# Patient Record
Sex: FEMALE | Race: WHITE | NOT HISPANIC OR LATINO | Employment: PART TIME | URBAN - METROPOLITAN AREA
[De-identification: names, ages, dates, MRNs, and addresses within clinical notes are randomized per-mention and may not be internally consistent; named-entity substitution may affect disease eponyms.]

---

## 2018-06-18 RX ORDER — LEVOTHYROXINE SODIUM 0.03 MG/1
1 TABLET ORAL DAILY
COMMUNITY
Start: 2014-11-17 | End: 2018-06-25

## 2018-06-25 ENCOUNTER — OFFICE VISIT (OUTPATIENT)
Dept: FAMILY MEDICINE CLINIC | Facility: HOSPITAL | Age: 63
End: 2018-06-25
Payer: COMMERCIAL

## 2018-06-25 VITALS
HEART RATE: 66 BPM | TEMPERATURE: 97.5 F | HEIGHT: 68 IN | DIASTOLIC BLOOD PRESSURE: 88 MMHG | BODY MASS INDEX: 22.73 KG/M2 | SYSTOLIC BLOOD PRESSURE: 136 MMHG | WEIGHT: 150 LBS

## 2018-06-25 DIAGNOSIS — Z13.820 SCREENING FOR OSTEOPOROSIS: ICD-10-CM

## 2018-06-25 DIAGNOSIS — Z00.00 ENCOUNTER FOR ANNUAL PHYSICAL EXAM: Primary | ICD-10-CM

## 2018-06-25 DIAGNOSIS — Z12.11 SCREENING FOR COLON CANCER: ICD-10-CM

## 2018-06-25 DIAGNOSIS — E78.5 DYSLIPIDEMIA: ICD-10-CM

## 2018-06-25 DIAGNOSIS — E04.2 NON-TOXIC MULTINODULAR GOITER: ICD-10-CM

## 2018-06-25 DIAGNOSIS — E06.3 HASHIMOTO'S DISEASE: ICD-10-CM

## 2018-06-25 DIAGNOSIS — Z12.31 ENCOUNTER FOR SCREENING MAMMOGRAM FOR BREAST CANCER: ICD-10-CM

## 2018-06-25 PROBLEM — H81.10 BENIGN PAROXYSMAL POSITIONAL VERTIGO: Status: RESOLVED | Noted: 2018-06-25 | Resolved: 2018-06-25

## 2018-06-25 PROBLEM — S52.90XA FRACTURE OF RADIUS: Status: RESOLVED | Noted: 2018-06-25 | Resolved: 2018-06-25

## 2018-06-25 PROCEDURE — 99386 PREV VISIT NEW AGE 40-64: CPT | Performed by: INTERNAL MEDICINE

## 2018-06-25 RX ORDER — CALCIUM GLUCONATE 45(500) MG
1000 TABLET ORAL DAILY
Status: ON HOLD | COMMUNITY
End: 2022-05-19 | Stop reason: CLARIF

## 2018-06-25 RX ORDER — MULTIVITAMIN WITH IRON
3 TABLET ORAL DAILY
COMMUNITY

## 2018-06-25 NOTE — PROGRESS NOTES
Patient is here to re-establish care and for a routine physical exam   Last physical was about 4-5 yrs ago    Health Maintenance:  BW - not done in years    Mammo - last one 2014, she notes no h/o abnormal mammograms, denies current breast symptoms    Dexa - never had one done, no known family h/o osteoporosis, did have wrist fracture after fall on ice at age 49    PAP - has not seen GYN in years, had h/o abnormal PAPs x 1 but that was years ago and has been normal since then    Colonoscopy - pt never had one, is deferring colo but will do FOBT    Diet/exercise - eats a well balanced diet with fruits and vegetables, exercises regularly at home and has a strenuous job    Dentist - every 6 mos, had a tooth pulled recently but denies any symptoms today    Eye Exam - wears glasses most of the time, last eye exam 2 yrs ago    BP up on presentation today  She states she is here with her dgtr and is very worried about her today  Pt has not been taking her Levothyroxine for years  She has switched over to holistic thyroid meds by Dr Soni Christine  He recently moved and she has not had thyroid levels checked in about a year  The med contains thytrophin, Cataplex F, Adrenal Complex, and thyroid complex    Review of Systems   Constitutional: Negative for chills, fatigue, fever and unexpected weight change  HENT: Negative for congestion and sore throat  Eyes: Negative for pain and discharge  Respiratory: Negative for shortness of breath and wheezing  Cardiovascular: Negative for chest pain, palpitations and leg swelling  Gastrointestinal: Negative for abdominal pain, blood in stool, constipation, diarrhea and nausea  Endocrine: Negative for polydipsia and polyuria  Genitourinary: Negative for difficulty urinating and dysuria  Musculoskeletal: Negative for back pain and neck pain  Skin: Negative for rash and wound  Neurological: Negative for dizziness, syncope, light-headedness and headaches  Hematological: Negative for adenopathy  Psychiatric/Behavioral: Negative for behavioral problems and confusion  Social History     Social History    Marital status: Single     Spouse name: N/A    Number of children: N/A    Years of education: N/A     Occupational History    Not on file  Social History Main Topics    Smoking status: Never Smoker    Smokeless tobacco: Never Used    Alcohol use Yes      Comment: social    Drug use: No    Sexual activity: Not on file     Other Topics Concern    Not on file     Social History Narrative    Single, lives alone, part-time employment in [employer]         family history includes Alcohol abuse in her father; Breast cancer in her maternal aunt and mother; COPD in her father; Other in her father; Stroke in her mother; Thyroid cancer in her brother; Thyroid disease in her maternal grandmother      has a past medical history of Benign paroxysmal positional vertigo and Fracture of radius  No Known Allergies      There is no immunization history on file for this patient  Vitals:    06/25/18 1014   BP: 136/88   Pulse:    Temp:      Physical Exam   Constitutional: She appears well-developed and well-nourished  No distress  HENT:   Head: Atraumatic  Left Ear: External ear normal    Mouth/Throat: Oropharynx is clear and moist    Scarring R TM   Eyes: Conjunctivae are normal  Right eye exhibits no discharge  Left eye exhibits no discharge  Neck: Neck supple  No tracheal deviation present  R lower pole of thyroid gland enlarged   Cardiovascular: Normal rate, regular rhythm and normal heart sounds  No murmur heard  Pulmonary/Chest: Effort normal and breath sounds normal  She has no wheezes  She has no rales  Abdominal: Soft  She exhibits no distension  There is no tenderness  Musculoskeletal: She exhibits no edema or deformity  Neurological: She is alert  She exhibits normal muscle tone  Skin: Skin is warm and dry  No rash noted  Psychiatric: She has a normal mood and affect  Her behavior is normal  Judgment normal    Nursing note and vitals reviewed  Edie House was seen today for establish care  Diagnoses and all orders for this visit:    Encounter for annual physical exam        -  Healthy diet, regular exercise encouraged        -  Urged to make appt for eye exam     Encounter for screening mammogram for breast cancer  -     Mammo screening bilateral w cad; Future    Screening for osteoporosis  -     DXA bone density spine hip and pelvis; Future    Screening for colon cancer  -     Occult Bloood,Fecal Immunochemical; Future (refused colonoscopy)    Dyslipidemia  -     CBC and differential  -     Comprehensive metabolic panel  -     Lipid panel    Non-toxic multinodular goiter - thyroid enlarged at R lower pole - urged to do US and labs  -     CBC and differential  -     Comprehensive metabolic panel  -     TSH, 3rd generation; Future  -     T4, free; Future  -     US thyroid; Future  -     TSH, 3rd generation  -     T4, free    Hashimoto's disease  -     CBC and differential  -     Comprehensive metabolic panel  -     TSH, 3rd generation; Future  -     T4, free;  Future  -     TSH, 3rd generation  -     T4, free    Will make appt with Edgar Alcocer or Tobias Buitrago for PAP    Return in about 1 year (around 6/25/2019) for Annual physical

## 2018-06-25 NOTE — PATIENT INSTRUCTIONS

## 2018-06-25 NOTE — PROGRESS NOTES
Assessment/Plan:    No problem-specific Assessment & Plan notes found for this encounter  There are no diagnoses linked to this encounter  Subjective:      Patient ID: Masha Phillips is a 65 y o  female      HPI Pt here to re-establish care - not seen in over 3 yrs            Review of Systems      Objective:      /88   Pulse 66   Temp 97 5 °F (36 4 °C)   Ht 5' 8" (1 727 m)   Wt 68 kg (150 lb)   BMI 22 81 kg/m²          Physical Exam

## 2018-06-29 DIAGNOSIS — R79.89 ELEVATED TSH: Primary | ICD-10-CM

## 2018-06-29 LAB
ALBUMIN SERPL-MCNC: 4.4 G/DL (ref 3.6–4.8)
ALBUMIN/GLOB SERPL: 1.6 {RATIO} (ref 1.2–2.2)
ALP SERPL-CCNC: 92 IU/L (ref 39–117)
ALT SERPL-CCNC: 15 IU/L (ref 0–32)
AST SERPL-CCNC: 37 IU/L (ref 0–40)
BASOPHILS # BLD AUTO: 0 X10E3/UL (ref 0–0.2)
BASOPHILS NFR BLD AUTO: 1 %
BILIRUB SERPL-MCNC: 0.4 MG/DL (ref 0–1.2)
BUN SERPL-MCNC: 15 MG/DL (ref 8–27)
BUN/CREAT SERPL: 17 (ref 12–28)
CALCIUM SERPL-MCNC: 9.8 MG/DL (ref 8.7–10.3)
CHLORIDE SERPL-SCNC: 98 MMOL/L (ref 96–106)
CHOLEST SERPL-MCNC: 258 MG/DL (ref 100–199)
CHOLEST/HDLC SERPL: 3 RATIO (ref 0–4.4)
CO2 SERPL-SCNC: 24 MMOL/L (ref 20–29)
CREAT SERPL-MCNC: 0.89 MG/DL (ref 0.57–1)
EOSINOPHIL # BLD AUTO: 0.1 X10E3/UL (ref 0–0.4)
EOSINOPHIL NFR BLD AUTO: 3 %
ERYTHROCYTE [DISTWIDTH] IN BLOOD BY AUTOMATED COUNT: 13.7 % (ref 12.3–15.4)
GLOBULIN SER-MCNC: 2.8 G/DL (ref 1.5–4.5)
GLUCOSE SERPL-MCNC: 94 MG/DL (ref 65–99)
HCT VFR BLD AUTO: 41.7 % (ref 34–46.6)
HDLC SERPL-MCNC: 85 MG/DL
HGB BLD-MCNC: 13.7 G/DL (ref 11.1–15.9)
IMM GRANULOCYTES # BLD: 0 X10E3/UL (ref 0–0.1)
IMM GRANULOCYTES NFR BLD: 0 %
LDLC SERPL CALC-MCNC: 155 MG/DL (ref 0–99)
LYMPHOCYTES # BLD AUTO: 2 X10E3/UL (ref 0.7–3.1)
LYMPHOCYTES NFR BLD AUTO: 47 %
MCH RBC QN AUTO: 29 PG (ref 26.6–33)
MCHC RBC AUTO-ENTMCNC: 32.9 G/DL (ref 31.5–35.7)
MCV RBC AUTO: 88 FL (ref 79–97)
MONOCYTES # BLD AUTO: 0.3 X10E3/UL (ref 0.1–0.9)
MONOCYTES NFR BLD AUTO: 7 %
NEUTROPHILS # BLD AUTO: 1.9 X10E3/UL (ref 1.4–7)
NEUTROPHILS NFR BLD AUTO: 42 %
PLATELET # BLD AUTO: 257 X10E3/UL (ref 150–379)
POTASSIUM SERPL-SCNC: 4.1 MMOL/L (ref 3.5–5.2)
PROT SERPL-MCNC: 7.2 G/DL (ref 6–8.5)
RBC # BLD AUTO: 4.73 X10E6/UL (ref 3.77–5.28)
SL AMB EGFR AFRICAN AMERICAN: 80 ML/MIN/1.73
SL AMB EGFR NON AFRICAN AMERICAN: 70 ML/MIN/1.73
SL AMB VLDL CHOLESTEROL CALC: 18 MG/DL (ref 5–40)
SODIUM SERPL-SCNC: 139 MMOL/L (ref 134–144)
T4 FREE SERPL-MCNC: 1 NG/DL (ref 0.82–1.77)
TRIGL SERPL-MCNC: 88 MG/DL (ref 0–149)
TSH SERPL DL<=0.005 MIU/L-ACNC: 4.76 UIU/ML (ref 0.45–4.5)
WBC # BLD AUTO: 4.4 X10E3/UL (ref 3.4–10.8)

## 2018-07-09 ENCOUNTER — HOSPITAL ENCOUNTER (OUTPATIENT)
Dept: ULTRASOUND IMAGING | Facility: HOSPITAL | Age: 63
Discharge: HOME | End: 2018-07-09
Payer: COMMERCIAL

## 2018-07-09 DIAGNOSIS — E04.2 NON-TOXIC MULTINODULAR GOITER: ICD-10-CM

## 2018-07-09 PROCEDURE — 76536 US EXAM OF HEAD AND NECK: CPT

## 2018-07-12 ENCOUNTER — ANNUAL EXAM (OUTPATIENT)
Dept: FAMILY MEDICINE CLINIC | Facility: HOSPITAL | Age: 63
End: 2018-07-12
Payer: COMMERCIAL

## 2018-07-12 VITALS
HEIGHT: 67 IN | DIASTOLIC BLOOD PRESSURE: 86 MMHG | SYSTOLIC BLOOD PRESSURE: 132 MMHG | HEART RATE: 68 BPM | BODY MASS INDEX: 23.48 KG/M2 | WEIGHT: 149.6 LBS | TEMPERATURE: 97.4 F

## 2018-07-12 DIAGNOSIS — Z01.419 ENCNTR FOR GYN EXAM (GENERAL) (ROUTINE) W/O ABN FINDINGS: ICD-10-CM

## 2018-07-12 DIAGNOSIS — Z12.11 SCREEN FOR COLON CANCER: Primary | ICD-10-CM

## 2018-07-12 PROCEDURE — G0124 SCREEN C/V THIN LAYER BY MD: HCPCS | Performed by: PATHOLOGY

## 2018-07-12 PROCEDURE — 99214 OFFICE O/P EST MOD 30 MIN: CPT | Performed by: NURSE PRACTITIONER

## 2018-07-12 PROCEDURE — 87624 HPV HI-RISK TYP POOLED RSLT: CPT | Performed by: NURSE PRACTITIONER

## 2018-07-12 PROCEDURE — S0612 ANNUAL GYNECOLOGICAL EXAMINA: HCPCS | Performed by: NURSE PRACTITIONER

## 2018-07-12 PROCEDURE — G0145 SCR C/V CYTO,THINLAYER,RESCR: HCPCS | Performed by: PATHOLOGY

## 2018-07-12 NOTE — PROGRESS NOTES
Pt is a59 y o  No obstetric history on file  ,menopausal, who presents for preventive care    Calcium: Yes  Vitamin D: yes  Exercise: Yes  Colonoscopy: Refuses  Has order for fecal occult blood screen  DEXA: Scheduled for tomorrow  Mammo: Scheduled for tomorrow  tobacco use: No         Past Medical History:   Diagnosis Date    Benign paroxysmal positional vertigo     Resolved: 9/15/2014    Fracture of radius        Past Surgical History:   Procedure Laterality Date    BIOPSY CORE NEEDLE      Thyroid     SECTION      COLPOSCOPY         Ob Hx:   OB History    Para Term  AB Living   2 2 2     2   SAB TAB Ectopic Multiple Live Births                  # Outcome Date GA Lbr Markos/2nd Weight Sex Delivery Anes PTL Lv   2 Term            1 Term                   Gyn HX:  abnormal pap    H/o abnormal pap with colposcopy less than 10 years ago  Colposcopy was normal  Last pap 4-5 years ago was normal    Mom with breast cancer diagnosed at age 79  Last mammo 3 years ago  Gets mammo tomorrow  Refuses colonoscopy  Has order for heme occult  Occasional intercourse  Denies issues with intercourse  Some vaginal dryness  Last period   Gets dexa tomorrow           Gynecologic Exam         Current Outpatient Prescriptions:     calcium gluconate 500 mg tablet, Take 500 mg by mouth daily, Disp: , Rfl:     Magnesium 250 MG TABS, Take 1 tablet by mouth daily, Disp: , Rfl:     Multiple Vitamin (MULTIVITAMINS PO), Take 1 capsule by mouth daily, Disp: , Rfl:     No Known Allergies    Social History     Social History    Marital status: Single     Spouse name: N/A    Number of children: N/A    Years of education: N/A     Social History Main Topics    Smoking status: Never Smoker    Smokeless tobacco: Never Used    Alcohol use Yes      Comment: social    Drug use: No    Sexual activity: Not Asked     Other Topics Concern    None     Social History Narrative    Single, lives alone, part-time employment in [employer]           Family History   Problem Relation Age of Onset    Stroke Mother         CVA    Breast cancer Mother     Alcohol abuse Father     COPD Father     Other Father         Nicotine Abuse    Thyroid cancer Brother     Thyroid disease Maternal Grandmother     Breast cancer Maternal Aunt        ROS:  Genito-Urinary ROS: no dysuria, trouble voiding, or hematuria    Review of Systems   Constitutional: Negative  Respiratory: Negative  Cardiovascular: Negative  Gastrointestinal: Negative  Genitourinary: Negative  Physical Exam   Constitutional: She is oriented to person, place, and time  She appears well-developed and well-nourished  Cardiovascular: Normal rate, regular rhythm and normal heart sounds  Pulmonary/Chest: Effort normal and breath sounds normal    Abdominal: Soft  Bowel sounds are normal  There is no tenderness  No hernia  Genitourinary: Rectum normal, vagina normal and uterus normal  No labial fusion  There is no rash, tenderness, lesion or injury on the right labia  There is no rash, tenderness, lesion or injury on the left labia  Cervix exhibits no motion tenderness, no discharge and no friability  Genitourinary Comments: Ovaries not palpated  Neurological: She is alert and oriented to person, place, and time  Skin: Skin is warm and dry  Capillary refill takes less than 2 seconds  Psychiatric: She has a normal mood and affect  A/P:  Pt is a 59 y o  No obstetric history on file  with       Katie Fer was seen today for gynecologic exam     Diagnoses and all orders for this visit:    Screen for colon cancer    Encntr for gyn exam (general) (routine) w/o abn findings  -     Liquid-based pap, screening    Other orders  -     Cancel: Ambulatory referral to Gastroenterology;  Future      F/U in 1 year for GYN exam

## 2018-07-13 ENCOUNTER — HOSPITAL ENCOUNTER (OUTPATIENT)
Dept: BONE DENSITY | Facility: IMAGING CENTER | Age: 63
Discharge: HOME | End: 2018-07-13
Payer: COMMERCIAL

## 2018-07-13 DIAGNOSIS — Z13.820 SCREENING FOR OSTEOPOROSIS: ICD-10-CM

## 2018-07-13 DIAGNOSIS — Z12.31 ENCOUNTER FOR SCREENING MAMMOGRAM FOR BREAST CANCER: ICD-10-CM

## 2018-07-13 PROCEDURE — 77080 DXA BONE DENSITY AXIAL: CPT

## 2018-07-13 PROCEDURE — 77067 SCR MAMMO BI INCL CAD: CPT

## 2018-07-16 LAB — HPV RRNA GENITAL QL NAA+PROBE: NORMAL

## 2018-07-20 LAB
LAB AP GYN PRIMARY INTERPRETATION: NORMAL
LAB AP LMP: NORMAL
Lab: NORMAL
PATH INTERP SPEC-IMP: NORMAL

## 2018-12-14 ENCOUNTER — OFFICE VISIT (OUTPATIENT)
Dept: FAMILY MEDICINE CLINIC | Facility: HOSPITAL | Age: 63
End: 2018-12-14
Payer: COMMERCIAL

## 2018-12-14 VITALS
BODY MASS INDEX: 24.71 KG/M2 | HEIGHT: 67 IN | TEMPERATURE: 97.5 F | DIASTOLIC BLOOD PRESSURE: 64 MMHG | SYSTOLIC BLOOD PRESSURE: 110 MMHG | OXYGEN SATURATION: 97 % | WEIGHT: 157.4 LBS | HEART RATE: 69 BPM

## 2018-12-14 DIAGNOSIS — J01.40 ACUTE NON-RECURRENT PANSINUSITIS: Primary | ICD-10-CM

## 2018-12-14 PROCEDURE — 3008F BODY MASS INDEX DOCD: CPT | Performed by: NURSE PRACTITIONER

## 2018-12-14 PROCEDURE — 99213 OFFICE O/P EST LOW 20 MIN: CPT | Performed by: NURSE PRACTITIONER

## 2018-12-14 PROCEDURE — 1036F TOBACCO NON-USER: CPT | Performed by: NURSE PRACTITIONER

## 2018-12-14 RX ORDER — AMOXICILLIN 500 MG/1
1000 CAPSULE ORAL EVERY 12 HOURS SCHEDULED
Qty: 40 CAPSULE | Refills: 0 | Status: SHIPPED | OUTPATIENT
Start: 2018-12-14 | End: 2018-12-24

## 2018-12-14 RX ORDER — FLUTICASONE PROPIONATE 50 MCG
SPRAY, SUSPENSION (ML) NASAL
Qty: 16 G | Refills: 0 | Status: SHIPPED | OUTPATIENT
Start: 2018-12-14 | End: 2019-02-14 | Stop reason: SDUPTHER

## 2018-12-14 NOTE — PROGRESS NOTES
Assessment/Plan:    No problem-specific Assessment & Plan notes found for this encounter  Diagnoses and all orders for this visit:    Acute non-recurrent pansinusitis  Comments:  given persistence/worsening, will issue antibiotic and nasal steroid; hold further OTC nasal spray, rest & push fluids  Orders:  -     amoxicillin (AMOXIL) 500 mg capsule; Take 2 capsules (1,000 mg total) by mouth every 12 (twelve) hours for 10 days  -     fluticasone (FLONASE) 50 mcg/act nasal spray; Use 2 sprays in each nostril at bedtime    Other orders  -     THYROID PO; Take by mouth  -     Misc Natural Products (ADRENAL PO); Take by mouth          Subjective:      Patient ID: Genoveva Edwards is a 59 y o  female here for an acute visit  Has had head/sinus pressure for past few weeks  Feels much worse today  Hurts to bend forward and do her sewing  Has tried sinex nasal spray, eye drops, and sudafed without relief  Eye MD suspected sinus infection this week  The following portions of the patient's history were reviewed and updated as appropriate: allergies, current medications, past medical history, past social history and problem list     Review of Systems   Constitutional: Positive for chills (last week)  Negative for activity change, appetite change and fever  HENT: Positive for congestion, ear pain, sinus pain and sinus pressure  Negative for sore throat and trouble swallowing  Respiratory: Negative for cough and shortness of breath  Objective:      /64 (Patient Position: Sitting, Cuff Size: Standard)   Pulse 69   Temp 97 5 °F (36 4 °C) (Tympanic)   Ht 5' 7" (1 702 m)   Wt 71 4 kg (157 lb 6 4 oz)   SpO2 97%   BMI 24 65 kg/m²          Physical Exam   Constitutional: She is oriented to person, place, and time  She appears well-developed and well-nourished  No distress  HENT:   Head: Normocephalic and atraumatic  Right Ear: A middle ear effusion is present     Left Ear: A middle ear effusion is present  Mouth/Throat: Oropharynx is clear and moist  No oropharyngeal exudate  bilat frontal > maxillary sinus pain     Eyes: Conjunctivae are normal  No scleral icterus  Cardiovascular: Normal rate and regular rhythm  Pulmonary/Chest: Effort normal and breath sounds normal  No respiratory distress  Lymphadenopathy:        Head (right side): Submandibular adenopathy present  Head (left side): Submandibular adenopathy present  Neurological: She is alert and oriented to person, place, and time  Skin: Skin is warm and dry  Psychiatric: She has a normal mood and affect  Her behavior is normal  Judgment and thought content normal    Vitals reviewed

## 2018-12-14 NOTE — PATIENT INSTRUCTIONS
Sinusitis   AMBULATORY CARE:   Sinusitis  is inflammation or infection of your sinuses  It is most often caused by a virus  Acute sinusitis may last up to 12 weeks  Chronic sinusitis lasts longer than 12 weeks  Recurrent sinusitis means you have 4 or more times in 1 year  Common symptoms include the following:   · Fever    · Pain, pressure, redness, or swelling around the forehead, cheeks, or eyes    · Thick yellow or green discharge from your nose    · Tenderness when you touch your face over your sinuses    · Dry cough that happens mostly at night or when you lie down    · Headache and face pain that is worse when you lean forward    · Tooth pain, or pain when you chew  Seek care immediately if:   · Your eye and eyelid are red, swollen, and painful  · You cannot open your eye  · You have vision changes, such as double vision  · Your eyeball bulges out or you cannot move your eye  · You are more sleepy than normal, or you notice changes in your ability to think, move, or talk  · You have a stiff neck, a fever, or a bad headache  · You have swelling of your forehead or scalp  Contact your healthcare provider if:   · Your symptoms do not improve after 3 days  · Your symptoms do not go away after 10 days  · You have nausea and are vomiting  · Your nose is bleeding  · You have questions or concerns about your condition or care  Treatment for sinusitis:  Your symptoms may go away on their own  Your healthcare provider may recommend watchful waiting for up to 10 days before starting antibiotics  You may  need any of the following:  · Acetaminophen  decreases pain and fever  It is available without a doctor's order  Ask how much to take and how often to take it  Follow directions  Read the labels of all other medicines you are using to see if they also contain acetaminophen, or ask your doctor or pharmacist  Acetaminophen can cause liver damage if not taken correctly   Do not use more than 4 grams (4,000 milligrams) total of acetaminophen in one day  · NSAIDs , such as ibuprofen, help decrease swelling, pain, and fever  This medicine is available with or without a doctor's order  NSAIDs can cause stomach bleeding or kidney problems in certain people  If you take blood thinner medicine, always ask your healthcare provider if NSAIDs are safe for you  Always read the medicine label and follow directions  · Nasal steroid sprays  may help decrease inflammation in your nose and sinuses  · Decongestants  help reduce swelling and drain mucus in the nose and sinuses  They may help you breathe easier  · Antihistamines  help dry mucus in the nose and relieve sneezing  · Antibiotics  help treat or prevent a bacterial infection  · Take your medicine as directed  Contact your healthcare provider if you think your medicine is not helping or if you have side effects  Tell him or her if you are allergic to any medicine  Keep a list of the medicines, vitamins, and herbs you take  Include the amounts, and when and why you take them  Bring the list or the pill bottles to follow-up visits  Carry your medicine list with you in case of an emergency  Self-care:   · Rinse your sinuses  Use a sinus rinse device to rinse your nasal passages with a saline (salt water) solution or distilled water  Do not use tap water  This will help thin the mucus in your nose and rinse away pollen and dirt  It will also help reduce swelling so you can breathe normally  Ask your healthcare provider how often to do this  · Breathe in steam   Heat a bowl of water until you see steam  Lean over the bowl and make a tent over your head with a large towel  Breathe deeply for about 20 minutes  Be careful not to get too close to the steam or burn yourself  Do this 3 times a day  You can also breathe deeply when you take a hot shower  · Sleep with your head elevated    Place an extra pillow under your head before you go to sleep to help your sinuses drain  · Drink liquids as directed  Ask your healthcare provider how much liquid to drink each day and which liquids are best for you  Liquids will thin the mucus in your nose and help it drain  Avoid drinks that contain alcohol or caffeine  · Do not smoke, and avoid secondhand smoke  Nicotine and other chemicals in cigarettes and cigars can make your symptoms worse  Ask your healthcare provider for information if you currently smoke and need help to quit  E-cigarettes or smokeless tobacco still contain nicotine  Talk to your healthcare provider before you use these products  Prevent the spread of germs that cause sinusitis:  Wash your hands often with soap and water  Wash your hands after you use the bathroom, change a child's diaper, or sneeze  Wash your hands before you prepare or eat food  Follow up with your healthcare provider as directed: You may be referred to an ear, nose, and throat specialist  Write down your questions so you remember to ask them during your visits  © 2017 [facility] Information is for End User's use only and may not be sold, redistributed or otherwise used for commercial purposes  All illustrations and images included in CareNotes® are the copyrighted property of [employer] , [employer]  or [employer]  The above information is an  only  It is not intended as medical advice for individual conditions or treatments  Talk to your doctor, nurse or pharmacist before following any medical regimen to see if it is safe and effective for you

## 2019-02-14 ENCOUNTER — OFFICE VISIT (OUTPATIENT)
Dept: FAMILY MEDICINE CLINIC | Facility: HOSPITAL | Age: 64
End: 2019-02-14
Payer: COMMERCIAL

## 2019-02-14 VITALS
BODY MASS INDEX: 24.8 KG/M2 | TEMPERATURE: 97.8 F | HEART RATE: 82 BPM | WEIGHT: 158 LBS | DIASTOLIC BLOOD PRESSURE: 82 MMHG | HEIGHT: 67 IN | SYSTOLIC BLOOD PRESSURE: 132 MMHG

## 2019-02-14 DIAGNOSIS — J01.91 ACUTE RECURRENT SINUSITIS, UNSPECIFIED LOCATION: Primary | ICD-10-CM

## 2019-02-14 DIAGNOSIS — J31.0 RHINITIS, UNSPECIFIED TYPE: ICD-10-CM

## 2019-02-14 DIAGNOSIS — J01.40 ACUTE NON-RECURRENT PANSINUSITIS: ICD-10-CM

## 2019-02-14 PROBLEM — M85.80 OSTEOPENIA: Status: ACTIVE | Noted: 2019-02-14

## 2019-02-14 PROCEDURE — 3008F BODY MASS INDEX DOCD: CPT | Performed by: INTERNAL MEDICINE

## 2019-02-14 PROCEDURE — 1036F TOBACCO NON-USER: CPT | Performed by: INTERNAL MEDICINE

## 2019-02-14 PROCEDURE — 99214 OFFICE O/P EST MOD 30 MIN: CPT | Performed by: INTERNAL MEDICINE

## 2019-02-14 RX ORDER — FLUTICASONE PROPIONATE 50 MCG
SPRAY, SUSPENSION (ML) NASAL
Qty: 16 G | Refills: 2 | Status: SHIPPED | OUTPATIENT
Start: 2019-02-14 | End: 2019-06-18 | Stop reason: SDUPTHER

## 2019-02-14 RX ORDER — DOXYCYCLINE HYCLATE 100 MG
100 TABLET ORAL 2 TIMES DAILY
Qty: 20 TABLET | Refills: 0 | Status: SHIPPED | OUTPATIENT
Start: 2019-02-14 | End: 2019-02-24

## 2019-02-14 NOTE — PROGRESS NOTES
Assessment/Plan:    Rhinitis  Urged to con't Flonase daily and start OTC antihistamine WITH it, urged gargles/hot tea/lozenges/rest/fluids, if not better needs to see allergist       Diagnoses and all orders for this visit:    Acute recurrent sinusitis, unspecified location  Comments:  D/w pt that her symptoms certainly may not be bacterial d/t duration of symptoms with known allergies - symptoms did improve with abx and have again worsened, will give Doxy one last time and if not better will have to see A/I, con't Flonase and restart OTC antihistamine with it,   Rest/fluids/lozenges/hot tea/garles were recommended; d/w pt that cough can last 4-6 wks but call with new/worsening symptoms     Orders:  -     doxycycline hyclate (VIBRA-TABS) 100 mg tablet; Take 1 tablet (100 mg total) by mouth 2 (two) times a day for 10 days    Acute non-recurrent pansinusitis  Comments:  given persistence/worsening, will issue antibiotic and nasal steroid; hold further OTC nasal spray, rest & push fluids  Orders:  -     fluticasone (FLONASE) 50 mcg/act nasal spray; Use 2 sprays in each nostril at bedtime    Rhinitis, unspecified type          Subjective:      Patient ID: Kashif Allen is a 59 y o  female  HPI Pt here with c/o persistent cold symptoms  Symptoms originally started in Oct and started with congestion and she thought it was allergies - has h/o allergies  She used saline nasal spray and symptoms improved  She con't to have congestion and cough and was seen by Ema Khalil in Dec   She was tx for sinusitis and was given Amoxil and Flonase  She noted symptoms again improved but never resolved  Since Dec she con't again to have runny nose and cough and post nasal drip  She notes a ST but no ear  No fevers/chills/rashes/urinary symptoms  She notes SOB and wheezing  She is still using Flonase but no cough med or decongestant    She did try an OTC antihistamine in the past (NOT with Flonase at the same time) and did not feel it really helped  Review of Systems   Constitutional: Positive for fatigue  Negative for chills and fever  HENT: Positive for congestion, postnasal drip, rhinorrhea, sinus pressure and sore throat  Negative for ear pain  Eyes: Positive for redness  Negative for discharge and itching  Respiratory: Positive for cough, shortness of breath and wheezing  Cardiovascular: Negative for chest pain and palpitations  Gastrointestinal: Negative for diarrhea, nausea and vomiting  Genitourinary: Negative for difficulty urinating and dysuria  Musculoskeletal: Negative for arthralgias and myalgias  Skin: Negative for rash  Neurological: Positive for headaches  Negative for dizziness  Psychiatric/Behavioral: Negative for behavioral problems and confusion  Objective:    /82 (BP Location: Right arm, Patient Position: Sitting, Cuff Size: Standard)   Pulse 82   Temp 97 8 °F (36 6 °C)   Ht 5' 7" (1 702 m)   Wt 71 7 kg (158 lb)   BMI 24 75 kg/m²      Physical Exam   Constitutional: She appears well-developed and well-nourished  No distress  HENT:   Head: Normocephalic and atraumatic  Right Ear: External ear normal    Left Ear: External ear normal    Mouth/Throat: Oropharynx is clear and moist  No oropharyngeal exudate  Eyes: Conjunctivae are normal  Right eye exhibits no discharge  Left eye exhibits no discharge  Neck: Neck supple  No tracheal deviation present  Cardiovascular: Normal rate, regular rhythm and normal heart sounds  Exam reveals no friction rub  No murmur heard  Pulmonary/Chest: Effort normal and breath sounds normal  No respiratory distress  She has no wheezes  She has no rales  Musculoskeletal: She exhibits no edema  Lymphadenopathy:     She has cervical adenopathy  Neurological: She is alert  She exhibits normal muscle tone  Skin: Skin is warm and dry  No rash noted  Psychiatric: She has a normal mood and affect   Her behavior is normal    Nursing note and vitals reviewed

## 2019-02-14 NOTE — ASSESSMENT & PLAN NOTE
Urged to con't Flonase daily and start OTC antihistamine WITH it, urged gargles/hot tea/lozenges/rest/fluids, if not better needs to see allergist

## 2019-06-18 DIAGNOSIS — J01.40 ACUTE NON-RECURRENT PANSINUSITIS: ICD-10-CM

## 2019-06-18 RX ORDER — FLUTICASONE PROPIONATE 50 MCG
SPRAY, SUSPENSION (ML) NASAL
Qty: 1 BOTTLE | Refills: 0 | Status: SHIPPED | OUTPATIENT
Start: 2019-06-18 | End: 2019-08-29 | Stop reason: SDUPTHER

## 2019-07-16 DIAGNOSIS — Z12.39 SCREENING FOR BREAST CANCER: Primary | ICD-10-CM

## 2019-07-22 ENCOUNTER — ANNUAL EXAM (OUTPATIENT)
Dept: FAMILY MEDICINE CLINIC | Facility: HOSPITAL | Age: 64
End: 2019-07-22
Payer: COMMERCIAL

## 2019-07-22 VITALS
HEART RATE: 68 BPM | TEMPERATURE: 97.4 F | BODY MASS INDEX: 24.71 KG/M2 | WEIGHT: 157.4 LBS | SYSTOLIC BLOOD PRESSURE: 126 MMHG | DIASTOLIC BLOOD PRESSURE: 72 MMHG | HEIGHT: 67 IN

## 2019-07-22 DIAGNOSIS — R79.89 ELEVATED TSH: ICD-10-CM

## 2019-07-22 DIAGNOSIS — E78.5 DYSLIPIDEMIA: ICD-10-CM

## 2019-07-22 DIAGNOSIS — E06.3 HASHIMOTO'S DISEASE: ICD-10-CM

## 2019-07-22 DIAGNOSIS — Z11.59 ENCOUNTER FOR HEPATITIS C SCREENING TEST FOR LOW RISK PATIENT: ICD-10-CM

## 2019-07-22 DIAGNOSIS — E04.2 NON-TOXIC MULTINODULAR GOITER: ICD-10-CM

## 2019-07-22 DIAGNOSIS — Z01.419 ENCNTR FOR GYN EXAM (GENERAL) (ROUTINE) W/O ABN FINDINGS: Primary | ICD-10-CM

## 2019-07-22 DIAGNOSIS — Z12.4 SCREENING FOR CERVICAL CANCER: ICD-10-CM

## 2019-07-22 DIAGNOSIS — R87.610 ASCUS OF CERVIX WITH NEGATIVE HIGH RISK HPV: ICD-10-CM

## 2019-07-22 DIAGNOSIS — Z12.11 SCREEN FOR COLON CANCER: ICD-10-CM

## 2019-07-22 PROCEDURE — S0612 ANNUAL GYNECOLOGICAL EXAMINA: HCPCS | Performed by: NURSE PRACTITIONER

## 2019-07-22 PROCEDURE — 99214 OFFICE O/P EST MOD 30 MIN: CPT | Performed by: NURSE PRACTITIONER

## 2019-07-22 NOTE — PROGRESS NOTES
Pt is a58 y o  [id number] ,menopausal, who presents for preventive care  Partner same           Calcium:Yes  Vitamin D:Yes  Exercise:Yes  Colonoscopy:Refuses  Agreeable to cologuard  DEXA:2018- osteopenia  Mammo: 2018  tobacco use No     Past Medical History:   Diagnosis Date    Benign paroxysmal positional vertigo     Resolved: 9/15/2014    Fracture of radius        Past Surgical History:   Procedure Laterality Date    BIOPSY CORE NEEDLE      Thyroid     SECTION      COLPOSCOPY         Ob Hx:   OB History    Para Term  AB Living   2 2 2     2   SAB TAB Ectopic Multiple Live Births                  # Outcome Date GA Lbr Markos/2nd Weight Sex Delivery Anes PTL Lv   2 Term            1 Term                Gyn HX:  abnormal pap   Had abnormal pap last year  ASCUS with HPV negative  Denies any vaginal bleeding  Post menopausal  No pelvic pain or pressure  Vaginal dryness  Advance is painful at times  Uses some sort of holistic type lubricant with little effectiveness  H/o enlarged thyroid glands and nodules  Last US was 2018  FNA was not recommended although she has had in the past  No significant change so f/u US not recommended unless clinically indicated  Pt feels nodule is getting smaller but can be painful at times  No difficulty swallowing  H/o hashimoto's thyroiditis  Last TSH was elevated  Pt has not had rechecked           Current Outpatient Medications:     calcium gluconate 500 mg tablet, Take 500 mg by mouth daily, Disp: , Rfl:     fluticasone (FLONASE) 50 mcg/act nasal spray, USE 2 SPRAYS IN EACH NOSTRIL AT BEDTIME, Disp: 1 Bottle, Rfl: 0    Magnesium 250 MG TABS, Take 1 tablet by mouth daily, Disp: , Rfl:     Misc Natural Products (ADRENAL PO), Take by mouth, Disp: , Rfl:     Multiple Vitamin (MULTIVITAMINS PO), Take 1 capsule by mouth daily, Disp: , Rfl:     THYROID PO, Take by mouth, Disp: , Rfl:     No Known Allergies    Social History     Socioeconomic History    Marital status: Single     Spouse name: None    Number of children: None    Years of education: None    Highest education level: None   Occupational History    None   Social Needs    Financial resource strain: None    Food insecurity:     Worry: None     Inability: None    Transportation needs:     Medical: None     Non-medical: None   Tobacco Use    Smoking status: Never Smoker    Smokeless tobacco: Never Used   Substance and Sexual Activity    Alcohol use: Yes     Comment: social    Drug use: No    Sexual activity: None   Lifestyle    Physical activity:     Days per week: None     Minutes per session: None    Stress: None   Relationships    Social connections:     Talks on phone: None     Gets together: None     Attends Orthodox service: None     Active member of club or organization: None     Attends meetings of clubs or organizations: None     Relationship status: None    Intimate partner violence:     Fear of current or ex partner: None     Emotionally abused: None     Physically abused: None     Forced sexual activity: None   Other Topics Concern    None   Social History Narrative    Single, lives alone, part-time employment in [employer]       Family History   Problem Relation Age of Onset    Stroke Mother         CVA    Breast cancer Mother     Alcohol abuse Father     COPD Father     Other Father         Nicotine Abuse    Thyroid cancer Brother     Thyroid disease Maternal Grandmother     Breast cancer Maternal Aunt        Review of Systems   Constitutional: Negative for chills, fatigue and fever  Respiratory: Negative for shortness of breath  Cardiovascular: Negative for chest pain, palpitations and leg swelling  Gastrointestinal: Negative for abdominal pain, constipation, diarrhea, nausea and vomiting  Endocrine: Negative  Genitourinary: Negative          Blood pressure 126/72, pulse 68, temperature (!) 97 4 °F (36 3 °C), temperature source Tympanic, height 5' 7" (1 702 m), weight 71 4 kg (157 lb 6 4 oz)  and Body mass index is 24 65 kg/m²  Physical Exam   Constitutional: She is oriented to person, place, and time  She appears well-developed and well-nourished  Genitourinary: Rectum normal, vagina normal and uterus normal  There is no rash, tenderness, lesion, injury or Bartholin's cyst on the right labia  There is no rash, tenderness, lesion, injury or Bartholin's cyst on the left labia  No vaginal discharge found  Right adnexum does not display mass, does not display tenderness, does not display fullness and palpable  Left adnexum does not display mass, does not display tenderness, does not display fullness and palpable  Cervix exhibits pinkness  Cervix does not exhibit motion tenderness, discharge, friability or polyp  Rectal exam shows guaiac negative stool  Neck: Thyroid mass and thyromegaly present  Cardiovascular: Normal rate, regular rhythm, normal heart sounds and intact distal pulses  Pulmonary/Chest: Effort normal and breath sounds normal  Right breast exhibits no inverted nipple, no mass, no nipple discharge, no skin change and no tenderness  Left breast exhibits no inverted nipple, no mass, no nipple discharge, no skin change and no tenderness  No breast swelling  Abdominal: Soft  Bowel sounds are normal    Neurological: She is alert and oriented to person, place, and time  Skin: Skin is warm and dry  Capillary refill takes less than 2 seconds  Psychiatric: She has a normal mood and affect  Her behavior is normal  Judgment and thought content normal        A/P:  Pt is a 62 y o  Authur Ducking with       Eliezer Goodell was seen today for gynecologic exam     Diagnoses and all orders for this visit:    Encntr for gyn exam (general) (routine) w/o abn findings    Screening for cervical cancer  -     Pap IG, rfx HPV ASCU,16/18    ASCUS of cervix with negative high risk HPV  -     Pap IG, rfx HPV ASCU,16/18    Elevated TSH  -     T4, free;  Future  -     T3, free; Future  -     TSH, 3rd generation; Future  -     T4, free  -     T3, free  -     TSH, 3rd generation    Hashimoto's disease  -     T4, free; Future  -     T3, free; Future  -     TSH, 3rd generation; Future  -     T4, free  -     T3, free  -     TSH, 3rd generation    Non-toxic multinodular goiter  -     T4, free; Future  -     T3, free; Future  -     TSH, 3rd generation; Future  -     T4, free  -     T3, free  -     TSH, 3rd generation    Dyslipidemia  -     CBC and differential; Future  -     Comprehensive metabolic panel; Future  -     Lipid panel; Future  -     CBC and differential  -     Comprehensive metabolic panel  -     Lipid panel    Screen for colon cancer  -     Cologuard; Future    Encounter for hepatitis C screening test for low risk patient  -     Hepatitis C antibody;  Future  -     Hepatitis C antibody

## 2019-07-30 LAB
CYTOLOGIST CVX/VAG CYTO: ABNORMAL
DX ICD CODE: ABNORMAL
DX ICD CODE: ABNORMAL
HPV I/H RISK 1 DNA CVX QL PROBE+SIG AMP: POSITIVE
HPV16 DNA CVX QL PROBE+SIG AMP: NEGATIVE
HPV18 DNA CVX QL PROBE+SIG AMP: NEGATIVE
OTHER STN SPEC: ABNORMAL
OTHER STN SPEC: ABNORMAL
PATH REPORT.FINAL DX SPEC: ABNORMAL
PATHOLOGIST CVX/VAG CYTO: ABNORMAL
SL AMB NOTE:: ABNORMAL
SL AMB SPECIMEN ADEQUACY: ABNORMAL
SL AMB TEST METHODOLOGY: ABNORMAL

## 2019-08-09 LAB
ALBUMIN SERPL-MCNC: 4.4 G/DL (ref 3.6–4.8)
ALBUMIN/GLOB SERPL: 1.6 {RATIO} (ref 1.2–2.2)
ALP SERPL-CCNC: 86 IU/L (ref 39–117)
ALT SERPL-CCNC: 10 IU/L (ref 0–32)
AST SERPL-CCNC: 36 IU/L (ref 0–40)
BASOPHILS # BLD AUTO: 0 X10E3/UL (ref 0–0.2)
BASOPHILS NFR BLD AUTO: 1 %
BILIRUB SERPL-MCNC: 0.4 MG/DL (ref 0–1.2)
BUN SERPL-MCNC: 15 MG/DL (ref 8–27)
BUN/CREAT SERPL: 17 (ref 12–28)
CALCIUM SERPL-MCNC: 9.5 MG/DL (ref 8.7–10.3)
CHLORIDE SERPL-SCNC: 101 MMOL/L (ref 96–106)
CHOLEST SERPL-MCNC: 254 MG/DL (ref 100–199)
CHOLEST/HDLC SERPL: 3.1 RATIO (ref 0–4.4)
CO2 SERPL-SCNC: 23 MMOL/L (ref 20–29)
CREAT SERPL-MCNC: 0.87 MG/DL (ref 0.57–1)
EOSINOPHIL # BLD AUTO: 0.1 X10E3/UL (ref 0–0.4)
EOSINOPHIL NFR BLD AUTO: 2 %
ERYTHROCYTE [DISTWIDTH] IN BLOOD BY AUTOMATED COUNT: 14.9 % (ref 12.3–15.4)
GLOBULIN SER-MCNC: 2.7 G/DL (ref 1.5–4.5)
GLUCOSE SERPL-MCNC: 94 MG/DL (ref 65–99)
HCT VFR BLD AUTO: 38.7 % (ref 34–46.6)
HDLC SERPL-MCNC: 83 MG/DL
HGB BLD-MCNC: 13.1 G/DL (ref 11.1–15.9)
IMM GRANULOCYTES # BLD: 0 X10E3/UL (ref 0–0.1)
IMM GRANULOCYTES NFR BLD: 0 %
LDLC SERPL CALC-MCNC: 154 MG/DL (ref 0–99)
LYMPHOCYTES # BLD AUTO: 2.3 X10E3/UL (ref 0.7–3.1)
LYMPHOCYTES NFR BLD AUTO: 48 %
MCH RBC QN AUTO: 29.2 PG (ref 26.6–33)
MCHC RBC AUTO-ENTMCNC: 33.9 G/DL (ref 31.5–35.7)
MCV RBC AUTO: 86 FL (ref 79–97)
MONOCYTES # BLD AUTO: 0.4 X10E3/UL (ref 0.1–0.9)
MONOCYTES NFR BLD AUTO: 9 %
NEUTROPHILS # BLD AUTO: 1.9 X10E3/UL (ref 1.4–7)
NEUTROPHILS NFR BLD AUTO: 40 %
PLATELET # BLD AUTO: 253 X10E3/UL (ref 150–450)
POTASSIUM SERPL-SCNC: 4.1 MMOL/L (ref 3.5–5.2)
PROT SERPL-MCNC: 7.1 G/DL (ref 6–8.5)
RBC # BLD AUTO: 4.48 X10E6/UL (ref 3.77–5.28)
SL AMB EGFR AFRICAN AMERICAN: 82 ML/MIN/1.73
SL AMB EGFR NON AFRICAN AMERICAN: 71 ML/MIN/1.73
SL AMB VLDL CHOLESTEROL CALC: 17 MG/DL (ref 5–40)
SODIUM SERPL-SCNC: 137 MMOL/L (ref 134–144)
T3FREE SERPL-MCNC: 3.2 PG/ML (ref 2–4.4)
T4 FREE SERPL-MCNC: 1.04 NG/DL (ref 0.82–1.77)
TRIGL SERPL-MCNC: 85 MG/DL (ref 0–149)
TSH SERPL DL<=0.005 MIU/L-ACNC: 5.17 UIU/ML (ref 0.45–4.5)
WBC # BLD AUTO: 4.7 X10E3/UL (ref 3.4–10.8)

## 2019-08-12 DIAGNOSIS — E06.3 HASHIMOTO'S DISEASE: Primary | ICD-10-CM

## 2019-08-26 ENCOUNTER — HOSPITAL ENCOUNTER (OUTPATIENT)
Dept: BONE DENSITY | Facility: IMAGING CENTER | Age: 64
Discharge: HOME | End: 2019-08-26
Payer: COMMERCIAL

## 2019-08-26 VITALS — WEIGHT: 157 LBS | BODY MASS INDEX: 24.64 KG/M2 | HEIGHT: 67 IN

## 2019-08-26 DIAGNOSIS — Z12.39 SCREENING FOR BREAST CANCER: ICD-10-CM

## 2019-08-26 PROCEDURE — 77067 SCR MAMMO BI INCL CAD: CPT

## 2019-08-26 PROCEDURE — 77063 BREAST TOMOSYNTHESIS BI: CPT

## 2019-08-29 ENCOUNTER — OFFICE VISIT (OUTPATIENT)
Dept: FAMILY MEDICINE CLINIC | Facility: HOSPITAL | Age: 64
End: 2019-08-29
Payer: COMMERCIAL

## 2019-08-29 VITALS
DIASTOLIC BLOOD PRESSURE: 70 MMHG | HEIGHT: 67 IN | HEART RATE: 78 BPM | BODY MASS INDEX: 24.64 KG/M2 | WEIGHT: 157 LBS | TEMPERATURE: 98 F | SYSTOLIC BLOOD PRESSURE: 128 MMHG

## 2019-08-29 DIAGNOSIS — J01.40 ACUTE NON-RECURRENT PANSINUSITIS: ICD-10-CM

## 2019-08-29 DIAGNOSIS — Z00.00 ANNUAL PHYSICAL EXAM: Primary | ICD-10-CM

## 2019-08-29 PROCEDURE — 99396 PREV VISIT EST AGE 40-64: CPT | Performed by: INTERNAL MEDICINE

## 2019-08-29 RX ORDER — FLUTICASONE PROPIONATE 50 MCG
2 SPRAY, SUSPENSION (ML) NASAL
Qty: 1 BOTTLE | Refills: 5 | Status: SHIPPED | OUTPATIENT
Start: 2019-08-29 | End: 2021-09-02

## 2019-08-29 NOTE — PROGRESS NOTES
ADULT ANNUAL PHYSICAL  [facility]'s Physician Dayanna Huerta MD    NAME: Alina Olvera  AGE: 62 y o  SEX: female  : 1955     DATE: 2019     Assessment and Plan:     Problem List Items Addressed This Visit     None      Visit Diagnoses     Annual physical exam    -  Primary    Acute non-recurrent pansinusitis        given persistence/worsening, will issue antibiotic and nasal steroid; hold further OTC nasal spray, rest & push fluids    Relevant Medications    fluticasone (FLONASE) 50 mcg/act nasal spray          Immunizations and preventive care screenings were discussed with patient today  Appropriate education was printed on patient's after visit summary  Counseling:  Alcohol/drug use: discussed moderation in alcohol intake and avoidance of illicit drug use  Dental Health: discussed importance of regular tooth brushing, flossing, and dental visits  · Injury prevention: discussed safety/seat belts, safety helmets, smoke detectors, carbon dioxide detectors, and smoking near bedding or upholstery  · Colon cancer screening: Cologuard neg  - repeat 3 yrs  · Breast cancer screening: neg mammo   · Cervical cancer screening: PAP     Return in 1 year (on 2020)  Chief Complaint:     Chief Complaint   Patient presents with    Annual Exam      History of Present Illness:     Adult Annual Physical   Patient here for a comprehensive physical exam  The patient reports no problems  Diet and Physical Activity  · Diet/Nutrition: well balanced diet, limited junk food and consuming 3-5 servings of fruits/vegetables daily  · Exercise: walking and walks 30 min 2-3 times a week and does light weights the other days  Depression Screening  PHQ-9 Depression Screening    PHQ-9:    Frequency of the following problems over the past two weeks:            General Health  · Sleep: sleeps well and gets 7-8 hours of sleep on average     · Hearing: normal - bilateral   · Vision: vision problems: has a cataract in the R eye and wears glasses  · Dental: regular dental visits, brushes teeth twice daily and flosses regularly  /GYN Health  · Patient is: postmenopausal  · Last menstrual period: age 47  · Contraceptive method: postmenopausal    Review of Systems:     Review of Systems   Constitutional: Negative for chills, fatigue, fever and unexpected weight change  HENT: Negative for congestion, hearing loss and sore throat  Eyes: Negative for pain and visual disturbance  Respiratory: Negative for cough, shortness of breath and wheezing  Cardiovascular: Negative for chest pain, palpitations and leg swelling  Gastrointestinal: Negative for abdominal pain, blood in stool, constipation, diarrhea and nausea  Endocrine: Negative for polydipsia and polyuria  Genitourinary: Negative for difficulty urinating, dysuria, hematuria, vaginal bleeding and vaginal pain  Musculoskeletal: Positive for arthralgias  Negative for back pain and neck pain  Skin: Negative for rash and wound  Neurological: Negative for dizziness, syncope, light-headedness and headaches  Hematological: Negative for adenopathy  Psychiatric/Behavioral: Negative for behavioral problems, confusion and dysphoric mood        Past Medical History:     Past Medical History:   Diagnosis Date    Benign paroxysmal positional vertigo     Resolved: 9/15/2014    Fracture of radius       Past Surgical History:     Past Surgical History:   Procedure Laterality Date    BIOPSY CORE NEEDLE      Thyroid     SECTION      COLPOSCOPY        Social History:     Social History     Socioeconomic History    Marital status: Single     Spouse name: None    Number of children: None    Years of education: None    Highest education level: None   Occupational History    None   Social Needs    Financial resource strain: None    Food insecurity:     Worry: None     Inability: None    Transportation needs:     Medical: None Non-medical: None   Tobacco Use    Smoking status: Never Smoker    Smokeless tobacco: Never Used   Substance and Sexual Activity    Alcohol use: Yes     Comment: social    Drug use: No    Sexual activity: None   Lifestyle    Physical activity:     Days per week: None     Minutes per session: None    Stress: None   Relationships    Social connections:     Talks on phone: None     Gets together: None     Attends Cheondoism service: None     Active member of club or organization: None     Attends meetings of clubs or organizations: None     Relationship status: None    Intimate partner violence:     Fear of current or ex partner: None     Emotionally abused: None     Physically abused: None     Forced sexual activity: None   Other Topics Concern    None   Social History Narrative    Single, lives alone, part-time employment in [employer]      Family History:     Family History   Problem Relation Age of Onset    Stroke Mother         CVA    Breast cancer Mother 77    Alcohol abuse Father     COPD Father     Other Father         Nicotine Abuse    Thyroid cancer Brother     Thyroid disease Maternal Grandmother     Breast cancer Maternal Aunt 36    Breast cancer additional onset Maternal Aunt 48    No Known Problems Sister     No Known Problems Daughter     No Known Problems Maternal Grandfather     No Known Problems Paternal Grandmother     No Known Problems Paternal Grandfather     No Known Problems Daughter     No Known Problems Maternal Aunt     No Known Problems Maternal Aunt     No Known Problems Maternal Aunt       Current Medications:     Current Outpatient Medications   Medication Sig Dispense Refill    calcium gluconate 500 mg tablet Take 500 mg by mouth daily      fluticasone (FLONASE) 50 mcg/act nasal spray 2 sprays into each nostril daily at bedtime 1 Bottle 5    Magnesium 250 MG TABS Take 1 tablet by mouth daily      Misc Natural Products (ADRENAL PO) Take by mouth      Multiple Vitamin (MULTIVITAMINS PO) Take 1 capsule by mouth daily      THYROID PO Take by mouth       No current facility-administered medications for this visit  Allergies:     No Known Allergies   Physical Exam:     /70 (BP Location: Right arm, Patient Position: Sitting, Cuff Size: Standard)   Pulse 78   Temp 98 °F (36 7 °C)   Ht 5' 7" (1 702 m)   Wt 71 2 kg (157 lb)   BMI 24 59 kg/m²     Physical Exam   Constitutional: She appears well-developed and well-nourished  No distress  HENT:   Head: Normocephalic and atraumatic  Right Ear: External ear normal    Left Ear: External ear normal    Mouth/Throat: Oropharynx is clear and moist    Eyes: Conjunctivae are normal  Right eye exhibits no discharge  Left eye exhibits no discharge  Neck: Neck supple  No tracheal deviation present  Cardiovascular: Normal rate, regular rhythm and normal heart sounds  Exam reveals no friction rub  No murmur heard  Pulmonary/Chest: Effort normal and breath sounds normal  No respiratory distress  She has no wheezes  She has no rales  Abdominal: Soft  She exhibits no distension  There is no tenderness  There is no guarding  Musculoskeletal: She exhibits no edema  Lymphadenopathy:     She has no cervical adenopathy  Neurological: She is alert  She exhibits normal muscle tone  Skin: Skin is warm and dry  No rash noted  Psychiatric: She has a normal mood and affect  Her behavior is normal    Nursing note and vitals reviewed        Bay Barnett MD

## 2019-08-29 NOTE — PATIENT INSTRUCTIONS
Wellness Visit for Adults   AMBULATORY CARE:   A wellness visit  is when you see your healthcare provider to get screened for health problems  You can also get advice on how to stay healthy  Write down your questions so you remember to ask them  Ask your healthcare provider how often you should have a wellness visit  What happens at a wellness visit:  Your healthcare provider will ask about your health, and your family history of health problems  This includes high blood pressure, heart disease, and cancer  He or she will ask if you have symptoms that concern you, if you smoke, and about your mood  You may also be asked about your intake of medicines, supplements, food, and alcohol  Any of the following may be done:  · Your weight  will be checked  Your height may also be checked so your body mass index (BMI) can be calculated  Your BMI shows if you are at a healthy weight  · Your blood pressure  and heart rate will be checked  Your temperature may also be checked  · Blood and urine tests  may be done  Blood tests may be done to check your cholesterol levels  Abnormal cholesterol levels increase your risk for heart disease and stroke  You may also need a blood or urine test to check for diabetes if you are at increased risk  Urine tests may be done to look for signs of an infection or kidney disease  · A physical exam  includes checking your heartbeat and lungs with a stethoscope  Your healthcare provider may also check your skin to look for sun damage  · Screening tests  may be recommended  A screening test is done to check for diseases that may not cause symptoms  The screening tests you may need depend on your age, gender, family history, and lifestyle habits  For example, colorectal screening may be recommended if you are [address] old or older  Screening tests you need if you are a woman:   · A Pap smear  is used to screen for cervical cancer   Pap smears are usually done every 3 to 5 years depending on your age  You may need them more often if you have had abnormal Pap smear test results in the past  Ask your healthcare provider how often you should have a Pap smear  · A mammogram  is an x-ray of your breasts to screen for breast cancer  Experts recommend mammograms every 2 years starting at age 48 years  You may need a mammogram at age 52 years or younger if you have an increased risk for breast cancer  Talk to your healthcare provider about when you should start having mammograms and how often you need them  Vaccines you may need:   · Get an influenza vaccine  every year  The influenza vaccine protects you from the flu  Several types of viruses cause the flu  The viruses change over time, so new vaccines are made each year  · Get a tetanus-diphtheria (Td) booster vaccine  every 10 years  This vaccine protects you against tetanus and diphtheria  Tetanus is a severe infection that may cause painful muscle spasms and lockjaw  Diphtheria is a severe bacterial infection that causes a thick covering in the back of your mouth and throat  · Get a human papillomavirus (HPV) vaccine  if you are female and aged 23 to 32 or male 23 to 24 and never received it  This vaccine protects you from HPV infection  HPV is the most common infection spread by sexual contact  HPV may also cause vaginal, penile, and anal cancers  · Get a pneumococcal vaccine  if you are aged 72 years or older  The pneumococcal vaccine is an injection given to protect you from pneumococcal disease  Pneumococcal disease is an infection caused by pneumococcal bacteria  The infection may cause pneumonia, meningitis, or an ear infection  · Get a shingles vaccine  if you are aged 61 or older, even if you have had shingles before  The shingles vaccine is an injection to protect you from the varicella-zoster virus  This is the same virus that causes chickenpox   Shingles is a painful rash that develops in people who had chickenpox or have been exposed to the virus  How to eat healthy:  My Plate is a model for planning healthy meals  It shows the types and amounts of foods that should go on your plate  Fruits and vegetables make up about half of your plate, and grains and protein make up the other half  A serving of dairy is included on the side of your plate  The amount of calories and serving sizes you need depends on your age, gender, weight, and height  Examples of healthy foods are listed below:  · Eat a variety of vegetables  such as dark green, red, and orange vegetables  You can also include canned vegetables low in sodium (salt) and frozen vegetables without added butter or sauces  · Eat a variety of fresh fruits , canned fruit in 100% juice, frozen fruit, and dried fruit  · Include whole grains  At least half of the grains you eat should be whole grains  Examples include whole-wheat bread, wheat pasta, brown rice, and whole-grain cereals such as oatmeal     · Eat a variety of protein foods such as seafood (fish and shellfish), lean meat, and poultry without skin (turkey and chicken)  Examples of lean meats include pork leg, shoulder, or tenderloin, and beef round, sirloin, tenderloin, and extra lean ground beef  Other protein foods include eggs and egg substitutes, beans, peas, soy products, nuts, and seeds  · Choose low-fat dairy products such as skim or 1% milk or low-fat yogurt, cheese, and cottage cheese  · Limit unhealthy fats  such as butter, hard margarine, and shortening  Exercise:  Exercise at least 30 minutes per day on most days of the week  Some examples of exercise include walking, biking, dancing, and swimming  You can also fit in more physical activity by taking the stairs instead of the elevator or parking farther away from stores  Include muscle strengthening activities 2 days each week  Regular exercise provides many health benefits   It helps you manage your weight, and decreases your risk for type 2 diabetes, heart disease, stroke, and high blood pressure  Exercise can also help improve your mood  Ask your healthcare provider about the best exercise plan for you  General health and safety guidelines:   · Do not smoke  Nicotine and other chemicals in cigarettes and cigars can cause lung damage  Ask your healthcare provider for information if you currently smoke and need help to quit  E-cigarettes or smokeless tobacco still contain nicotine  Talk to your healthcare provider before you use these products  · Limit alcohol  A drink of alcohol is 12 ounces of beer, 5 ounces of wine, or 1½ ounces of liquor  · Lose weight, if needed  Being overweight increases your risk of certain health conditions  These include heart disease, high blood pressure, type 2 diabetes, and certain types of cancer  · Protect your skin  Do not sunbathe or use tanning beds  Use sunscreen with a SPF 15 or higher  Apply sunscreen at least 15 minutes before you go outside  Reapply sunscreen every 2 hours  Wear protective clothing, hats, and sunglasses when you are outside  · Drive safely  Always wear your seatbelt  Make sure everyone in your car wears a seatbelt  A seatbelt can save your life if you are in an accident  Do not use your cell phone when you are driving  This could distract you and cause an accident  Pull over if you need to make a call or send a text message  · Practice safe sex  Use latex condoms if are sexually active and have more than one partner  Your healthcare provider may recommend screening tests for sexually transmitted infections (STIs)  · Wear helmets, lifejackets, and protective gear  Always wear a helmet when you ride a bike or motorcycle, go skiing, or play sports that could cause a head injury  Wear protective equipment when you play sports  Wear a lifejacket when you are on a boat or doing water sports    © 2017 [employer] Information is for End User's use only and may not be sold, redistributed or otherwise used for commercial purposes  All illustrations and images included in CareNotes® are the copyrighted property of [employer]  or [employer]  The above information is an  only  It is not intended as medical advice for individual conditions or treatments  Talk to your doctor, nurse or pharmacist before following any medical regimen to see if it is safe and effective for you  Weight Management   AMBULATORY CARE:   Why it is important to manage your weight:  Being overweight increases your risk of health conditions such as heart disease, high blood pressure, type 2 diabetes, and certain types of cancer  It can also increase your risk for osteoarthritis, sleep apnea, and other respiratory problems  Aim for a slow, steady weight loss  Even a small amount of weight loss can lower your risk of health problems  How to lose weight safely:  A safe and healthy way to lose weight is to eat fewer calories and get regular exercise  You can lose up about 1 pound a week by decreasing the number of calories you eat by 500 calories each day  You can decrease calories by eating smaller portion sizes or by cutting out high-calorie foods  Read labels to find out how many calories are in the foods you eat  You can also burn calories with exercise such as walking, swimming, or biking  You will be more likely to keep weight off if you make these changes part of your lifestyle  Healthy meal plan for weight management:  A healthy meal plan includes a variety of foods, contains fewer calories, and helps you stay healthy  A healthy meal plan includes the following:  · Eat whole-grain foods more often  A healthy meal plan should contain fiber  Fiber is the part of grains, fruits, and vegetables that is not broken down by your body  Whole-grain foods are healthy and provide extra fiber in your diet   Some examples of whole-grain foods are whole-wheat breads and pastas, oatmeal, brown rice, and bulgur  · Eat a variety of vegetables every day  Include dark, leafy greens such as spinach, kale, veronica greens, and mustard greens  Eat yellow and orange vegetables such as carrots, sweet potatoes, and winter squash  · Eat a variety of fruits every day  Choose fresh or canned fruit (canned in its own juice or light syrup) instead of juice  Fruit juice has very little or no fiber  · Eat low-fat dairy foods  Drink fat-free (skim) milk or 1% milk  Eat fat-free yogurt and low-fat cottage cheese  Try low-fat cheeses such as mozzarella and other reduced-fat cheeses  · Choose meat and other protein foods that are low in fat  Choose beans or other legumes such as split peas or lentils  Choose fish, skinless poultry (chicken or turkey), or lean cuts of red meat (beef or pork)  Before you cook meat or poultry, cut off any visible fat  · Use less fat and oil  Try baking foods instead of frying them  Add less fat, such as margarine, sour cream, regular salad dressing and mayonnaise to foods  Eat fewer high-fat foods  Some examples of high-fat foods include french fries, doughnuts, ice cream, and cakes  · Eat fewer sweets  Limit foods and drinks that are high in sugar  This includes candy, cookies, regular soda, and sweetened drinks  Ways to decrease calories:   · Eat smaller portions  ¨ Use a small plate with smaller servings  ¨ Do not eat second helpings  ¨ When you eat at a restaurant, ask for a box and place half of your meal in the box before you eat  ¨ Share an entrée with someone else  · Replace high-calorie snacks with healthy, low-calorie snacks  ¨ Choose fresh fruit, vegetables, fat-free rice cakes, or air-popped popcorn instead of potato chips, nuts, or chocolate  ¨ Choose water or calorie-free drinks instead of soda or sweetened drinks  · Eat regular meals  Skipping meals can lead to overeating later in the day   Eat a healthy snack in place of a meal if you do not have time to eat a regular meal      · Do not shop for groceries when you are hungry  You may be more likely to make unhealthy food choices  Take a grocery list of healthy foods and shop after you have eaten  Exercise:  Exercise at least 30 minutes per day on most days of the week  Some examples of exercise include walking, biking, dancing, and swimming  You can also fit in more physical activity by taking the stairs instead of the elevator or parking farther away from stores  Ask your healthcare provider about the best exercise plan for you  Other things to consider as you try to lose weight:   · Be aware of situations that may give you the urge to overeat, such as eating while watching television  Find ways to avoid these situations  For example, read a book, go for a walk, or do crafts  · Meet with a weight loss support group or friends who are also trying to lose weight  This may help you stay motivated to continue working on your weight loss goals  © 2017 [employer] Information is for End User's use only and may not be sold, redistributed or otherwise used for commercial purposes  All illustrations and images included in CareNotes® are the copyrighted property of [employer]  or [employer]  The above information is an  only  It is not intended as medical advice for individual conditions or treatments  Talk to your doctor, nurse or pharmacist before following any medical regimen to see if it is safe and effective for you

## 2019-11-22 ENCOUNTER — OFFICE VISIT (OUTPATIENT)
Dept: URGENT CARE | Facility: CLINIC | Age: 64
End: 2019-11-22
Payer: COMMERCIAL

## 2019-11-22 VITALS
RESPIRATION RATE: 18 BRPM | HEIGHT: 67 IN | DIASTOLIC BLOOD PRESSURE: 84 MMHG | BODY MASS INDEX: 25.27 KG/M2 | TEMPERATURE: 97.5 F | SYSTOLIC BLOOD PRESSURE: 156 MMHG | OXYGEN SATURATION: 97 % | HEART RATE: 82 BPM | WEIGHT: 161 LBS

## 2019-11-22 DIAGNOSIS — K11.20 SIALADENITIS: Primary | ICD-10-CM

## 2019-11-22 PROCEDURE — 99213 OFFICE O/P EST LOW 20 MIN: CPT | Performed by: FAMILY MEDICINE

## 2019-11-22 RX ORDER — AMOXICILLIN AND CLAVULANATE POTASSIUM 875; 125 MG/1; MG/1
1 TABLET, FILM COATED ORAL EVERY 12 HOURS SCHEDULED
Qty: 14 TABLET | Refills: 0 | Status: SHIPPED | OUTPATIENT
Start: 2019-11-22 | End: 2019-11-29

## 2019-11-22 NOTE — PATIENT INSTRUCTIONS
F/u here as needed  Viral vs bacterial  Will start augmentin  Warm compresses  Hard sour candies  Massage  Go to ER if worse symptoms

## 2019-11-24 ENCOUNTER — HOSPITAL ENCOUNTER (EMERGENCY)
Facility: HOSPITAL | Age: 64
Discharge: HOME | End: 2019-11-24
Attending: EMERGENCY MEDICINE
Payer: COMMERCIAL

## 2019-11-24 ENCOUNTER — APPOINTMENT (EMERGENCY)
Dept: CT IMAGING | Facility: HOSPITAL | Age: 64
End: 2019-11-24
Payer: COMMERCIAL

## 2019-11-24 VITALS
WEIGHT: 158.73 LBS | RESPIRATION RATE: 16 BRPM | DIASTOLIC BLOOD PRESSURE: 78 MMHG | HEART RATE: 68 BPM | HEIGHT: 67 IN | OXYGEN SATURATION: 96 % | SYSTOLIC BLOOD PRESSURE: 150 MMHG | TEMPERATURE: 97.8 F | BODY MASS INDEX: 24.91 KG/M2

## 2019-11-24 DIAGNOSIS — K04.7 DENTAL ABSCESS: Primary | ICD-10-CM

## 2019-11-24 LAB
ALBUMIN SERPL BCP-MCNC: 3.6 G/DL (ref 3.5–5)
ALP SERPL-CCNC: 96 U/L (ref 46–116)
ALT SERPL W P-5'-P-CCNC: 21 U/L (ref 12–78)
ANION GAP SERPL CALCULATED.3IONS-SCNC: 9 MMOL/L (ref 4–13)
AST SERPL W P-5'-P-CCNC: 38 U/L (ref 5–45)
BASOPHILS # BLD AUTO: 0.03 THOUSANDS/ΜL (ref 0–0.1)
BASOPHILS NFR BLD AUTO: 1 % (ref 0–1)
BILIRUB SERPL-MCNC: 0.5 MG/DL (ref 0.2–1)
BUN SERPL-MCNC: 13 MG/DL (ref 5–25)
CALCIUM SERPL-MCNC: 9 MG/DL (ref 8.3–10.1)
CHLORIDE SERPL-SCNC: 101 MMOL/L (ref 100–108)
CO2 SERPL-SCNC: 25 MMOL/L (ref 21–32)
CREAT SERPL-MCNC: 0.89 MG/DL (ref 0.6–1.3)
EOSINOPHIL # BLD AUTO: 0.09 THOUSAND/ΜL (ref 0–0.61)
EOSINOPHIL NFR BLD AUTO: 1 % (ref 0–6)
ERYTHROCYTE [DISTWIDTH] IN BLOOD BY AUTOMATED COUNT: 13.9 % (ref 11.6–15.1)
GFR SERPL CREATININE-BSD FRML MDRD: 69 ML/MIN/1.73SQ M
GLUCOSE SERPL-MCNC: 94 MG/DL (ref 65–140)
HCT VFR BLD AUTO: 39.9 % (ref 34.8–46.1)
HGB BLD-MCNC: 12.8 G/DL (ref 11.5–15.4)
IMM GRANULOCYTES # BLD AUTO: 0 THOUSAND/UL (ref 0–0.2)
IMM GRANULOCYTES NFR BLD AUTO: 0 % (ref 0–2)
LYMPHOCYTES # BLD AUTO: 2.16 THOUSANDS/ΜL (ref 0.6–4.47)
LYMPHOCYTES NFR BLD AUTO: 33 % (ref 14–44)
MCH RBC QN AUTO: 29 PG (ref 26.8–34.3)
MCHC RBC AUTO-ENTMCNC: 32.1 G/DL (ref 31.4–37.4)
MCV RBC AUTO: 90 FL (ref 82–98)
MONOCYTES # BLD AUTO: 0.63 THOUSAND/ΜL (ref 0.17–1.22)
MONOCYTES NFR BLD AUTO: 10 % (ref 4–12)
NEUTROPHILS # BLD AUTO: 3.64 THOUSANDS/ΜL (ref 1.85–7.62)
NEUTS SEG NFR BLD AUTO: 55 % (ref 43–75)
PLATELET # BLD AUTO: 261 THOUSANDS/UL (ref 149–390)
PMV BLD AUTO: 8.9 FL (ref 8.9–12.7)
POTASSIUM SERPL-SCNC: 3.7 MMOL/L (ref 3.5–5.3)
PROT SERPL-MCNC: 7.9 G/DL (ref 6.4–8.2)
RBC # BLD AUTO: 4.42 MILLION/UL (ref 3.81–5.12)
SODIUM SERPL-SCNC: 135 MMOL/L (ref 136–145)
WBC # BLD AUTO: 6.55 THOUSAND/UL (ref 4.31–10.16)

## 2019-11-24 PROCEDURE — 96361 HYDRATE IV INFUSION ADD-ON: CPT

## 2019-11-24 PROCEDURE — 99284 EMERGENCY DEPT VISIT MOD MDM: CPT

## 2019-11-24 PROCEDURE — 36415 COLL VENOUS BLD VENIPUNCTURE: CPT | Performed by: EMERGENCY MEDICINE

## 2019-11-24 PROCEDURE — 70491 CT SOFT TISSUE NECK W/DYE: CPT

## 2019-11-24 PROCEDURE — 99284 EMERGENCY DEPT VISIT MOD MDM: CPT | Performed by: EMERGENCY MEDICINE

## 2019-11-24 PROCEDURE — 80053 COMPREHEN METABOLIC PANEL: CPT | Performed by: EMERGENCY MEDICINE

## 2019-11-24 PROCEDURE — 96365 THER/PROPH/DIAG IV INF INIT: CPT

## 2019-11-24 PROCEDURE — 85025 COMPLETE CBC W/AUTO DIFF WBC: CPT | Performed by: EMERGENCY MEDICINE

## 2019-11-24 PROCEDURE — 96375 TX/PRO/DX INJ NEW DRUG ADDON: CPT

## 2019-11-24 RX ORDER — KETOROLAC TROMETHAMINE 30 MG/ML
30 INJECTION, SOLUTION INTRAMUSCULAR; INTRAVENOUS ONCE
Status: COMPLETED | OUTPATIENT
Start: 2019-11-24 | End: 2019-11-24

## 2019-11-24 RX ORDER — CLINDAMYCIN PHOSPHATE 600 MG/50ML
600 INJECTION INTRAVENOUS ONCE
Status: COMPLETED | OUTPATIENT
Start: 2019-11-24 | End: 2019-11-24

## 2019-11-24 RX ADMIN — IOHEXOL 90 ML: 350 INJECTION, SOLUTION INTRAVENOUS at 12:50

## 2019-11-24 RX ADMIN — KETOROLAC TROMETHAMINE 30 MG: 30 INJECTION, SOLUTION INTRAMUSCULAR; INTRAVENOUS at 11:44

## 2019-11-24 RX ADMIN — CLINDAMYCIN IN 5 PERCENT DEXTROSE 600 MG: 12 INJECTION, SOLUTION INTRAVENOUS at 14:27

## 2019-11-24 RX ADMIN — SODIUM CHLORIDE 1000 ML: 0.9 INJECTION, SOLUTION INTRAVENOUS at 11:55

## 2019-11-24 NOTE — ED PROVIDER NOTES
History  Chief Complaint   Patient presents with    Facial Swelling     Pt woke up 1 month ago with right jaw pain and swelling, went away and then on Friday she woke up with increased swelling and was seen at urgent care and told she had blocked glands with possibly stones  Shes been sucking on sour candy and pain is worse and swelling is increased  Taking amoxicillin and aleve  This is a 81-year-old female who presents with right jaw and sub mandibular pain over the past 3 days, she was seen at urgent care yesterday and started on Aleve and Augmentin denies any fevers or chills she did have a similar episode that resolved on its own approximately 1 month ago  Denies any dental pain  History provided by:  Patient  Medical Problem   Location:  Right jaw  Quality:  Pain  Severity:  Moderate  Onset quality:  Gradual  Duration:  3 days  Timing:  Constant  Progression:  Waxing and waning  Chronicity:  Recurrent  Context:  Right jaw pain  Relieved by:  Nothing  Worsened by:  Palpation and jaw movement  Ineffective treatments:  Just started Augmentin yesterday  Associated symptoms: no fever        Prior to Admission Medications   Prescriptions Last Dose Informant Patient Reported? Taking?    Magnesium 250 MG TABS Past Week at Unknown time Self Yes Yes   Sig: Take 1 tablet by mouth daily   Misc Natural Products (ADRENAL PO) Past Week at Unknown time  Yes Yes   Sig: Take by mouth   Multiple Vitamin (MULTIVITAMINS PO) Past Week at Unknown time  Yes Yes   Sig: Take 1 capsule by mouth daily   THYROID PO Past Week at Unknown time  Yes Yes   Sig: Take by mouth   amoxicillin-clavulanate (AUGMENTIN) 875-125 mg per tablet 2019 at Unknown time  No Yes   Sig: Take 1 tablet by mouth every 12 (twelve) hours for 7 days   calcium gluconate 500 mg tablet Past Week at Unknown time Self Yes Yes   Sig: Take 500 mg by mouth daily   fluticasone (FLONASE) 50 mcg/act nasal spray Past Week at Unknown time  No Yes   Si sprays into each nostril daily at bedtime      Facility-Administered Medications: None       Past Medical History:   Diagnosis Date    Benign paroxysmal positional vertigo     Resolved: 9/15/2014    Fracture of radius        Past Surgical History:   Procedure Laterality Date    BIOPSY CORE NEEDLE      Thyroid     SECTION      COLPOSCOPY         Family History   Problem Relation Age of Onset   Jaymie Splinter Stroke Mother         CVA   Jaymie Splinter Breast cancer Mother 75    Alcohol abuse Father     COPD Father     Other Father         Nicotine Abuse    Thyroid cancer Brother     Thyroid disease Maternal Grandmother     Breast cancer Maternal Aunt 36    Breast cancer additional onset Maternal Aunt 48    No Known Problems Sister     No Known Problems Daughter     No Known Problems Maternal Grandfather     No Known Problems Paternal Grandmother     No Known Problems Paternal Grandfather     No Known Problems Daughter     No Known Problems Maternal Aunt     No Known Problems Maternal Aunt     No Known Problems Maternal Aunt      I have reviewed and agree with the history as documented  Social History     Tobacco Use    Smoking status: Never Smoker    Smokeless tobacco: Never Used   Substance Use Topics    Alcohol use: Yes     Comment: social    Drug use: No        Review of Systems   Constitutional: Negative for fever  HENT:        Jaw pain   All other systems reviewed and are negative  Physical Exam  Physical Exam   Constitutional: She is oriented to person, place, and time  She appears well-developed and well-nourished  No distress  HENT:   Head: Normocephalic and atraumatic  Right Ear: External ear normal    Left Ear: External ear normal    Nose: Nose normal    Mild swelling with tenderness over the right submandibular salivary gland no exudative drainage from Stensen's duct or Mozier's duct  No dental tenderness   Eyes: Pupils are equal, round, and reactive to light   EOM are normal  Right eye exhibits no discharge  Left eye exhibits no discharge  Neck: Neck supple  No JVD present  No tracheal deviation present  Cardiovascular: Regular rhythm and intact distal pulses  Exam reveals no gallop and no friction rub  No murmur heard  Pulmonary/Chest: Effort normal and breath sounds normal  No stridor  No respiratory distress  She has no wheezes  She has no rales  Abdominal: Soft  Bowel sounds are normal  She exhibits no distension  There is no tenderness  There is no rebound and no guarding  Musculoskeletal: Normal range of motion  She exhibits no edema, tenderness or deformity  Neurological: She is alert and oriented to person, place, and time  No cranial nerve deficit or sensory deficit  She exhibits normal muscle tone  Skin: Skin is warm and dry  No rash noted  She is not diaphoretic  Psychiatric: She has a normal mood and affect  Her behavior is normal  Thought content normal    Nursing note and vitals reviewed        Vital Signs  ED Triage Vitals [11/24/19 1103]   Temperature Pulse Respirations Blood Pressure SpO2   97 8 °F (36 6 °C) (!) 110 20 130/92 96 %      Temp Source Heart Rate Source Patient Position - Orthostatic VS BP Location FiO2 (%)   Temporal Monitor Sitting Left arm --      Pain Score       Worst Possible Pain           Vitals:    11/24/19 1315 11/24/19 1330 11/24/19 1345 11/24/19 1400   BP: 151/70 139/67 142/68 137/68   Pulse: 78 68 69 68   Patient Position - Orthostatic VS:             Visual Acuity  Visual Acuity      Most Recent Value   L Pupil Size (mm)  3   R Pupil Size (mm)  3          ED Medications  Medications   clindamycin (CLEOCIN) IVPB (premix) 600 mg (has no administration in time range)   ketorolac (TORADOL) injection 30 mg (30 mg Intravenous Given 11/24/19 1144)   sodium chloride 0 9 % bolus 1,000 mL (0 mL Intravenous Stopped 11/24/19 1303)   iohexol (OMNIPAQUE) 350 MG/ML injection (MULTI-DOSE) 90 mL (90 mL Intravenous Given 11/24/19 1250)       Diagnostic Studies  Results Reviewed     Procedure Component Value Units Date/Time    Comprehensive metabolic panel [[id number]]  (Abnormal) Collected:  11/24/19 1149    Lab Status:  Final result Specimen:  Blood from Arm, Right Updated:  11/24/19 1223     Sodium 135 mmol/L      Potassium 3 7 mmol/L      Chloride 101 mmol/L      CO2 25 mmol/L      ANION GAP 9 mmol/L      BUN 13 mg/dL      Creatinine 0 89 mg/dL      Glucose 94 mg/dL      Calcium 9 0 mg/dL      AST 38 U/L      ALT 21 U/L      Alkaline Phosphatase 96 U/L      Total Protein 7 9 g/dL      Albumin 3 6 g/dL      Total Bilirubin 0 50 mg/dL      eGFR 69 ml/min/1 73sq m     Narrative:       [place] guidelines for Chronic Kidney Disease (CKD):     Stage 1 with normal or high GFR (GFR > 90 mL/min/1 73 square meters)    Stage 2 Mild CKD (GFR = 60-89 mL/min/1 73 square meters)    Stage 3A Moderate CKD (GFR = 45-59 mL/min/1 73 square meters)    Stage 3B Moderate CKD (GFR = 30-44 mL/min/1 73 square meters)    Stage 4 Severe CKD (GFR = 15-29 mL/min/1 73 square meters)    Stage 5 End Stage CKD (GFR <15 mL/min/1 73 square meters)  Note: GFR calculation is accurate only with a steady state creatinine    CBC and differential [[id number]]  (Normal) Collected:  11/24/19 1149    Lab Status:  Final result Specimen:  Blood from Arm, Right Updated:  11/24/19 1208     WBC 6 55 Thousand/uL      RBC 4 42 Million/uL      Hemoglobin 12 8 g/dL      Hematocrit 39 9 %      MCV 90 fL      MCH 29 0 pg      MCHC 32 1 g/dL      RDW 13 9 %      MPV 8 9 fL      Platelets 113 Thousands/uL      Neutrophils Relative 55 %      Immat GRANS % 0 %      Lymphocytes Relative 33 %      Monocytes Relative 10 %      Eosinophils Relative 1 %      Basophils Relative 1 %      Neutrophils Absolute 3 64 Thousands/µL      Immature Grans Absolute 0 00 Thousand/uL      Lymphocytes Absolute 2 16 Thousands/µL      Monocytes Absolute 0 63 Thousand/µL      Eosinophils Absolute 0 09 Thousand/µL Basophils Absolute 0 03 Thousands/µL                  CT soft tissue neck with contrast   Final Result by Toro Motta MD (11/24 1314)   Right posterior mandibular molar periapical abscess with overlying phlegmonous reaction involving facial soft tissues      Thyromegaly  Thyroid nodules  Consistent with 7/9/2018 ultrasound               Workstation performed: [id number]                    Procedures  Procedures       ED Course                               MDM  Number of Diagnoses or Management Options  Diagnosis management comments: Submandibular swelling will check CT scan to rule out stone or abscess       Amount and/or Complexity of Data Reviewed  Clinical lab tests: ordered  Tests in the radiology section of CPT®: ordered        Disposition  Final diagnoses:   Dental abscess     Time reflects when diagnosis was documented in both MDM as applicable and the Disposition within this note     Time User Action Codes Description Comment    11/24/2019  2:11 PM Graeme Lucero Add [K04 7] Dental abscess       ED Disposition     ED Disposition Condition Date/Time Comment    Discharge Stable Sun Nov 24, 2019  2:11 PM Rosa M Yang discharge to home/self care  Follow-up Information     Follow up With Specialties Details Why [address] [facility] and [facility]  In 1 week Call tomorrow for dental follow-up [address] [facility] [place] [address]          Patient's Medications   Discharge Prescriptions    No medications on file     No discharge procedures on file      ED Provider  Electronically Signed by           Justo Purcell DO  11/24/19 5456

## 2019-11-26 ENCOUNTER — VBI (OUTPATIENT)
Dept: FAMILY MEDICINE CLINIC | Facility: HOSPITAL | Age: 64
End: 2019-11-26

## 2020-09-03 ENCOUNTER — OFFICE VISIT (OUTPATIENT)
Dept: FAMILY MEDICINE CLINIC | Facility: HOSPITAL | Age: 65
End: 2020-09-03
Payer: COMMERCIAL

## 2020-09-03 VITALS
SYSTOLIC BLOOD PRESSURE: 120 MMHG | WEIGHT: 145.6 LBS | BODY MASS INDEX: 22.85 KG/M2 | HEART RATE: 69 BPM | HEIGHT: 67 IN | TEMPERATURE: 97.9 F | DIASTOLIC BLOOD PRESSURE: 62 MMHG

## 2020-09-03 DIAGNOSIS — M25.562 ACUTE PAIN OF LEFT KNEE: Primary | ICD-10-CM

## 2020-09-03 PROCEDURE — 3725F SCREEN DEPRESSION PERFORMED: CPT | Performed by: FAMILY MEDICINE

## 2020-09-03 PROCEDURE — 99213 OFFICE O/P EST LOW 20 MIN: CPT | Performed by: FAMILY MEDICINE

## 2020-09-03 NOTE — PROGRESS NOTES
Assessment/Plan:      Problem List Items Addressed This Visit     None      Visit Diagnoses     Acute pain of left knee    -  Primary         There is pain on palpation over the left MCL  There is corresponding numbness over that same area  Suspect inflammation causing irritation over a peripheral/superficial nerve of the knee  May continue with aleve as needed  Monitor for further sytmpms  Consider further evaluation and workup especially if worsening numbness or other associated sytmpoms  To call if worsening or no improvement  Subjective:   Chief Complaint   Patient presents with    Knee Pain     Left - inner part  States it is numb         Patient ID: Mojgan Zavala is a 60 y o  female  Patient with ongoing knee pain and pain over the left side  Has been going to chiropractic treatments  Over the pst few days with numbness over the inner side of her knee  No pain on walking  No numbness in other areas of the body  No weakness  No lateralizing weakness  No significant pain  No dizziness, no blurry of vision  No balance problems  The following portions of the patient's history were reviewed and updated as appropriate: allergies, current medications, past family history, past medical history, past social history, past surgical history and problem list     Review of Systems   Constitutional: Negative  Negative for activity change, appetite change, chills, diaphoresis, fatigue and fever  HENT: Negative for congestion, facial swelling and sore throat  Respiratory: Negative  Negative for apnea, cough, chest tightness and shortness of breath  Cardiovascular: Negative  Negative for chest pain and palpitations  Gastrointestinal: Negative  Negative for abdominal distention, abdominal pain, blood in stool, constipation, diarrhea and nausea  Genitourinary: Negative  Negative for difficulty urinating, dysuria, flank pain and frequency  Objective:  Vitals:    09/03/20 1426   BP: 120/62   Pulse: 69   Temp: 97 9 °F (36 6 °C)   Weight: 66 kg (145 lb 9 6 oz)   Height: 5' 7" (1 702 m)     BP Readings from Last 6 Encounters:   09/03/20 120/62   11/24/19 150/78   11/22/19 156/84   08/29/19 128/70   07/22/19 126/72   02/14/19 132/82      Wt Readings from Last 6 Encounters:   09/03/20 66 kg (145 lb 9 6 oz)   11/24/19 72 kg (158 lb 11 7 oz)   11/22/19 73 kg (161 lb)   08/29/19 71 2 kg (157 lb)   08/26/19 71 2 kg (157 lb)   07/22/19 71 4 kg (157 lb 6 4 oz)             Physical Exam  Vitals signs reviewed  Constitutional:       Appearance: Normal appearance  Musculoskeletal:      Left knee: She exhibits normal range of motion, no swelling, no effusion, no ecchymosis, no deformity, no laceration, no erythema, normal alignment, no LCL laxity, normal patellar mobility, no bony tenderness, normal meniscus and no MCL laxity  Tenderness found  Medial joint line and MCL tenderness noted  No lateral joint line, no LCL and no patellar tendon tenderness noted  Legs:    Neurological:      Mental Status: She is alert  No visits with results within 6 Month(s) from this visit     Latest known visit with results is:   Admission on 11/24/2019, Discharged on 11/24/2019   Component Date Value Ref Range Status    WBC 11/24/2019 6 55  4 31 - 10 16 Thousand/uL Final    RBC 11/24/2019 4 42  3 81 - 5 12 Million/uL Final    Hemoglobin 11/24/2019 12 8  11 5 - 15 4 g/dL Final    Hematocrit 11/24/2019 39 9  34 8 - 46 1 % Final    MCV 11/24/2019 90  82 - 98 fL Final    MCH 11/24/2019 29 0  26 8 - 34 3 pg Final    MCHC 11/24/2019 32 1  31 4 - 37 4 g/dL Final    RDW 11/24/2019 13 9  11 6 - 15 1 % Final    MPV 11/24/2019 8 9  8 9 - 12 7 fL Final    Platelets 94/75/9768 261  149 - 390 Thousands/uL Final    Neutrophils Relative 11/24/2019 55  43 - 75 % Final    Immat GRANS % 11/24/2019 0  0 - 2 % Final    Lymphocytes Relative 11/24/2019 33  14 - 44 % Final  Monocytes Relative 11/24/2019 10  4 - 12 % Final    Eosinophils Relative 11/24/2019 1  0 - 6 % Final    Basophils Relative 11/24/2019 1  0 - 1 % Final    Neutrophils Absolute 11/24/2019 3 64  1 85 - 7 62 Thousands/µL Final    Immature Grans Absolute 11/24/2019 0 00  0 00 - 0 20 Thousand/uL Final    Lymphocytes Absolute 11/24/2019 2 16  0 60 - 4 47 Thousands/µL Final    Monocytes Absolute 11/24/2019 0 63  0 17 - 1 22 Thousand/µL Final    Eosinophils Absolute 11/24/2019 0 09  0 00 - 0 61 Thousand/µL Final    Basophils Absolute 11/24/2019 0 03  0 00 - 0 10 Thousands/µL Final    Sodium 11/24/2019 135* 136 - 145 mmol/L Final    Potassium 11/24/2019 3 7  3 5 - 5 3 mmol/L Final    Chloride 11/24/2019 101  100 - 108 mmol/L Final    CO2 11/24/2019 25  21 - 32 mmol/L Final    ANION GAP 11/24/2019 9  4 - 13 mmol/L Final    BUN 11/24/2019 13  5 - 25 mg/dL Final    Creatinine 11/24/2019 0 89  0 60 - 1 30 mg/dL Final    Standardized to IDMS reference method    Glucose 11/24/2019 94  65 - 140 mg/dL Final      If the patient is fasting, the ADA then defines impaired fasting glucose as > 100 mg/dL and diabetes as > or equal to 123 mg/dL  Specimen collection should occur prior to Sulfasalazine administration due to the potential for falsely depressed results  Specimen collection should occur prior to Sulfapyridine administration due to the potential for falsely elevated results   Calcium 11/24/2019 9 0  8 3 - 10 1 mg/dL Final    AST 11/24/2019 38  5 - 45 U/L Final      Specimen collection should occur prior to Sulfasalazine administration due to the potential for falsely depressed results   ALT 11/24/2019 21  12 - 78 U/L Final      Specimen collection should occur prior to Sulfasalazine administration due to the potential for falsely depressed results       Alkaline Phosphatase 11/24/2019 96  46 - 116 U/L Final    Total Protein 11/24/2019 7 9  6 4 - 8 2 g/dL Final    Albumin 11/24/2019 3 6  3 5 - 5 0 g/dL Final    Total Bilirubin 11/24/2019 0 50  0 20 - 1 00 mg/dL Final    eGFR 11/24/2019 69  ml/min/1 73sq m Final

## 2020-09-15 ENCOUNTER — OFFICE VISIT (OUTPATIENT)
Dept: FAMILY MEDICINE CLINIC | Facility: HOSPITAL | Age: 65
End: 2020-09-15
Payer: COMMERCIAL

## 2020-09-15 VITALS
HEART RATE: 80 BPM | SYSTOLIC BLOOD PRESSURE: 118 MMHG | DIASTOLIC BLOOD PRESSURE: 74 MMHG | WEIGHT: 146.4 LBS | TEMPERATURE: 97 F | HEIGHT: 67 IN | BODY MASS INDEX: 22.98 KG/M2

## 2020-09-15 DIAGNOSIS — Z12.39 SCREENING FOR MALIGNANT NEOPLASM OF BREAST: ICD-10-CM

## 2020-09-15 DIAGNOSIS — Z00.00 ANNUAL PHYSICAL EXAM: Primary | ICD-10-CM

## 2020-09-15 DIAGNOSIS — Z11.4 SCREENING FOR HIV (HUMAN IMMUNODEFICIENCY VIRUS): ICD-10-CM

## 2020-09-15 DIAGNOSIS — E28.39 MENOPAUSE OVARIAN FAILURE: ICD-10-CM

## 2020-09-15 DIAGNOSIS — R79.89 ELEVATED TSH: ICD-10-CM

## 2020-09-15 DIAGNOSIS — E78.5 DYSLIPIDEMIA: ICD-10-CM

## 2020-09-15 DIAGNOSIS — Z11.59 ENCOUNTER FOR HEPATITIS C SCREENING TEST FOR LOW RISK PATIENT: ICD-10-CM

## 2020-09-15 DIAGNOSIS — E06.3 HASHIMOTO'S DISEASE: ICD-10-CM

## 2020-09-15 PROCEDURE — 99396 PREV VISIT EST AGE 40-64: CPT | Performed by: INTERNAL MEDICINE

## 2020-09-15 PROCEDURE — 1036F TOBACCO NON-USER: CPT | Performed by: INTERNAL MEDICINE

## 2020-09-15 NOTE — PATIENT INSTRUCTIONS

## 2020-09-15 NOTE — PROGRESS NOTES
Jossy Holder MD    NAME: Michaela Ken  AGE: 60 y o   SEX: female  : 1955     DATE: 9/15/2020     Assessment and Plan:     Problem List Items Addressed This Visit        Endocrine    Hashimoto's disease     Check TFT's and thyroid US - orders given         Relevant Medications    Chlorophyll 100 MG TABS    Other Relevant Orders    CBC and differential    Comprehensive metabolic panel    Lipid panel    TSH, 3rd generation    T4, free       Other    Dyslipidemia     Order for FLP given, healthy diet/exercise encouraged         Relevant Medications    Chlorophyll 100 MG TABS    Other Relevant Orders    CBC and differential    Comprehensive metabolic panel    Lipid panel    TSH, 3rd generation    T4, free    Elevated TSH     Overdue for TFT's - order given, would be agreeable to start thyroid meds if needed         Relevant Medications    Chlorophyll 100 MG TABS    Other Relevant Orders    CBC and differential    Comprehensive metabolic panel    Lipid panel    TSH, 3rd generation    T4, free      Other Visit Diagnoses     Annual physical exam    -  Primary    Relevant Medications    Chlorophyll 100 MG TABS    Other Relevant Orders    CBC and differential    Comprehensive metabolic panel    Lipid panel    TSH, 3rd generation    T4, free    Hepatitis C antibody    HIV 1/2 w/ Reflex (Multispot)    Screening for malignant neoplasm of breast        Relevant Medications    Chlorophyll 100 MG TABS    Other Relevant Orders    Mammo screening bilateral w 3d & cad    CBC and differential    Comprehensive metabolic panel    Lipid panel    TSH, 3rd generation    T4, free    Menopause ovarian failure        Relevant Medications    Chlorophyll 100 MG TABS    Other Relevant Orders    DXA bone density spine hip and pelvis    CBC and differential    Comprehensive metabolic panel    Lipid panel    TSH, 3rd generation    T4, free    Encounter for hepatitis C screening test for low risk patient        Relevant Orders    Hepatitis C antibody    Screening for HIV (human immunodeficiency virus)        Relevant Orders    HIV 1/2 w/ Reflex (Multispot)          Immunizations and preventive care screenings were discussed with patient today  Appropriate education was printed on patient's after visit summary  Counseling:  Alcohol/drug use: discussed moderation in alcohol intake, the recommendations for healthy alcohol use, and avoidance of illicit drug use  Dental Health: discussed importance of regular tooth brushing, flossing, and dental visits  Injury prevention: discussed safety/seat belts, safety helmets, smoke detectors, carbon dioxide detectors, and smoking near bedding or upholstery  · Exercise: the importance of regular exercise/physical activity was discussed  Recommend exercise 3-5 times per week for at least 30 minutes  · Cervical cancer screening: PAP 7/19 - 3 yrs  · Breast cancer screening: mammo 8/19 - order given  · Colon cancer screening: Cologuard 8/19 - 3 yrs         Return in about 1 year (around 9/15/2021) for Annual physical      Chief Complaint:     Chief Complaint   Patient presents with    Physical Exam      History of Present Illness:     Adult Annual Physical   Patient here for a comprehensive physical exam  The patient reports problems - L knee pain has improved with Aleve  Diet and Physical Activity  · Diet/Nutrition: well balanced diet, limited junk food and consuming 3-5 servings of fruits/vegetables daily  · Exercise: stretching regularly and walks approx 2 miles daily, does do some push ups and sit ups as well  · BMI reviewed, wgt down 11 lbs from last year     Depression Screening  PHQ-9 Depression Screening    PHQ-9:    Frequency of the following problems over the past two weeks:            General Health  · Sleep: sleeps well and gets 7-8 hours of sleep on average     · Hearing: significantly decreased - bilateral   · Vision: goes for regular eye exams, most recent eye exam >1 year ago and wears glasses  · Dental: regular dental visits, brushes teeth twice daily and flosses  /GYN Health  · Patient is: postmenopausal  · Last menstrual period: approx 48 yo  · Contraceptive method: postmenopausal      Review of Systems:     Review of Systems   Constitutional: Negative for chills, fatigue, fever and unexpected weight change  HENT: Positive for hearing loss  Negative for congestion and sore throat  Eyes: Negative for pain and visual disturbance  Respiratory: Negative for cough, shortness of breath and wheezing  Cardiovascular: Negative for chest pain, palpitations and leg swelling  Gastrointestinal: Negative for abdominal pain, blood in stool, constipation, diarrhea, nausea and vomiting  Endocrine: Negative for polydipsia and polyuria  Genitourinary: Negative for difficulty urinating, dysuria, hematuria, vaginal bleeding and vaginal pain  Musculoskeletal: Positive for arthralgias  Negative for back pain and neck pain  Skin: Negative for rash and wound  Neurological: Negative for dizziness, light-headedness and headaches  Hematological: Negative for adenopathy  Psychiatric/Behavioral: Negative for behavioral problems, confusion, dysphoric mood and sleep disturbance        Past Medical History:     Past Medical History:   Diagnosis Date    Benign paroxysmal positional vertigo     Resolved: 9/15/2014    Fracture of radius       Past Surgical History:     Past Surgical History:   Procedure Laterality Date    BIOPSY CORE NEEDLE      Thyroid     SECTION      COLPOSCOPY        Social History:     E-Cigarette/Vaping    E-Cigarette Use Never User      E-Cigarette/Vaping Substances    Nicotine No     Flavoring No      Social History     Socioeconomic History    Marital status: Single     Spouse name: None    Number of children: None    Years of education: None    Highest education level: None   Occupational History    None   Social Needs    Financial resource strain: None    Food insecurity     Worry: None     Inability: None    Transportation needs     Medical: None     Non-medical: None   Tobacco Use    Smoking status: Never Smoker    Smokeless tobacco: Never Used   Substance and Sexual Activity    Alcohol use: Yes     Comment: social    Drug use: No    Sexual activity: None   Lifestyle    Physical activity     Days per week: None     Minutes per session: None    Stress: None   Relationships    Social connections     Talks on phone: None     Gets together: None     Attends Confucianism service: None     Active member of club or organization: None     Attends meetings of clubs or organizations: None     Relationship status: None    Intimate partner violence     Fear of current or ex partner: None     Emotionally abused: None     Physically abused: None     Forced sexual activity: None   Other Topics Concern    None   Social History Narrative    Single, lives alone, part-time employment in [employer]      Family History:     Family History   Problem Relation Age of Onset    Stroke Mother         CVA    Breast cancer Mother 77    Alcohol abuse Father     COPD Father     Other Father         Nicotine Abuse    Thyroid cancer Brother     Coronary artery disease Brother     Thyroid disease Maternal Grandmother     Breast cancer Maternal Aunt 36    Breast cancer additional onset Maternal Aunt 48    No Known Problems Sister     No Known Problems Daughter     No Known Problems Maternal Grandfather     No Known Problems Paternal Grandmother     No Known Problems Paternal Grandfather     No Known Problems Daughter     No Known Problems Maternal Aunt     No Known Problems Maternal Aunt     No Known Problems Maternal Aunt       Current Medications:     Current Outpatient Medications   Medication Sig Dispense Refill    calcium gluconate 500 mg tablet Take 500 mg by mouth daily      Chlorophyll 100 MG TABS Take 1 tablet (100 mg total) by mouth daily  0    fluticasone (FLONASE) 50 mcg/act nasal spray 2 sprays into each nostril daily at bedtime 1 Bottle 5    Magnesium 250 MG TABS Take 1 tablet by mouth daily      Misc Natural Products (ADRENAL PO) Take by mouth      Multiple Vitamin (MULTIVITAMINS PO) Take 1 capsule by mouth daily      THYROID PO Take by mouth       No current facility-administered medications for this visit  Allergies:     No Known Allergies   Physical Exam:     /74   Pulse 80   Temp (!) 97 °F (36 1 °C)   Ht 5' 7" (1 702 m)   Wt 66 4 kg (146 lb 6 4 oz)   BMI 22 93 kg/m²     Physical Exam  Vitals signs and nursing note reviewed  Constitutional:       General: She is not in acute distress  Appearance: She is well-developed  HENT:      Head: Atraumatic  Right Ear: Tympanic membrane and external ear normal  There is no impacted cerumen  Left Ear: Tympanic membrane and external ear normal  There is no impacted cerumen  Eyes:      General:         Right eye: No discharge  Left eye: No discharge  Conjunctiva/sclera: Conjunctivae normal    Neck:      Musculoskeletal: Neck supple  Thyroid: No thyromegaly  Trachea: No tracheal deviation  Comments: No thyroid masses noted on exam  Cardiovascular:      Rate and Rhythm: Normal rate and regular rhythm  Heart sounds: Normal heart sounds  No murmur  Pulmonary:      Effort: Pulmonary effort is normal  No respiratory distress  Breath sounds: Normal breath sounds  No wheezing, rhonchi or rales  Abdominal:      Palpations: Abdomen is soft  Tenderness: There is no abdominal tenderness  There is no guarding or rebound  Musculoskeletal:         General: No deformity  Right lower leg: No edema  Left lower leg: No edema  Lymphadenopathy:      Cervical: No cervical adenopathy  Skin:     General: Skin is warm and dry  Coloration: Skin is not pale        Findings: No rash  Neurological:      General: No focal deficit present  Mental Status: She is alert  Motor: No abnormal muscle tone  Gait: Gait normal    Psychiatric:         Mood and Affect: Mood normal          Behavior: Behavior normal          Thought Content:  Thought content normal          Judgment: Judgment normal           Nighat Alvarez MD

## 2020-09-18 DIAGNOSIS — E06.3 HASHIMOTO'S DISEASE: ICD-10-CM

## 2020-09-18 DIAGNOSIS — R79.89 ELEVATED TSH: Primary | ICD-10-CM

## 2020-09-18 LAB
ALBUMIN SERPL-MCNC: 4.6 G/DL (ref 3.8–4.8)
ALBUMIN/GLOB SERPL: 1.8 {RATIO} (ref 1.2–2.2)
ALP SERPL-CCNC: 79 IU/L (ref 39–117)
ALT SERPL-CCNC: 12 IU/L (ref 0–32)
AST SERPL-CCNC: 43 IU/L (ref 0–40)
BASOPHILS # BLD AUTO: 0 X10E3/UL (ref 0–0.2)
BASOPHILS NFR BLD AUTO: 1 %
BILIRUB SERPL-MCNC: 0.4 MG/DL (ref 0–1.2)
BUN SERPL-MCNC: 13 MG/DL (ref 8–27)
BUN/CREAT SERPL: 16 (ref 12–28)
CALCIUM SERPL-MCNC: 9.6 MG/DL (ref 8.7–10.3)
CHLORIDE SERPL-SCNC: 102 MMOL/L (ref 96–106)
CHOLEST SERPL-MCNC: 276 MG/DL (ref 100–199)
CHOLEST/HDLC SERPL: 3.3 RATIO (ref 0–4.4)
CO2 SERPL-SCNC: 26 MMOL/L (ref 20–29)
CREAT SERPL-MCNC: 0.82 MG/DL (ref 0.57–1)
EOSINOPHIL # BLD AUTO: 0.1 X10E3/UL (ref 0–0.4)
EOSINOPHIL NFR BLD AUTO: 1 %
ERYTHROCYTE [DISTWIDTH] IN BLOOD BY AUTOMATED COUNT: 13.3 % (ref 11.7–15.4)
GLOBULIN SER-MCNC: 2.5 G/DL (ref 1.5–4.5)
GLUCOSE SERPL-MCNC: 96 MG/DL (ref 65–99)
HCT VFR BLD AUTO: 39.3 % (ref 34–46.6)
HDLC SERPL-MCNC: 83 MG/DL
HGB BLD-MCNC: 13.3 G/DL (ref 11.1–15.9)
IMM GRANULOCYTES # BLD: 0 X10E3/UL (ref 0–0.1)
IMM GRANULOCYTES NFR BLD: 0 %
LDLC SERPL CALC-MCNC: 177 MG/DL (ref 0–99)
LYMPHOCYTES # BLD AUTO: 2.2 X10E3/UL (ref 0.7–3.1)
LYMPHOCYTES NFR BLD AUTO: 46 %
MCH RBC QN AUTO: 30.2 PG (ref 26.6–33)
MCHC RBC AUTO-ENTMCNC: 33.8 G/DL (ref 31.5–35.7)
MCV RBC AUTO: 89 FL (ref 79–97)
MONOCYTES # BLD AUTO: 0.4 X10E3/UL (ref 0.1–0.9)
MONOCYTES NFR BLD AUTO: 9 %
NEUTROPHILS # BLD AUTO: 2 X10E3/UL (ref 1.4–7)
NEUTROPHILS NFR BLD AUTO: 43 %
PLATELET # BLD AUTO: 273 X10E3/UL (ref 150–450)
POTASSIUM SERPL-SCNC: 4 MMOL/L (ref 3.5–5.2)
PROT SERPL-MCNC: 7.1 G/DL (ref 6–8.5)
RBC # BLD AUTO: 4.4 X10E6/UL (ref 3.77–5.28)
SL AMB EGFR AFRICAN AMERICAN: 87 ML/MIN/1.73
SL AMB EGFR NON AFRICAN AMERICAN: 76 ML/MIN/1.73
SL AMB VLDL CHOLESTEROL CALC: 16 MG/DL (ref 5–40)
SODIUM SERPL-SCNC: 139 MMOL/L (ref 134–144)
T4 FREE SERPL-MCNC: 0.95 NG/DL (ref 0.82–1.77)
TRIGL SERPL-MCNC: 97 MG/DL (ref 0–149)
TSH SERPL DL<=0.005 MIU/L-ACNC: 4.6 UIU/ML (ref 0.45–4.5)
WBC # BLD AUTO: 4.8 X10E3/UL (ref 3.4–10.8)

## 2020-09-21 LAB
HCV AB S/CO SERPL IA: <0.1 S/CO RATIO (ref 0–0.9)
HGB FRACT BLD-IMP: NEGATIVE
HIV1 AB SERPL QL IA: NEGATIVE
SL AMB FINAL INTERPRETATION: NORMAL
SL AMB HIV 1 RNA QUALITATIVE: NEGATIVE
SL AMB HIV 2 AB: NEGATIVE

## 2020-10-13 ENCOUNTER — TELEPHONE (OUTPATIENT)
Dept: FAMILY MEDICINE CLINIC | Facility: HOSPITAL | Age: 65
End: 2020-10-13

## 2020-11-17 ENCOUNTER — HOSPITAL ENCOUNTER (OUTPATIENT)
Dept: BONE DENSITY | Facility: IMAGING CENTER | Age: 65
Discharge: HOME | End: 2020-11-17
Payer: COMMERCIAL

## 2020-11-17 VITALS — HEIGHT: 67 IN | BODY MASS INDEX: 22.6 KG/M2 | WEIGHT: 144 LBS

## 2020-11-17 DIAGNOSIS — E28.39 MENOPAUSE OVARIAN FAILURE: ICD-10-CM

## 2020-11-17 DIAGNOSIS — Z12.31 VISIT FOR SCREENING MAMMOGRAM: ICD-10-CM

## 2020-11-17 PROCEDURE — 77080 DXA BONE DENSITY AXIAL: CPT

## 2020-11-17 PROCEDURE — 77067 SCR MAMMO BI INCL CAD: CPT

## 2020-11-17 PROCEDURE — 77063 BREAST TOMOSYNTHESIS BI: CPT

## 2021-01-08 ENCOUNTER — TELEPHONE (OUTPATIENT)
Dept: FAMILY MEDICINE CLINIC | Facility: HOSPITAL | Age: 66
End: 2021-01-08

## 2021-01-08 DIAGNOSIS — M25.562 ACUTE PAIN OF LEFT KNEE: Primary | ICD-10-CM

## 2021-01-08 NOTE — TELEPHONE ENCOUNTER
Pain in knee and leg is getting worse  She has seen Dr Eulalia Koenig for this before  She is asking how she should proceed from here   PCB

## 2021-01-12 ENCOUNTER — APPOINTMENT (OUTPATIENT)
Dept: RADIOLOGY | Facility: CLINIC | Age: 66
End: 2021-01-12
Payer: COMMERCIAL

## 2021-01-12 ENCOUNTER — OFFICE VISIT (OUTPATIENT)
Dept: OBGYN CLINIC | Facility: CLINIC | Age: 66
End: 2021-01-12
Payer: COMMERCIAL

## 2021-01-12 VITALS
BODY MASS INDEX: 22.91 KG/M2 | SYSTOLIC BLOOD PRESSURE: 122 MMHG | WEIGHT: 146 LBS | DIASTOLIC BLOOD PRESSURE: 80 MMHG | HEIGHT: 67 IN | TEMPERATURE: 97.9 F

## 2021-01-12 DIAGNOSIS — M54.50 LOW BACK PAIN, UNSPECIFIED BACK PAIN LATERALITY, UNSPECIFIED CHRONICITY, UNSPECIFIED WHETHER SCIATICA PRESENT: ICD-10-CM

## 2021-01-12 DIAGNOSIS — M51.36 DDD (DEGENERATIVE DISC DISEASE), LUMBAR: Primary | ICD-10-CM

## 2021-01-12 DIAGNOSIS — R20.0 LEFT LEG NUMBNESS: ICD-10-CM

## 2021-01-12 DIAGNOSIS — M25.562 POSTERIOR LEFT KNEE PAIN: ICD-10-CM

## 2021-01-12 DIAGNOSIS — R29.898 WEAKNESS OF BOTH LOWER EXTREMITIES: ICD-10-CM

## 2021-01-12 DIAGNOSIS — M16.12 PRIMARY OSTEOARTHRITIS OF LEFT HIP: ICD-10-CM

## 2021-01-12 DIAGNOSIS — M25.552 PAIN IN LEFT HIP: ICD-10-CM

## 2021-01-12 DIAGNOSIS — M25.562 LEFT KNEE PAIN, UNSPECIFIED CHRONICITY: ICD-10-CM

## 2021-01-12 PROBLEM — M51.369 DDD (DEGENERATIVE DISC DISEASE), LUMBAR: Status: ACTIVE | Noted: 2021-01-12

## 2021-01-12 PROBLEM — M17.12 PRIMARY OSTEOARTHRITIS OF LEFT KNEE: Status: ACTIVE | Noted: 2021-01-12

## 2021-01-12 PROCEDURE — 73502 X-RAY EXAM HIP UNI 2-3 VIEWS: CPT

## 2021-01-12 PROCEDURE — 3008F BODY MASS INDEX DOCD: CPT | Performed by: ORTHOPAEDIC SURGERY

## 2021-01-12 PROCEDURE — 72110 X-RAY EXAM L-2 SPINE 4/>VWS: CPT

## 2021-01-12 PROCEDURE — 99244 OFF/OP CNSLTJ NEW/EST MOD 40: CPT | Performed by: ORTHOPAEDIC SURGERY

## 2021-01-12 PROCEDURE — 1036F TOBACCO NON-USER: CPT | Performed by: ORTHOPAEDIC SURGERY

## 2021-01-12 PROCEDURE — 73564 X-RAY EXAM KNEE 4 OR MORE: CPT

## 2021-01-12 RX ORDER — COVID-19 ANTIGEN TEST
KIT MISCELLANEOUS
COMMUNITY

## 2021-01-12 NOTE — PROGRESS NOTES
Assessment:     1  DDD (degenerative disc disease), lumbar    2  Left leg numbness    3  Primary osteoarthritis of left hip    4  Posterior left knee pain    5  Weakness of both lower extremities        Plan:     Problem List Items Addressed This Visit        Musculoskeletal and Integument    Primary osteoarthritis of left hip    DDD (degenerative disc disease), lumbar - Primary    Relevant Orders    XR hip/pelv 2-3 vws left if performed (Completed)    EMG 2 limb lower extremity (Completed)       Other    Left leg numbness    Relevant Orders    EMG 2 limb lower extremity (Completed)      Other Visit Diagnoses     Posterior left knee pain        Relevant Orders    XR knee 4+ vw left injury (Completed)    Weakness of both lower extremities        Relevant Orders    XR spine lumbar minimum 4 views non injury (Completed)          Findings consistent left left hip severe ostearthritis andlumbar DDD multilevel worse at L1/2, L5/S1 possible stenosis  I reviewed patient's left hip, knee, and lumbar spine x-ray with her  I discussed possible cause of her leg symptoms  Imaging reviewed showing advanced left hip arthritis which is causing her gait issues, limited motion of hip  Currently having no groin pain  Localized area of diminished sensation of medial aspect of left knee and leg weakness could be due to irritated nerve in lumbar spine  Will get EMG to further evaluate  See back to review and discuss further treatment options  All patient's questions were answered to her satisfaction  This note is created using dictation transcription  It may contain typographical errors, grammatical errors, improperly dictated words, background noise and other errors  Subjective:     Patient ID: Adilson Foreman is a 60 y o  female  Chief Complaint:  60 yr old female referred by her family physician for evaluation of left leg pain and numbness   Onset in March, she was power walking 2 miles a day then started to have left leg, back pain  Saw chiropractor and he suggested she rest  She did which helped, then in July started to get more active and started to notice numbness in medial aspect of her knee and medial posterior thigh region which has continued  She denies locking or giving way sensation in the left knee  She feels when she gets up from seated position she has to adjust her left hip, knee, ankle so she can walk  She doesn't have same motion in left hip as right  Denies any back pain  No groin pain  No locking or catching in her knee  She denies bowel bladder problems  Information on patient's intake form was reviewed      Allergy:  No Known Allergies  Medications:  all current active meds have been reviewed  Past Medical History:  Past Medical History:   Diagnosis Date    Benign paroxysmal positional vertigo     Resolved: 9/15/2014    Fracture of radius      Past Surgical History:  Past Surgical History:   Procedure Laterality Date    BIOPSY CORE NEEDLE      Thyroid     SECTION      COLPOSCOPY       Family History:  Family History   Problem Relation Age of Onset    Stroke Mother         CVA    Breast cancer Mother 77    Alcohol abuse Father     COPD Father     Other Father         Nicotine Abuse    Thyroid cancer Brother     Coronary artery disease Brother     Thyroid disease Maternal Grandmother     Breast cancer Maternal Aunt 36    Breast cancer additional onset Maternal Aunt 48    No Known Problems Sister     No Known Problems Daughter     No Known Problems Maternal Grandfather     No Known Problems Paternal Grandmother     No Known Problems Paternal Grandfather     No Known Problems Daughter     No Known Problems Maternal Aunt     No Known Problems Maternal Aunt     No Known Problems Maternal Aunt      Social History:  Social History     Substance and Sexual Activity   Alcohol Use Yes    Comment: social     Social History     Substance and Sexual Activity   Drug Use No     Social History Tobacco Use   Smoking Status Never Smoker   Smokeless Tobacco Never Used     Review of Systems   Constitutional: Negative for chills and fever  HENT: Negative for ear pain and sore throat  Eyes: Negative for pain and visual disturbance  Respiratory: Negative for cough and shortness of breath  Cardiovascular: Negative for chest pain and palpitations  Gastrointestinal: Negative for abdominal pain and vomiting  Genitourinary: Negative for dysuria and hematuria  Musculoskeletal: Negative for arthralgias, back pain, gait problem and joint swelling  Skin: Negative for color change and rash  Neurological: Positive for weakness (Bilateral leg) and numbness (Left knee and posterior thigh)  Negative for seizures and syncope  All other systems reviewed and are negative  Objective:  BP Readings from Last 1 Encounters:   02/10/21 120/80      Wt Readings from Last 1 Encounters:   02/10/21 66 2 kg (146 lb)      BMI:   Estimated body mass index is 22 87 kg/m² as calculated from the following:    Height as of this encounter: 5' 7" (1 702 m)  Weight as of this encounter: 66 2 kg (146 lb)  BSA:   Estimated body surface area is 1 77 meters squared as calculated from the following:    Height as of this encounter: 5' 7" (1 702 m)  Weight as of this encounter: 66 2 kg (146 lb)  Physical Exam  Vitals signs and nursing note reviewed  Constitutional:       Appearance: Normal appearance  She is well-developed  HENT:      Head: Normocephalic and atraumatic  Right Ear: External ear normal       Left Ear: External ear normal    Eyes:      Extraocular Movements: Extraocular movements intact  Conjunctiva/sclera: Conjunctivae normal    Neck:      Musculoskeletal: Neck supple  Pulmonary:      Effort: Pulmonary effort is normal    Skin:     General: Skin is warm and dry  Neurological:      Mental Status: She is alert and oriented to person, place, and time        Deep Tendon Reflexes: Reflexes are normal and symmetric  Psychiatric:         Mood and Affect: Mood normal          Behavior: Behavior normal        Left Knee Exam     Muscle Strength   The patient has normal left knee strength  Tenderness   The patient is experiencing no tenderness  Range of Motion   Extension: normal   Flexion: normal     Tests   Sadie:  Medial - negative Lateral - negative  Varus: negative Valgus: negative  Lachman:  Anterior - negative      Drawer:  Anterior - negative       Patellar apprehension: negative    Other   Erythema: absent  Scars: absent  Sensation: normal  Pulse: present  Swelling: none      Left Hip Exam     Tenderness   The patient is experiencing tenderness in the greater trochanter  Range of Motion   Flexion: 120   External rotation: 30 (pain at end range)   Internal rotation: 10 (pain at end range)     Muscle Strength   The patient has normal left hip strength  Tests   FABIAN: positive  Tasia: negative    Other   Erythema: absent  Scars: absent  Sensation: normal  Pulse: present    Comments:  Lumbar spine  Skin intact  No specific tenderness to palpation lumbar musculature   L2-S1 5/5 strength  L2-S1 sensation intact and symmetric except localized area medial aspect of left knee and posterior thigh decreased sensation  Negative SLR            I have personally reviewed pertinent films in PACS and my interpretation is xr left knee demonstrates narrowing medial compartment, no significant degenerative changes  XR left hip demonstrates significant degenerative changes, subchondral sclerosis, osteophytes  XR lumbar spine multilevel DDD worse at L1/2, L5/S1 , scoliosis, loss of lordosis       Scribe Attestation    I,:  Jorje Williamson am acting as a scribe while in the presence of the attending physician :       I,:  Landry Yanez MD personally performed the services described in this documentation    as scribed in my presence :

## 2021-01-20 ENCOUNTER — HOSPITAL ENCOUNTER (OUTPATIENT)
Dept: NEUROLOGY | Facility: CLINIC | Age: 66
Discharge: HOME | End: 2021-01-20
Payer: COMMERCIAL

## 2021-01-20 DIAGNOSIS — R20.0 LEFT LEG NUMBNESS: ICD-10-CM

## 2021-01-20 DIAGNOSIS — M51.36 DDD (DEGENERATIVE DISC DISEASE), LUMBAR: ICD-10-CM

## 2021-01-20 PROCEDURE — 95886 MUSC TEST DONE W/N TEST COMP: CPT | Performed by: PSYCHIATRY & NEUROLOGY

## 2021-01-20 PROCEDURE — 95910 NRV CNDJ TEST 7-8 STUDIES: CPT | Performed by: PSYCHIATRY & NEUROLOGY

## 2021-02-10 ENCOUNTER — OFFICE VISIT (OUTPATIENT)
Dept: OBGYN CLINIC | Facility: CLINIC | Age: 66
End: 2021-02-10
Payer: COMMERCIAL

## 2021-02-10 VITALS
HEIGHT: 67 IN | DIASTOLIC BLOOD PRESSURE: 80 MMHG | WEIGHT: 146 LBS | BODY MASS INDEX: 22.91 KG/M2 | TEMPERATURE: 97.8 F | SYSTOLIC BLOOD PRESSURE: 120 MMHG

## 2021-02-10 DIAGNOSIS — M16.12 PRIMARY OSTEOARTHRITIS OF LEFT HIP: Primary | ICD-10-CM

## 2021-02-10 DIAGNOSIS — M51.36 DDD (DEGENERATIVE DISC DISEASE), LUMBAR: ICD-10-CM

## 2021-02-10 DIAGNOSIS — R20.0 LEFT LEG NUMBNESS: ICD-10-CM

## 2021-02-10 PROCEDURE — 99213 OFFICE O/P EST LOW 20 MIN: CPT | Performed by: ORTHOPAEDIC SURGERY

## 2021-02-10 NOTE — PROGRESS NOTES
Assessment:     1  Primary osteoarthritis of left hip    2  Left leg numbness    3  DDD (degenerative disc disease), lumbar        Plan:     Problem List Items Addressed This Visit        Musculoskeletal and Integument    Osteoarthritis of hip - Primary    Relevant Orders    Ambulatory referral to Orthopedic Surgery    DDD (degenerative disc disease), lumbar       Other    Left leg numbness    Relevant Orders    Ambulatory referral to Orthopedic Surgery          EMG results were reviewed with the patient today which showed normal findings  She is still experiencing numbness the medial aspect of her left knee  She denies any knee pain or groin pain  With her EMG finding being normal I cannot really explain why she is experiencing numbness around the inner aspect of her knee  With arthritis in her hip which could certainly cause some numbing sensation in the leg but unlikely to actual decrease physical sensation  Some of her complain such as initial weight-bearing with instability sensation may stem from the hip arthritis  At this time I will be placing a referral for her to see Dr Cookie Bryan our joint replacement specialist for second opinion  All patient's questions were answered to her satisfaction  This note is created using dictation transcription  It may contain typographical errors, grammatical errors, improperly dictated words, background noise and other errors  Subjective:      Patient ID: Julio Soto is a 60 y o  female  Chief Complaint:  Left Leg numbness and weakness    Jackson Junior is a 60year old female presenting here today for a follow up evaluation and to review her EMG results  She notes continued pain with her activities of daily living especially bending, lifting and walking  She likes to take walks and is unable to walk for long periods of time now  She notes that she has numbness located on her medial aspect of her left knee and denies any pain in the groin   She notes that when she gets up from a seated position, she cannot feel her leg and comes close to falling  She denies bowel or bladder problem  She has no lower back pain  His no pain radiate from the back into her legs  Allergy:  No Known Allergies  Medications:  all current active meds have been reviewed  Past Medical History:  Past Medical History:   Diagnosis Date    Benign paroxysmal positional vertigo     Resolved: 9/15/2014    Fracture of radius      Past Surgical History:  Past Surgical History:   Procedure Laterality Date    BIOPSY CORE NEEDLE      Thyroid     SECTION      COLPOSCOPY       Family History:  Family History   Problem Relation Age of Onset    Stroke Mother         CVA    Breast cancer Mother 77    Alcohol abuse Father     COPD Father     Other Father         Nicotine Abuse    Thyroid cancer Brother     Coronary artery disease Brother     Thyroid disease Maternal Grandmother     Breast cancer Maternal Aunt 36    Breast cancer additional onset Maternal Aunt 48    No Known Problems Sister     No Known Problems Daughter     No Known Problems Maternal Grandfather     No Known Problems Paternal Grandmother     No Known Problems Paternal Grandfather     No Known Problems Daughter     No Known Problems Maternal Aunt     No Known Problems Maternal Aunt     No Known Problems Maternal Aunt      Social History:  Social History     Substance and Sexual Activity   Alcohol Use Yes    Comment: social     Social History     Substance and Sexual Activity   Drug Use No     Social History     Tobacco Use   Smoking Status Never Smoker   Smokeless Tobacco Never Used     Review of Systems   Constitutional: Negative  HENT: Negative  Eyes: Negative  Respiratory: Negative  Cardiovascular: Negative  Gastrointestinal: Negative  Endocrine: Negative  Genitourinary: Negative  Musculoskeletal: Negative  Skin: Negative  Allergic/Immunologic: Negative      Neurological: Positive for weakness (Left leg) and numbness (Left leg)  Hematological: Negative  Psychiatric/Behavioral: Negative  Objective:  BP Readings from Last 1 Encounters:   02/10/21 120/80      Wt Readings from Last 1 Encounters:   02/10/21 66 2 kg (146 lb)      BMI:   Estimated body mass index is 22 87 kg/m² as calculated from the following:    Height as of this encounter: 5' 7" (1 702 m)  Weight as of this encounter: 66 2 kg (146 lb)  BSA:   Estimated body surface area is 1 77 meters squared as calculated from the following:    Height as of this encounter: 5' 7" (1 702 m)  Weight as of this encounter: 66 2 kg (146 lb)  Physical Exam  Vitals signs and nursing note reviewed  Constitutional:       Appearance: Normal appearance  She is well-developed  HENT:      Head: Normocephalic and atraumatic  Right Ear: External ear normal       Left Ear: External ear normal    Eyes:      Extraocular Movements: Extraocular movements intact  Conjunctiva/sclera: Conjunctivae normal    Neck:      Musculoskeletal: Neck supple  Pulmonary:      Effort: Pulmonary effort is normal    Musculoskeletal:      Right knee: She exhibits no effusion  Skin:     General: Skin is warm and dry  Neurological:      Mental Status: She is alert and oriented to person, place, and time  Deep Tendon Reflexes: Reflexes are normal and symmetric  Psychiatric:         Mood and Affect: Mood normal          Behavior: Behavior normal        Right Knee Exam     Other   Effusion: no effusion present      Left Knee Exam     Muscle Strength   The patient has normal left knee strength  Tenderness   The patient is experiencing no tenderness       Range of Motion   Extension: normal   Flexion: normal     Tests   Sadie:  Medial - negative Lateral - negative  Varus: negative Valgus: negative  Lachman:  Anterior - negative      Drawer:  Anterior - negative       Patellar apprehension: negative    Other   Erythema: absent  Scars: absent  Sensation: normal  Pulse: present  Swelling: none      Left Hip Exam     Tenderness   The patient is experiencing tenderness in the greater trochanter  Range of Motion   Flexion: normal   External rotation: 30 (pain at end range)   Internal rotation: 10 (pain at end range)     Muscle Strength   The patient has normal left hip strength  Tests   FABIAN: positive  Tasia: negative    Other   Erythema: absent  Scars: absent  Sensation: normal  Pulse: present    Comments:  Lumbar spine  Skin intact  No specific tenderness to palpation lumbar musculature   L2-S1 5/5 strength  L2-S1 sensation intact and symmetric except localized area medial aspect of left knee and posterior thigh decreased sensation  Negative SLR            EMG study was performed on 1/20/2021 has normal findings

## 2021-02-11 ENCOUNTER — OFFICE VISIT (OUTPATIENT)
Dept: OBGYN CLINIC | Facility: CLINIC | Age: 66
End: 2021-02-11
Payer: COMMERCIAL

## 2021-02-11 VITALS
TEMPERATURE: 97.7 F | SYSTOLIC BLOOD PRESSURE: 118 MMHG | BODY MASS INDEX: 22.91 KG/M2 | HEIGHT: 67 IN | WEIGHT: 146 LBS | DIASTOLIC BLOOD PRESSURE: 80 MMHG

## 2021-02-11 DIAGNOSIS — M51.36 DDD (DEGENERATIVE DISC DISEASE), LUMBAR: Primary | ICD-10-CM

## 2021-02-11 DIAGNOSIS — M16.12 PRIMARY OSTEOARTHRITIS OF LEFT HIP: ICD-10-CM

## 2021-02-11 DIAGNOSIS — R20.0 LEFT LEG NUMBNESS: ICD-10-CM

## 2021-02-11 PROCEDURE — 99214 OFFICE O/P EST MOD 30 MIN: CPT | Performed by: ORTHOPAEDIC SURGERY

## 2021-02-11 NOTE — PROGRESS NOTES
Orthopaedic Surgery Note    CC: Left Knee Pain      HPI:  Migel Guardian is a 60 y  o female with left knee numbness  She was referred by Dr General Smiley  She states in 2020 she was power walking for 2 miles a day and started to have numbness over medial aspect of the left knee  She denies any left hip or groin  pain  No knee pain  She did have EMG Left LE which was normal  She feels her left knee is unstable  Pain is worse with prolong walking and increase activities and better with rest  She is taking OTC Aleve or Advil for pain with relief  She has no history of having treatment on her left knee  Her pain level is a 0/10  ALLERGIES:  No Known Allergies    CURRENT MEDICATIONS:  Current Outpatient Medications   Medication Sig Dispense Refill    calcium gluconate 500 mg tablet Take 500 mg by mouth daily      Chlorophyll 100 MG TABS Take 1 tablet (100 mg total) by mouth daily  0    fluticasone (FLONASE) 50 mcg/act nasal spray 2 sprays into each nostril daily at bedtime 1 Bottle 5    Magnesium 250 MG TABS Take 1 tablet by mouth daily      Misc Natural Products (ADRENAL PO) Take by mouth      Multiple Vitamin (MULTIVITAMINS PO) Take 1 capsule by mouth daily      Naproxen Sodium (Aleve) 220 MG CAPS Take by mouth      THYROID PO Take by mouth       No current facility-administered medications for this visit          PAST MEDICAL HISTORY  Past Medical History:   Diagnosis Date    Benign paroxysmal positional vertigo     Resolved: 9/15/2014    Fracture of radius        SURGICAL HISTORY  Past Surgical History:   Procedure Laterality Date    BIOPSY CORE NEEDLE      Thyroid     SECTION      COLPOSCOPY         FAMILY HISTORY  Family History   Problem Relation Age of Onset    Stroke Mother         CVA    Breast cancer Mother 75    Alcohol abuse Father     COPD Father     Other Father         Nicotine Abuse    Thyroid cancer Brother     Coronary artery disease Brother     Thyroid disease Maternal Grandmother     Breast cancer Maternal Aunt 36    Breast cancer additional onset Maternal Aunt 48    No Known Problems Sister     No Known Problems Daughter     No Known Problems Maternal Grandfather     No Known Problems Paternal Grandmother     No Known Problems Paternal Grandfather     No Known Problems Daughter     No Known Problems Maternal Aunt     No Known Problems Maternal Aunt     No Known Problems Maternal Aunt        SOCIAL HISTORY  Social History     Socioeconomic History    Marital status: Single     Spouse name: Not on file    Number of children: Not on file    Years of education: Not on file    Highest education level: Not on file   Occupational History    Not on file   Social Needs    Financial resource strain: Not on file    Food insecurity     Worry: Not on file     Inability: Not on file    Transportation needs     Medical: Not on file     Non-medical: Not on file   Tobacco Use    Smoking status: Never Smoker    Smokeless tobacco: Never Used   Substance and Sexual Activity    Alcohol use: Yes     Comment: social    Drug use: No    Sexual activity: Not on file   Lifestyle    Physical activity     Days per week: Not on file     Minutes per session: Not on file    Stress: Not on file   Relationships    Social connections     Talks on phone: Not on file     Gets together: Not on file     Attends [employer] service: Not on file     Active member of club or organization: Not on file     Attends meetings of clubs or organizations: Not on file     Relationship status: Not on file    Intimate partner violence     Fear of current or ex partner: Not on file     Emotionally abused: Not on file     Physically abused: Not on file     Forced sexual activity: Not on file   Other Topics Concern    Not on file   Social History Narrative    Single, lives alone, part-time employment in [employer]         Review of Systems   Metal Allergy: no  History of MRSA: no  History of DVT or PE: no  Active dental issues: no  Anesthesia complications: no    Patient indicated positive for thyroid problems  Otherwise negative except per above and HPI  Physical Exam    Vitals  Vitals:    02/11/21 1339   BP: 118/80   Temp: 97 7 °F (36 5 °C)       BMI  Body mass index is 22 87 kg/m²  GENERAL: No acute distress  X3 Alert and oriented  Well nourished and well hydrated  Appears stated age  HEENT : Normocephalic, atraumatic  Extraocular movements intact  Mask in place  NECK: Supple, trachea midline  LUNGS: Adequate and symmetric respiratory effort  No intercostal retractions or accessory muscle use  HEART: Extremities warm and perfused  ABDOMEN: Nondistended  SKIN: Warm and dry, no rash  Left Hip:  Flexion 120 (5-/5)  Extension 0  IR/ER: 15/20      Left  Knee   Inspection/Appearance:       Swelling: No      Patella is midline  Alignment:  Knee is in neutral  Palpation - Soft Tissue: normal without effusion  ROM:       Extension - 0          Flexion - 120      extensor lag: no    Stability:  demonstrates no varus, valgus, anterior drawer or posterior drawer  Patella: stable, tracks normally  Sensation Intact to Light Touch in Sural, Saphenous, Tibial, Superficial Peroneal, and Deep Peroneal Nerve Distribution  Motor function 5 out of 5 strength in Tibialis Anterior, Gastrocnemius, Soleus, Extensor Hallucis Longus, and Flexor Hallucis Longus Muscles  Extremity Warm and Well Perfused  Imaging  A) Imaging modality available  Radiographs: yes  MRI scan: no  CT scan: no    B) Imaging findings  Subchondral cysts: no  Subchondral sclerosis: no  Periarticular osteophytes: no  Joint subluxation: no  Joint space narrowing: yes  Bone-on-bone articulations: yes  Avascular necrosis: no    Left knee mild OA, mostly medial compartment  Left hip severe OA with bone on bone, most severe in central compartment        Assessment and Plan  Left  Knee mild  Arthritis  Left hip severe OA    - discussed with patietn that her symptoms of medial thigh/knee numbness are not typical for either hip or knee OA  Hip OA, especially central compartment disease can commonly effect the obturator nerve and result in referred pain to the medial kneel however, this would be unlikely to cause numbnes  Prior EMG show LLE peripheral nerves appear normal, this study did not apepar to evaluate nerves coming from lumbar spine  She does have lumbar spine degen disc disease  Recommend pain managemtn for evaluation:    Differential in my mind is abnormal presentation of hip OA (hip CSI may help diagnositically) vs lumbar spine degen disc disease  If IA hip CSI provides significant relief, this would be evidence to help support SAMEER as tx option  Merly Rubio MD  Adult Reconstruction Surgery  Department of [address] [place] [address]  1:49 PM      Scribe Attestation    I,:  Andrew Elaine am acting as a scribe while in the presence of the attending physician :       I,:  Lance Hutchison MD personally performed the services described in this documentation    as scribed in my presence :

## 2021-02-17 ENCOUNTER — CONSULT (OUTPATIENT)
Dept: PAIN MEDICINE | Facility: CLINIC | Age: 66
End: 2021-02-17
Payer: COMMERCIAL

## 2021-02-17 VITALS
DIASTOLIC BLOOD PRESSURE: 80 MMHG | WEIGHT: 150 LBS | HEART RATE: 78 BPM | TEMPERATURE: 97.9 F | HEIGHT: 67 IN | SYSTOLIC BLOOD PRESSURE: 160 MMHG | BODY MASS INDEX: 23.54 KG/M2

## 2021-02-17 DIAGNOSIS — R20.0 LEFT LEG NUMBNESS: ICD-10-CM

## 2021-02-17 DIAGNOSIS — M51.36 DDD (DEGENERATIVE DISC DISEASE), LUMBAR: ICD-10-CM

## 2021-02-17 DIAGNOSIS — M16.12 PRIMARY OSTEOARTHRITIS OF LEFT HIP: ICD-10-CM

## 2021-02-17 PROCEDURE — 99244 OFF/OP CNSLTJ NEW/EST MOD 40: CPT | Performed by: ANESTHESIOLOGY

## 2021-02-17 NOTE — LETTER
February 17, 2021     Vivek Ruffin MD  [address] [place] [address]  [address] [facility]  [place] [id number]    Patient: Veryl Knee   YOB: 1955   Date of Visit: 2/17/2021       Dear Dr Jhoana Boudreaux: Thank you for referring Jessica Veliz to me for evaluation  Below are my notes for this consultation  If you have questions, please do not hesitate to call me  I look forward to following your patient along with you  Sincerely,        Lucila Figueroa DO        CC: No Recipients  Lucila Figueroa DO  2/17/2021  9:39 AM  Signed  Assessment  1  Primary osteoarthritis of left hip    2  DDD (degenerative disc disease), lumbar    3  Left leg numbness        Plan       Leobardo Champagne has an unusual presentation  Her radiographs of the knee are normal EMG is essentially within normal limits yet she has significant hip arthritis  Her physical exam is unremarkable  At this point in time I think with  her hip radiographs revealing  significant osteoarthritis,  I believe is reasonable to pursue a [ left  hip joint injection that would serve both diagnostic and hopefully therapeutic purposes  Depending on  her response, we will re-evaluate the plan  if she obtained positive response with the relief of her other symptoms,  Did not obtain standing relief than 1 May consider surgical re-evaluation  In the office today did review the nature of the patient's pathology in depth using diagrams and models  I discussed the approach I would use for the hip joint injection provided literature for home review  I did review the risks associated with the procedure not limited to but including bleeding, tissue injury, decreased immunity secondary to steroid injection, allergic reaction, infection, exacerbation of symptoms and patient provided verbal consent  My impressions and treatment recommendations were discussed in detail with the patient who verbalized understanding and had no further questions  Discharge instructions were provided  I personally saw and examined the patient and I agree with the above discussed plan of care  This note is created using dictation transcription  It may contain typographical errors, grammatical errors, improperly dictated words, background noise and other errors  Orders Placed This Encounter   Procedures    FL spine and pain procedure     Standing Status:   Future     Standing Expiration Date:   2/17/2025     Order Specific Question:   Reason for Exam:     Answer:   Left hip injection with 0 75% and pain diary     Order Specific Question:   Anticoagulant hold needed? Answer:   no       Referred By: Alia Purcell MD  History of Present Illness    Rosa M Yang is a 60 y o  female   Was evaluated by Orthopedics  She has left medial knee numbness and tingling worsening when walking  She is unaware of any clear precipitating event denies any trauma or injury  This been going on for eight months  She has had radiographs of both the knee and the hip performed as well as EMG  Knee and EMG are essentially within normal limits her symptoms are moderate no typical pattern with a numbing sensation  She denies weakness of her lower limbs  She reports that sitting decreases symptoms will standing bending walking and sometimes lying down aggravates her pain  I have personally reviewed and/or updated the patient's past medical history, past surgical history, family history, social history, current medications, allergies, and vital signs today  Review of Systems   Constitutional: Negative for fever and unexpected weight change  HENT: Negative for trouble swallowing  Eyes: Negative for visual disturbance  Respiratory: Negative for shortness of breath and wheezing  Cardiovascular: Negative for chest pain and palpitations  Gastrointestinal: Negative for constipation, diarrhea, nausea and vomiting  Endocrine: Negative for cold intolerance, heat intolerance and polydipsia     Genitourinary: Negative for difficulty urinating and frequency  Musculoskeletal: Negative for arthralgias, gait problem, joint swelling and myalgias  Skin: Negative for rash  Neurological: Negative for dizziness, seizures, syncope, weakness and headaches  Hematological: Does not bruise/bleed easily  Psychiatric/Behavioral: Negative for dysphoric mood  All other systems reviewed and are negative        Patient Active Problem List   Diagnosis    Breast cyst    Cervical spondylosis    Chronic allergic conjunctivitis    Dyslipidemia    Hashimoto's disease    Herniated cervical disc    Non-toxic multinodular goiter    Primary osteoarthritis of left hip    Rhinitis    Varicose veins    Elevated TSH    Osteopenia    DDD (degenerative disc disease), lumbar    Left leg numbness       Past Medical History:   Diagnosis Date    Benign paroxysmal positional vertigo     Resolved: 9/15/2014    Fracture of radius     Thyroid disease        Past Surgical History:   Procedure Laterality Date    BIOPSY CORE NEEDLE      Thyroid     SECTION      COLPOSCOPY         Family History   Problem Relation Age of Onset    Stroke Mother         CVA    Breast cancer Mother 77    Alcohol abuse Father    Fili Flower COPD Father     Other Father         Nicotine Abuse    Thyroid cancer Brother     Coronary artery disease Brother     Thyroid disease Maternal Grandmother     Breast cancer Maternal Aunt 36    Breast cancer additional onset Maternal Aunt 48    No Known Problems Sister     No Known Problems Daughter     No Known Problems Maternal Grandfather     No Known Problems Paternal Grandmother     No Known Problems Paternal Grandfather     No Known Problems Daughter     No Known Problems Maternal Aunt     No Known Problems Maternal Aunt     No Known Problems Maternal Aunt        Social History     Occupational History    Not on file   Tobacco Use    Smoking status: Never Smoker    Smokeless tobacco: Never Used   Substance and Sexual Activity    Alcohol use: Yes     Comment: social    Drug use: No    Sexual activity: Not Currently       Current Outpatient Medications on File Prior to Visit   Medication Sig    calcium gluconate 500 mg tablet Take 500 mg by mouth daily    Chlorophyll 100 MG TABS Take 1 tablet (100 mg total) by mouth daily    fluticasone (FLONASE) 50 mcg/act nasal spray 2 sprays into each nostril daily at bedtime    Magnesium 250 MG TABS Take 1 tablet by mouth daily    Misc Natural Products (ADRENAL PO) Take by mouth    Multiple Vitamin (MULTIVITAMINS PO) Take 1 capsule by mouth daily    Naproxen Sodium (Aleve) 220 MG CAPS Take by mouth    THYROID PO Take by mouth     No current facility-administered medications on file prior to visit  No Known Allergies    Physical Exam    /80 (BP Location: Left arm, Patient Position: Sitting, Cuff Size: Standard)   Pulse 78   Temp 97 9 °F (36 6 °C)   Ht 5' 7" (1 702 m)   Wt 68 kg (150 lb)   BMI 23 49 kg/m²     Constitutional: normal, well developed, well nourished, alert, in no distress and non-toxic and no overt pain behavior  Eyes: anicteric  HEENT: grossly intact  Neck: supple, symmetric, trachea midline and no masses   Pulmonary:even and unlabored  Cardiovascular:No edema or pitting edema present  Skin:Normal without rashes or lesions and well hydrated  Psychiatric:Mood and affect appropriate  Neurologic:Cranial Nerves II-XII grossly intact  Musculoskeletal:normal ,   Inspection:  Normal station and gait  Normal lumbar lordotic curve with no significant scoliosis or spinal step-off  Skin intact without erythema  No gross mass or muscle atrophy  Palpation:  There is no tenderness to palpation overlying the sacroiliac joint as well as the ischial bursa bilateral   No significant tenderness over the greater trochanteric bursa bilaterally  Motor/Strength:  5/5 strength in the bilateral lower limbs  The patient is able to heel and/toe walk  Tandem gait is intact  Reflexes: Deep tendon reflex are 2+ and symmetrical bilateral patella and Achilles  Sensation:   Sensation intact to light touch and pinprick in the bilateral lower limbs  Proprioception is intact at bilateral hallux  Maneuvers: Negative bilateral straight leg raising  Negative Benoit's maneuver  Imaging  LUMBAR SPINE @  1-12-21     INDICATION:   M54 5: Low back pain      COMPARISON:  None     VIEWS:  XR SPINE LUMBAR MINIMUM 4 VIEWS NON INJURY        FINDINGS:     There are 5 non rib bearing lumbar vertebral bodies       There is no evidence of acute fracture or destructive osseous lesion      There is mild convexity of the lumbar spine to the right in the lower lumbar spine to the left  There is grade 1 anterolisthesis of L4 on L5  There is grade 1 retrolisthesis of L1 on L2       There is mild intervertebral disc space narrowing at L1/L2 and L5/S1  There is bilateral facet arthropathy at L3/L4, L4/L5 and L5/S1      The pedicles appear intact      Soft tissues are unremarkable      IMPRESSION:     No acute lumbar spine fracture or subluxation      Mild to moderate multilevel spondylotic degenerative changes of the lumbar spine  LEFT HIP @  1-12-21     INDICATION:   M25 552: Pain in left hip      COMPARISON:  Left hip x-ray November 30, 2007      VIEWS:  XR HIP/PELV 2-3 VWS LEFT  W PELVIS IF PERFORMED         FINDINGS:     There is no acute fracture or dislocation      There is severe left hip joint space narrowing with subchondral sclerosis and subchondral cyst formation  There are small acetabular marginal osteophytes      No lytic or blastic osseous lesion      Soft tissues are unremarkable      The visualized lumbar spine is unremarkable      IMPRESSION:     No acute osseous abnormality      Severe osteoarthritic degenerative changes of the left hip joint      LEFT KNEE @  1-12-21     INDICATION:   M25 562: Pain in left knee      COMPARISON:  None     VIEWS:  XR KNEE 4+ VW LEFT INJURY         FINDINGS:     There is no acute fracture or dislocation      There is no joint effusion      No significant degenerative changes      No lytic or blastic osseous lesion      Soft tissues are unremarkable      IMPRESSION:     No acute osseous abnormality  I have personally reviewed pertinent films in PACS

## 2021-02-17 NOTE — PROGRESS NOTES
Assessment  1  Primary osteoarthritis of left hip    2  DDD (degenerative disc disease), lumbar    3  Left leg numbness        Plan       Kelly Frederick has an unusual presentation  Her radiographs of the knee are normal EMG is essentially within normal limits yet she has significant hip arthritis  Her physical exam is unremarkable  At this point in time I think with  her hip radiographs revealing  significant osteoarthritis,  I believe is reasonable to pursue a [ left  hip joint injection that would serve both diagnostic and hopefully therapeutic purposes  Depending on  her response, we will re-evaluate the plan  if she obtained positive response with the relief of her other symptoms,  Did not obtain standing relief than 1 May consider surgical re-evaluation  In the office today did review the nature of the patient's pathology in depth using diagrams and models  I discussed the approach I would use for the hip joint injection provided literature for home review  I did review the risks associated with the procedure not limited to but including bleeding, tissue injury, decreased immunity secondary to steroid injection, allergic reaction, infection, exacerbation of symptoms and patient provided verbal consent  My impressions and treatment recommendations were discussed in detail with the patient who verbalized understanding and had no further questions  Discharge instructions were provided  I personally saw and examined the patient and I agree with the above discussed plan of care  This note is created using dictation transcription  It may contain typographical errors, grammatical errors, improperly dictated words, background noise and other errors      Orders Placed This Encounter   Procedures    FL spine and pain procedure     Standing Status:   Future     Standing Expiration Date:   2/17/2025     Order Specific Question:   Reason for Exam:     Answer:   Left hip injection with 0 75% and pain diary Order Specific Question:   Anticoagulant hold needed? Answer:   no       Referred By: Tapan Arias MD  History of Present Illness    Monica Neves is a 60 y o  female   Was evaluated by Orthopedics  She has left medial knee numbness and tingling worsening when walking  She is unaware of any clear precipitating event denies any trauma or injury  This been going on for eight months  She has had radiographs of both the knee and the hip performed as well as EMG  Knee and EMG are essentially within normal limits her symptoms are moderate no typical pattern with a numbing sensation  She denies weakness of her lower limbs  She reports that sitting decreases symptoms will standing bending walking and sometimes lying down aggravates her pain  I have personally reviewed and/or updated the patient's past medical history, past surgical history, family history, social history, current medications, allergies, and vital signs today  Review of Systems   Constitutional: Negative for fever and unexpected weight change  HENT: Negative for trouble swallowing  Eyes: Negative for visual disturbance  Respiratory: Negative for shortness of breath and wheezing  Cardiovascular: Negative for chest pain and palpitations  Gastrointestinal: Negative for constipation, diarrhea, nausea and vomiting  Endocrine: Negative for cold intolerance, heat intolerance and polydipsia  Genitourinary: Negative for difficulty urinating and frequency  Musculoskeletal: Negative for arthralgias, gait problem, joint swelling and myalgias  Skin: Negative for rash  Neurological: Negative for dizziness, seizures, syncope, weakness and headaches  Hematological: Does not bruise/bleed easily  Psychiatric/Behavioral: Negative for dysphoric mood  All other systems reviewed and are negative        Patient Active Problem List   Diagnosis    Breast cyst    Cervical spondylosis    Chronic allergic conjunctivitis    Dyslipidemia  Hashimoto's disease    Herniated cervical disc    Non-toxic multinodular goiter    Primary osteoarthritis of left hip    Rhinitis    Varicose veins    Elevated TSH    Osteopenia    DDD (degenerative disc disease), lumbar    Left leg numbness       Past Medical History:   Diagnosis Date    Benign paroxysmal positional vertigo     Resolved: 9/15/2014    Fracture of radius     Thyroid disease        Past Surgical History:   Procedure Laterality Date    BIOPSY CORE NEEDLE      Thyroid     SECTION      COLPOSCOPY         Family History   Problem Relation Age of Onset    Stroke Mother         CVA    Breast cancer Mother 77    Alcohol abuse Father     COPD Father     Other Father         Nicotine Abuse    Thyroid cancer Brother     Coronary artery disease Brother     Thyroid disease Maternal Grandmother     Breast cancer Maternal Aunt 36    Breast cancer additional onset Maternal Aunt 48    No Known Problems Sister     No Known Problems Daughter     No Known Problems Maternal Grandfather     No Known Problems Paternal Grandmother     No Known Problems Paternal Grandfather     No Known Problems Daughter     No Known Problems Maternal Aunt     No Known Problems Maternal Aunt     No Known Problems Maternal Aunt        Social History     Occupational History    Not on file   Tobacco Use    Smoking status: Never Smoker    Smokeless tobacco: Never Used   Substance and Sexual Activity    Alcohol use: Yes     Comment: social    Drug use: No    Sexual activity: Not Currently       Current Outpatient Medications on File Prior to Visit   Medication Sig    calcium gluconate 500 mg tablet Take 500 mg by mouth daily    Chlorophyll 100 MG TABS Take 1 tablet (100 mg total) by mouth daily    fluticasone (FLONASE) 50 mcg/act nasal spray 2 sprays into each nostril daily at bedtime    Magnesium 250 MG TABS Take 1 tablet by mouth daily    Misc Natural Products (ADRENAL PO) Take by mouth  Multiple Vitamin (MULTIVITAMINS PO) Take 1 capsule by mouth daily    Naproxen Sodium (Aleve) 220 MG CAPS Take by mouth    THYROID PO Take by mouth     No current facility-administered medications on file prior to visit  No Known Allergies    Physical Exam    /80 (BP Location: Left arm, Patient Position: Sitting, Cuff Size: Standard)   Pulse 78   Temp 97 9 °F (36 6 °C)   Ht 5' 7" (1 702 m)   Wt 68 kg (150 lb)   BMI 23 49 kg/m²     Constitutional: normal, well developed, well nourished, alert, in no distress and non-toxic and no overt pain behavior  Eyes: anicteric  HEENT: grossly intact  Neck: supple, symmetric, trachea midline and no masses   Pulmonary:even and unlabored  Cardiovascular:No edema or pitting edema present  Skin:Normal without rashes or lesions and well hydrated  Psychiatric:Mood and affect appropriate  Neurologic:Cranial Nerves II-XII grossly intact  Musculoskeletal:normal ,   Inspection:  Normal station and gait  Normal lumbar lordotic curve with no significant scoliosis or spinal step-off  Skin intact without erythema  No gross mass or muscle atrophy  Palpation:  There is no tenderness to palpation overlying the sacroiliac joint as well as the ischial bursa bilateral   No significant tenderness over the greater trochanteric bursa bilaterally  Motor/Strength:  5/5 strength in the bilateral lower limbs  The patient is able to heel and/toe walk  Tandem gait is intact  Reflexes: Deep tendon reflex are 2+ and symmetrical bilateral patella and Achilles  Sensation:   Sensation intact to light touch and pinprick in the bilateral lower limbs  Proprioception is intact at bilateral hallux  Maneuvers: Negative bilateral straight leg raising  Negative Benoit's maneuver            Imaging  LUMBAR SPINE @  1-12-21     INDICATION:   M54 5: Low back pain      COMPARISON:  None     VIEWS:  XR SPINE LUMBAR MINIMUM 4 VIEWS NON INJURY        FINDINGS:     There are 5 non rib bearing lumbar vertebral bodies       There is no evidence of acute fracture or destructive osseous lesion      There is mild convexity of the lumbar spine to the right in the lower lumbar spine to the left  There is grade 1 anterolisthesis of L4 on L5  There is grade 1 retrolisthesis of L1 on L2       There is mild intervertebral disc space narrowing at L1/L2 and L5/S1  There is bilateral facet arthropathy at L3/L4, L4/L5 and L5/S1      The pedicles appear intact      Soft tissues are unremarkable      IMPRESSION:     No acute lumbar spine fracture or subluxation      Mild to moderate multilevel spondylotic degenerative changes of the lumbar spine  LEFT HIP @  1-12-21     INDICATION:   M25 552: Pain in left hip      COMPARISON:  Left hip x-ray November 30, 2007      VIEWS:  XR HIP/PELV 2-3 VWS LEFT  W PELVIS IF PERFORMED         FINDINGS:     There is no acute fracture or dislocation      There is severe left hip joint space narrowing with subchondral sclerosis and subchondral cyst formation  There are small acetabular marginal osteophytes      No lytic or blastic osseous lesion      Soft tissues are unremarkable      The visualized lumbar spine is unremarkable      IMPRESSION:     No acute osseous abnormality      Severe osteoarthritic degenerative changes of the left hip joint  LEFT KNEE @  1-12-21     INDICATION:   M25 562: Pain in left knee      COMPARISON:  None     VIEWS:  XR KNEE 4+ VW LEFT INJURY         FINDINGS:     There is no acute fracture or dislocation      There is no joint effusion      No significant degenerative changes      No lytic or blastic osseous lesion      Soft tissues are unremarkable      IMPRESSION:     No acute osseous abnormality  I have personally reviewed pertinent films in PACS

## 2021-02-17 NOTE — PATIENT INSTRUCTIONS
Hip Pain   WHAT YOU NEED TO KNOW:   What causes hip pain? Hip pain can be caused by a number of conditions, such as bursitis, arthritis, or muscle or tendon strain  You may have swelling in the fluid-filled sacs that protect your muscles and tendons  Hip pain can also be caused by a lower back problem  Hip pain may be caused by trauma, playing sports, or running  Pain may start in your hip and go to your thigh, buttock, or groin  How can I manage hip pain? You may need x-rays to make sure there are no broken bones that need to be treated  · Rest  your injured hip so that it can heal  You may need to avoid putting any weight on your hip for at least 48 hours  Return to normal activities as directed  · Ice  the injury for 20 minutes every 4 hours, or as directed  Use an ice pack, or put crushed ice in a plastic bag  Cover it with a towel to protect your skin  Ice helps prevent tissue damage and decreases swelling and pain  · Elevate  your injured hip above the level of your heart as often as you can  This will help decrease swelling and pain  If possible, prop your hip and leg on pillows or blankets to keep the area elevated comfortably  · NSAIDs , such as ibuprofen, help decrease swelling, pain, and fever  This medicine is available with or without a doctor's order  NSAIDs can cause stomach bleeding or kidney problems in certain people  If you take blood thinner medicine, always ask your healthcare provider if NSAIDs are safe for you  Always read the medicine label and follow directions  · Maintain a healthy weight  Extra body weight can cause pressure and pain in your hip, knee, and ankle joints  Ask your healthcare provider how much you should weigh  Ask him or her to help you create a weight loss plan if you are overweight  · Use assistive devices as directed  You may need to use a cane or crutches  Assistive devices help decrease pain and pressure on your hip when you walk   Ask your healthcare provider for more information about assistive devices and how to use them correctly  When should I seek immediate care? · Your pain gets worse  · You have numbness in your leg or toes  · You cannot put any weight on or move your hip  When should I contact my healthcare provider? · You have a fever  · Your pain does not decrease, even after treatment  · You have questions or concerns about your condition or care  CARE AGREEMENT:   You have the right to help plan your care  Learn about your health condition and how it may be treated  Discuss treatment options with your healthcare providers to decide what care you want to receive  You always have the right to refuse treatment  The above information is an  only  It is not intended as medical advice for individual conditions or treatments  Talk to your doctor, nurse or pharmacist before following any medical regimen to see if it is safe and effective for you  © Copyright [address] Information is for End User's use only and may not be sold, redistributed or otherwise used for commercial purposes   All illustrations and images included in CareNotes® are the copyrighted property of [employer]  or [address] [place] [address]

## 2021-02-24 ENCOUNTER — TELEPHONE (OUTPATIENT)
Dept: PAIN MEDICINE | Facility: CLINIC | Age: 66
End: 2021-02-24

## 2021-02-25 ENCOUNTER — HOSPITAL ENCOUNTER (OUTPATIENT)
Dept: RADIOLOGY | Facility: CLINIC | Age: 66
Discharge: HOME | End: 2021-02-25
Attending: ANESTHESIOLOGY | Admitting: ANESTHESIOLOGY
Payer: COMMERCIAL

## 2021-02-25 VITALS
RESPIRATION RATE: 20 BRPM | HEART RATE: 83 BPM | DIASTOLIC BLOOD PRESSURE: 81 MMHG | TEMPERATURE: 97.3 F | OXYGEN SATURATION: 98 % | SYSTOLIC BLOOD PRESSURE: 123 MMHG

## 2021-02-25 DIAGNOSIS — M16.12 PRIMARY OSTEOARTHRITIS OF LEFT HIP: ICD-10-CM

## 2021-02-25 PROCEDURE — 77002 NEEDLE LOCALIZATION BY XRAY: CPT | Performed by: ANESTHESIOLOGY

## 2021-02-25 PROCEDURE — 20610 DRAIN/INJ JOINT/BURSA W/O US: CPT | Performed by: ANESTHESIOLOGY

## 2021-02-25 PROCEDURE — 77002 NEEDLE LOCALIZATION BY XRAY: CPT

## 2021-02-25 RX ORDER — LIDOCAINE HYDROCHLORIDE 10 MG/ML
5 INJECTION, SOLUTION EPIDURAL; INFILTRATION; INTRACAUDAL; PERINEURAL ONCE
Status: COMPLETED | OUTPATIENT
Start: 2021-02-25 | End: 2021-02-25

## 2021-02-25 RX ORDER — BUPIVACAINE HCL/PF 2.5 MG/ML
10 VIAL (ML) INJECTION ONCE
Status: COMPLETED | OUTPATIENT
Start: 2021-02-25 | End: 2021-02-25

## 2021-02-25 RX ORDER — METHYLPREDNISOLONE ACETATE 80 MG/ML
80 INJECTION, SUSPENSION INTRA-ARTICULAR; INTRALESIONAL; INTRAMUSCULAR; PARENTERAL; SOFT TISSUE ONCE
Status: COMPLETED | OUTPATIENT
Start: 2021-02-25 | End: 2021-02-25

## 2021-02-25 RX ADMIN — LIDOCAINE HYDROCHLORIDE 3 ML: 10 INJECTION, SOLUTION EPIDURAL; INFILTRATION; INTRACAUDAL; PERINEURAL at 09:10

## 2021-02-25 RX ADMIN — BUPIVACAINE HYDROCHLORIDE 2 ML: 2.5 INJECTION, SOLUTION EPIDURAL; INFILTRATION; INTRACAUDAL at 09:10

## 2021-02-25 RX ADMIN — IOHEXOL 1 ML: 300 INJECTION, SOLUTION INTRAVENOUS at 09:10

## 2021-02-25 RX ADMIN — METHYLPREDNISOLONE ACETATE 80 MG: 80 INJECTION, SUSPENSION INTRA-ARTICULAR; INTRALESIONAL; INTRAMUSCULAR; SOFT TISSUE at 09:11

## 2021-02-25 NOTE — DISCHARGE INSTRUCTIONS
Steroid Joint Injection   WHAT YOU NEED TO KNOW:   A steroid joint injection is a procedure to inject steroid medicine into a joint  Steroid medicine decreases pain and inflammation  The injection may also contain an anesthetic (numbing medicine) to decrease pain  It may be done to treat conditions such as arthritis, gout, or carpal tunnel syndrome  The injections may be given in your knee, ankle, shoulder, elbow, wrist, ankle or sacroiliac joint  1  Do not apply heat to any area that is numb  If you have discomfort or soreness at the injection site, you may apply ice today, 20 minutes on and 20 minutes off  Tomorrow you may use ice or warm, moist heat  Do not apply ice or heat directly to the skin  2  You may have an increase or change in the discomfort for 36-48 hours after your treatment  Apply ice and continue with any pain medicine you have been prescribed  3  Do not do anything strenuous today  You may shower, but no tub baths or hot tubs today  You may resume your normal activities tomorrow, but do not overdo it  Resume normal activities slowly when you are feeling better  4  If you experience redness, drainage or swelling at the injection site, or if you develop a fever above 100 degrees, please call [facility] at [phone] or go to the Emergency Room  5  Continue to take all routine medicines prescribed by your primary care physician unless otherwise instructed by our staff  Most blood thinners should be started again according to your regularly scheduled dosing  If you have any questions, please give our office a call  If you have a problem specifically related to your procedure, please call our office at [phone]  Problems not related to your procedure should be directed to your primary care physician

## 2021-02-25 NOTE — H&P
History of Present Illness: The patient is a 60 y o  female who presents with complaints of  Leg pain      Patient Active Problem List   Diagnosis    Breast cyst    Cervical spondylosis    Chronic allergic conjunctivitis    Dyslipidemia    Hashimoto's disease    Herniated cervical disc    Non-toxic multinodular goiter    Primary osteoarthritis of left hip    Rhinitis    Varicose veins    Elevated TSH    Osteopenia    DDD (degenerative disc disease), lumbar    Left leg numbness       Past Medical History:   Diagnosis Date    Benign paroxysmal positional vertigo     Resolved: 9/15/2014    Fracture of radius     Thyroid disease        Past Surgical History:   Procedure Laterality Date    BIOPSY CORE NEEDLE      Thyroid     SECTION      COLPOSCOPY           Current Outpatient Medications:     calcium gluconate 500 mg tablet, Take 500 mg by mouth daily, Disp: , Rfl:     Chlorophyll 100 MG TABS, Take 1 tablet (100 mg total) by mouth daily, Disp: , Rfl: 0    fluticasone (FLONASE) 50 mcg/act nasal spray, 2 sprays into each nostril daily at bedtime, Disp: 1 Bottle, Rfl: 5    Magnesium 250 MG TABS, Take 1 tablet by mouth daily, Disp: , Rfl:     Misc Natural Products (ADRENAL PO), Take by mouth, Disp: , Rfl:     Multiple Vitamin (MULTIVITAMINS PO), Take 1 capsule by mouth daily, Disp: , Rfl:     Naproxen Sodium (Aleve) 220 MG CAPS, Take by mouth, Disp: , Rfl:     THYROID PO, Take by mouth, Disp: , Rfl:     Current Facility-Administered Medications:     bupivacaine (PF) (MARCAINE) 0 25 % injection 10 mL, 10 mL, Intra-articular, Once, Quinton Bryan DO    iohexol (OMNIPAQUE) 300 mg/mL injection 50 mL, 50 mL, Intra-articular, Once, Quinton Bryan DO    lidocaine (PF) (XYLOCAINE-MPF) 1 % injection 5 mL, 5 mL, Other, Once, Quinton Bryan DO    methylPREDNISolone acetate (DEPO-MEDROL) injection 80 mg, 80 mg, Intra-articular, Once, Quinton Bryan DO    No Known Allergies    Physical Exam:   General: Awake, Alert, Oriented x 3, Mood and affect appropriate  Respiratory: Respirations even and unlabored  Cardiovascular: Peripheral pulses intact; no edema  Musculoskeletal Exam: Decreased range of motion left hip  ASA Score: II         Assessment:   1   Primary osteoarthritis of left hip        Plan:  Left hip injection with pain diary

## 2021-03-04 ENCOUNTER — TELEPHONE (OUTPATIENT)
Dept: PAIN MEDICINE | Facility: CLINIC | Age: 66
End: 2021-03-04

## 2021-03-04 NOTE — TELEPHONE ENCOUNTER
Pt reports 80% improvement post inj (hip)   She said 20% improvement in her knee  Pain level 2/10      S/p LT HIP INJ 2/25 F/u 3/12

## 2021-03-12 ENCOUNTER — OFFICE VISIT (OUTPATIENT)
Dept: PAIN MEDICINE | Facility: CLINIC | Age: 66
End: 2021-03-12
Payer: COMMERCIAL

## 2021-03-12 VITALS
HEIGHT: 67 IN | DIASTOLIC BLOOD PRESSURE: 80 MMHG | WEIGHT: 148 LBS | TEMPERATURE: 98.2 F | SYSTOLIC BLOOD PRESSURE: 128 MMHG | BODY MASS INDEX: 23.23 KG/M2 | HEART RATE: 78 BPM

## 2021-03-12 DIAGNOSIS — M16.12 PRIMARY OSTEOARTHRITIS OF LEFT HIP: ICD-10-CM

## 2021-03-12 DIAGNOSIS — G89.29 CHRONIC LEFT HIP PAIN: ICD-10-CM

## 2021-03-12 DIAGNOSIS — G89.4 CHRONIC PAIN SYNDROME: Primary | ICD-10-CM

## 2021-03-12 DIAGNOSIS — G89.29 CHRONIC PAIN OF LEFT KNEE: ICD-10-CM

## 2021-03-12 DIAGNOSIS — M25.562 CHRONIC PAIN OF LEFT KNEE: ICD-10-CM

## 2021-03-12 DIAGNOSIS — M25.552 CHRONIC LEFT HIP PAIN: ICD-10-CM

## 2021-03-12 PROCEDURE — 1036F TOBACCO NON-USER: CPT | Performed by: NURSE PRACTITIONER

## 2021-03-12 PROCEDURE — 3008F BODY MASS INDEX DOCD: CPT | Performed by: NURSE PRACTITIONER

## 2021-03-12 PROCEDURE — 99213 OFFICE O/P EST LOW 20 MIN: CPT | Performed by: NURSE PRACTITIONER

## 2021-03-12 RX ORDER — LORATADINE 10 MG/1
10 TABLET ORAL DAILY
COMMUNITY
End: 2021-09-02

## 2021-03-12 NOTE — PROGRESS NOTES
Assessment:  1  Chronic pain syndrome    2  Chronic left hip pain    3  Chronic pain of left knee    4  Primary osteoarthritis of left hip        Plan:    While the patient was in the office today, I did have a thorough conversation with the patient regarding their chronic pain syndrome, symptoms, medication regimen, and treatment plan  I discussed with the patient that at this point time since she is noting significant and stable relief from the left hip pain, as a result of the injection, for now, we will hold off any repeat injections for at least the next 2-3 months, if not longer  If her pain should start to return before that time period, we would then have to seriously consider have her follow-up with orthopedics to discuss hip replacement surgery  The patient was agreeable and verbalized an understanding  With regards to her left knee pain, I did explain to the patient that it could be related to her compensation for her walking antalgicly because of her left hip pain  I did discuss as I few feel there is definitely significant underlying neuropathic, myofascial, and even osteoarthritic component to her pain symptoms, that she may benefit from a neuropathic medications such as Cymbalta  However, at this point the patient is not overly interested in medications and wants to see how she does for the next several weeks to months and proceed from there as appropriate  I discussed with the patient that at this point time she can followup with our office on an as-needed basis  I did review the patient that if her pain symptoms should change, worsen, and/or if she would experience any new symptoms as she would like to be evaluated for, she should give our office a call  The patient was agreeable and verbalized an understanding  History of Present Illness:     The patient is a 60 y o  female last seen on 2/25/2021 who presents for a follow up office visit in regards to Chronic pain syndrome secondary to  Primary osteoarthritis of the left hip with continued left knee pain  The patient currently reports that since her last office visit she has noted significant improvement with regards to her left hip pain which she is status post a left intra-articular hip joint injection on 2021 with Dr Terrell Calvo and feels there is at least 80% improvement in the hip pain itself  She does continue with the left anterior and medial knee pain and numbness which is more of an annoying instant significant pain and does not feel there is any improvement in her knee pain since her hip injection  The patient presents today to discuss her treatment plan options as again she feels her pain is improved but she is just concerned that no one can tell her why she is having the left knee pain as her x-rays and EMG were normal   She presents today to discuss her treatment plan options  I have personally reviewed and/or updated the patient's past medical history, past surgical history, family history, social history, current medications, allergies, and vital signs today  Review of Systems:    Review of Systems   Respiratory: Negative for shortness of breath  Cardiovascular: Negative for chest pain  Gastrointestinal: Negative for constipation, diarrhea, nausea and vomiting  Musculoskeletal: Positive for gait problem  Negative for arthralgias, joint swelling (joint stiffness) and myalgias  Skin: Negative for rash  Neurological: Negative for dizziness, seizures and weakness  All other systems reviewed and are negative          Past Medical History:   Diagnosis Date    Benign paroxysmal positional vertigo     Resolved: 9/15/2014    Fracture of radius     Thyroid disease        Past Surgical History:   Procedure Laterality Date    BIOPSY CORE NEEDLE      Thyroid     SECTION      COLPOSCOPY         Family History   Problem Relation Age of Onset    Stroke Mother         CVA    Breast cancer Mother 77    Alcohol abuse Father     COPD Father     Other Father         Nicotine Abuse    Thyroid cancer Brother     Coronary artery disease Brother     Thyroid disease Maternal Grandmother     Breast cancer Maternal Aunt 36    Breast cancer additional onset Maternal Aunt 48    No Known Problems Sister     No Known Problems Daughter     No Known Problems Maternal Grandfather     No Known Problems Paternal Grandmother     No Known Problems Paternal Grandfather     No Known Problems Daughter     No Known Problems Maternal Aunt     No Known Problems Maternal Aunt     No Known Problems Maternal Aunt        Social History     Occupational History    Not on file   Tobacco Use    Smoking status: Never Smoker    Smokeless tobacco: Never Used   Substance and Sexual Activity    Alcohol use: Yes     Comment: social    Drug use: No    Sexual activity: Not Currently         Current Outpatient Medications:     calcium gluconate 500 mg tablet, Take 500 mg by mouth daily, Disp: , Rfl:     Chlorophyll 100 MG TABS, Take 1 tablet (100 mg total) by mouth daily, Disp: , Rfl: 0    fluticasone (FLONASE) 50 mcg/act nasal spray, 2 sprays into each nostril daily at bedtime, Disp: 1 Bottle, Rfl: 5    loratadine (CLARITIN) 10 mg tablet, Take 10 mg by mouth daily, Disp: , Rfl:     Magnesium 250 MG TABS, Take 1 tablet by mouth daily, Disp: , Rfl:     Misc Natural Products (ADRENAL PO), Take by mouth, Disp: , Rfl:     Multiple Vitamin (MULTIVITAMINS PO), Take 1 capsule by mouth daily, Disp: , Rfl:     Naproxen Sodium (Aleve) 220 MG CAPS, Take by mouth, Disp: , Rfl:     THYROID PO, Take by mouth, Disp: , Rfl:     No Known Allergies    Physical Exam:    /80 (BP Location: Left arm, Patient Position: Sitting, Cuff Size: Standard)   Pulse 78   Temp 98 2 °F (36 8 °C)   Ht 5' 7" (1 702 m)   Wt 67 1 kg (148 lb)   BMI 23 18 kg/m²     Constitutional:normal, well developed, well nourished, alert, in no distress and non-toxic and no overt pain behavior  Eyes:anicteric  HEENT:grossly intact  Neck:supple, symmetric, trachea midline and no masses   Pulmonary:even and unlabored  Cardiovascular:No edema or pitting edema present  Skin:Normal without rashes or lesions and well hydrated  Psychiatric:Mood and affect appropriate  Neurologic:Cranial Nerves II-XII grossly intact  Musculoskeletal:The patient's gait is slightly antalgic, but steady without the use of any assistive devices  Imaging  No orders to display         No orders of the defined types were placed in this encounter

## 2021-04-13 DIAGNOSIS — Z23 ENCOUNTER FOR IMMUNIZATION: ICD-10-CM

## 2021-04-25 ENCOUNTER — IMMUNIZATIONS (OUTPATIENT)
Dept: FAMILY MEDICINE CLINIC | Facility: HOSPITAL | Age: 66
End: 2021-04-25

## 2021-04-25 DIAGNOSIS — Z23 ENCOUNTER FOR IMMUNIZATION: Primary | ICD-10-CM

## 2021-04-25 PROCEDURE — 91300 SARS-COV-2 / COVID-19 MRNA VACCINE (PFIZER-BIONTECH) 30 MCG: CPT

## 2021-04-25 PROCEDURE — 0001A SARS-COV-2 / COVID-19 MRNA VACCINE (PFIZER-BIONTECH) 30 MCG: CPT

## 2021-05-19 ENCOUNTER — IMMUNIZATIONS (OUTPATIENT)
Dept: FAMILY MEDICINE CLINIC | Facility: HOSPITAL | Age: 66
End: 2021-05-19

## 2021-05-19 DIAGNOSIS — Z23 ENCOUNTER FOR IMMUNIZATION: Primary | ICD-10-CM

## 2021-05-19 PROCEDURE — 91300 SARS-COV-2 / COVID-19 MRNA VACCINE (PFIZER-BIONTECH) 30 MCG: CPT

## 2021-05-19 PROCEDURE — 0002A SARS-COV-2 / COVID-19 MRNA VACCINE (PFIZER-BIONTECH) 30 MCG: CPT

## 2021-07-19 ENCOUNTER — HOSPITAL ENCOUNTER (OUTPATIENT)
Dept: MRI IMAGING | Facility: HOSPITAL | Age: 66
Discharge: HOME | End: 2021-07-19
Payer: MEDICARE

## 2021-07-19 DIAGNOSIS — M54.16 RADICULOPATHY, LUMBAR REGION: ICD-10-CM

## 2021-07-19 DIAGNOSIS — M79.662 PAIN IN LEFT LOWER LEG: ICD-10-CM

## 2021-07-19 DIAGNOSIS — M89.8X6 OTHER SPECIFIED DISORDERS OF BONE, LOWER LEG: ICD-10-CM

## 2021-07-19 PROCEDURE — 73718 MRI LOWER EXTREMITY W/O DYE: CPT

## 2021-08-09 ENCOUNTER — HOSPITAL ENCOUNTER (OUTPATIENT)
Dept: MRI IMAGING | Facility: HOSPITAL | Age: 66
Discharge: HOME | End: 2021-08-09
Payer: MEDICARE

## 2021-08-09 DIAGNOSIS — M47.817 SPONDYLOSIS WITHOUT MYELOPATHY OR RADICULOPATHY, LUMBOSACRAL REGION: ICD-10-CM

## 2021-08-09 DIAGNOSIS — M54.17 RADICULOPATHY, LUMBOSACRAL REGION: ICD-10-CM

## 2021-08-09 DIAGNOSIS — M54.16 RADICULOPATHY, LUMBAR REGION: ICD-10-CM

## 2021-08-09 PROCEDURE — G1004 CDSM NDSC: HCPCS

## 2021-08-09 PROCEDURE — 72148 MRI LUMBAR SPINE W/O DYE: CPT

## 2021-08-24 ENCOUNTER — EVALUATION (OUTPATIENT)
Dept: PHYSICAL THERAPY | Facility: CLINIC | Age: 66
End: 2021-08-24
Payer: MEDICARE

## 2021-08-24 DIAGNOSIS — M54.17 LUMBOSACRAL RADICULOPATHY: ICD-10-CM

## 2021-08-24 DIAGNOSIS — M51.37 DEGENERATION OF LUMBAR OR LUMBOSACRAL INTERVERTEBRAL DISC: Primary | ICD-10-CM

## 2021-08-24 DIAGNOSIS — M53.3 SI (SACROILIAC) JOINT DYSFUNCTION: ICD-10-CM

## 2021-08-24 PROCEDURE — 97110 THERAPEUTIC EXERCISES: CPT | Performed by: PHYSICAL THERAPIST

## 2021-08-24 PROCEDURE — 97161 PT EVAL LOW COMPLEX 20 MIN: CPT | Performed by: PHYSICAL THERAPIST

## 2021-08-24 NOTE — LETTER
2021    Mohsen Granado MD  [address] [address]    Patient: Eamon Prakash   YOB: 1955   Date of Visit: 2021     Encounter Diagnosis     ICD-10-CM    1  Degeneration of lumbar or lumbosacral intervertebral disc  M51 37    2  Lumbosacral radiculopathy  M54 17    3  SI (sacroiliac) joint dysfunction  M53 3        Dear Dr Berto Quintanilla: Thank you for your recent referral of Eamon Prakash  Please review the attached evaluation summary from Sandra's recent visit  Please verify that you agree with the plan of care by signing the attached order  If you have any questions or concerns, please do not hesitate to call  I sincerely appreciate the opportunity to share in the care of one of your patients and hope to have another opportunity to work with you in the near future  Sincerely,    KATELIN Monet, PT      Referring Provider:      I certify that I have read the below Plan of Care and certify the need for these services furnished under this plan of treatment while under my care  Mohsen Granado MD  [address] [address]  Via Fax: [phone]          PT Evaluation     Today's date: 2021  Patient name: Eamon Prakash  : 1955  MRN: [id number]  Referring provider: eDvora Atkins MD  Dx:   Encounter Diagnosis     ICD-10-CM    1  Degeneration of lumbar or lumbosacral intervertebral disc  M51 37    2  Lumbosacral radiculopathy  M54 17    3  SI (sacroiliac) joint dysfunction  M53 3                   Assessment  Assessment details: Patient is a 73 y o  female with PT prescription for lumbar radiculopathy with signs and symptoms consistent with accompanying SI joint dysfunction  Patient presents with decreased lumbar spine AROM, mild neural tension in slump position, moderate hip ROM restrictions, and left anterior innominate rotation   These impairments limit patient with stair climbing, sleeping, prolonged walking in community and for recreational exercise, and completing work duties as a seamstress  To address impairments and improve function, patient would benefit from skilled PT consisting of manual therapy to improve ROM and joint mobility, therapeutic exercises to improve core strength and flexibility, neuromuscular reeducation to improve core stability, and patient education on home program and posture/proper body mechanics  Impairments: abnormal gait, abnormal or restricted ROM, abnormal movement, impaired balance, impaired physical strength, lacks appropriate home exercise program, pain with function, poor posture  and poor body mechanics    Symptom irritability: moderateUnderstanding of Dx/Px/POC: good   Prognosis: good    Goals  Short term goals:  Patient is independent in home program to support plan of care and improve function  - 2 weeks  Patient demonstrates only mild restriction with lumbar extension AROM so she can walk with less pain  -2 weeks  Patient demonstrates proper body mechanics with bending and lifting so she can perform ADLs with less pain  - 2 weeks    Long term goals:  Patient demonstrates improvement in community participation with increase in FOTO score by 20%  - 6 weeks  Patient has no LE numbness so she has less sleep disturbances for improved quality of life  - 4 weeks  Patient demonstrates negative SI joint dysfunction tests so she can perform work duties and ambulate in community without fear of leg giving out on her   - 6 weeks      Plan  Patient would benefit from: skilled physical therapy  Planned therapy interventions: abdominal trunk stabilization, activity modification, joint mobilization, manual therapy, massage, neuromuscular re-education, patient education, postural training, strengthening, stretching, body mechanics training, therapeutic exercise, flexibility, functional ROM exercises and home exercise program  Frequency: 2x week  Duration in weeks: 6  Treatment plan discussed with: patient        Subjective Evaluation    History of Present Illness  Date of onset: 2020  Mechanism of injury: Patient has prescription for lumbar radiculopathy  Patient reports onset of symptoms approximately 1 year ago  She had starting powering walking the previous March, and was walking 2 miles everyday  She initially got anterior tibia/shin pain, but now her symptoms are numbness distal medial thigh (L side) and feeling of left lower extremity weakness  She went to PCP and was referred to orthopedist  She eventually went to [facility] and had MRI to lumbar spine and hip  EMG for was negative  She had cortisone injection in hip with no change in symptoms  She recently started prednisone dose pack and was given script for PT  Symptoms include: constant numbness in distal medial thigh (sometimes woken at night), difficulty putting weight on LE, due to fear of giving out on her  She has difficulty laying flat due to back arching/stiffness  She reports feeling weak in left thigh and stiffness in spine when standing up  She has difficulty stair climbing, some difficulty measuring customers at work, getting up from floor  She denies saddle symptoms, denies bowel/bladder symptoms       Works as seamstress with some work limitation  Pain  Current pain ratin  At best pain ratin  At worst pain ratin (8/10 rated numbness, 2/10 pain)    Patient Goals  Patient goals for therapy: independence with ADLs/IADLs, increased strength, return to sport/leisure activities and decreased pain  Patient goal: get back to walking, feel more confident on leg        Objective     Postural Observations    Additional Postural Observation Details  Antalgic gait pattern, decreased stance time on left LE, decreased knee/hip extension during terminal stance on L with right circumduction for compensation  -improved following posterior mobilization to left ASIS    Active Range of Motion     Lumbar   Flexion:  WFL  Extension: Restriction level: moderate  Left lateral flexion:  WFL  Right lateral flexion:  with pain Restriction level: moderate  Left rotation:  WFL  Right rotation:  [facility]  Mechanical Assessment    Cervical      Thoracic      Lumbar    Standing flexion: repeated movements   Pain location:no change  Lying flexion: repeated movements  Pain location: no change  Standing extension: repeated movements  Pain location: no change    Tests     Lumbar     Left   Positive slump test    Negative passive SLR  Right   Negative passive SLR and slump test      Left Hip   Positive FABIAN and long sit  Right Hip   Positive FABIAN  Additional Tests Details  14 sec  TUG  11 sec   5x Sit to stand  SLS 2 seconds on L, 30 on R  Tandem stance 30 b/l    When assessed in supine, anterior position of left innominate relative to right, noted significant relief in numbness, lumbar tightness, and ambulation following MET for left hamstrings and posterior rotation mobilization     General Comments:      Hip Comments   Moderate hip ER PROM restriction b/l  Mild hamstrings tightness             Precautions: Osteopenia    Manuals 8/24            p-a mobs lumbar spine 3-4             L hip long axis distraction gr 1-3 2'            STM              A-p rotation mob L ASIS 3'            Neuro Re-Ed                          TA bracing             Bent knee FO             Brace with march                          Ther Ex             Press ups                          Pt edu on plan of care, pathology, home program 5'                         Hamstring stretch             Piriformis stretch             Figure of 4 stretch 3x30"            Left hamstrings MET 10x5"            Ther Activity                                       Gait Training                                       Modalities             Moist hot pack

## 2021-08-24 NOTE — PROGRESS NOTES
PT Evaluation     Today's date: 2021  Patient name: Larissa Miranda  : 1955  MRN: [id number]  Referring provider: Gunner King MD  Dx:   Encounter Diagnosis     ICD-10-CM    1  Degeneration of lumbar or lumbosacral intervertebral disc  M51 37    2  Lumbosacral radiculopathy  M54 17    3  SI (sacroiliac) joint dysfunction  M53 3                   Assessment  Assessment details: Patient is a 73 y o  female with PT prescription for lumbar radiculopathy with signs and symptoms consistent with accompanying SI joint dysfunction  Patient presents with decreased lumbar spine AROM, mild neural tension in slump position, moderate hip ROM restrictions, and left anterior innominate rotation  These impairments limit patient with stair climbing, sleeping, prolonged walking in community and for recreational exercise, and completing work duties as a seamstress  To address impairments and improve function, patient would benefit from skilled PT consisting of manual therapy to improve ROM and joint mobility, therapeutic exercises to improve core strength and flexibility, neuromuscular reeducation to improve core stability, and patient education on home program and posture/proper body mechanics  Impairments: abnormal gait, abnormal or restricted ROM, abnormal movement, impaired balance, impaired physical strength, lacks appropriate home exercise program, pain with function, poor posture  and poor body mechanics    Symptom irritability: moderateUnderstanding of Dx/Px/POC: good   Prognosis: good    Goals  Short term goals:  Patient is independent in home program to support plan of care and improve function  - 2 weeks  Patient demonstrates only mild restriction with lumbar extension AROM so she can walk with less pain  -2 weeks  Patient demonstrates proper body mechanics with bending and lifting so she can perform ADLs with less pain   - 2 weeks    Long term goals:  Patient demonstrates improvement in community participation with increase in FOTO score by 20%  - 6 weeks  Patient has no LE numbness so she has less sleep disturbances for improved quality of life  - 4 weeks  Patient demonstrates negative SI joint dysfunction tests so she can perform work duties and ambulate in community without fear of leg giving out on her  - 6 weeks      Plan  Patient would benefit from: skilled physical therapy  Planned therapy interventions: abdominal trunk stabilization, activity modification, joint mobilization, manual therapy, massage, neuromuscular re-education, patient education, postural training, strengthening, stretching, body mechanics training, therapeutic exercise, flexibility, functional ROM exercises and home exercise program  Frequency: 2x week  Duration in weeks: 6  Treatment plan discussed with: patient        Subjective Evaluation    History of Present Illness  Date of onset: 7/24/2020  Mechanism of injury: Patient has prescription for lumbar radiculopathy  Patient reports onset of symptoms approximately 1 year ago  She had starting powering walking the previous March, and was walking 2 miles everyday  She initially got anterior tibia/shin pain, but now her symptoms are numbness distal medial thigh (L side) and feeling of left lower extremity weakness  She went to PCP and was referred to orthopedist  She eventually went to [facility] and had MRI to lumbar spine and hip  EMG for was negative  She had cortisone injection in hip with no change in symptoms  She recently started prednisone dose pack and was given script for PT  Symptoms include: constant numbness in distal medial thigh (sometimes woken at night), difficulty putting weight on LE, due to fear of giving out on her  She has difficulty laying flat due to back arching/stiffness  She reports feeling weak in left thigh and stiffness in spine when standing up  She has difficulty stair climbing, some difficulty measuring customers at work, getting up from floor   She denies saddle symptoms, denies bowel/bladder symptoms  Works as seamstress with some work limitation  Pain  Current pain ratin  At best pain ratin  At worst pain ratin (8/10 rated numbness, 2/10 pain)    Patient Goals  Patient goals for therapy: independence with ADLs/IADLs, increased strength, return to sport/leisure activities and decreased pain  Patient goal: get back to walking, feel more confident on leg        Objective     Postural Observations    Additional Postural Observation Details  Antalgic gait pattern, decreased stance time on left LE, decreased knee/hip extension during terminal stance on L with right circumduction for compensation  -improved following posterior mobilization to left ASIS    Active Range of Motion     Lumbar   Flexion:  WFL  Extension:  Restriction level: moderate  Left lateral flexion:  WFL  Right lateral flexion:  with pain Restriction level: moderate  Left rotation:  WFL  Right rotation:  [facility]  Mechanical Assessment    Cervical      Thoracic      Lumbar    Standing flexion: repeated movements   Pain location:no change  Lying flexion: repeated movements  Pain location: no change  Standing extension: repeated movements  Pain location: no change    Tests     Lumbar     Left   Positive slump test    Negative passive SLR  Right   Negative passive SLR and slump test      Left Hip   Positive FABIAN and long sit  Right Hip   Positive FABIAN  Additional Tests Details  14 sec  TUG  11 sec   5x Sit to stand  SLS 2 seconds on L, 30 on R  Tandem stance 30 b/l    When assessed in supine, anterior position of left innominate relative to right, noted significant relief in numbness, lumbar tightness, and ambulation following MET for left hamstrings and posterior rotation mobilization     General Comments:      Hip Comments   Moderate hip ER PROM restriction b/l  Mild hamstrings tightness             Precautions: Osteopenia    Manuals             p-a mobs lumbar spine 3-4             L hip long axis distraction gr 1-3 2'            STM              A-p rotation mob L ASIS 3'            Neuro Re-Ed                          TA bracing             Bent knee FO             Brace with march                          Ther Ex             Press ups                          Pt edu on plan of care, pathology, home program 5'                         Hamstring stretch             Piriformis stretch             Figure of 4 stretch 3x30"            Left hamstrings MET 10x5"            Ther Activity                                       Gait Training                                       Modalities             Moist hot pack

## 2021-08-26 ENCOUNTER — OFFICE VISIT (OUTPATIENT)
Dept: PHYSICAL THERAPY | Facility: CLINIC | Age: 66
End: 2021-08-26
Payer: MEDICARE

## 2021-08-26 DIAGNOSIS — M53.3 SI (SACROILIAC) JOINT DYSFUNCTION: ICD-10-CM

## 2021-08-26 DIAGNOSIS — M54.17 LUMBOSACRAL RADICULOPATHY: ICD-10-CM

## 2021-08-26 DIAGNOSIS — M51.37 DEGENERATION OF LUMBAR OR LUMBOSACRAL INTERVERTEBRAL DISC: Primary | ICD-10-CM

## 2021-08-26 PROCEDURE — 97112 NEUROMUSCULAR REEDUCATION: CPT | Performed by: PHYSICAL THERAPIST

## 2021-08-26 PROCEDURE — 97110 THERAPEUTIC EXERCISES: CPT | Performed by: PHYSICAL THERAPIST

## 2021-08-26 PROCEDURE — 97140 MANUAL THERAPY 1/> REGIONS: CPT | Performed by: PHYSICAL THERAPIST

## 2021-08-26 NOTE — PROGRESS NOTES
Daily Note     Today's date: 2021  Patient name: Martha Oliva  : 1955  MRN: [id number]  Referring provider: Mina Nageotte, MD  Dx:   Encounter Diagnosis     ICD-10-CM    1  Degeneration of lumbar or lumbosacral intervertebral disc  M51 37    2  Lumbosacral radiculopathy  M54 17    3  SI (sacroiliac) joint dysfunction  M53 3                   Subjective: Patient reports that she felt good following evaluation and yesterday, but the pain/discomfort came back today  She also had a migraine yesterday but it has since subsided  Objective: See treatment diary below  Moderate hamstrings tightness on R, mild L  Mild quad tightness on L  Moderate hip flexor tightness on L, mild on R  Moderate hip extension PROM restriction on L      Assessment: Upon assessment, patient demonstrated moderate lower extremity muscle tightness and imbalance, most notable with hip flexor tightness on L  Patient tolerated manual therapy to hip and pelvis well with less discomfort and difficulty ambulating following  Progressed home program with addition of supine hip flexor stretch  Patient would benefit from continued skilled PT per plan of care  Plan: Continue per plan of care        Precautions: Osteopenia    Manuals            p-a mobs lumbar spine 3-4             L hip long axis distraction gr 1-3 2' Prone 2'                                     L hip p-a mob, and IR mob gr 3-4  6'           A-p rotation mob L ASIS 3' 2'           Neuro Re-Ed             Pelvic tilt  15x           TA bracing with bridge  15x           Bent knee FO             Brace with march                          Ther Ex             Press ups  10x           Supine hip flexor stretch  3x30"                        Hamstring stretch  3x30"           Piriformis stretch             Figure of 4 stretch 3x30" 3x30"           Left hamstrings MET 10x5" 10x5"           Ther Activity                                       Gait Training Modalities             Moist hot pack

## 2021-08-31 ENCOUNTER — OFFICE VISIT (OUTPATIENT)
Dept: PHYSICAL THERAPY | Facility: CLINIC | Age: 66
End: 2021-08-31
Payer: MEDICARE

## 2021-08-31 DIAGNOSIS — M51.37 DEGENERATION OF LUMBAR OR LUMBOSACRAL INTERVERTEBRAL DISC: Primary | ICD-10-CM

## 2021-08-31 DIAGNOSIS — M54.17 LUMBOSACRAL RADICULOPATHY: ICD-10-CM

## 2021-08-31 PROCEDURE — 97110 THERAPEUTIC EXERCISES: CPT

## 2021-08-31 PROCEDURE — 97112 NEUROMUSCULAR REEDUCATION: CPT

## 2021-08-31 NOTE — PROGRESS NOTES
Daily Note     Today's date: 2021  Patient name: Larissa Rodriguez  : 1955  MRN: [id number]  Referring provider: Blaine Carmona MD  Dx:   Encounter Diagnosis     ICD-10-CM    1  Degeneration of lumbar or lumbosacral intervertebral disc  M51 37    2  Lumbosacral radiculopathy  M54 17                   Subjective: Patient supine hip flexor stretch on R helps significantly but when performed on the L it really bothered her and increased her symptoms  Objective: See treatment diary below  Moderate hamstrings tightness on R, mild L  Mild quad tightness on L  Moderate hip flexor tightness on L, mild on R  Moderate hip extension PROM restriction on L      Assessment: Noted pain on L side- Anterior SI rotation on L- unchanged with SI MET  With Supine hip flexor stretch on R side she has relief and improved ambulation but no change in LLD  Tolerated all other TE's well with no adverse effects  Post session she was feeling better and had improved ambulation  She was fatigued post session and would continue to benefit from skilled therapy    Plan: Continue per plan of care        Precautions: Osteopenia    Manuals           p-a mobs lumbar spine 3-4             L hip long axis distraction gr 1-3 2' Prone 2'           SI MET for Ant Rot on L   WE                       L hip p-a mob, and IR mob gr 3-4  6'           A-p rotation mob L ASIS 3' 2'           Neuro Re-Ed             Pelvic tilt  15x x15          TA bracing with bridge  15x x15          Bent knee FO   x10 ea          Brace with march   x10          Brace with ADD ball squeeze   5"x10                       Ther Ex             Press ups  10x           Supine hip flexor stretch  3x30" held                       Hamstring stretch  3x30" 30"x3 ea          Piriformis stretch             Figure of 4 stretch 3x30" 3x30" 30"x3          Left hamstrings MET 10x5" 10x5" 5"x10          Ther Activity                                       Gait Training Modalities             Moist hot pack

## 2021-09-02 ENCOUNTER — OFFICE VISIT (OUTPATIENT)
Dept: PHYSICAL THERAPY | Facility: CLINIC | Age: 66
End: 2021-09-02
Payer: MEDICARE

## 2021-09-02 ENCOUNTER — OFFICE VISIT (OUTPATIENT)
Dept: FAMILY MEDICINE CLINIC | Facility: HOSPITAL | Age: 66
End: 2021-09-02
Payer: MEDICARE

## 2021-09-02 VITALS
TEMPERATURE: 95.7 F | SYSTOLIC BLOOD PRESSURE: 116 MMHG | WEIGHT: 150.6 LBS | HEIGHT: 66 IN | DIASTOLIC BLOOD PRESSURE: 82 MMHG | BODY MASS INDEX: 24.2 KG/M2 | HEART RATE: 74 BPM

## 2021-09-02 DIAGNOSIS — E06.3 HASHIMOTO'S DISEASE: ICD-10-CM

## 2021-09-02 DIAGNOSIS — M53.3 SI (SACROILIAC) JOINT DYSFUNCTION: ICD-10-CM

## 2021-09-02 DIAGNOSIS — M51.37 DEGENERATION OF LUMBAR OR LUMBOSACRAL INTERVERTEBRAL DISC: Primary | ICD-10-CM

## 2021-09-02 DIAGNOSIS — Z00.00 WELCOME TO MEDICARE PREVENTIVE VISIT: Primary | ICD-10-CM

## 2021-09-02 DIAGNOSIS — Z12.31 ENCOUNTER FOR SCREENING MAMMOGRAM FOR MALIGNANT NEOPLASM OF BREAST: ICD-10-CM

## 2021-09-02 DIAGNOSIS — M54.17 LUMBOSACRAL RADICULOPATHY: ICD-10-CM

## 2021-09-02 DIAGNOSIS — R20.0 LEFT LEG NUMBNESS: ICD-10-CM

## 2021-09-02 DIAGNOSIS — E78.5 DYSLIPIDEMIA: ICD-10-CM

## 2021-09-02 DIAGNOSIS — Z12.83 SKIN CANCER SCREENING: ICD-10-CM

## 2021-09-02 DIAGNOSIS — R79.89 ELEVATED TSH: ICD-10-CM

## 2021-09-02 PROCEDURE — 1123F ACP DISCUSS/DSCN MKR DOCD: CPT | Performed by: INTERNAL MEDICINE

## 2021-09-02 PROCEDURE — 97112 NEUROMUSCULAR REEDUCATION: CPT

## 2021-09-02 PROCEDURE — 97110 THERAPEUTIC EXERCISES: CPT

## 2021-09-02 PROCEDURE — G0402 INITIAL PREVENTIVE EXAM: HCPCS | Performed by: INTERNAL MEDICINE

## 2021-09-02 NOTE — PROGRESS NOTES
Assessment and Plan:     Problem List Items Addressed This Visit        Endocrine    Hashimoto's disease     Due for TFT's - order given, will follow, currently only on natural thyroid supplements         Relevant Orders    CBC and differential    Comprehensive metabolic panel    TSH, 3rd generation with Free T4 reflex       Other    Dyslipidemia     Due for FLP - order given, healthy diet/exercise encouraged, will follow         Relevant Orders    CBC and differential    Comprehensive metabolic panel    Lipid panel    Elevated TSH     Never did f/u BW - order given again for complete labs         Relevant Orders    CBC and differential    Comprehensive metabolic panel    TSH, 3rd generation with Free T4 reflex    Left leg numbness     Improved with PT and gait much better, con't as per Dr Ryan Rodriguez and PT         Relevant Orders    CBC and differential    Comprehensive metabolic panel    Lipid panel    TSH, 3rd generation with Free T4 reflex      Other Visit Diagnoses     Welcome to Medicare preventive visit    -  Primary    Relevant Orders    CBC and differential    Comprehensive metabolic panel    Lipid panel    TSH, 3rd generation with Free T4 reflex    Encounter for screening mammogram for malignant neoplasm of breast        Relevant Orders    Mammo screening bilateral w 3d & cad    Skin cancer screening        Relevant Orders    Ambulatory referral to Dermatology           Preventive health issues were discussed with patient, and age appropriate screening tests were ordered as noted in patient's After Visit Summary  Personalized health advice and appropriate referrals for health education or preventive services given if needed, as noted in patient's After Visit Summary  Deferring pneumonia vaccine - advised to call if she changes her mind    Will check with pharmacy about Shingrix         History of Present Illness:     Patient presents for Welcome to Medicare visit       Patient Care Team:  Ileana Castro DO as PCP - General (Internal Medicine)     Review of Systems:     Review of Systems   Constitutional: Positive for fatigue  Negative for chills, fever and unexpected weight change  HENT: Negative for congestion and sore throat  Eyes: Positive for visual disturbance  Negative for pain  Respiratory: Negative for cough, shortness of breath and wheezing  Cardiovascular: Negative for chest pain, palpitations and leg swelling  Gastrointestinal: Negative for abdominal pain, blood in stool, constipation, diarrhea, nausea and vomiting  Endocrine: Negative for polydipsia and polyuria  Genitourinary: Negative for difficulty urinating, dysuria, hematuria, vaginal bleeding and vaginal pain  Musculoskeletal: Positive for back pain and gait problem  Skin: Negative for rash and wound  Neurological: Positive for numbness  Negative for dizziness, light-headedness and headaches  Hematological: Negative for adenopathy  Does not bruise/bleed easily  Psychiatric/Behavioral: Negative for behavioral problems, confusion and dysphoric mood        Problem List:     Patient Active Problem List   Diagnosis    Breast cyst    Cervical spondylosis    Chronic allergic conjunctivitis    Dyslipidemia    Hashimoto's disease    Herniated cervical disc    Non-toxic multinodular goiter    Primary osteoarthritis of left hip    Rhinitis    Varicose veins    Elevated TSH    Osteopenia    DDD (degenerative disc disease), lumbar    Left leg numbness    Chronic pain syndrome    Chronic pain of left knee    Chronic left hip pain      Past Medical and Surgical History:     Past Medical History:   Diagnosis Date    Benign paroxysmal positional vertigo     Resolved: 9/15/2014    Fracture of radius      Past Surgical History:   Procedure Laterality Date    BIOPSY CORE NEEDLE      Thyroid     SECTION      COLPOSCOPY        Family History:     Family History   Problem Relation Age of Onset    Stroke Mother CVA    Breast cancer Mother 77    Alcohol abuse Father     COPD Father     Other Father         Nicotine Abuse    Thyroid cancer Brother     Coronary artery disease Brother     Thyroid disease Maternal Grandmother     Breast cancer Maternal Aunt 36    Breast cancer additional onset Maternal Aunt 48    No Known Problems Sister     No Known Problems Daughter     No Known Problems Maternal Grandfather     No Known Problems Paternal Grandmother     No Known Problems Paternal Grandfather     No Known Problems Daughter     No Known Problems Maternal Aunt     No Known Problems Maternal Aunt     No Known Problems Maternal Aunt       Social History:     Social History     Socioeconomic History    Marital status: Single     Spouse name: None    Number of children: None    Years of education: None    Highest education level: None   Occupational History    None   Tobacco Use    Smoking status: Never Smoker    Smokeless tobacco: Never Used   Vaping Use    Vaping Use: Never used   Substance and Sexual Activity    Alcohol use: Yes     Comment: social    Drug use: No    Sexual activity: Not Currently   Other Topics Concern    None   Social History Narrative    Single, lives alone, part-time employment in [employer]     Social Determinants of Health     Financial Resource Strain:     Difficulty of Paying Living Expenses:    Food Insecurity:     Worried About [email or web address] in the Last Year:     [address] in the Last Year:    Transportation Needs:     Lack of Transportation (Medical):      Lack of Transportation (Non-Medical):    Physical Activity:     Days of Exercise per Week:     Minutes of Exercise per Session:    Stress:     Feeling of Stress :    Social Connections:     Frequency of Communication with Friends and Family:     Frequency of Social Gatherings with Friends and Family:     Attends [employer] Services:     Active Member of Clubs or Organizations:     Attends [employer] or Organization Meetings:     Marital Status:    Intimate Partner Violence:     Fear of Current or Ex-Partner:     Emotionally Abused:     Physically Abused:     Sexually Abused:       Medications and Allergies:     Current Outpatient Medications   Medication Sig Dispense Refill    Multiple Vitamins-Minerals (ZINC PO) Take 1 tablet by mouth daily      calcium gluconate 500 mg tablet Take 1,000 mg by mouth daily      Chlorophyll 100 MG TABS Take 1 tablet (100 mg total) by mouth daily  0    Magnesium 250 MG TABS Take 1 tablet by mouth daily      Misc Natural Products (ADRENAL PO) Take by mouth      Multiple Vitamin (MULTIVITAMINS PO) Take 1 capsule by mouth daily      Naproxen Sodium (Aleve) 220 MG CAPS Take by mouth      THYROID PO Take by mouth       No current facility-administered medications for this visit  No Known Allergies   Immunizations:     Immunization History   Administered Date(s) Administered    SARS-CoV-2 / COVID-19 mRNA IM (Pfizer-BioNTech) 04/25/2021, 05/19/2021      Health Maintenance:         Topic Date Due    Breast Cancer Screening: Mammogram  11/17/2021    Cervical Cancer Screening  07/22/2022    Colorectal Cancer Screening  08/12/2022    DXA SCAN  11/17/2022    HIV Screening  Completed    Hepatitis C Screening  Completed         Topic Date Due    DTaP,Tdap,and Td Vaccines (1 - Tdap) Never done    Pneumococcal Vaccine: 65+ Years (1 of 1 - PPSV23) Never done    Influenza Vaccine (1) 09/01/2021      Medicare Screening Tests and Risk Assessments:     Eliezer Goodell is here for her Welcome to Medicare visit  Health Risk Assessment:   Patient rates overall health as very good  Patient feels that their physical health rating is slightly better  Patient is very satisfied with their life  Eyesight was rated as slightly worse  Hearing was rated as same  Patient feels that their emotional and mental health rating is same  Patients states they are never, rarely angry   Patient states they are never, rarely unusually tired/fatigued  Pain experienced in the last 7 days has been none  Patient states that she has experienced no weight loss or gain in last 6 months  Physical healthy better with PT for her LLE pain, feels more "balanced" and is not as afraid of falling  Eyesight slightly worse - has a known cataract in her R eye, has glasses but did not have for appt today    Depression Screening:   PHQ-2 Score: 0      Fall Risk Screening: In the past year, patient has experienced: no history of falling in past year      Urinary Incontinence Screening:   Patient has not leaked urine accidently in the last six months  Home Safety:  Patient has trouble with stairs inside or outside of their home  Patient has working smoke alarms and has no working carbon monoxide detector  Home safety hazards include: none  Nutrition:   Current diet is Regular  Eats more veggies then fruits but feels diet is healthy  She does no formal exercise since her LLE pain but is more active now with PT    Medications:   Patient is currently taking over-the-counter supplements  OTC medications include: see medication list  Patient is able to manage medications  Activities of Daily Living (ADLs)/Instrumental Activities of Daily Living (IADLs):   Walk and transfer into and out of bed and chair?: Yes  Dress and groom yourself?: Yes    Bathe or shower yourself?: Yes    Feed yourself? Yes  Do your laundry/housekeeping?: Yes  Manage your money, pay your bills and track your expenses?: Yes  Make your own meals?: Yes    Do your own shopping?: Yes    Previous Hospitalizations:   Any hospitalizations or ED visits within the last 12 months?: No      Advance Care Planning:   Living will: Yes    Durable POA for healthcare:  Yes    Advanced directive: Yes      Cognitive Screening:   Provider or family/friend/caregiver concerned regarding cognition?: No    PREVENTIVE SCREENINGS      Cardiovascular Screening:    General: Screening Current and Risks and Benefits Discussed      Diabetes Screening:     General: Screening Current and Risks and Benefits Discussed      Colorectal Cancer Screening:     General: Screening Current      Breast Cancer Screening:     General: Screening Current and Risks and Benefits Discussed      Cervical Cancer Screening:    General: Risks and Benefits Discussed and Screening Current      Osteoporosis Screening:    General: Screening Current and Risks and Benefits Discussed      Abdominal Aortic Aneurysm (AAA) Screening:        General: Risks and Benefits Discussed and Screening Not Indicated      Lung Cancer Screening:     General: Screening Not Indicated and Risks and Benefits Discussed      Hepatitis C Screening:    General: Screening Current and Risks and Benefits Discussed    Screening, Brief Intervention, and Referral to Treatment (SBIRT)    Screening  Typical number of drinks in a day: 0  Typical number of drinks in a week: 0  Interpretation: Low risk drinking behavior  Single Item Drug Screening:  How often have you used an illegal drug (including marijuana) or a prescription medication for non-medical reasons in the past year? never    Single Item Drug Screen Score: 0  Interpretation: Negative screen for possible drug use disorder    Other Counseling Topics:   Car/seat belt/driving safety, sunscreen and regular weightbearing exercise  Visual Acuity Screening    Right eye Left eye Both eyes   Without correction: 20/200 20/40 20/40   With correction:           Physical Exam:     /82   Pulse 74   Temp (!) 95 7 °F (35 4 °C) (Tympanic)   Ht 5' 6" (1 676 m)   Wt 68 3 kg (150 lb 9 6 oz)   BMI 24 31 kg/m²     Physical Exam  Vitals and nursing note reviewed  Constitutional:       General: She is not in acute distress  Appearance: She is well-developed  She is not ill-appearing  HENT:      Head: Normocephalic and atraumatic        Right Ear: Tympanic membrane normal  There is no impacted cerumen  Left Ear: Tympanic membrane normal  There is no impacted cerumen  Eyes:      General:         Right eye: No discharge  Left eye: No discharge  Conjunctiva/sclera: Conjunctivae normal    Neck:      Vascular: No carotid bruit  Cardiovascular:      Rate and Rhythm: Normal rate and regular rhythm  Heart sounds: No murmur heard  Pulmonary:      Effort: Pulmonary effort is normal  No respiratory distress  Breath sounds: Normal breath sounds  No wheezing, rhonchi or rales  Abdominal:      General: There is no distension  Palpations: Abdomen is soft  Tenderness: There is no abdominal tenderness  There is no guarding or rebound  Musculoskeletal:         General: No deformity or signs of injury  Cervical back: Neck supple  Lymphadenopathy:      Cervical: No cervical adenopathy  Skin:     General: Skin is warm and dry  Coloration: Skin is not pale  Neurological:      General: No focal deficit present  Mental Status: She is alert  Mental status is at baseline  Gait: Gait normal    Psychiatric:         Mood and Affect: Mood normal          Thought Content:  Thought content normal          Judgment: Judgment normal           Roxanne Ely DO

## 2021-09-02 NOTE — PROGRESS NOTES
Daily Note     Today's date: 2021  Patient name: Nalini Main  : 1955  MRN: [id number]  Referring provider: Krishan Miller MD  Dx:   Encounter Diagnosis     ICD-10-CM    1  Degeneration of lumbar or lumbosacral intervertebral disc  M51 37    2  Lumbosacral radiculopathy  M54 17    3  SI (sacroiliac) joint dysfunction  M53 3          Subjective:  Patient noted that she had L knee pain pre treatment and some numbness medial side of knee  Objective: See treatment diary below      Assessment: Tolerated treatment fair  Patient was able to perform listed exercises with correct form  Patient noted that she felt better after performing exercises  Patient noted decreased numbness medial side L knee post grade 3 anterior glide fibular head  Patient would benefit from continued PT      Plan: Continue per plan of care        Precautions: Osteopenia    Manuals          p-a mobs lumbar spine 3-4             L hip long axis distraction gr 1-3 2' Prone 2'           SI MET for Ant Rot on L   WE WE         Grade 3 anterior glide fibular head     JA         L hip p-a mob, and IR mob gr 3-4  6'           A-p rotation mob L ASIS 3' 2'           Neuro Re-Ed             Pelvic tilt  15x x15 x15         TA bracing with bridge  15x x15 x15         Bent knee FO   x10 ea x10ea         Brace with march   x10 x10         Brace with ADD ball squeeze   5"x10 5"x10                      Ther Ex             Press ups  10x           Supine hip flexor stretch  3x30" held                       Hamstring stretch  3x30" 30"x3 ea 30"x3         Piriformis stretch             Figure of 4 stretch 3x30" 3x30" 30"x3 30"x3         Left hamstrings MET 10x5" 10x5" 5"x10          Ther Activity                                       Gait Training                                       Modalities             Moist hot pack

## 2021-09-02 NOTE — PATIENT INSTRUCTIONS
Medicare Preventive Visit Patient Instructions  Thank you for completing your Welcome to Medicare Visit or Medicare Annual Wellness Visit today  Your next wellness visit will be due in one year (9/3/2022)  The screening/preventive services that you may require over the next 5-10 years are detailed below  Some tests may not apply to you based off risk factors and/or age  Screening tests ordered at today's visit but not completed yet may show as past due  Also, please note that scanned in results may not display below  Preventive Screenings:  Service Recommendations Previous Testing/Comments   Colorectal Cancer Screening  * Colonoscopy    * Fecal Occult Blood Test (FOBT)/Fecal Immunochemical Test (FIT)  * Fecal DNA/Cologuard Test  * Flexible Sigmoidoscopy Age: 54-65 years old   Colonoscopy: every 10 years (may be performed more frequently if at higher risk)  OR  FOBT/FIT: every 1 year  OR  Cologuard: every 3 years  OR  Sigmoidoscopy: every 5 years  Screening may be recommended earlier than age 48 if at higher risk for colorectal cancer  Also, an individualized decision between you and your healthcare provider will decide whether screening between the ages of 74-80 would be appropriate  Colonoscopy: Not on file  FOBT/FIT: Not on file  Cologuard: 08/12/2019  Sigmoidoscopy: Not on file    Screening Current     Breast Cancer Screening Age: 36 years old  Frequency: every 1-2 years  Not required if history of left and right mastectomy Mammogram: 11/17/2020    Screening Current   Cervical Cancer Screening Between the ages of 21-29, pap smear recommended once every 3 years  Between the ages of 33-67, can perform pap smear with HPV co-testing every 5 years     Recommendations may differ for women with a history of total hysterectomy, cervical cancer, or abnormal pap smears in past  Pap Smear: 07/22/2019    Screening Not Indicated   Hepatitis C Screening Once for adults born between 1945 and 1965  More frequently in patients at high risk for Hepatitis C Hep C Antibody: 09/17/2020    Screening Current   Diabetes Screening 1-2 times per year if you're at risk for diabetes or have pre-diabetes Fasting glucose: No results in last 5 years   A1C: No results in last 5 years    Screening Current   Cholesterol Screening Once every 5 years if you don't have a lipid disorder  May order more often based on risk factors  Lipid panel: 09/17/2020    Screening Current     Other Preventive Screenings Covered by Medicare:  1  Abdominal Aortic Aneurysm (AAA) Screening: covered once if your at risk  You're considered to be at risk if you have a family history of AAA  2  Lung Cancer Screening: covers low dose CT scan once per year if you meet all of the following conditions: (1) Age 50-69; (2) No signs or symptoms of lung cancer; (3) Current smoker or have quit smoking within the last 15 years; (4) You have a tobacco smoking history of at least 30 pack years (packs per day multiplied by number of years you smoked); (5) You get a written order from a healthcare provider  3  Glaucoma Screening: covered annually if you're considered high risk: (1) You have diabetes OR (2) Family history of glaucoma OR (3)  aged 48 and older OR (3)  American aged 72 and older  3  Osteoporosis Screening: covered every 2 years if you meet one of the following conditions: (1) You're estrogen deficient and at risk for osteoporosis based off medical history and other findings; (2) Have a vertebral abnormality; (3) On glucocorticoid therapy for more than 3 months; (4) Have primary hyperparathyroidism; (5) On osteoporosis medications and need to assess response to drug therapy  · Last bone density test (DXA Scan): 11/17/2020   5  HIV Screening: covered annually if you're between the age of 15-65  Also covered annually if you are younger than 13 and older than 72 with risk factors for HIV infection   For pregnant patients, it is covered up to 3 times per pregnancy  Immunizations:  Immunization Recommendations   Influenza Vaccine Annual influenza vaccination during flu season is recommended for all persons aged >= 6 months who do not have contraindications   Pneumococcal Vaccine (Prevnar and Pneumovax)  * Prevnar = PCV13  * Pneumovax = PPSV23   Adults 25-60 years old: 1-3 doses may be recommended based on certain risk factors  Adults 72 years old: Prevnar (PCV13) vaccine recommended followed by Pneumovax (PPSV23) vaccine  If already received PPSV23 since turning 65, then PCV13 recommended at least one year after PPSV23 dose  Hepatitis B Vaccine 3 dose series if at intermediate or high risk (ex: diabetes, end stage renal disease, liver disease)   Tetanus (Td) Vaccine - COST NOT COVERED BY MEDICARE PART B Following completion of primary series, a booster dose should be given every 10 years to maintain immunity against tetanus  Td may also be given as tetanus wound prophylaxis  Tdap Vaccine - COST NOT COVERED BY MEDICARE PART B Recommended at least once for all adults  For pregnant patients, recommended with each pregnancy  Shingles Vaccine (Shingrix) - COST NOT COVERED BY MEDICARE PART B  2 shot series recommended in those aged 48 and above     Health Maintenance Due:      Topic Date Due    Breast Cancer Screening: Mammogram  11/17/2021    Cervical Cancer Screening  07/22/2022    Colorectal Cancer Screening  08/12/2022    DXA SCAN  11/17/2022    HIV Screening  Completed    Hepatitis C Screening  Completed     Immunizations Due:      Topic Date Due    DTaP,Tdap,and Td Vaccines (1 - Tdap) Never done    Pneumococcal Vaccine: 65+ Years (1 of 1 - PPSV23) Never done    Influenza Vaccine (1) 09/01/2021     Advance Directives   What are advance directives? Advance directives are legal documents that state your wishes and plans for medical care  These plans are made ahead of time in case you lose your ability to make decisions for yourself   Advance directives can apply to any medical decision, such as the treatments you want, and if you want to donate organs  What are the types of advance directives? There are many types of advance directives, and each state has rules about how to use them  You may choose a combination of any of the following:  · Living will: This is a written record of the treatment you want  You can also choose which treatments you do not want, which to limit, and which to stop at a certain time  This includes surgery, medicine, IV fluid, and tube feedings  · Durable power of  for healthcare [facility]): This is a written record that states who you want to make healthcare choices for you when you are unable to make them for yourself  This person, called a proxy, is usually a family member or a friend  You may choose more than 1 proxy  · Do not resuscitate (DNR) order:  A DNR order is used in case your heart stops beating or you stop breathing  It is a request not to have certain forms of treatment, such as CPR  A DNR order may be included in other types of advance directives  · Medical directive: This covers the care that you want if you are in a coma, near death, or unable to make decisions for yourself  You can list the treatments you want for each condition  Treatment may include pain medicine, surgery, blood transfusions, dialysis, IV or tube feedings, and a ventilator (breathing machine)  · Values history: This document has questions about your views, beliefs, and how you feel and think about life  This information can help others choose the care that you would choose  Why are advance directives important? An advance directive helps you control your care  Although spoken wishes may be used, it is better to have your wishes written down  Spoken wishes can be misunderstood, or not followed  Treatments may be given even if you do not want them   An advance directive may make it easier for your family to make difficult choices about your care  © Copyright [employer] 2018 Information is for End User's use only and may not be sold, redistributed or otherwise used for commercial purposes  All illustrations and images included in CareNotes® are the copyrighted property of [employer]  or [facility] Preventive Visit Patient Instructions  Thank you for completing your Welcome to Medicare Visit or Medicare Annual Wellness Visit today  Your next wellness visit will be due in one year (9/3/2022)  The screening/preventive services that you may require over the next 5-10 years are detailed below  Some tests may not apply to you based off risk factors and/or age  Screening tests ordered at today's visit but not completed yet may show as past due  Also, please note that scanned in results may not display below  Preventive Screenings:  Service Recommendations Previous Testing/Comments   Colorectal Cancer Screening  * Colonoscopy    * Fecal Occult Blood Test (FOBT)/Fecal Immunochemical Test (FIT)  * Fecal DNA/Cologuard Test  * Flexible Sigmoidoscopy Age: 54-65 years old   Colonoscopy: every 10 years (may be performed more frequently if at higher risk)  OR  FOBT/FIT: every 1 year  OR  Cologuard: every 3 years  OR  Sigmoidoscopy: every 5 years  Screening may be recommended earlier than age 48 if at higher risk for colorectal cancer  Also, an individualized decision between you and your healthcare provider will decide whether screening between the ages of 74-80 would be appropriate  Colonoscopy: Not on file  FOBT/FIT: Not on file  Cologuard: 08/12/2019  Sigmoidoscopy: Not on file    Screening Current     Breast Cancer Screening Age: 36 years old  Frequency: every 1-2 years  Not required if history of left and right mastectomy Mammogram: 11/17/2020    Screening Current   Cervical Cancer Screening Between the ages of 21-29, pap smear recommended once every 3 years     Between the ages of 33-67, can perform pap smear with HPV co-testing every 5 years  Recommendations may differ for women with a history of total hysterectomy, cervical cancer, or abnormal pap smears in past  Pap Smear: 07/22/2019    Screening Not Indicated   Hepatitis C Screening Once for adults born between 1945 and 1965  More frequently in patients at high risk for Hepatitis C Hep C Antibody: 09/17/2020    Screening Current   Diabetes Screening 1-2 times per year if you're at risk for diabetes or have pre-diabetes Fasting glucose: No results in last 5 years   A1C: No results in last 5 years    Screening Current   Cholesterol Screening Once every 5 years if you don't have a lipid disorder  May order more often based on risk factors  Lipid panel: 09/17/2020    Screening Current     Other Preventive Screenings Covered by Medicare:  6  Abdominal Aortic Aneurysm (AAA) Screening: covered once if your at risk  You're considered to be at risk if you have a family history of AAA  7  Lung Cancer Screening: covers low dose CT scan once per year if you meet all of the following conditions: (1) Age 50-69; (2) No signs or symptoms of lung cancer; (3) Current smoker or have quit smoking within the last 15 years; (4) You have a tobacco smoking history of at least 30 pack years (packs per day multiplied by number of years you smoked); (5) You get a written order from a healthcare provider  8  Glaucoma Screening: covered annually if you're considered high risk: (1) You have diabetes OR (2) Family history of glaucoma OR (3)  aged 48 and older OR (3)  American aged 72 and older  5   Osteoporosis Screening: covered every 2 years if you meet one of the following conditions: (1) You're estrogen deficient and at risk for osteoporosis based off medical history and other findings; (2) Have a vertebral abnormality; (3) On glucocorticoid therapy for more than 3 months; (4) Have primary hyperparathyroidism; (5) On osteoporosis medications and need to assess response to drug therapy  · Last bone density test (DXA Scan): 11/17/2020  10  HIV Screening: covered annually if you're between the age of 12-76  Also covered annually if you are younger than 13 and older than 72 with risk factors for HIV infection  For pregnant patients, it is covered up to 3 times per pregnancy  Immunizations:  Immunization Recommendations   Influenza Vaccine Annual influenza vaccination during flu season is recommended for all persons aged >= 6 months who do not have contraindications   Pneumococcal Vaccine (Prevnar and Pneumovax)  * Prevnar = PCV13  * Pneumovax = PPSV23   Adults 25-60 years old: 1-3 doses may be recommended based on certain risk factors  Adults 72 years old: Prevnar (PCV13) vaccine recommended followed by Pneumovax (PPSV23) vaccine  If already received PPSV23 since turning 65, then PCV13 recommended at least one year after PPSV23 dose  Hepatitis B Vaccine 3 dose series if at intermediate or high risk (ex: diabetes, end stage renal disease, liver disease)   Tetanus (Td) Vaccine - COST NOT COVERED BY MEDICARE PART B Following completion of primary series, a booster dose should be given every 10 years to maintain immunity against tetanus  Td may also be given as tetanus wound prophylaxis  Tdap Vaccine - COST NOT COVERED BY MEDICARE PART B Recommended at least once for all adults  For pregnant patients, recommended with each pregnancy     Shingles Vaccine (Shingrix) - COST NOT COVERED BY MEDICARE PART B  2 shot series recommended in those aged 48 and above     Health Maintenance Due:      Topic Date Due    Breast Cancer Screening: Mammogram  11/17/2021    Cervical Cancer Screening  07/22/2022    Colorectal Cancer Screening  08/12/2022    DXA SCAN  11/17/2022    HIV Screening  Completed    Hepatitis C Screening  Completed     Immunizations Due:      Topic Date Due    DTaP,Tdap,and Td Vaccines (1 - Tdap) Never done    Pneumococcal Vaccine: 65+ Years (1 of 1 - PPSV23) Never done    Influenza Vaccine (1) 09/01/2021     Advance Directives   What are advance directives? Advance directives are legal documents that state your wishes and plans for medical care  These plans are made ahead of time in case you lose your ability to make decisions for yourself  Advance directives can apply to any medical decision, such as the treatments you want, and if you want to donate organs  What are the types of advance directives? There are many types of advance directives, and each state has rules about how to use them  You may choose a combination of any of the following:  · Living will: This is a written record of the treatment you want  You can also choose which treatments you do not want, which to limit, and which to stop at a certain time  This includes surgery, medicine, IV fluid, and tube feedings  · Durable power of  for healthcare [place]): This is a written record that states who you want to make healthcare choices for you when you are unable to make them for yourself  This person, called a proxy, is usually a family member or a friend  You may choose more than 1 proxy  · Do not resuscitate (DNR) order:  A DNR order is used in case your heart stops beating or you stop breathing  It is a request not to have certain forms of treatment, such as CPR  A DNR order may be included in other types of advance directives  · Medical directive: This covers the care that you want if you are in a coma, near death, or unable to make decisions for yourself  You can list the treatments you want for each condition  Treatment may include pain medicine, surgery, blood transfusions, dialysis, IV or tube feedings, and a ventilator (breathing machine)  · Values history: This document has questions about your views, beliefs, and how you feel and think about life  This information can help others choose the care that you would choose  Why are advance directives important?   An advance directive helps you control your care  Although spoken wishes may be used, it is better to have your wishes written down  Spoken wishes can be misunderstood, or not followed  Treatments may be given even if you do not want them  An advance directive may make it easier for your family to make difficult choices about your care  © Copyright [employer] 2018 Information is for End User's use only and may not be sold, redistributed or otherwise used for commercial purposes   All illustrations and images included in CareNotes® are the copyrighted property of [employer]  or [address] [place] [address]

## 2021-09-03 ENCOUNTER — LAB (OUTPATIENT)
Dept: LAB | Facility: HOSPITAL | Age: 66
End: 2021-09-03
Payer: MEDICARE

## 2021-09-03 DIAGNOSIS — E06.3 CHRONIC LYMPHOCYTIC THYROIDITIS: ICD-10-CM

## 2021-09-03 DIAGNOSIS — R20.0 TACTILE ANESTHESIA: ICD-10-CM

## 2021-09-03 DIAGNOSIS — Z00.00 ROUTINE GENERAL MEDICAL EXAMINATION AT A HEALTH CARE FACILITY: ICD-10-CM

## 2021-09-03 LAB
ALBUMIN SERPL BCP-MCNC: 3.5 G/DL (ref 3.5–5)
ALP SERPL-CCNC: 80 U/L (ref 46–116)
ALT SERPL W P-5'-P-CCNC: 21 U/L (ref 12–78)
ANION GAP SERPL CALCULATED.3IONS-SCNC: 2 MMOL/L (ref 4–13)
AST SERPL W P-5'-P-CCNC: 43 U/L (ref 5–45)
BASOPHILS # BLD AUTO: 0.02 THOUSANDS/ΜL (ref 0–0.1)
BASOPHILS NFR BLD AUTO: 1 % (ref 0–1)
BILIRUB SERPL-MCNC: 0.56 MG/DL (ref 0.2–1)
BUN SERPL-MCNC: 13 MG/DL (ref 5–25)
CALCIUM SERPL-MCNC: 9.3 MG/DL (ref 8.3–10.1)
CHLORIDE SERPL-SCNC: 108 MMOL/L (ref 100–108)
CHOLEST SERPL-MCNC: 273 MG/DL (ref 50–200)
CO2 SERPL-SCNC: 29 MMOL/L (ref 21–32)
CREAT SERPL-MCNC: 0.86 MG/DL (ref 0.6–1.3)
EOSINOPHIL # BLD AUTO: 0.11 THOUSAND/ΜL (ref 0–0.61)
EOSINOPHIL NFR BLD AUTO: 3 % (ref 0–6)
ERYTHROCYTE [DISTWIDTH] IN BLOOD BY AUTOMATED COUNT: 14.6 % (ref 11.6–15.1)
GFR SERPL CREATININE-BSD FRML MDRD: 71 ML/MIN/1.73SQ M
GLUCOSE P FAST SERPL-MCNC: 92 MG/DL (ref 65–99)
HCT VFR BLD AUTO: 43.2 % (ref 34.8–46.1)
HDLC SERPL-MCNC: 104 MG/DL
HGB BLD-MCNC: 13.7 G/DL (ref 11.5–15.4)
IMM GRANULOCYTES # BLD AUTO: 0.01 THOUSAND/UL (ref 0–0.2)
IMM GRANULOCYTES NFR BLD AUTO: 0 % (ref 0–2)
LDLC SERPL CALC-MCNC: 156 MG/DL (ref 0–100)
LYMPHOCYTES # BLD AUTO: 1.95 THOUSANDS/ΜL (ref 0.6–4.47)
LYMPHOCYTES NFR BLD AUTO: 45 % (ref 14–44)
MCH RBC QN AUTO: 29.5 PG (ref 26.8–34.3)
MCHC RBC AUTO-ENTMCNC: 31.7 G/DL (ref 31.4–37.4)
MCV RBC AUTO: 93 FL (ref 82–98)
MONOCYTES # BLD AUTO: 0.37 THOUSAND/ΜL (ref 0.17–1.22)
MONOCYTES NFR BLD AUTO: 9 % (ref 4–12)
NEUTROPHILS # BLD AUTO: 1.75 THOUSANDS/ΜL (ref 1.85–7.62)
NEUTS SEG NFR BLD AUTO: 42 % (ref 43–75)
NONHDLC SERPL-MCNC: 169 MG/DL
NRBC BLD AUTO-RTO: 0 /100 WBCS
PLATELET # BLD AUTO: 250 THOUSANDS/UL (ref 149–390)
PMV BLD AUTO: 9 FL (ref 8.9–12.7)
POTASSIUM SERPL-SCNC: 4.6 MMOL/L (ref 3.5–5.3)
PROT SERPL-MCNC: 7.1 G/DL (ref 6.4–8.2)
RBC # BLD AUTO: 4.65 MILLION/UL (ref 3.81–5.12)
SODIUM SERPL-SCNC: 139 MMOL/L (ref 136–145)
T4 FREE SERPL-MCNC: 0.91 NG/DL (ref 0.76–1.46)
TRIGL SERPL-MCNC: 63 MG/DL
TSH SERPL DL<=0.05 MIU/L-ACNC: 3.83 UIU/ML (ref 0.36–3.74)
WBC # BLD AUTO: 4.21 THOUSAND/UL (ref 4.31–10.16)

## 2021-09-03 PROCEDURE — 84439 ASSAY OF FREE THYROXINE: CPT

## 2021-09-03 PROCEDURE — 80053 COMPREHEN METABOLIC PANEL: CPT

## 2021-09-03 PROCEDURE — 80061 LIPID PANEL: CPT

## 2021-09-03 PROCEDURE — 36415 COLL VENOUS BLD VENIPUNCTURE: CPT

## 2021-09-03 PROCEDURE — 85025 COMPLETE CBC W/AUTO DIFF WBC: CPT

## 2021-09-03 PROCEDURE — 84443 ASSAY THYROID STIM HORMONE: CPT

## 2021-09-07 ENCOUNTER — OFFICE VISIT (OUTPATIENT)
Dept: PHYSICAL THERAPY | Facility: CLINIC | Age: 66
End: 2021-09-07
Payer: MEDICARE

## 2021-09-07 DIAGNOSIS — M54.17 LUMBOSACRAL RADICULOPATHY: ICD-10-CM

## 2021-09-07 DIAGNOSIS — M51.37 DEGENERATION OF LUMBAR OR LUMBOSACRAL INTERVERTEBRAL DISC: Primary | ICD-10-CM

## 2021-09-07 DIAGNOSIS — M53.3 SI (SACROILIAC) JOINT DYSFUNCTION: ICD-10-CM

## 2021-09-07 PROCEDURE — 97110 THERAPEUTIC EXERCISES: CPT | Performed by: PHYSICAL THERAPIST

## 2021-09-07 PROCEDURE — 97140 MANUAL THERAPY 1/> REGIONS: CPT | Performed by: PHYSICAL THERAPIST

## 2021-09-07 NOTE — PROGRESS NOTES
Daily Note     Today's date: 2021  Patient name: Julissa Fernandez  : 1955  MRN: [id number]  Referring provider: Adeline Patel MD  Dx:   Encounter Diagnosis     ICD-10-CM    1  Degeneration of lumbar or lumbosacral intervertebral disc  M51 37    2  Lumbosacral radiculopathy  M54 17    3  SI (sacroiliac) joint dysfunction  M53 3          Subjective:  Patient reported that she felt a lot better following therap on Thursday, especially with knee mobilizations  She then tried the core exercises at home and had more pain following  She also reported knee pain with hip flexor stretch  She hasn't been having as much difficulty walking or feeling of leg buckling anymore, or numbness, but has had increased low back pain lately  Objective: See treatment diary below      Assessment: Patient continues to present with significant anterior innominate rotation on left side with supine assessment  Patient noted reduction in low back pain following muscle energy technique and manual posterior rotation mobilization to left innominate  She was unable to tolerate hip flexor stretching without left patella pain so IASTM was performed to hip flexors  However she noted increase in low back pain following, likely due to lying supine with hip extended during IASTM treatment  Patient would benefit from continued PT per plan of care  Plan: Continue per plan of care        Precautions: Osteopenia    Manuals         p-a mobs lumbar spine 3-4             L hip long axis distraction gr 1-3 2' Prone 2'   Prone 2'        SI MET for Ant Rot on L   WE WE 3'        Grade 3 anterior glide fibular head     JA 3'        L hip p-a mob, and IR mob gr 3-4  6'   3'        A-p rotation mob L ASIS 3' 2'   4'        Neuro Re-Ed             Pelvic tilt  15x x15 x15         TA bracing with bridge  15x x15 x15         Bent knee FO   x10 ea x10ea         Brace with march   x10 x10         Brace with ADD ball squeeze   5"x10 5"x10                      Ther Ex             Press ups  10x   Prone on elbows        Supine hip flexor stretch  3x30" held  3x30"        Pt edu on home program     5'        Hamstring stretch  3x30" 30"x3 ea 30"x3 3x30"        Piriformis stretch             Figure of 4 stretch 3x30" 3x30" 30"x3 30"x3         Left hamstrings MET 10x5" 10x5" 5"x10  3x30"        Ther Activity                                       Gait Training                                       Modalities             Moist hot pack

## 2021-09-09 ENCOUNTER — OFFICE VISIT (OUTPATIENT)
Dept: PHYSICAL THERAPY | Facility: CLINIC | Age: 66
End: 2021-09-09
Payer: MEDICARE

## 2021-09-09 DIAGNOSIS — M51.37 DEGENERATION OF LUMBAR OR LUMBOSACRAL INTERVERTEBRAL DISC: Primary | ICD-10-CM

## 2021-09-09 DIAGNOSIS — M54.17 LUMBOSACRAL RADICULOPATHY: ICD-10-CM

## 2021-09-09 DIAGNOSIS — M53.3 SI (SACROILIAC) JOINT DYSFUNCTION: ICD-10-CM

## 2021-09-09 PROCEDURE — 97112 NEUROMUSCULAR REEDUCATION: CPT | Performed by: PHYSICAL THERAPIST

## 2021-09-09 PROCEDURE — 97140 MANUAL THERAPY 1/> REGIONS: CPT | Performed by: PHYSICAL THERAPIST

## 2021-09-09 PROCEDURE — 97110 THERAPEUTIC EXERCISES: CPT | Performed by: PHYSICAL THERAPIST

## 2021-09-09 NOTE — PROGRESS NOTES
Daily Note     Today's date: 2021  Patient name: Martha Oliva  : 1955  MRN: [id number]  Referring provider: Mina Nageotte, MD  Dx:   Encounter Diagnosis     ICD-10-CM    1  Degeneration of lumbar or lumbosacral intervertebral disc  M51 37    2  Lumbosacral radiculopathy  M54 17    3  SI (sacroiliac) joint dysfunction  M53 3          Subjective:  Patient reports that she feels better compared to earlier this week  She reports 6/10 low back pain which is an improvement  She has been following home program per recommendations  Objective: See treatment diary below      Assessment: Upon assessment, patient displayed improved pelvic symmetry compared to previous session, with only slight leg length descrepancy  Performed manual hamstring MET on left side  Patient displayed significant improvement in symmetry, near even alignment and noted reduction in low back pain to 3/10 pain  Patient also noted reduction in low back pain following core stability exercises and feeling more supportive with trial of SI belt  Patient would benefit from continued PT per plan of care  Plan: Continue per plan of care        Precautions: Osteopenia    Manuals        p-a mobs lumbar spine 3-4             L hip long axis distraction gr 1-3 2' Prone 2'   Prone 2'        SI MET for Ant Rot on L   WE WE 3' 4'       Grade 3 anterior glide fibular head     JA 3'        Manual L quad and hip flexor stretch      4'       L hip p-a mob, and IR mob gr 3-4  6'   3' 4'       A-p rotation mob L ASIS 3' 2'   4' 4'       Neuro Re-Ed             Pelvic tilt  15x x15 x15         TA bracing with bridge  15x x15 x15  10x       Bent knee FO   x10 ea x10ea  10x       Brace with march   x10 x10         Brace with ADD ball squeeze   5"x10 5"x10  10x       ta bracing      10x       Ther Ex             Press ups  10x   Prone on elbows        Supine hip flexor stretch  3x30" held  3x30"        Pt edu on home program     5' Hamstring stretch  3x30" 30"x3 ea 30"x3 3x30" 3x30"       Piriformis stretch             Figure of 4 stretch 3x30" 3x30" 30"x3 30"x3  3x30"       Left hamstrings MET 10x5" 10x5" 5"x10  3x30" 5x10"       Ther Activity                                       Gait Training                                       Modalities             Moist hot pack

## 2021-09-14 ENCOUNTER — OFFICE VISIT (OUTPATIENT)
Dept: PHYSICAL THERAPY | Facility: CLINIC | Age: 66
End: 2021-09-14
Payer: MEDICARE

## 2021-09-14 DIAGNOSIS — M51.37 DEGENERATION OF LUMBAR OR LUMBOSACRAL INTERVERTEBRAL DISC: ICD-10-CM

## 2021-09-14 DIAGNOSIS — M54.17 LUMBOSACRAL RADICULOPATHY: Primary | ICD-10-CM

## 2021-09-14 DIAGNOSIS — M53.3 SI (SACROILIAC) JOINT DYSFUNCTION: ICD-10-CM

## 2021-09-14 PROCEDURE — 97112 NEUROMUSCULAR REEDUCATION: CPT | Performed by: PHYSICAL THERAPIST

## 2021-09-14 PROCEDURE — 97110 THERAPEUTIC EXERCISES: CPT | Performed by: PHYSICAL THERAPIST

## 2021-09-14 PROCEDURE — 97140 MANUAL THERAPY 1/> REGIONS: CPT | Performed by: PHYSICAL THERAPIST

## 2021-09-14 NOTE — PROGRESS NOTES
Daily Note     Today's date: 2021  Patient name: Jose Joshi  : 1955  MRN: [id number]  Referring provider: Kitty Brandon MD  Dx:   Encounter Diagnosis     ICD-10-CM    1  Lumbosacral radiculopathy  M54 17    2  Degeneration of lumbar or lumbosacral intervertebral disc  M51 37    3  SI (sacroiliac) joint dysfunction  M53 3          Subjective:  Patient reports that she had a lot of pain over the weekend  She reports yesterday the pain started in her left groin, unsure why  She got SI belt but isn't sure if it helps, thinks maybe she has less pain when not wearing it  Objective: See treatment diary below      Assessment: Upon assessment, patient anterior innominate rotation on left side and leg length descrepancy in supine  This was corrected again with hamstring MET and she noted reduction in left groin pain from 6/10 to 2/10  Performed lateral hip mobilizations with mobilization belt into IR/ER  She had marked improvement in IR PROM following and 0/10 pain following  Patient continues to be severely limited with hip flexor muscle tightness and hip extension PROM  Patient would benefit from continued PT per plan of care  Plan: Continue per plan of care        Precautions: Osteopenia    Manuals       SI MET for Ant Rot on L   WE WE 3' 4' 4'      Grade 3 anterior glide fibular head     JA 3'        Manual L quad and hip flexor stretch      4' 3'      L hip lateral mob with belt for IR/ER gr 3-4       6'      L hip p-a mob, and IR mob gr 3-4  6'   3' 4' 4'      A-p rotation mob L ASIS 3' 2'   4' 4'       Neuro Re-Ed             Pelvic tilt  15x x15 x15         TA bracing with bridge  15x x15 x15  10x 15x      Bent knee FO   x10 ea x10ea  10x 10ea      Brace with ADD ball squeeze   5"x10 5"x10  10x 15x      ta bracing      10x 15x      Ther Ex             Press ups  10x   Prone on elbows        Supine hip flexor stretch  3x30" held  3x30"  standing 30"      Pt edu on home program     5'  2'      Hamstring stretch  3x30" 30"x3 ea 30"x3 3x30" 3x30"       Figure of 4 stretch 3x30" 3x30" 30"x3 30"x3  3x30"       Prone quad stretch       3x30"      Left hamstrings MET 10x5" 10x5" 5"x10  3x30" 5x10" 5x10"      Ther Activity                                       Gait Training                                       Modalities             Moist hot pack

## 2021-09-16 ENCOUNTER — OFFICE VISIT (OUTPATIENT)
Dept: PHYSICAL THERAPY | Facility: CLINIC | Age: 66
End: 2021-09-16
Payer: MEDICARE

## 2021-09-16 DIAGNOSIS — M54.17 LUMBOSACRAL RADICULOPATHY: Primary | ICD-10-CM

## 2021-09-16 DIAGNOSIS — M51.37 DEGENERATION OF LUMBAR OR LUMBOSACRAL INTERVERTEBRAL DISC: ICD-10-CM

## 2021-09-16 DIAGNOSIS — M53.3 SI (SACROILIAC) JOINT DYSFUNCTION: ICD-10-CM

## 2021-09-16 PROCEDURE — 97140 MANUAL THERAPY 1/> REGIONS: CPT | Performed by: PHYSICAL THERAPIST

## 2021-09-16 PROCEDURE — 97110 THERAPEUTIC EXERCISES: CPT | Performed by: PHYSICAL THERAPIST

## 2021-09-16 NOTE — PROGRESS NOTES
Daily Note     Today's date: 2021  Patient name: Amy Griggs  : 1955  MRN: [id number]  Referring provider: Stan Gasca MD  Dx:   Encounter Diagnosis     ICD-10-CM    1  Lumbosacral radiculopathy  M54 17    2  Degeneration of lumbar or lumbosacral intervertebral disc  M51 37    3  SI (sacroiliac) joint dysfunction  M53 3          Subjective:  Patient reports that she felt great yesterday and had no pain anywhere  She noticed a little difficulty walking still but felt good, the best she has since onset  She didn't have to take any pain medication  Today she woke up with 4/10 groin pain  Objective: See treatment diary below      Assessment: Patient continues to respond well from hamstring MET and hip mobilizations, noting less pain and displaying improved motion following  She tolerated manual hip IR with improvement in knee pain, but noted low back pain with resisted ER  Trialed repeated lateral shift in standing due to left lateral shift with ambulation, but her gait worsened following  Instructed patient in stretching hip ER and performing MET in sitting in case she gets pain over the weekend when walking through mall  Patient would benefit from continued PT per plan of care  Plan: Continue per plan of care        Precautions: Osteopenia    Manuals      SI MET for Ant Rot on L   WE WE 3' 4' 4' 4'     Grade 3 anterior glide fibular head     JA 3'        Manual L quad and hip flexor stretch      4' 3'      L hip lateral mob with belt for IR/ER gr 3-4       6' 6'     L hip p-a mob, and IR mob gr 3-4  6'   3' 4' 4' 5'     Hip IR/ER manual resistance        3'     A-p rotation mob L ASIS 3' 2'   4' 4'       Neuro Re-Ed             Pelvic tilt  15x x15 x15         TA bracing with bridge  15x x15 x15  10x 15x      Bent knee FO   x10 ea x10ea  10x 10ea      Brace with ADD ball squeeze   5"x10 5"x10  10x 15x                   Prone brace with glut set        10x ta bracing      10x 15x      Ther Ex             Press ups  10x   Prone on elbows        Supine hip flexor stretch  3x30" held  3x30"  standing 30"      Pt edu on home program     5'  2'      Hamstring stretch  3x30" 30"x3 ea 30"x3 3x30" 3x30"       Figure of 4 stretch 3x30" 3x30" 30"x3 30"x3  3x30"  3x30" and sitting     Prone quad stretch       3x30" 3x30"     Right lateral glide        10x     Left hamstrings MET 10x5" 10x5" 5"x10  3x30" 5x10" 5x10" 5x10" and sitting     Ther Activity                                       Gait Training                                       Modalities             Moist hot pack

## 2021-09-21 ENCOUNTER — OFFICE VISIT (OUTPATIENT)
Dept: PHYSICAL THERAPY | Facility: CLINIC | Age: 66
End: 2021-09-21
Payer: MEDICARE

## 2021-09-21 DIAGNOSIS — M51.37 DEGENERATION OF LUMBAR OR LUMBOSACRAL INTERVERTEBRAL DISC: ICD-10-CM

## 2021-09-21 DIAGNOSIS — M54.17 LUMBOSACRAL RADICULOPATHY: Primary | ICD-10-CM

## 2021-09-21 PROCEDURE — 97140 MANUAL THERAPY 1/> REGIONS: CPT | Performed by: PHYSICAL THERAPIST

## 2021-09-21 PROCEDURE — 97112 NEUROMUSCULAR REEDUCATION: CPT | Performed by: PHYSICAL THERAPIST

## 2021-09-21 PROCEDURE — 97110 THERAPEUTIC EXERCISES: CPT | Performed by: PHYSICAL THERAPIST

## 2021-09-21 NOTE — PROGRESS NOTES
Daily Note     Today's date: 2021  Patient name: Lady Amaya  : 1955  MRN: [id number]  Referring provider: Emani Lara MD  Dx:   Encounter Diagnosis     ICD-10-CM    1  Lumbosacral radiculopathy  M54 17    2  Degeneration of lumbar or lumbosacral intervertebral disc  M51 37          Subjective:  Patient reports that 6/10 pain today and her whole leg feels numb  Patient reports that she drove 1 5 hours to see daughter and had increased pain following, she was unable to walk much  She wore SI belt when driving back home and this helped  She was able to improve her symptoms by over 50% with home program     Objective: See treatment diary below      Assessment: Performed neutral gapping mobilization to lumbar spine on left side  She had multiple cavitations and noted significant improvement in symptoms following, but slight aggravation when performed on right side  Patient noted further reduction in pain and improvement in walking following core stability exercises in quadruped and prone  Patient was able to tolerate supine hip flexor stretch with less pain, but had aggravation of symptoms following  Patient continues to respond well to manual therapy and core stability exercises, but has overall high irritability of symptoms  Patient would benefit from continued PT per plan of care  Plan: Continue per plan of care        Precautions: Osteopenia    Manuals     SI MET for Ant Rot on L   WE WE 3' 4' 4' 4' 3'    Manual L quad and hip flexor stretch      4' 3'      L hip lateral mob with belt for IR/ER gr 3-4       6' 6' 5'    L hip p-a mob, and IR mob gr 3-4  6'   3' 4' 4' 5' 4'    Hip IR/ER manual resistance        3' 3'    L spine neutral gap gr 3-4         6'                 A-p rotation mob L ASIS 3' 2'   4' 4'       Neuro Re-Ed             Pelvic tilt  15x x15 x15     10x    TA bracing with bridge  15x x15 x15  10x 15x  10x    Bent knee FO   x10 ea x10ea 10x 10ea  10x    Brace with ADD ball squeeze   5"x10 5"x10  10x 15x  10x                 Prone brace with glut set        10x Just brace    ta bracing      10x 15x  10x    Ther Ex             Press ups  10x   Prone on elbows        Supine hip flexor stretch  3x30" held  3x30"  standing 30"  2x30"    Pt edu on home program     5'  2'      Hamstring stretch  3x30" 30"x3 ea 30"x3 3x30" 3x30"       Figure of 4 stretch 3x30" 3x30" 30"x3 30"x3  3x30"  3x30" and sitting 3x30"    Prone quad stretch       3x30" 3x30" 3x30"    Right lateral glide        10x     Left hamstrings MET 10x5" 10x5" 5"x10  3x30" 5x10" 5x10" 5x10" and sitting 5x10"    Ther Activity                                       Gait Training                                       Modalities             Moist hot pack

## 2021-09-23 ENCOUNTER — OFFICE VISIT (OUTPATIENT)
Dept: PHYSICAL THERAPY | Facility: CLINIC | Age: 66
End: 2021-09-23
Payer: MEDICARE

## 2021-09-23 DIAGNOSIS — M54.17 LUMBOSACRAL RADICULOPATHY: Primary | ICD-10-CM

## 2021-09-23 DIAGNOSIS — M51.37 DEGENERATION OF LUMBAR OR LUMBOSACRAL INTERVERTEBRAL DISC: ICD-10-CM

## 2021-09-23 DIAGNOSIS — M53.3 SI (SACROILIAC) JOINT DYSFUNCTION: ICD-10-CM

## 2021-09-23 PROCEDURE — 97112 NEUROMUSCULAR REEDUCATION: CPT | Performed by: PHYSICAL THERAPIST

## 2021-09-23 PROCEDURE — 97140 MANUAL THERAPY 1/> REGIONS: CPT | Performed by: PHYSICAL THERAPIST

## 2021-09-23 PROCEDURE — 97110 THERAPEUTIC EXERCISES: CPT | Performed by: PHYSICAL THERAPIST

## 2021-09-23 NOTE — PROGRESS NOTES
Daily Note     Today's date: 2021  Patient name: Yair Sultana  : 1955  MRN: [id number]  Referring provider: Alla Martinez MD  Dx:   Encounter Diagnosis     ICD-10-CM    1  Lumbosacral radiculopathy  M54 17    2  Degeneration of lumbar or lumbosacral intervertebral disc  M51 37    3  SI (sacroiliac) joint dysfunction  M53 3          Subjective:  Patient reports that no pain other than in lower leg/shin  She thinks it could be due to arthritis  Patient reports feeling pretty good the past two days  She didn't have significant increase in pain during work on Tuesday  Objective: See treatment diary below      Assessment: Patient noted less tightness following neutral gapping mobilization to left side of lumbar spine  She continues to have altered gait pattern due to decreased hip extension on left  Trialed different hip extension stretches to improve hip flexor muscle length  Progressed core strengthening and proximal hip strengthening today which caused fatigue but no increase in pain  Patient would benefit from continued PT per plan of care  Plan: Continue per plan of care    Improve hip extension/hip flexor tightness as able     Precautions: Osteopenia    Manuals    SI MET for Ant Rot on L   WE WE 3' 4' 4' 4' 3' 3'   STM L Quad          3'   L hip lateral mob with belt for IR/ER gr 3-4       6' 6' 5'    L hip p-a mob, and IR mob gr 3-4  6'   3' 4' 4' 5' 4' 4'   Hip IR/ER manual resistance        3' 3' 2'   L spine neutral gap gr 3-4         6' 3'                A-p rotation mob L ASIS 3' 2'   4' 4'    4'   Neuro Re-Ed             Pelvic tilt  15x x15 x15     10x 15x   TA bracing with bridge  15x x15 x15  10x 15x  10x 10x   Bent knee FO   x10 ea x10ea  10x 10ea  10x 10x   Quadruped core and kegel          10ea   Prone brace with glut set        10x Just brace    ta bracing      10x 15x  10x    Ther Ex             Press ups  10x   Prone on elbows Supine hip flexor stretch  3x30" held  3x30"  standing 30"  2x30" Prone lying   Hamstring stretch  3x30" 30"x3 ea 30"x3 3x30" 3x30"       Figure of 4 stretch 3x30" 3x30" 30"x3 30"x3  3x30"  3x30" and sitting 3x30" 3x30"   clamshells          2x10   Prone quad stretch       3x30" 3x30" 3x30"    Left hamstrings MET 10x5" 10x5" 5"x10  3x30" 5x10" 5x10" 5x10" and sitting 5x10"    Ther Activity                                       Gait Training                                       Modalities             Moist hot pack

## 2021-09-28 ENCOUNTER — OFFICE VISIT (OUTPATIENT)
Dept: PHYSICAL THERAPY | Facility: CLINIC | Age: 66
End: 2021-09-28
Payer: MEDICARE

## 2021-09-28 DIAGNOSIS — M53.3 SI (SACROILIAC) JOINT DYSFUNCTION: ICD-10-CM

## 2021-09-28 DIAGNOSIS — M54.17 LUMBOSACRAL RADICULOPATHY: Primary | ICD-10-CM

## 2021-09-28 DIAGNOSIS — M51.37 DEGENERATION OF LUMBAR OR LUMBOSACRAL INTERVERTEBRAL DISC: ICD-10-CM

## 2021-09-28 PROCEDURE — 97110 THERAPEUTIC EXERCISES: CPT | Performed by: PHYSICAL THERAPIST

## 2021-09-28 PROCEDURE — 97140 MANUAL THERAPY 1/> REGIONS: CPT | Performed by: PHYSICAL THERAPIST

## 2021-09-28 NOTE — PROGRESS NOTES
Progress Report     Today's date: 2021  Patient name: Reggie Cox  : 1955  MRN: [id number]  Referring provider: Kya West MD  Dx:   Encounter Diagnosis     ICD-10-CM    1  Lumbosacral radiculopathy  M54 17    2  Degeneration of lumbar or lumbosacral intervertebral disc  M51 37    3  SI (sacroiliac) joint dysfunction  M53 3          Subjective:  Patient reports that she feels "back to square one " She reports that since trying to stretch hip flexor in prone lying last session, she has had more numbness in knee and unsteadiness of LE, like her symptoms use to be  Patient reports that she has had periods of no pain or numbness in LE in response to therapy treatments, but she continues to have limping  She also reports having to do steps one at a time over the past few days  Objective: See treatment diary below  Gait pattern: left lateral lean with circumduction on right    SLS 8 seconds on L, 23 on R     LE strength MMT knee and ankle 5/5, hip flexion 4/5 on L 4+ on R, hip abduction 3 on L 4 on R, hip extension 3+ on L 4+ on R, hip IR 5 b/l but pain on L    Lumbar AROM: minimal restriction with flexion, moderate restriction with extension (repeated lumbar extension reduce pain during but worse ambulation following)    R Hip PROM mild restriction with flexion and ER, moderate hamstrings tightness, moderate quad tightness  L Hip PROM moderate restriction with ER and IR, severe hip extension restriction, mild hamstrings and quad tightness      Assessment: Patient has been treated for over the past month for lumbosacral radiculopathy and SI joint dysfunction  Treatment has consisted of manual therapy, flexibility exercises, core and proximal hip strengthening exercises, use of SI belt, and patient education on postural awareness   At times, patient has noted complete relief of symptoms following exercises and manual therapy, with gradual progress, but she is experiencing flare up and regression today  She continues to present with hip and lumbar spine ROM restrictions, hip muscle weakness and poor pelvic stability, and moderate irritability of symptoms  At this time, patient would benefit from continued skilled PT per plan of care to further progress upon deficits and meet goals  Plan: Continue per plan of care  Improve hip extension/hip flexor tightness as able        Precautions: Osteopenia    Manuals 9/2 9/7 9/9 9/14 9/16 9/21 9/23 9/28     SI MET for Ant Rot on L WE 3' 4' 4' 4' 3' 3'      STM L Quad       3' Psoas 5'     L hip lateral mob with belt for IR/ER gr 3-4    6' 6' 5'       L hip p-a mob, and IR mob gr 3-4  3' 4' 4' 5' 4' 4' 4'     Hip IR/ER manual resistance     3' 3' 2'      L spine neutral gap gr 3-4      6' 3'                   A-p rotation mob L ASIS  4' 4'    4' 4'     Neuro Re-Ed             Pelvic tilt x15     10x 15x      TA bracing with bridge x15  10x 15x  10x 10x      Bent knee FO x10ea  10x 10ea  10x 10x      Quadruped core and kegel       10ea      ta bracing   10x 15x  10x       Ther Ex             Press ups  Prone on elbows           Supine hip flexor stretch  3x30"  standing 30"  2x30" Prone lying      Hamstring stretch 30"x3 3x30" 3x30"          Figure of 4 stretch 30"x3  3x30"  3x30" and sitting 3x30" 3x30"      clamshells       2x10 2x10     Prone quad stretch    3x30" 3x30" 3x30"       Left hamstrings MET  3x30" 5x10" 5x10" 5x10" and sitting 5x10"                    Assessment associated with progress report        21'     Ther Activity                                       Gait Training                                       Modalities             Moist hot pack

## 2021-09-30 ENCOUNTER — OFFICE VISIT (OUTPATIENT)
Dept: PHYSICAL THERAPY | Facility: CLINIC | Age: 66
End: 2021-09-30
Payer: MEDICARE

## 2021-09-30 DIAGNOSIS — M54.17 LUMBOSACRAL RADICULOPATHY: Primary | ICD-10-CM

## 2021-09-30 DIAGNOSIS — M53.3 SI (SACROILIAC) JOINT DYSFUNCTION: ICD-10-CM

## 2021-09-30 DIAGNOSIS — M51.37 DEGENERATION OF LUMBAR OR LUMBOSACRAL INTERVERTEBRAL DISC: ICD-10-CM

## 2021-09-30 PROCEDURE — 97110 THERAPEUTIC EXERCISES: CPT | Performed by: PHYSICAL THERAPIST

## 2021-09-30 PROCEDURE — 97140 MANUAL THERAPY 1/> REGIONS: CPT | Performed by: PHYSICAL THERAPIST

## 2021-09-30 NOTE — PROGRESS NOTES
Daily Note     Today's date: 2021  Patient name: Driss Flores  : 1955  MRN: [id number]  Referring provider: Chana Dumont MD  Dx:   Encounter Diagnosis     ICD-10-CM    1  Lumbosacral radiculopathy  M54 17    2  SI (sacroiliac) joint dysfunction  M53 3    3  Degeneration of lumbar or lumbosacral intervertebral disc  M51 37          Subjective:  Patient reports that she feels a little better than earlier this week but she still has pain, 8/10 anterior thigh pain  She reports sitting in the car causes pain  She reports frustration because she wants to be able to walk again  Objective: See treatment diary below  Assessment: Patient presented with significant tenderness and soft tissue restrictions along iliopsoas muscles  Performed soft tissue mobilization and trigger point release  She noted marked relief in knee pain and tightness during treatment, with significant relaxation of reported tightness  She reported reduction in pain to 3/10 with ambulation following and less difficulty extending hip and knee on left side  Instructed patient in pelvic tilt and kneeling hip flexor stretch to improve psoas flexibility without aggravating lumbar symptoms  She was able to feel appropriate stretch without pain  Patient would benefit from continued skilled PT per plan of care  Plan: Continue per plan of care  Improve hip extension/hip flexor tightness as able        Precautions: Osteopenia    Manuals     SI MET for Ant Rot on L WE 3' 4' 4' 4' 3' 3'      Iliopsoas STM supine and sidelying         30'    L hip lateral mob with belt for IR/ER gr 3-4    6' 6' 5'       L hip p-a mob, and IR mob gr 3-4  3' 4' 4' 5' 4' 4' 4'     Hip IR/ER manual resistance     3' 3' 2'      L spine neutral gap gr 3-4      6' 3'                   A-p rotation mob L ASIS  4' 4'    4' 4' 3'    Neuro Re-Ed             TA bracing with bridge x15  10x 15x  10x 10x      Bent knee FO x10ea 10x 10ea  10x 10x      Quadruped core and kegel       10ea      ta bracing   10x 15x  10x       Ther Ex             Press ups  Prone on elbows           Supine hip flexor stretch  3x30"  standing 30"  2x30" Prone lying      Hamstring stretch 30"x3 3x30" 3x30"          Figure of 4 stretch 30"x3  3x30"  3x30" and sitting 3x30" 3x30"      clamshells       2x10 2x10     Prone quad stretch    3x30" 3x30" 3x30"       Pt edu         3'    Left hamstrings MET  3x30" 5x10" 5x10" 5x10" and sitting 5x10"       Pelvic tilt, supine and standing         5ea    Lunge psoas stretch         3x30"    Ther Activity                                       Gait Training                                       Modalities             Moist hot pack

## 2021-10-05 ENCOUNTER — OFFICE VISIT (OUTPATIENT)
Dept: PHYSICAL THERAPY | Facility: CLINIC | Age: 66
End: 2021-10-05
Payer: MEDICARE

## 2021-10-05 DIAGNOSIS — M51.37 DEGENERATION OF LUMBAR OR LUMBOSACRAL INTERVERTEBRAL DISC: ICD-10-CM

## 2021-10-05 DIAGNOSIS — M54.17 LUMBOSACRAL RADICULOPATHY: Primary | ICD-10-CM

## 2021-10-05 DIAGNOSIS — M53.3 SI (SACROILIAC) JOINT DYSFUNCTION: ICD-10-CM

## 2021-10-05 PROCEDURE — 97110 THERAPEUTIC EXERCISES: CPT | Performed by: PHYSICAL THERAPIST

## 2021-10-05 PROCEDURE — 97140 MANUAL THERAPY 1/> REGIONS: CPT | Performed by: PHYSICAL THERAPIST

## 2021-10-07 ENCOUNTER — OFFICE VISIT (OUTPATIENT)
Dept: PHYSICAL THERAPY | Facility: CLINIC | Age: 66
End: 2021-10-07
Payer: MEDICARE

## 2021-10-07 DIAGNOSIS — M54.17 LUMBOSACRAL RADICULOPATHY: Primary | ICD-10-CM

## 2021-10-07 DIAGNOSIS — M53.3 SI (SACROILIAC) JOINT DYSFUNCTION: ICD-10-CM

## 2021-10-07 DIAGNOSIS — M51.37 DEGENERATION OF LUMBAR OR LUMBOSACRAL INTERVERTEBRAL DISC: ICD-10-CM

## 2021-10-07 PROCEDURE — 97140 MANUAL THERAPY 1/> REGIONS: CPT | Performed by: PHYSICAL THERAPIST

## 2021-10-07 PROCEDURE — 97110 THERAPEUTIC EXERCISES: CPT | Performed by: PHYSICAL THERAPIST

## 2021-10-12 ENCOUNTER — APPOINTMENT (OUTPATIENT)
Dept: PHYSICAL THERAPY | Facility: CLINIC | Age: 66
End: 2021-10-12
Payer: MEDICARE

## 2021-10-14 ENCOUNTER — OFFICE VISIT (OUTPATIENT)
Dept: PHYSICAL THERAPY | Facility: CLINIC | Age: 66
End: 2021-10-14
Payer: MEDICARE

## 2021-10-14 DIAGNOSIS — M54.17 LUMBOSACRAL RADICULOPATHY: Primary | ICD-10-CM

## 2021-10-14 DIAGNOSIS — M51.37 DEGENERATION OF LUMBAR OR LUMBOSACRAL INTERVERTEBRAL DISC: ICD-10-CM

## 2021-10-14 DIAGNOSIS — M53.3 SI (SACROILIAC) JOINT DYSFUNCTION: ICD-10-CM

## 2021-10-14 PROCEDURE — 97140 MANUAL THERAPY 1/> REGIONS: CPT | Performed by: PHYSICAL THERAPIST

## 2021-10-14 PROCEDURE — 97110 THERAPEUTIC EXERCISES: CPT | Performed by: PHYSICAL THERAPIST

## 2021-10-19 ENCOUNTER — OFFICE VISIT (OUTPATIENT)
Dept: PHYSICAL THERAPY | Facility: CLINIC | Age: 66
End: 2021-10-19
Payer: MEDICARE

## 2021-10-19 DIAGNOSIS — M53.3 SI (SACROILIAC) JOINT DYSFUNCTION: ICD-10-CM

## 2021-10-19 DIAGNOSIS — M51.37 DEGENERATION OF LUMBAR OR LUMBOSACRAL INTERVERTEBRAL DISC: ICD-10-CM

## 2021-10-19 DIAGNOSIS — M54.17 LUMBOSACRAL RADICULOPATHY: Primary | ICD-10-CM

## 2021-10-19 PROCEDURE — 97112 NEUROMUSCULAR REEDUCATION: CPT | Performed by: PHYSICAL THERAPIST

## 2021-10-19 PROCEDURE — 97140 MANUAL THERAPY 1/> REGIONS: CPT | Performed by: PHYSICAL THERAPIST

## 2021-10-19 PROCEDURE — 97110 THERAPEUTIC EXERCISES: CPT | Performed by: PHYSICAL THERAPIST

## 2021-10-21 ENCOUNTER — OFFICE VISIT (OUTPATIENT)
Dept: PHYSICAL THERAPY | Facility: CLINIC | Age: 66
End: 2021-10-21
Payer: MEDICARE

## 2021-10-21 DIAGNOSIS — M51.37 DEGENERATION OF LUMBAR OR LUMBOSACRAL INTERVERTEBRAL DISC: ICD-10-CM

## 2021-10-21 DIAGNOSIS — M54.17 LUMBOSACRAL RADICULOPATHY: Primary | ICD-10-CM

## 2021-10-21 DIAGNOSIS — M53.3 SI (SACROILIAC) JOINT DYSFUNCTION: ICD-10-CM

## 2021-10-21 PROCEDURE — 97140 MANUAL THERAPY 1/> REGIONS: CPT | Performed by: PHYSICAL THERAPIST

## 2021-10-21 PROCEDURE — 97110 THERAPEUTIC EXERCISES: CPT | Performed by: PHYSICAL THERAPIST

## 2021-10-21 PROCEDURE — 97112 NEUROMUSCULAR REEDUCATION: CPT | Performed by: PHYSICAL THERAPIST

## 2021-10-25 ENCOUNTER — OFFICE VISIT (OUTPATIENT)
Dept: PHYSICAL THERAPY | Facility: CLINIC | Age: 66
End: 2021-10-25
Payer: MEDICARE

## 2021-10-25 DIAGNOSIS — M54.17 LUMBOSACRAL RADICULOPATHY: Primary | ICD-10-CM

## 2021-10-25 DIAGNOSIS — M51.37 DEGENERATION OF LUMBAR OR LUMBOSACRAL INTERVERTEBRAL DISC: ICD-10-CM

## 2021-10-25 DIAGNOSIS — M53.3 SI (SACROILIAC) JOINT DYSFUNCTION: ICD-10-CM

## 2021-10-25 PROCEDURE — 97110 THERAPEUTIC EXERCISES: CPT | Performed by: PHYSICAL THERAPIST

## 2021-10-25 PROCEDURE — 97140 MANUAL THERAPY 1/> REGIONS: CPT | Performed by: PHYSICAL THERAPIST

## 2021-10-27 ENCOUNTER — OFFICE VISIT (OUTPATIENT)
Dept: PHYSICAL THERAPY | Facility: CLINIC | Age: 66
End: 2021-10-27
Payer: MEDICARE

## 2021-10-27 DIAGNOSIS — M51.37 DEGENERATION OF LUMBAR OR LUMBOSACRAL INTERVERTEBRAL DISC: ICD-10-CM

## 2021-10-27 DIAGNOSIS — M54.17 LUMBOSACRAL RADICULOPATHY: Primary | ICD-10-CM

## 2021-10-27 DIAGNOSIS — M53.3 SI (SACROILIAC) JOINT DYSFUNCTION: ICD-10-CM

## 2021-10-27 PROCEDURE — 97140 MANUAL THERAPY 1/> REGIONS: CPT | Performed by: PHYSICAL THERAPIST

## 2021-10-27 PROCEDURE — 97110 THERAPEUTIC EXERCISES: CPT | Performed by: PHYSICAL THERAPIST

## 2021-11-02 ENCOUNTER — OFFICE VISIT (OUTPATIENT)
Dept: PHYSICAL THERAPY | Facility: CLINIC | Age: 66
End: 2021-11-02
Payer: MEDICARE

## 2021-11-02 DIAGNOSIS — M51.37 DEGENERATION OF LUMBAR OR LUMBOSACRAL INTERVERTEBRAL DISC: ICD-10-CM

## 2021-11-02 DIAGNOSIS — M54.17 LUMBOSACRAL RADICULOPATHY: Primary | ICD-10-CM

## 2021-11-02 DIAGNOSIS — M53.3 SI (SACROILIAC) JOINT DYSFUNCTION: ICD-10-CM

## 2021-11-02 PROCEDURE — 97110 THERAPEUTIC EXERCISES: CPT | Performed by: PHYSICAL THERAPIST

## 2021-11-02 PROCEDURE — 97140 MANUAL THERAPY 1/> REGIONS: CPT | Performed by: PHYSICAL THERAPIST

## 2021-11-02 PROCEDURE — 97112 NEUROMUSCULAR REEDUCATION: CPT | Performed by: PHYSICAL THERAPIST

## 2021-11-04 ENCOUNTER — OFFICE VISIT (OUTPATIENT)
Dept: PHYSICAL THERAPY | Facility: CLINIC | Age: 66
End: 2021-11-04
Payer: MEDICARE

## 2021-11-04 DIAGNOSIS — M54.17 LUMBOSACRAL RADICULOPATHY: Primary | ICD-10-CM

## 2021-11-04 DIAGNOSIS — M51.37 DEGENERATION OF LUMBAR OR LUMBOSACRAL INTERVERTEBRAL DISC: ICD-10-CM

## 2021-11-04 DIAGNOSIS — M53.3 SI (SACROILIAC) JOINT DYSFUNCTION: ICD-10-CM

## 2021-11-04 PROCEDURE — 97110 THERAPEUTIC EXERCISES: CPT | Performed by: PHYSICAL THERAPIST

## 2021-11-04 PROCEDURE — 97112 NEUROMUSCULAR REEDUCATION: CPT | Performed by: PHYSICAL THERAPIST

## 2021-11-04 PROCEDURE — 97140 MANUAL THERAPY 1/> REGIONS: CPT | Performed by: PHYSICAL THERAPIST

## 2021-11-09 ENCOUNTER — OFFICE VISIT (OUTPATIENT)
Dept: PHYSICAL THERAPY | Facility: CLINIC | Age: 66
End: 2021-11-09
Payer: MEDICARE

## 2021-11-09 DIAGNOSIS — M54.17 LUMBOSACRAL RADICULOPATHY: Primary | ICD-10-CM

## 2021-11-09 DIAGNOSIS — M51.37 DEGENERATION OF LUMBAR OR LUMBOSACRAL INTERVERTEBRAL DISC: ICD-10-CM

## 2021-11-09 DIAGNOSIS — M53.3 SI (SACROILIAC) JOINT DYSFUNCTION: ICD-10-CM

## 2021-11-09 PROCEDURE — 97110 THERAPEUTIC EXERCISES: CPT | Performed by: PHYSICAL THERAPIST

## 2021-11-09 PROCEDURE — 97140 MANUAL THERAPY 1/> REGIONS: CPT | Performed by: PHYSICAL THERAPIST

## 2021-11-09 PROCEDURE — 97112 NEUROMUSCULAR REEDUCATION: CPT | Performed by: PHYSICAL THERAPIST

## 2021-11-11 ENCOUNTER — OFFICE VISIT (OUTPATIENT)
Dept: PHYSICAL THERAPY | Facility: CLINIC | Age: 66
End: 2021-11-11
Payer: MEDICARE

## 2021-11-11 DIAGNOSIS — M53.3 SI (SACROILIAC) JOINT DYSFUNCTION: ICD-10-CM

## 2021-11-11 DIAGNOSIS — M54.17 LUMBOSACRAL RADICULOPATHY: ICD-10-CM

## 2021-11-11 DIAGNOSIS — M51.37 DEGENERATION OF LUMBAR OR LUMBOSACRAL INTERVERTEBRAL DISC: Primary | ICD-10-CM

## 2021-11-11 PROCEDURE — 97140 MANUAL THERAPY 1/> REGIONS: CPT | Performed by: PHYSICAL THERAPIST

## 2021-11-11 PROCEDURE — 97112 NEUROMUSCULAR REEDUCATION: CPT | Performed by: PHYSICAL THERAPIST

## 2021-11-11 PROCEDURE — 97110 THERAPEUTIC EXERCISES: CPT | Performed by: PHYSICAL THERAPIST

## 2021-11-16 ENCOUNTER — OFFICE VISIT (OUTPATIENT)
Dept: PHYSICAL THERAPY | Facility: CLINIC | Age: 66
End: 2021-11-16
Payer: MEDICARE

## 2021-11-16 DIAGNOSIS — M54.17 LUMBOSACRAL RADICULOPATHY: ICD-10-CM

## 2021-11-16 DIAGNOSIS — M51.37 DEGENERATION OF LUMBAR OR LUMBOSACRAL INTERVERTEBRAL DISC: Primary | ICD-10-CM

## 2021-11-16 DIAGNOSIS — M53.3 SI (SACROILIAC) JOINT DYSFUNCTION: ICD-10-CM

## 2021-11-16 PROCEDURE — 97140 MANUAL THERAPY 1/> REGIONS: CPT | Performed by: PHYSICAL THERAPIST

## 2021-11-16 PROCEDURE — 97110 THERAPEUTIC EXERCISES: CPT | Performed by: PHYSICAL THERAPIST

## 2021-11-18 ENCOUNTER — OFFICE VISIT (OUTPATIENT)
Dept: PHYSICAL THERAPY | Facility: CLINIC | Age: 66
End: 2021-11-18
Payer: MEDICARE

## 2021-11-18 DIAGNOSIS — M53.3 SI (SACROILIAC) JOINT DYSFUNCTION: ICD-10-CM

## 2021-11-18 DIAGNOSIS — M54.17 LUMBOSACRAL RADICULOPATHY: ICD-10-CM

## 2021-11-18 DIAGNOSIS — M51.37 DEGENERATION OF LUMBAR OR LUMBOSACRAL INTERVERTEBRAL DISC: Primary | ICD-10-CM

## 2021-11-18 PROCEDURE — 97112 NEUROMUSCULAR REEDUCATION: CPT | Performed by: PHYSICAL THERAPIST

## 2021-11-18 PROCEDURE — 97110 THERAPEUTIC EXERCISES: CPT | Performed by: PHYSICAL THERAPIST

## 2021-11-18 PROCEDURE — 97140 MANUAL THERAPY 1/> REGIONS: CPT | Performed by: PHYSICAL THERAPIST

## 2021-12-19 ENCOUNTER — HOSPITAL ENCOUNTER (OUTPATIENT)
Dept: MAMMOGRAPHY | Facility: IMAGING CENTER | Age: 66
Discharge: HOME | End: 2021-12-19
Payer: MEDICARE

## 2021-12-19 VITALS — WEIGHT: 138.89 LBS | BODY MASS INDEX: 22.32 KG/M2 | HEIGHT: 66 IN

## 2021-12-19 DIAGNOSIS — Z12.31 ENCOUNTER FOR SCREENING MAMMOGRAM FOR MALIGNANT NEOPLASM OF BREAST: ICD-10-CM

## 2021-12-19 DIAGNOSIS — Z12.31 VISIT FOR SCREENING MAMMOGRAM: ICD-10-CM

## 2021-12-19 PROCEDURE — 77067 SCR MAMMO BI INCL CAD: CPT

## 2021-12-19 PROCEDURE — 77063 BREAST TOMOSYNTHESIS BI: CPT

## 2022-05-18 ENCOUNTER — APPOINTMENT (EMERGENCY)
Dept: CT IMAGING | Facility: HOSPITAL | Age: 67
DRG: 065 | End: 2022-05-18
Payer: MEDICARE

## 2022-05-18 ENCOUNTER — HOSPITAL ENCOUNTER (INPATIENT)
Facility: HOSPITAL | Age: 67
LOS: 2 days | Discharge: HOME | DRG: 065 | End: 2022-05-20
Attending: EMERGENCY MEDICINE | Admitting: INTERNAL MEDICINE
Payer: MEDICARE

## 2022-05-18 DIAGNOSIS — R20.0 LEFT ARM NUMBNESS: Primary | ICD-10-CM

## 2022-05-18 DIAGNOSIS — R29.90 STROKE-LIKE SYMPTOMS: ICD-10-CM

## 2022-05-18 DIAGNOSIS — I63.9 CEREBROVASCULAR ACCIDENT (CVA), UNSPECIFIED MECHANISM (HCC): ICD-10-CM

## 2022-05-18 LAB
2HR DELTA HS TROPONIN: 0 NG/L
ALBUMIN SERPL BCP-MCNC: 4.2 G/DL (ref 3.5–5)
ALP SERPL-CCNC: 104 U/L (ref 46–116)
ALT SERPL W P-5'-P-CCNC: 14 U/L (ref 12–78)
ANION GAP SERPL CALCULATED.3IONS-SCNC: 7 MMOL/L (ref 4–13)
APTT PPP: 30 SECONDS (ref 23–37)
AST SERPL W P-5'-P-CCNC: 40 U/L (ref 5–45)
BASOPHILS # BLD AUTO: 0.05 THOUSANDS/ΜL (ref 0–0.1)
BASOPHILS NFR BLD AUTO: 1 % (ref 0–1)
BILIRUB SERPL-MCNC: 0.4 MG/DL (ref 0.2–1)
BUN SERPL-MCNC: 14 MG/DL (ref 5–25)
CALCIUM SERPL-MCNC: 9.4 MG/DL (ref 8.3–10.1)
CARDIAC TROPONIN I PNL SERPL HS: 2 NG/L
CARDIAC TROPONIN I PNL SERPL HS: 2 NG/L
CHLORIDE SERPL-SCNC: 104 MMOL/L (ref 100–108)
CO2 SERPL-SCNC: 26 MMOL/L (ref 21–32)
CREAT SERPL-MCNC: 0.79 MG/DL (ref 0.6–1.3)
EOSINOPHIL # BLD AUTO: 0.05 THOUSAND/ΜL (ref 0–0.61)
EOSINOPHIL NFR BLD AUTO: 1 % (ref 0–6)
ERYTHROCYTE [DISTWIDTH] IN BLOOD BY AUTOMATED COUNT: 13.3 % (ref 11.6–15.1)
FLUAV RNA RESP QL NAA+PROBE: NEGATIVE
FLUBV RNA RESP QL NAA+PROBE: NEGATIVE
GFR SERPL CREATININE-BSD FRML MDRD: 78 ML/MIN/1.73SQ M
GLUCOSE SERPL-MCNC: 109 MG/DL (ref 65–140)
GLUCOSE SERPL-MCNC: 120 MG/DL (ref 65–140)
HCT VFR BLD AUTO: 39.9 % (ref 34.8–46.1)
HGB BLD-MCNC: 12.5 G/DL (ref 11.5–15.4)
IMM GRANULOCYTES # BLD AUTO: 0.02 THOUSAND/UL (ref 0–0.2)
IMM GRANULOCYTES NFR BLD AUTO: 0 % (ref 0–2)
INR PPP: 1.1 (ref 0.84–1.19)
LYMPHOCYTES # BLD AUTO: 2.28 THOUSANDS/ΜL (ref 0.6–4.47)
LYMPHOCYTES NFR BLD AUTO: 32 % (ref 14–44)
MCH RBC QN AUTO: 28.5 PG (ref 26.8–34.3)
MCHC RBC AUTO-ENTMCNC: 31.3 G/DL (ref 31.4–37.4)
MCV RBC AUTO: 91 FL (ref 82–98)
MONOCYTES # BLD AUTO: 0.55 THOUSAND/ΜL (ref 0.17–1.22)
MONOCYTES NFR BLD AUTO: 8 % (ref 4–12)
NEUTROPHILS # BLD AUTO: 4.27 THOUSANDS/ΜL (ref 1.85–7.62)
NEUTS SEG NFR BLD AUTO: 58 % (ref 43–75)
NRBC BLD AUTO-RTO: 0 /100 WBCS
PLATELET # BLD AUTO: 281 THOUSANDS/UL (ref 149–390)
PMV BLD AUTO: 8.6 FL (ref 8.9–12.7)
POTASSIUM SERPL-SCNC: 3.6 MMOL/L (ref 3.5–5.3)
PROT SERPL-MCNC: 7.8 G/DL (ref 6.4–8.2)
PROTHROMBIN TIME: 14.1 SECONDS (ref 11.6–14.5)
RBC # BLD AUTO: 4.38 MILLION/UL (ref 3.81–5.12)
RSV RNA RESP QL NAA+PROBE: NEGATIVE
SARS-COV-2 RNA RESP QL NAA+PROBE: NEGATIVE
SODIUM SERPL-SCNC: 137 MMOL/L (ref 136–145)
TSH SERPL DL<=0.05 MIU/L-ACNC: 3.45 UIU/ML (ref 0.45–4.5)
WBC # BLD AUTO: 7.22 THOUSAND/UL (ref 4.31–10.16)

## 2022-05-18 PROCEDURE — 84443 ASSAY THYROID STIM HORMONE: CPT | Performed by: PHYSICIAN ASSISTANT

## 2022-05-18 PROCEDURE — 70496 CT ANGIOGRAPHY HEAD: CPT

## 2022-05-18 PROCEDURE — 0241U HB NFCT DS VIR RESP RNA 4 TRGT: CPT | Performed by: PHYSICIAN ASSISTANT

## 2022-05-18 PROCEDURE — 82948 REAGENT STRIP/BLOOD GLUCOSE: CPT

## 2022-05-18 PROCEDURE — 83036 HEMOGLOBIN GLYCOSYLATED A1C: CPT | Performed by: INTERNAL MEDICINE

## 2022-05-18 PROCEDURE — 99285 EMERGENCY DEPT VISIT HI MDM: CPT

## 2022-05-18 PROCEDURE — 85730 THROMBOPLASTIN TIME PARTIAL: CPT | Performed by: PHYSICIAN ASSISTANT

## 2022-05-18 PROCEDURE — 36415 COLL VENOUS BLD VENIPUNCTURE: CPT

## 2022-05-18 PROCEDURE — 80053 COMPREHEN METABOLIC PANEL: CPT | Performed by: EMERGENCY MEDICINE

## 2022-05-18 PROCEDURE — 70498 CT ANGIOGRAPHY NECK: CPT

## 2022-05-18 PROCEDURE — 99285 EMERGENCY DEPT VISIT HI MDM: CPT | Performed by: PHYSICIAN ASSISTANT

## 2022-05-18 PROCEDURE — 85610 PROTHROMBIN TIME: CPT | Performed by: PHYSICIAN ASSISTANT

## 2022-05-18 PROCEDURE — 99223 1ST HOSP IP/OBS HIGH 75: CPT | Performed by: PHYSICIAN ASSISTANT

## 2022-05-18 PROCEDURE — 85025 COMPLETE CBC W/AUTO DIFF WBC: CPT | Performed by: EMERGENCY MEDICINE

## 2022-05-18 PROCEDURE — 84484 ASSAY OF TROPONIN QUANT: CPT | Performed by: EMERGENCY MEDICINE

## 2022-05-18 PROCEDURE — 93005 ELECTROCARDIOGRAM TRACING: CPT

## 2022-05-18 RX ORDER — HYDRALAZINE HYDROCHLORIDE 20 MG/ML
5 INJECTION INTRAMUSCULAR; INTRAVENOUS EVERY 6 HOURS PRN
Status: DISCONTINUED | OUTPATIENT
Start: 2022-05-18 | End: 2022-05-20 | Stop reason: HOSPADM

## 2022-05-18 RX ORDER — ASPIRIN 325 MG
325 TABLET ORAL ONCE
Status: COMPLETED | OUTPATIENT
Start: 2022-05-18 | End: 2022-05-18

## 2022-05-18 RX ORDER — ASPIRIN 81 MG/1
81 TABLET, CHEWABLE ORAL DAILY
Status: DISCONTINUED | OUTPATIENT
Start: 2022-05-19 | End: 2022-05-20 | Stop reason: HOSPADM

## 2022-05-18 RX ORDER — ENOXAPARIN SODIUM 100 MG/ML
40 INJECTION SUBCUTANEOUS DAILY
Status: DISCONTINUED | OUTPATIENT
Start: 2022-05-19 | End: 2022-05-20 | Stop reason: HOSPADM

## 2022-05-18 RX ORDER — ATORVASTATIN CALCIUM 40 MG/1
40 TABLET, FILM COATED ORAL EVERY EVENING
Status: DISCONTINUED | OUTPATIENT
Start: 2022-05-18 | End: 2022-05-20 | Stop reason: HOSPADM

## 2022-05-18 RX ORDER — CLOPIDOGREL BISULFATE 75 MG/1
75 TABLET ORAL DAILY
Status: DISCONTINUED | OUTPATIENT
Start: 2022-05-19 | End: 2022-05-20 | Stop reason: HOSPADM

## 2022-05-18 RX ORDER — CLOPIDOGREL BISULFATE 75 MG/1
600 TABLET ORAL ONCE
Status: COMPLETED | OUTPATIENT
Start: 2022-05-18 | End: 2022-05-18

## 2022-05-18 RX ADMIN — IOHEXOL 65 ML: 350 INJECTION, SOLUTION INTRAVENOUS at 18:06

## 2022-05-18 RX ADMIN — CLOPIDOGREL BISULFATE 600 MG: 75 TABLET ORAL at 18:30

## 2022-05-18 RX ADMIN — ATORVASTATIN CALCIUM 40 MG: 40 TABLET, FILM COATED ORAL at 21:09

## 2022-05-18 RX ADMIN — ASPIRIN 325 MG ORAL TABLET 325 MG: 325 PILL ORAL at 18:30

## 2022-05-18 NOTE — ASSESSMENT & PLAN NOTE
· Left arm weakness and numbness that onset 5/18 at 1000 - persisted throughout the day, so presented to the ED  · CTH: "No acute intracranial hemorrhage, mass effect or edema "  · CTA head/neck: " No hemodynamically significant stenosis major arteries of the neck  No major intracranial arterial occlusion    No evidence of intracranial aneurysm "  · Neurology recommend admission to stroke pathway with DAPT  · Tele  · VS and neuro checks   · ASA and Plavix load given in the ED - continue on ASA 81mg daily and plavix 75mg daily   · MRI brain and echo ordered  · Risk stratify with lipid panel and HgbA1c   · Permissive HTN x24 hours from onset of symptoms - hydralazine PRN   · Neuro consult  · Not tPA candidate as she is outside the window and symptoms are not disabling  · NIHSS on admission 1 for slightly decreased sensation in left pinky and thumb - no weakness appreciated on exam

## 2022-05-18 NOTE — H&P
aHnk Jaramillo  H&P- Mell Montgomery 1955, 73 y o  female MRN: [id number]  Unit/Bed#: -01 Encounter: [id number]  Primary Care Provider: Saundra Aguilar DO   Date and time admitted to hospital: 5/18/2022  5:19 PM    * Stroke-like symptoms  Assessment & Plan  · Left arm weakness and numbness that onset 5/18 at 1000 - persisted throughout the day, so presented to the ED  · CTH: "No acute intracranial hemorrhage, mass effect or edema "  · CTA head/neck: " No hemodynamically significant stenosis major arteries of the neck  No major intracranial arterial occlusion  No evidence of intracranial aneurysm "  · Neurology recommend admission to stroke pathway with DAPT  · Tele  · VS and neuro checks   · ASA and Plavix load given in the ED - continue on ASA 81mg daily and plavix 75mg daily   · MRI brain and echo ordered  · Risk stratify with lipid panel and HgbA1c   · Permissive HTN x24 hours from onset of symptoms - hydralazine PRN   · Neuro consult  · Not tPA candidate as she is outside the window and symptoms are not disabling  · NIHSS on admission 1 for slightly decreased sensation in left pinky and thumb - no weakness appreciated on exam     Dyslipidemia  Assessment & Plan  · Lipid panel from 09/2021: total 273, triglycerides 63, , and LDl 156   · Recheck lipid panel in AM  · Start atorvastatin this evening     Hashimoto's disease  Assessment & Plan  · Reports she takes thyroid PO supplement as she did not tolerate synthroid   · Last TSH 09/2021 was 3 830 but free T4 was 0 91  · Recheck TSH with reflex to T4    VTE Pharmacologic Prophylaxis: VTE Score: 7 High Risk (Score >/= 5) - Pharmacological DVT Prophylaxis Ordered: enoxaparin (Lovenox)  Sequential Compression Devices Ordered  Code Status: Level 1 - Full Code   Discussion with family: Patient declined call to     Pt reports she updated her daughter herself in the ED    Anticipated Length of Stay: Patient will be admitted on an inpatient basis with an anticipated length of stay of greater than 2 midnights secondary to stroke like symptoms   Total Time for Visit, including Counseling / Coordination of Care: 60 minutes Greater than 50% of this total time spent on direct patient counseling and coordination of care  Chief Complaint: "My fingers wouldn't work and my hand and arm felt numb"    History of Present Illness:  Miguel Gonzalez is a 73 y o  female with a PMH of Hashimoto's on thyroid supplemental and HLD not on statin who presents with left hand numbness and weakness that onset at 1000 this morning (5/18)  Patient reports that she woke 6:00 a m  and went about her morning routine without any difficulty  Upon going upstairs to get ready for work she noticed she was unable to perform fine motor skills with her left fingers  She also noted numbness in her fingers that then extended proximally to her hand and up to her mid forearm  She was able to move her left shoulder, left wrist, and left elbow without any difficulty  She was unable to perform any fine motor skills with her left fingers though  The symptoms persisted throughout the day while she was at work, so she decided to present to the ED  She never noticed any right upper extremity numbness or weakness, bilateral lower extremity numbness or weakness, speech difficulty, facial droop, headache, or vision changes during this time  Patient reports that the weakness completely resolved proximally 20 minutes after taking the aspirin and Plavix in the ER  She also states this sensation has almost returned to baseline with some residual decreased sensation in her left pinky and left thumb  She denies any prior similar symptoms  She does note she was experiencing allergy like symptoms of rhinorrhea and sinus pressure for 2 weeks  She had negative COVID test x2 during this time    No recent fevers, chills, chest pain, dyspnea, abdominal pain, nausea, vomiting, diarrhea, or urinary symptoms  Family history of stroke in her mother at the age of 40  Patient reports that her mother was diagnosed with some abnormality in the intracranial blood vessels, however she is not sure the exact diagnosis  Of note, patient reports baseline left lower extremity weakness for which she has been evaluated and is currently seeing physical therapy for  She walks with a slight limp at baseline  Review of Systems:  Review of Systems   Constitutional: Negative for chills and fever  HENT: Positive for rhinorrhea and sinus pressure  Eyes: Negative for visual disturbance  Respiratory: Negative for cough and shortness of breath  Cardiovascular: Negative for chest pain, palpitations and leg swelling  Gastrointestinal: Negative for abdominal pain, constipation, diarrhea, nausea and vomiting  Genitourinary: Negative for difficulty urinating, dysuria and hematuria  Musculoskeletal: Positive for gait problem  Neurological: Positive for weakness and numbness  Negative for facial asymmetry, speech difficulty and headaches  All other systems reviewed and are negative  Past Medical and Surgical History:   Past Medical History:   Diagnosis Date    Benign paroxysmal positional vertigo     Resolved: 9/15/2014    Fracture of radius        Past Surgical History:   Procedure Laterality Date    BIOPSY CORE NEEDLE      Thyroid     SECTION      COLPOSCOPY         Meds/Allergies:  Prior to Admission medications    Medication Sig Start Date End Date Taking?  Authorizing Provider   calcium gluconate 500 mg tablet Take 1,000 mg by mouth daily   Yes Historical Provider, MD   Chlorophyll 100 MG TABS Take 1 tablet (100 mg total) by mouth daily 9/15/20  Yes Shannen Hernandez DO   Magnesium 250 MG TABS Take 1 tablet by mouth daily   Yes Historical Provider, MD   Misc Natural Products (ADRENAL PO) Take by mouth   Yes Historical Provider, MD   Multiple Vitamin (MULTIVITAMINS PO) Take 1 capsule by mouth daily 9/15/14  Yes Historical Provider, MD   Multiple Vitamins-Minerals (ZINC PO) Take 1 tablet by mouth daily   Yes Historical Provider, MD   Naproxen Sodium 220 MG CAPS Take by mouth   Yes Historical Provider, MD   THYROID PO Take by mouth   Yes Historical Provider, MD     I have reviewed home medications with patient personally      Allergies: No Known Allergies    Social History:  Marital Status: Single   Occupation: works as a seamstress   Patient Pre-hospital Living Situation: Home, Alone  Patient Pre-hospital Level of Mobility: walks  Patient Pre-hospital Diet Restrictions: none   Substance Use History:   Social History     Substance and Sexual Activity   Alcohol Use Yes    Comment: social     Social History     Tobacco Use   Smoking Status Never Smoker   Smokeless Tobacco Never Used     Social History     Substance and Sexual Activity   Drug Use No       Family History:  Family History   Problem Relation Age of Onset    Stroke Mother         CVA   Jeral Hose Breast cancer Mother 77    Alcohol abuse Father     COPD Father     Other Father         Nicotine Abuse    Thyroid cancer Brother     Coronary artery disease Brother     Thyroid disease Maternal Grandmother     Breast cancer Maternal Aunt 36    Breast cancer additional onset Maternal Aunt 48    No Known Problems Sister     No Known Problems Daughter     No Known Problems Maternal Grandfather     No Known Problems Paternal Grandmother     No Known Problems Paternal Grandfather     No Known Problems Daughter     No Known Problems Maternal Aunt     No Known Problems Maternal Aunt     No Known Problems Maternal Aunt        Physical Exam:     Vitals:   Blood Pressure: 159/80 (05/18/22 2021)  Pulse: 65 (05/18/22 2021)  Temperature: 98 °F (36 7 °C) (05/18/22 2021)  Temp Source: Oral (05/18/22 2021)  Respirations: 19 (05/18/22 2021)  Height: 5' 6" (167 6 cm) (05/18/22 1616)  Weight - Scale: 62 6 kg (138 lb) (05/18/22 1616)  SpO2: 96 % (05/18/22 1845)    Physical Exam  Vitals and nursing note reviewed  Constitutional:       Appearance: Normal appearance  HENT:      Head: Normocephalic  Nose: Nose normal       Mouth/Throat:      Mouth: Mucous membranes are moist    Eyes:      Extraocular Movements: Extraocular movements intact  Conjunctiva/sclera: Conjunctivae normal       Pupils: Pupils are equal, round, and reactive to light  Cardiovascular:      Rate and Rhythm: Normal rate and regular rhythm  Pulses: Normal pulses  Heart sounds: No murmur heard  Pulmonary:      Effort: Pulmonary effort is normal       Breath sounds: Normal breath sounds  Abdominal:      General: Abdomen is flat  Palpations: Abdomen is soft  Musculoskeletal:         General: Normal range of motion  Cervical back: Normal range of motion  Right lower leg: No edema  Left lower leg: No edema  Skin:     General: Skin is warm and dry  Coloration: Skin is not pale  Neurological:      Mental Status: She is alert  Cranial Nerves: No cranial nerve deficit  Comments: NIHSS of 1 (slight decreased sensation in left thumb and left pinky)  Motor:  5/5 strength in bilateral upper and lower extremities  No drift in bilateral upper and lower extremities  Sensory:  Sensation is equal and intact in bilateral lower extremities  There is slight decreased sensation in the left pinky and left thumb  Normal sensation in the remainder of the left upper extremity and throughout the right upper extremity  Coordination:  Normal finger-to-nose and heel-to-shin bilaterally  No dysarthria  No aphasia  EOMI  PERRL  Visual fields intact  Psychiatric:         Mood and Affect: Mood normal          Thought Content:  Thought content normal           Additional Data:     Lab Results:  Results from last 7 days   Lab Units 05/18/22  1624   WBC Thousand/uL 7 22   HEMOGLOBIN g/dL 12 5   HEMATOCRIT % 39 9   PLATELETS Thousands/uL 281   NEUTROS PCT % 58   LYMPHS PCT % 32   MONOS PCT % 8   EOS PCT % 1     Results from last 7 days   Lab Units 05/18/22  1624   SODIUM mmol/L 137   POTASSIUM mmol/L 3 6   CHLORIDE mmol/L 104   CO2 mmol/L 26   BUN mg/dL 14   CREATININE mg/dL 0 79   ANION GAP mmol/L 7   CALCIUM mg/dL 9 4   ALBUMIN g/dL 4 2   TOTAL BILIRUBIN mg/dL 0 40   ALK PHOS U/L 104   ALT U/L 14   AST U/L 40   GLUCOSE RANDOM mg/dL 109     Results from last 7 days   Lab Units 05/18/22  1758   INR  1 10     Results from last 7 days   Lab Units 05/18/22  1751   POC GLUCOSE mg/dl 120               Imaging: Reviewed radiology reports from this admission including: CT head  CTA stroke alert (head/neck)   Final Result by Camryn Ayala MD (05/18 1820)         1  No hemodynamically significant stenosis major arteries of the neck  2   No major intracranial arterial occlusion  No evidence of intracranial aneurysm  I personally communicated the results of this study with [facility] and Dr Marleny Tipton on 5/18/2022 at 6:14 PM                             Workstation performed: [id number]         CT stroke alert brain   Final Result by Camryn Ayala MD (05/18 1810)      No acute intracranial hemorrhage, mass effect or edema  Workstation performed: [id number]         MRI Inpatient Order    (Results Pending)       EKG and Other Studies Reviewed on Admission:   · EKG: Initial shows NSR at 72 bpm  Repeat 2 hours later showed sinus bradycardia at 53 bpm  Neither EKG with acute ischemia       ** Please Note: This note has been constructed using a voice recognition system   **

## 2022-05-18 NOTE — QUICK NOTE
Stroke Alert    Called 5:49pm    72 y/o F without significant PMHx that presents with onset of subjective L arm weakness and numbness  LKN 10am this morning when symptoms began and persisted throughout the day, then came to the ED for evaluation  NIHSS 1 on exam for LUE numbness however no objective weakness noted or other deficit  HCT and CTA reviewed - no evidence of acute ischemia/hemorrhage and no significant vascular abnormality  Not a tPA candidate as she is outside of the window and mild, non-disabling deficit  Load with 325mg ASA and 600mg Plavix then admit to the stroke pathway on DAPT

## 2022-05-18 NOTE — ED PROVIDER NOTES
History  Chief Complaint   Patient presents with    Numbness     At 10am pt started having left hand and arm numbness  Pt denies any weakness in extremities or difficulty forming words  Pt denies dizzniness, headache, or visual disturbances     Patient is a 74 y/o F that presents to the ED with left arm numbness and hand weakness that started this morning at 10AM   She states she noticed it when she was getting ready for work and trying to fasten her bra  She denies speech difficulty  No trouble walking  No dizziness  Patient states her mother had a history of 2 strokes  History provided by:  Patient  CVA/TIA-like Symptoms  Presenting symptoms: sensory loss and weakness    Presenting symptoms: no confusion and no headaches    Date/time of last known well:  5/18/2022 10:00 AM  Onset quality:  Sudden  Duration:  7 hours  Timing:  Constant  Progression:  Unchanged  Similar to previous episodes: no    Associated symptoms: no chest pain, no trouble swallowing, no dizziness, no facial pain, no fall, no fever, no nausea, no neck pain, no seizures, no vertigo and no vomiting        Prior to Admission Medications   Prescriptions Last Dose Informant Patient Reported? Taking?    Chlorophyll 100 MG TABS 5/18/2022 at Unknown time Self No Yes   Sig: Take 1 tablet (100 mg total) by mouth daily   Magnesium 250 MG TABS 5/18/2022 at Unknown time Self Yes Yes   Sig: Take 1 tablet by mouth daily   Misc Natural Products (ADRENAL PO) 5/18/2022 at Unknown time Self Yes Yes   Sig: Take by mouth   Multiple Vitamin (MULTIVITAMINS PO) 5/18/2022 at Unknown time Self Yes Yes   Sig: Take 1 capsule by mouth daily   Multiple Vitamins-Minerals (ZINC PO) 5/18/2022 at Unknown time Self Yes Yes   Sig: Take 1 tablet by mouth daily   Naproxen Sodium 220 MG CAPS 5/18/2022 at Unknown time Self Yes Yes   Sig: Take by mouth   THYROID PO 5/18/2022 at Unknown time Self Yes Yes   Sig: Take by mouth   calcium gluconate 500 mg tablet 5/18/2022 at Unknown time Self Yes Yes   Sig: Take 1,000 mg by mouth daily      Facility-Administered Medications: None       Past Medical History:   Diagnosis Date    Benign paroxysmal positional vertigo     Resolved: 9/15/2014    Fracture of radius        Past Surgical History:   Procedure Laterality Date    BIOPSY CORE NEEDLE      Thyroid     SECTION      COLPOSCOPY         Family History   Problem Relation Age of Onset   [place] Stroke Mother         CVA    Breast cancer Mother 77    Alcohol abuse Father    [place] COPD Father     Other Father         Nicotine Abuse    Thyroid cancer Brother     Coronary artery disease Brother     Thyroid disease Maternal Grandmother     Breast cancer Maternal Aunt 36    Breast cancer additional onset Maternal Aunt 48    No Known Problems Sister     No Known Problems Daughter     No Known Problems Maternal Grandfather     No Known Problems Paternal Grandmother     No Known Problems Paternal Grandfather     No Known Problems Daughter     No Known Problems Maternal Aunt     No Known Problems Maternal Aunt     No Known Problems Maternal Aunt      I have reviewed and agree with the history as documented  E-Cigarette/Vaping    E-Cigarette Use Never User      E-Cigarette/Vaping Substances    Nicotine No     Flavoring No      Social History     Tobacco Use    Smoking status: Never Smoker    Smokeless tobacco: Never Used   Vaping Use    Vaping Use: Never used   Substance Use Topics    Alcohol use: Yes     Comment: social    Drug use: No       Review of Systems   Constitutional: Negative for chills and fever  HENT: Negative for congestion and trouble swallowing  Eyes: Negative for visual disturbance  Respiratory: Negative for cough and shortness of breath  Cardiovascular: Negative for chest pain, palpitations and leg swelling  Gastrointestinal: Negative for abdominal pain, nausea and vomiting  Musculoskeletal: Negative for back pain and neck pain     Skin: Negative for color change, rash and wound  Neurological: Positive for weakness and numbness  Negative for dizziness, vertigo, tremors, seizures, syncope, facial asymmetry, speech difficulty, light-headedness and headaches  Psychiatric/Behavioral: Negative for confusion  All other systems reviewed and are negative  Physical Exam  Physical Exam  Vitals and nursing note reviewed  Constitutional:       General: She is not in acute distress  Appearance: Normal appearance  She is well-developed, well-groomed and normal weight  She is not ill-appearing or diaphoretic  HENT:      Head: Normocephalic and atraumatic  Right Ear: External ear normal       Left Ear: External ear normal       Nose: Nose normal       Mouth/Throat:      Mouth: Mucous membranes are moist       Pharynx: Oropharynx is clear  Eyes:      General: Lids are normal  No visual field deficit  Extraocular Movements: Extraocular movements intact  Conjunctiva/sclera: Conjunctivae normal       Pupils: Pupils are equal, round, and reactive to light  Cardiovascular:      Rate and Rhythm: Normal rate and regular rhythm  Heart sounds: Normal heart sounds  Pulmonary:      Effort: Pulmonary effort is normal       Breath sounds: Normal breath sounds  No wheezing, rhonchi or rales  Abdominal:      General: Abdomen is flat  Bowel sounds are normal       Palpations: Abdomen is soft  Tenderness: There is no abdominal tenderness  Musculoskeletal:         General: No swelling or tenderness  Normal range of motion  Cervical back: Normal range of motion and neck supple  Right lower leg: No edema  Left lower leg: No edema  Skin:     General: Skin is warm and dry  Coloration: Skin is not jaundiced or pale  Findings: No rash  Neurological:      General: No focal deficit present  Mental Status: She is alert and oriented to person, place, and time  GCS: GCS eye subscore is 4   GCS verbal subscore is 5  GCS motor subscore is 6  Cranial Nerves: Cranial nerves are intact  No cranial nerve deficit, dysarthria or facial asymmetry  Sensory: Sensory deficit (decreased sensation entire left arm  ) present  Motor: No weakness, tremor, abnormal muscle tone or pronator drift  Coordination: Coordination is intact  Finger-Nose-Finger Test and Heel to [facility] Test normal       Gait: Gait is intact  Psychiatric:         Mood and Affect: Mood normal          Speech: Speech normal          Behavior: Behavior is cooperative  Cognition and Memory: Cognition normal          Vital Signs  ED Triage Vitals   Temperature Pulse Respirations Blood Pressure SpO2   05/18/22 1616 05/18/22 1616 05/18/22 1616 05/18/22 1616 05/18/22 1616   (!) 97 3 °F (36 3 °C) 78 18 165/83 97 %      Temp src Heart Rate Source Patient Position - Orthostatic VS BP Location FiO2 (%)   -- 05/18/22 1742 05/18/22 1830 -- --    Monitor Lying        Pain Score       --                  Vitals:    05/18/22 1800 05/18/22 1815 05/18/22 1830 05/18/22 1845   BP: 163/86 (!) 172/80 (!) 182/80    Pulse: 71 69 70 70   Patient Position - Orthostatic VS:   Lying Lying         Visual Acuity  Visual Acuity    Flowsheet Row Most Recent Value   L Pupil Size (mm) 3   R Pupil Size (mm) 3          ED Medications  Medications   iohexol (OMNIPAQUE) 350 MG/ML injection (SINGLE-DOSE) 100 mL (65 mL Intravenous Given 5/18/22 1806)   aspirin tablet 325 mg (325 mg Oral Given 5/18/22 1830)   clopidogrel (PLAVIX) tablet 600 mg (600 mg Oral Given 5/18/22 1830)       Diagnostic Studies  Results Reviewed     Procedure Component Value Units Date/Time    HS Troponin I 2hr [[id number]] Collected: 05/18/22 1843    Lab Status:  In process Specimen: Blood from Arm, Right Updated: 05/18/22 1846    HS Troponin I 4hr [[id number]] Collected: 05/18/22 1840    Lab Status: No result Specimen: Blood from Arm, Right     Protime-INR [[id number]]  (Normal) Collected: 05/18/22 1758    Lab Status: Final result Specimen: Blood from Arm, Right Updated: 05/18/22 1820     Protime 14 1 seconds      INR 1 10    APTT [[id number]]  (Normal) Collected: 05/18/22 1758    Lab Status: Final result Specimen: Blood from Arm, Right Updated: 05/18/22 1820     PTT 30 seconds     COVID/FLU/RSV - 2 hour TAT [[id number]] Collected: 05/18/22 1758    Lab Status:  In process Specimen: Nares from Nose Updated: 05/18/22 1804    Fingerstick Glucose (POCT) [[id number]]  (Normal) Collected: 05/18/22 1751    Lab Status: Final result Updated: 05/18/22 1751     POC Glucose 120 mg/dl     HS Troponin 0hr (reflex protocol) [[id number]]  (Normal) Collected: 05/18/22 1624    Lab Status: Final result Specimen: Blood from Arm, Left Updated: 05/18/22 1700     hs TnI 0hr 2 ng/L     Comprehensive metabolic panel [[id number]] Collected: 05/18/22 1624    Lab Status: Final result Specimen: Blood from Arm, Left Updated: 05/18/22 1657     Sodium 137 mmol/L      Potassium 3 6 mmol/L      Chloride 104 mmol/L      CO2 26 mmol/L      ANION GAP 7 mmol/L      BUN 14 mg/dL      Creatinine 0 79 mg/dL      Glucose 109 mg/dL      Calcium 9 4 mg/dL      AST 40 U/L      ALT 14 U/L      Alkaline Phosphatase 104 U/L      Total Protein 7 8 g/dL      Albumin 4 2 g/dL      Total Bilirubin 0 40 mg/dL      eGFR 78 ml/min/1 73sq m     Narrative:      [place] guidelines for Chronic Kidney Disease (CKD):     Stage 1 with normal or high GFR (GFR > 90 mL/min/1 73 square meters)    Stage 2 Mild CKD (GFR = 60-89 mL/min/1 73 square meters)    Stage 3A Moderate CKD (GFR = 45-59 mL/min/1 73 square meters)    Stage 3B Moderate CKD (GFR = 30-44 mL/min/1 73 square meters)    Stage 4 Severe CKD (GFR = 15-29 mL/min/1 73 square meters)    Stage 5 End Stage CKD (GFR <15 mL/min/1 73 square meters)  Note: GFR calculation is accurate only with a steady state creatinine    CBC and differential [[id number]]  (Abnormal) Collected: 05/18/22 1624    Lab Status: Final result Specimen: Blood from Arm, Left Updated: 05/18/22 1634     WBC 7 22 Thousand/uL      RBC 4 38 Million/uL      Hemoglobin 12 5 g/dL      Hematocrit 39 9 %      MCV 91 fL      MCH 28 5 pg      MCHC 31 3 g/dL      RDW 13 3 %      MPV 8 6 fL      Platelets 209 Thousands/uL      nRBC 0 /100 WBCs      Neutrophils Relative 58 %      Immat GRANS % 0 %      Lymphocytes Relative 32 %      Monocytes Relative 8 %      Eosinophils Relative 1 %      Basophils Relative 1 %      Neutrophils Absolute 4 27 Thousands/µL      Immature Grans Absolute 0 02 Thousand/uL      Lymphocytes Absolute 2 28 Thousands/µL      Monocytes Absolute 0 55 Thousand/µL      Eosinophils Absolute 0 05 Thousand/µL      Basophils Absolute 0 05 Thousands/µL                  CTA stroke alert (head/neck)   Final Result by Dimas Licona MD (05/18 1820)         1  No hemodynamically significant stenosis major arteries of the neck  2   No major intracranial arterial occlusion  No evidence of intracranial aneurysm  I personally communicated the results of this study with [facility] and Dr Gena Murphy on 5/18/2022 at 6:14 PM                             Workstation performed: [id number]         CT stroke alert brain   Final Result by Dimas Licona MD (05/18 1810)      No acute intracranial hemorrhage, mass effect or edema           Workstation performed: [id number]         MRI ED order    (Results Pending)              Procedures  ECG 12 Lead Documentation Only    Date/Time: 5/18/2022 6:48 PM  Performed by: Campos Davey PA-C  Authorized by: Campos Davey PA-C     Indications / Diagnosis:  Left arm numbness, weakness  Patient location:  ED  Previous ECG:     Previous ECG:  Compared to current    Similarity:  No change  Rate:     ECG rate:  53  Rhythm:     Rhythm: sinus bradycardia    Conduction:     Conduction: normal    ST segments:     ST segments:  Normal  T waves:     T waves: normal    Comments:      Low voltage             ED Course  ED Course as of 05/18/22 1849   Wed May 18, 2022   1742 Stroke alert called   1752 SPoke with Dr Karen Sams, he states if CT normal, give 325mg aspirin, 600mg plavix and admit under stroke pathway  Susanne 7 paged for admission  Stroke Assessment     Row Name 05/18/22 1757             NIH Stroke Scale    Interval Baseline      Level of Consciousness (1a ) 0      LOC Questions (1b ) 0      LOC Commands (1c ) 0      Best Gaze (2 ) 0      Visual (3 ) 0      Facial Palsy (4 ) 0      Motor Arm, Left (5a ) 0      Motor Arm, Right (5b ) 0      Motor Leg, Left (6a ) 0      Motor Leg, Right (6b ) 0      Limb Ataxia (7 ) 0      Sensory (8 ) 1      Best Language (9 ) 0      Dysarthria (10 ) 0      Extinction and Inattention (11 ) (Formerly Neglect) 0      Total 1              Flowsheet Row Most Recent Value   TPA Decision Options    TPA Decision Patient not a TPA candidate  Patient is not a candidate options Unclear time of onset outside appropriate time window  MDM  Number of Diagnoses or Management Options  Left arm numbness: new and requires workup  Stroke-like symptoms: new and requires workup  Diagnosis management comments: Patient with left arm numbness, weakness, will order labs, CT scan r/o stroke  Patient admitted under stroke pathway for further monitoring and evaluation          Amount and/or Complexity of Data Reviewed  Clinical lab tests: ordered and reviewed  Tests in the radiology section of CPT®: ordered and reviewed  Discuss the patient with other providers: yes    Patient Progress  Patient progress: stable      Disposition  Final diagnoses:   Left arm numbness   Stroke-like symptoms     Time reflects when diagnosis was documented in both MDM as applicable and the Disposition within this note     Time User Action Codes Description Comment    5/18/2022  5:46 PM Lelia Guy Add [R20 0] Left arm numbness     5/18/2022  6:47 PM Steffi Shook Add [R29 90] Stroke-like symptoms       ED Disposition     ED Disposition   Admit    Condition   Stable    Date/Time   Wed May 18, 2022  6:46 PM    Comment   Case was discussed with Dr Rock Mccauley and the patient's admission status was agreed to be Admission Status: inpatient status to the service of Dr Rock Mccauley   Follow-up Information    None         Patient's Medications   Discharge Prescriptions    No medications on file       No discharge procedures on file      PDMP Review     None          ED Provider  Electronically Signed by           Yaw Rivero PA-C  05/18/22 7588

## 2022-05-19 ENCOUNTER — APPOINTMENT (INPATIENT)
Dept: MRI IMAGING | Facility: HOSPITAL | Age: 67
DRG: 065 | End: 2022-05-19
Payer: MEDICARE

## 2022-05-19 ENCOUNTER — APPOINTMENT (INPATIENT)
Dept: NON INVASIVE DIAGNOSTICS | Facility: HOSPITAL | Age: 67
DRG: 065 | End: 2022-05-19
Payer: MEDICARE

## 2022-05-19 LAB
ANION GAP SERPL CALCULATED.3IONS-SCNC: 10 MMOL/L (ref 4–13)
AORTIC ROOT: 3.8 CM
APICAL FOUR CHAMBER EJECTION FRACTION: 61 %
BUN SERPL-MCNC: 13 MG/DL (ref 5–25)
CALCIUM SERPL-MCNC: 8.8 MG/DL (ref 8.3–10.1)
CHLORIDE SERPL-SCNC: 105 MMOL/L (ref 100–108)
CHOLEST SERPL-MCNC: 227 MG/DL
CO2 SERPL-SCNC: 24 MMOL/L (ref 21–32)
CREAT SERPL-MCNC: 0.81 MG/DL (ref 0.6–1.3)
E WAVE DECELERATION TIME: 205 MS
ERYTHROCYTE [DISTWIDTH] IN BLOOD BY AUTOMATED COUNT: 13.4 % (ref 11.6–15.1)
EST. AVERAGE GLUCOSE BLD GHB EST-MCNC: 114 MG/DL
FRACTIONAL SHORTENING: 39 % (ref 28–44)
GFR SERPL CREATININE-BSD FRML MDRD: 76 ML/MIN/1.73SQ M
GLUCOSE SERPL-MCNC: 94 MG/DL (ref 65–140)
HBA1C MFR BLD: 5.6 %
HCT VFR BLD AUTO: 40 % (ref 34.8–46.1)
HDLC SERPL-MCNC: 73 MG/DL
HGB BLD-MCNC: 12.7 G/DL (ref 11.5–15.4)
INTERVENTRICULAR SEPTUM IN DIASTOLE (PARASTERNAL SHORT AXIS VIEW): 1 CM
INTERVENTRICULAR SEPTUM: 1 CM (ref 0.6–1.1)
LAAS-AP2: 17.1 CM2
LAAS-AP4: 12.2 CM2
LDLC SERPL CALC-MCNC: 140 MG/DL (ref 0–100)
LEFT ATRIUM AREA SYSTOLE SINGLE PLANE A4C: 12.9 CM2
LEFT ATRIUM SIZE: 3.3 CM
LEFT INTERNAL DIMENSION IN SYSTOLE: 2.5 CM (ref 2.1–4)
LEFT VENTRICLE DIASTOLIC VOLUME (MOD BIPLANE): 83 ML
LEFT VENTRICLE SYSTOLIC VOLUME (MOD BIPLANE): 31 ML
LEFT VENTRICULAR INTERNAL DIMENSION IN DIASTOLE: 4.1 CM (ref 3.5–6)
LEFT VENTRICULAR POSTERIOR WALL IN END DIASTOLE: 1 CM
LEFT VENTRICULAR STROKE VOLUME: 53 ML
LV EF: 63 %
LVSV (TEICH): 53 ML
MCH RBC QN AUTO: 28.8 PG (ref 26.8–34.3)
MCHC RBC AUTO-ENTMCNC: 31.8 G/DL (ref 31.4–37.4)
MCV RBC AUTO: 91 FL (ref 82–98)
MV E'TISSUE VEL-SEP: 8 CM/S
MV PEAK A VEL: 0.94 M/S
MV PEAK E VEL: 75 CM/S
MV STENOSIS PRESSURE HALF TIME: 59 MS
MV VALVE AREA P 1/2 METHOD: 3.73 CM2
PLATELET # BLD AUTO: 282 THOUSANDS/UL (ref 149–390)
PMV BLD AUTO: 8.9 FL (ref 8.9–12.7)
POTASSIUM SERPL-SCNC: 4 MMOL/L (ref 3.5–5.3)
RBC # BLD AUTO: 4.41 MILLION/UL (ref 3.81–5.12)
RIGHT ATRIUM AREA SYSTOLE A4C: 12.9 CM2
RIGHT VENTRICLE ID DIMENSION: 3.1 CM
SL CV LEFT ATRIUM LENGTH A2C: 5.5 CM
SL CV LV EF: 65
SL CV PED ECHO LEFT VENTRICLE DIASTOLIC VOLUME (MOD BIPLANE) 2D: 75 ML
SL CV PED ECHO LEFT VENTRICLE SYSTOLIC VOLUME (MOD BIPLANE) 2D: 23 ML
SODIUM SERPL-SCNC: 139 MMOL/L (ref 136–145)
TR MAX PG: 31 MMHG
TR PEAK VELOCITY: 2.8 M/S
TRICUSPID VALVE PEAK REGURGITATION VELOCITY: 2.77 M/S
TRIGL SERPL-MCNC: 69 MG/DL
WBC # BLD AUTO: 5.44 THOUSAND/UL (ref 4.31–10.16)

## 2022-05-19 PROCEDURE — 93306 TTE W/DOPPLER COMPLETE: CPT | Performed by: INTERNAL MEDICINE

## 2022-05-19 PROCEDURE — 99223 1ST HOSP IP/OBS HIGH 75: CPT | Performed by: PSYCHIATRY & NEUROLOGY

## 2022-05-19 PROCEDURE — 99232 SBSQ HOSP IP/OBS MODERATE 35: CPT | Performed by: INTERNAL MEDICINE

## 2022-05-19 PROCEDURE — 93306 TTE W/DOPPLER COMPLETE: CPT

## 2022-05-19 PROCEDURE — 97163 PT EVAL HIGH COMPLEX 45 MIN: CPT

## 2022-05-19 PROCEDURE — G1004 CDSM NDSC: HCPCS

## 2022-05-19 PROCEDURE — 70551 MRI BRAIN STEM W/O DYE: CPT

## 2022-05-19 PROCEDURE — 80048 BASIC METABOLIC PNL TOTAL CA: CPT | Performed by: INTERNAL MEDICINE

## 2022-05-19 PROCEDURE — 97166 OT EVAL MOD COMPLEX 45 MIN: CPT

## 2022-05-19 PROCEDURE — 80061 LIPID PANEL: CPT | Performed by: INTERNAL MEDICINE

## 2022-05-19 PROCEDURE — 85027 COMPLETE CBC AUTOMATED: CPT | Performed by: INTERNAL MEDICINE

## 2022-05-19 RX ORDER — MELATONIN
1000 DAILY
COMMUNITY

## 2022-05-19 RX ORDER — IBUPROFEN 200 MG
2 CAPSULE ORAL DAILY
COMMUNITY

## 2022-05-19 RX ORDER — ZINC SULFATE 50(220)MG
220 CAPSULE ORAL DAILY
COMMUNITY

## 2022-05-19 RX ADMIN — CLOPIDOGREL BISULFATE 75 MG: 75 TABLET ORAL at 10:19

## 2022-05-19 RX ADMIN — ASPIRIN 81 MG CHEWABLE TABLET 81 MG: 81 TABLET CHEWABLE at 10:19

## 2022-05-19 RX ADMIN — ENOXAPARIN SODIUM 40 MG: 100 INJECTION SUBCUTANEOUS at 10:20

## 2022-05-19 RX ADMIN — ATORVASTATIN CALCIUM 40 MG: 40 TABLET, FILM COATED ORAL at 18:31

## 2022-05-19 NOTE — ASSESSMENT & PLAN NOTE
73 y o  RHD female with no significant medical history who presented to [facility] on 5/18/22 with L hand, L arm numbness, and difficulty with fine motor skills in L hand that started at 10 AM on 5/19/22  BP on arrival 165/83  NIHSS 1  CTA H/N wwo contrast in ED revealed no significant stenosis in major arteries of the neck, no LVO, and no intracranial aneurysm  [facility] in ED revealed no acute intracranial hemorrhage, mass effect or edema  Pt not a tPA candidate due to being outside of tPA time window  MRI brain completed on 5/19/22 revealed acute ischemia in the posterior R frontal region  Stroke due to undetermined source at this time  Plan   - Stroke pathway   MRI brain completed    TTE completed 5/19/22, revealed EF 65% and normal sized L atrium  No thrombus identified   JAYDEN pending    If JAYDEN unrevealing, recommend loop recorder placement as an outpatient    Labs:    Lipid Panel: total cholesterol 227,     Hemoglobin A1c 5 6   TSH 3 4   D Dimer 0 73   Thrombosis panel pending    S/p  mg and Plavix 300 mg load on 5/18/22   Continue DAPT with Aspirin 81 mg daily and Plavix 75 mg daily on x 21 days (last day 6/8/22), then stop Plavix and continue with ASA as monotherapy   Continue Atorvastatin 40 mg   goal normotension    Euglycemic, normothermic goal   Continue telemetry   PT/OT/ST   Stroke education   Frequent neuro checks  Continue to monitor and notify neurology with any changes   STAT CT head for any acute change in neuro exam  - Medical management and supportive care per primary team  Correction of any metabolic or infectious disturbances

## 2022-05-19 NOTE — SPEECH THERAPY NOTE
Speech Language/Pathology Screen    Order received, chart reviewed  NIH 1(sensory)  Pt passed nursing dysphagia screen and has been tolerating regular texture diet, thin liquids, and PO meds  Spoke w/ RN, RN denies concerns for dysphagia, aspiration, and/or speech/language deficits  Spoke w/ pt  Pt denies dysphagia, odynophagia, globus sensation, s/s aspiration, and/or speech/language deficits at this time  Pt observed taking rapid consecutive sips of thin without difficulty, no overt s/s aspiration  No gross speech/language deficits observed during conversation  Formal ST evaluation not indicated at this time  Please re-consult if medically necessary

## 2022-05-19 NOTE — PHYSICAL THERAPY NOTE
PHYSICAL THERAPY Evaluation    Performed at least 2 patient identifiers during session:  Patient Active Problem List   Diagnosis    Breast cyst    Cervical spondylosis    Chronic allergic conjunctivitis    Dyslipidemia    Hashimoto's disease    Herniated cervical disc    Non-toxic multinodular goiter    Primary osteoarthritis of left hip    Rhinitis    Varicose veins    Elevated TSH    Osteopenia    DDD (degenerative disc disease), lumbar    Left leg numbness    Chronic pain syndrome    Chronic pain of left knee    Chronic left hip pain    Stroke-like symptoms       Past Medical History:   Diagnosis Date    Benign paroxysmal positional vertigo     Resolved: 9/15/2014    Fracture of radius        Past Surgical History:   Procedure Laterality Date    BIOPSY CORE NEEDLE      Thyroid     SECTION      COLPOSCOPY          22 1354   PT Last Visit   PT Visit Date 22   Note Type   Note type Evaluation   Pain Assessment   Pain Assessment Tool 0-10   Pain Score No Pain   Home Living   Type of Home House   Home Layout Two level;Bed/bath upstairs;1/2 bath on main level  (1 DANYELL)   Bathroom Shower/Tub Tub/shower unit   Home Equipment Cane   Prior Function   Level of [place] Independent with ADLs and functional mobility   Lives With Alone   Receives Help From Friend(s)   ADL Assistance Independent   IADLs Independent   Falls in the last 6 months 0   Vocational Full time employment   Cognition   Overall Cognitive Status WFL   Arousal/Participation Alert   Orientation Level Oriented X4   Memory Within functional limits   Following Commands Follows all commands and directions without difficulty   RUE Assessment   RUE Assessment WFL   LUE Assessment   LUE Assessment WFL   RLE Assessment   RLE Assessment WFL   LLE Assessment   LLE Assessment WFL   Coordination   Sensation WFL   Finger to Nose & Finger to Finger  Impaired  (LUE slowed finger opposition)   Heel to Shin Intact   Rapid Alternating Movements Intact   Light Touch   RLE Light Touch Grossly intact   LLE Light Touch Grossly intact   Bed Mobility   Supine to Sit 7  Independent   Additional Comments pt remains OOB at end of session   Transfers   Sit to Stand 7  Independent   Stand to Sit 7  Independent   Stand pivot 7  Independent   Ambulation/Elevation   Gait pattern Decreased L stance   Gait Assistance 6  Modified independent   Assistive Device None   Distance 50ft, no LOB or unsteadiness;  safe mc;  pt with chronic LLE deficits (pt finished OPPT in November)   Balance   Static Sitting Good   Dynamic Sitting Good   Static Standing Fair +  (NBOS, EO/EC x30 seconds each)   Dynamic Standing Fair +  (able to turn 360 degrees L and R in less than 4 seconds each; able to pick object off floor )   Ambulatory Fair +   Activity Tolerance   Activity Tolerance   (no adverse effects to PT noted)   Assessment   Prognosis Good   Problem List Decreased coordination   Assessment Pt is a 73 y o  female who presented to ED 5/18/22 with c/o LUE weakness and numbness  Dx:  CVA protocol;  MRI (+):  Small acute/early subacute infarct in the posterior right frontal lobe  Comorbidities affecting pt's physical performance at time of assessment include: chronic LLE deficits and posture deficits  Personal factors affecting pt at time of IE include: L hand deficits  PLOF and home set up listed above; Upon evaluation: Pt independent for bed mobility, independent for sit to stand, and independent for ambulation without AD, chronic LLE deficits causing gait deviations however safe and baseline  Full objective findings from PT assessment regarding body systems outlined above  Pt appears to be functioning at baseline for mobility, no need for skilled PT while in acute hospital;  Recommend OP PT/OT for hand therapy; Will DC PT orders at this time;   Re-consult if mobility status changes  Encourage ambulation TID and OOB for all meals  The patient's AM-PAC Basic Mobility Inpatient Short Form Raw Score is 24  A Raw score of greater than 17 suggests the patient may benefit from discharge to home  Please also refer to the recommendation of the Physical Therapist for safe discharge planning     Goals   Patient Goals to figure out what is going on and go home   Recommendation   PT Discharge Recommendation Home with outpatient rehabilitation  (OP hand therapy (PT or OT))   AM-PAC Basic Mobility Inpatient   Turning in Bed Without Bedrails 4   Lying on Back to Sitting on Edge of Flat Bed 4   Moving Bed to Chair 4   Standing Up From Chair 4   Walk in Room 4   Climb 3-5 Stairs 4   Basic Mobility Inpatient Raw Score 24   Basic Mobility Standardized Score 57 68   Highest Level Of Mobility   JH-HLM Goal 8: Walk 250 feet or more   JH-HLM Achieved 7: Walk 25 feet or more     Saqib He PT      Patient Name: Adilson Foreman  CLBKK'S Date: 5/19/2022

## 2022-05-19 NOTE — ASSESSMENT & PLAN NOTE
· Lipid panel from 09/2021: total 273, triglycerides 63, , and LDl 156   · Recheck lipid panel in AM  · Start atorvastatin this evening

## 2022-05-19 NOTE — ASSESSMENT & PLAN NOTE
· Reports she takes thyroid PO supplement as she did not tolerate synthroid   · Last TSH 09/2021 was 3 830 but free T4 was 0 91  · Recheck TSH with reflex to T4

## 2022-05-19 NOTE — ASSESSMENT & PLAN NOTE
Small CVA on MRI    Continue dual antiplatelet therapy, continue statin, follow-up with Neurology recommendations and monitor on telemetry overnight

## 2022-05-19 NOTE — OCCUPATIONAL THERAPY NOTE
Occupational Therapy Evaluation     Patient Name: Nancy GARCIA Date: 2022  Problem List  Principal Problem:    Stroke-like symptoms  Active Problems:    Dyslipidemia    Hashimoto's disease    Past Medical History  Past Medical History:   Diagnosis Date    Benign paroxysmal positional vertigo     Resolved: 9/15/2014    Fracture of radius      Past Surgical History  Past Surgical History:   Procedure Laterality Date    BIOPSY CORE NEEDLE      Thyroid     SECTION      COLPOSCOPY             22 1410   OT Last Visit   OT Visit Date 22   Note Type   Note type Evaluation   Pain Assessment   Pain Assessment Tool 0-10   Pain Score No Pain   Home Living   Type of [address] [place] [address] Ave Two level;1/2 bath on main level   Bathroom Shower/Tub Tub/shower unit   Bathroom Toilet [address]  (not using at baseline)   Prior Function   Level of [place] Independent with ADLs and functional mobility   Lives With Alone   Receives Help From Friend(s)   ADL Assistance Independent   IADLs Independent   Vocational Full time employment  (Seamstress)   Subjective   Subjective Pt received in supine position  Daughter at bedside   Pt agreeable to session   ADL   Eating Assistance 7  Independent   Grooming Assistance 7  Independent   UB Bathing Assistance 7  Independent   LB Bathing Assistance 7  Independent   UB Dressing Assistance 7  Independent   LB Dressing Assistance 7  Independent   Toileting Assistance  7  Independent   Bed Mobility   Supine to Sit 7  Independent   Additional Comments Pt remained seated in recliner by end of session   Transfers   Sit to Stand 7  Independent   Stand to Sit 7  Independent   Functional Mobility   Functional Mobility 7  Independent   Additional Comments no AD   Balance   Static Sitting Good   Dynamic Sitting Good   Static Standing Fair +   Dynamic Standing Fair +   Activity Tolerance   Activity Tolerance Patient tolerated treatment well   Medical Staff Made Aware PT Dagmar   RUE Assessment   RUE Assessment WFL  (5/5 strength)   LUE Assessment   LUE Assessment WFL  (5/5 strength)   Hand Function   Gross Motor Coordination Functional   Fine Motor Coordination Impaired  (Slowed/increased effort noted with thumb opposition  Some difficulty noted with in-hand translational skills)   Sensation   Light Touch No apparent deficits   Vision-Basic Assessment   Current Vision No visual deficits   Vision - Complex Assessment   Ocular Range of Motion WFL   Cognition   Overall Cognitive Status WFL   Arousal/Participation Alert   Attention Within functional limits   Orientation Level Oriented X4   Memory Within functional limits   Following Commands Follows all commands and directions without difficulty   Assessment   Limitation Decreased fine motor control   Assessment Pt is a 73 y o  female seen for OT evaluation at [facility], admitted 5/18/2022 w/ Stroke-like symptoms (left UE weakness/numbness)  OT completed moderate review of pt's medical and social history  Comorbidities affecting pt's functional performance at time of assessment include: Cervical spondylosis, herniated cervical disc, chronic left hip/knee pain, etc (see chart)  Prior to admission, pt was living alone in a house with steps to manage and was independent with ADLs and functional mobility  Pt with baseline "limp"  Upon evaluation, pt presents to OT at functional baseline except for mild LUE FM deficits  Based on findings, pt is of moderate complexity  The patient's raw score on the AM-PAC Daily Activity inpatient short form is 24, standardized score is 57 54, greater than 39 4  Patients at this level are likely to benefit from DC to home  Please refer to the recommendation of the Occupational Therapist for safe DC planning  At this time, OT recommendations at time of discharge are home with outpt OT (CHT)   No further inpt OT needs indicated at this time - Recommend pt continue to be OOB for meals, ambulation to/from BR, perform self care tasks, and mobility in hallway with nursing  D/C from OT caseload with above recommendations  Goals   Patient Goals Pt wishes for left hand function to improve     Plan   OT Frequency Eval only   Recommendation   OT Discharge Recommendation Home with outpatient rehabilitation  (oupt CHT)   AM-PAC Daily Activity Inpatient   Lower Body Dressing 4   Bathing 4   Toileting 4   Upper Body Dressing 4   Grooming 4   Eating 4   Daily Activity Raw Score 24   Daily Activity Standardized Score (Calc for Raw Score >=11) 57 54   AM-PAC Applied Cognition Inpatient   Following a Speech/Presentation 4   Understanding Ordinary Conversation 4   Taking Medications 4   Remembering Where Things Are Placed or Put Away 4   Remembering List of 4-5 Errands 4   Taking Care of Complicated Tasks 4   Applied Cognition Raw Score 24   Applied Cognition Standardized Score 62 21             Crystal Greenwood, OTR/L

## 2022-05-19 NOTE — CONSULTS
Consultation - Neurology   George Bravo 73 y o  female MRN: [id number]  Unit/Bed#: -01 Encounter: [id number]      Assessment/Plan     * Stroke-like symptoms  Assessment & Plan  73 y o  RHD female with no significant medical history who [facility] with L hand, L arm numbness, and difficulty with fine motor skills in L hand that started at 10 AM on 5/19/22  BP on arrival 165/83  NIHSS 1  CTA H/N wwo contrast in ED revealed no significant stenosis in major arteries of the neck, no LVO, and no intracranial aneurysm  [address] in ED revealed no acute intracranial hemorrhage, mass effect or edema  Pt not a tPA candidate due to being outside of tPA time window  Concern for stroke versus peripheral nerve injury versus C spine nerve impingement     Plan   - Stroke pathway   MRI brain pending    Echo pending    Labs:    Lipid Panel: total cholesterol 227,     Hemoglobin A1c 5 6   TSH 3 4   S/p  mg and Plavix 300 mg load on 5/18/22   Start DAPT with Aspirin 81 mg daily and Plavix 75 mg daily on 5/19/22 x 21 days, then stop Plavix and continue with ASA as monotherapy   Continue Atorvastatin 40 mg   Permissive HTN allow for SBP up to 220/110 x 24 hours from onset of symptoms, then slowly bring back to goal normotension    Euglycemic, normothermic goal   Continue telemetry   PT/OT/ST   Stroke education   Frequent neuro checks  Continue to monitor and notify neurology with any changes   STAT CT head for any acute change in neuro exam  - Medical management and supportive care per primary team  Correction of any metabolic or infectious disturbances  Case and plan discussed with attending neurologist  Please see attending attestation for any further recommendations and/or changes to plan  George Bravo will need follow up in in 6 weeks with neurovascular attending or advance practitioner   She will not require outpatient neurological testing  History of Present Illness     Reason for Consult / Principal Problem: Stroke alert 5/18/22  Hx and PE limited by: N/A   HPI: Ema Leija is a 73 y o  right handed female with no significant medical history who [facility]18[facility]22 with L hand, L arm numbness, and difficulty with fine motor skills in L hand that started at 10 AM on 5/19/22  BP on arrival 165/83  NIHSS 1  Pt not a tPA candidate due to being outside of tPA time window  Patient reports that she woke up around 6AM yesterday and felt at her baseline level  She was able to perform her morning "rituals" (make breakfast, clean dishes, etc ) without any difficulty  Then, pt went to take a shower and when she was trying to get dressed, she could not  her bra or hook her bra with her L hand  She then went to leave for work and had difficulty picking up her keys  She reports that she had difficulty with fine motor movements in her L hand and could not feel items with her L fingers to  items  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, buttoning clothing, etc ), so she told her boss about her symptoms, and he recommended she get further evaluation at the hospital  Pt reports the loss of sensation in her L fingers were throughout her L fingers, hand, and about midway up to her forearm  Pt denies facial droop, difficulty with speech, change in speech, headache, dizziness, changes in vision, chest pain, and/or shortness of breath  No similar previous episodes in the past      Today, pt reports the loss of sensation in her L fingers/hand/forearm is completely resolved  Pt reports having difficulty with fine motor movements in her L hand  Pt reports that she does not take any prescription medications  She reports that she takes a thyroid hormone supplement  She does not take any antiplatelet or anticoagulation medications   She reports that she has no significant medical history other than lumbar disc issues effecting her L leg and  having elevated cholesterol every year around prom season because she is so busy at work that she does not eat healthy foods  She denies history of irregular heart beat, including but not limited to Afib  She denies personal history of stroke and/or seizure  She lives in a 2 story home with 1 step to enter  She is able to handle all of her own ADLs and IADLs  Consult to Neurology  Consult performed by: Graciela Lai PA-C  Consult ordered by: Marcella Quinones PA-C          Review of Systems   Constitutional: Negative for chills and fever  HENT: Negative for congestion and trouble swallowing  Eyes: Negative for photophobia and visual disturbance  Respiratory: Negative for cough and shortness of breath  Cardiovascular: Negative for chest pain and palpitations  Gastrointestinal: Negative for nausea and vomiting  Genitourinary: Negative for difficulty urinating and dysuria  Musculoskeletal: Positive for gait problem (walks with a limp, chronic )  Negative for back pain  Neurological: Positive for weakness (L fingers/fine motor movements in L fingers/hand)  Negative for dizziness, facial asymmetry, speech difficulty, numbness and headaches  Psychiatric/Behavioral: Negative for confusion and suicidal ideas         Historical Information   Past Medical History:   Diagnosis Date    Benign paroxysmal positional vertigo     Resolved: 9/15/2014    Fracture of radius      Past Surgical History:   Procedure Laterality Date    BIOPSY CORE NEEDLE      Thyroid     SECTION      COLPOSCOPY       Social History   Social History     Substance and Sexual Activity   Alcohol Use Yes    Comment: social     Social History     Substance and Sexual Activity   Drug Use No     E-Cigarette/Vaping    E-Cigarette Use Never User      E-Cigarette/Vaping Substances    Nicotine No     Flavoring No      Social History     Tobacco Use Smoking Status Never Smoker   Smokeless Tobacco Never Used     Family History:   Family History   Problem Relation Age of Onset   Landry Harshil Stroke Mother         CVA   Landry Harshil Breast cancer Mother 75    Alcohol abuse Father    Landry Harshil COPD Father     Other Father         Nicotine Abuse    Thyroid cancer Brother     Coronary artery disease Brother     Thyroid disease Maternal Grandmother     Breast cancer Maternal Aunt 36    Breast cancer additional onset Maternal Aunt 48    No Known Problems Sister     No Known Problems Daughter     No Known Problems Maternal Grandfather     No Known Problems Paternal Grandmother     No Known Problems Paternal Grandfather     No Known Problems Daughter     No Known Problems Maternal Aunt     No Known Problems Maternal Aunt     No Known Problems Maternal Aunt        Review of previous medical records was completed  Meds/Allergies   current meds:   Current Facility-Administered Medications   Medication Dose Route Frequency    aspirin chewable tablet 81 mg  81 mg Oral Daily    atorvastatin (LIPITOR) tablet 40 mg  40 mg Oral QPM    clopidogrel (PLAVIX) tablet 75 mg  75 mg Oral Daily    enoxaparin (LOVENOX) subcutaneous injection 40 mg  40 mg Subcutaneous Daily    hydrALAZINE (APRESOLINE) injection 5 mg  5 mg Intravenous Q6H PRN    and PTA meds:   Prior to Admission Medications   Prescriptions Last Dose Informant Patient Reported? Taking?    Chlorophyll 100 MG TABS 5/18/2022 at Unknown time Self No Yes   Sig: Take 1 tablet (100 mg total) by mouth daily   Magnesium 250 MG TABS 5/18/2022 at Unknown time Self Yes Yes   Sig: Take 1 tablet by mouth daily   Misc Natural Products (ADRENAL PO) 5/18/2022 at Unknown time Self Yes Yes   Sig: Take by mouth   Multiple Vitamin (MULTIVITAMINS PO) 5/18/2022 at Unknown time Self Yes Yes   Sig: Take 1 capsule by mouth daily   Multiple Vitamins-Minerals (ZINC PO) 5/18/2022 at Unknown time Self Yes Yes   Sig: Take 1 tablet by mouth daily   Naproxen Sodium 220 MG CAPS 5/18/2022 at Unknown time Self Yes Yes   Sig: Take by mouth   THYROID PO 5/18/2022 at Unknown time Self Yes Yes   Sig: Take by mouth   calcium gluconate 500 mg tablet 5/18/2022 at Unknown time Self Yes Yes   Sig: Take 1,000 mg by mouth daily      Facility-Administered Medications: None       No Known Allergies    Objective   Vitals:Blood pressure 131/71, pulse 72, temperature 98 3 °F (36 8 °C), temperature source Oral, resp  rate 18, height 5' 6" (1 676 m), weight 62 6 kg (138 lb), SpO2 92 %  ,Body mass index is 22 27 kg/m²  No intake or output data in the 24 hours ending 05/19/22 1149    Invasive Devices: Invasive Devices  Report    Peripheral Intravenous Line  Duration           Peripheral IV 05/18/22 Right Antecubital 1 day                Physical Exam  Vitals and nursing note reviewed  Constitutional:       General: She is not in acute distress  Appearance: Normal appearance  She is normal weight  She is not diaphoretic  HENT:      Head: Normocephalic and atraumatic  Nose: Nose normal  No congestion or rhinorrhea  Mouth/Throat:      Mouth: Mucous membranes are moist       Pharynx: Oropharynx is clear  Posterior oropharyngeal erythema present  No oropharyngeal exudate  Eyes:      General: No scleral icterus  Right eye: No discharge  Left eye: No discharge  Extraocular Movements: Extraocular movements intact and EOM normal       Conjunctiva/sclera: Conjunctivae normal    Cardiovascular:      Rate and Rhythm: Normal rate  Pulmonary:      Effort: Pulmonary effort is normal    Musculoskeletal:      Right lower leg: No edema  Left lower leg: No edema  Neurological:      Mental Status: She is alert and oriented to person, place, and time        Coordination: Finger-Nose-Finger Test and Heel to [employer] normal    Psychiatric:         Speech: Speech normal       Comments: Pleasant and cooperative during exam        Neurologic Exam     Mental Status Oriented to person, place, and time  Follows 2 step commands  Attention: normal (able to spell last name forwards and backwards )  Concentration: normal    Speech: speech is normal (Clear and fluent  No dysarthria appreciated on exam )  Level of consciousness: alert  Able to perform simple calculations (6 quarters in $1 50 )  Able to name object (glove, glasses, watch )  Able to repeat ("Today is a bright and susan day in [facility] " )  Cranial Nerves     CN II   Visual acuity: (grossly intact )  Right visual field deficit: none  Left visual field deficit: none     CN III, IV, VI   Extraocular motions are normal    Right pupil: Size: 3 mm  Shape: regular  Left pupil: Size: 3 mm  Shape: regular  Nystagmus: none     CN V   Facial sensation intact  CN VII   Facial expression full, symmetric       CN VIII   CN VIII normal      CN IX, X   CN IX normal    CN X normal      CN XI   CN XI normal    Right trapezius strength: normal  Left trapezius strength: normal    CN XII   CN XII normal    Tongue: not atrophic  Fasciculations: absent  Tongue deviation: none    Motor Exam   Muscle bulk: normal  Overall muscle tone: normal  Strength to confrontation testing:  -bilateral hand  5/5  - right interossei muscle 5/5  - L interossei muscle 4+/5   - R finger flexion 5/5   - L finger flexion 4+/5   -bilateral wrist flexion and extension 5/5  -bilateral elbow flexion and extension 5/5  -bilateral shoulder abduction and adduction 5/5  -bilateral plantar flexion and dorsiflexion 5/5  Bilateral knee flexion and extension 5/5  -bilateral hip flexion 5/5    Finger tapping in R hand/fingers normal, slowed in L hand/fingers      Sensory Exam   Light touch normal    Right arm light touch: normal  Left arm light touch: normal  Right leg light touch: normal  Left leg light touch: normal    Gait, Coordination, and Reflexes     Gait  Gait: (deferred for patient safety )    Coordination   Finger to nose coordination: normal  Heel to shin coordination: normal      Lab Results:   CBC:   Results from last 7 days   Lab Units 05/19/22  0436 05/18/22  1624   WBC Thousand/uL 5 44 7 22   RBC Million/uL 4 41 4 38   HEMOGLOBIN g/dL 12 7 12 5   HEMATOCRIT % 40 0 39 9   MCV fL 91 91   PLATELETS Thousands/uL 282 281   , BMP/CMP:   Results from last 7 days   Lab Units 05/19/22  0338 05/18/22  1624   SODIUM mmol/L 139 137   POTASSIUM mmol/L 4 0 3 6   CHLORIDE mmol/L 105 104   CO2 mmol/L 24 26   BUN mg/dL 13 14   CREATININE mg/dL 0 81 0 79   CALCIUM mg/dL 8 8 9 4   AST U/L  --  40   ALT U/L  --  14   ALK PHOS U/L  --  104   EGFR ml/min/1 73sq m 76 78   , HgBA1C:   Results from last 7 days   Lab Units 05/18/22  1624   HEMOGLOBIN A1C % 5 6   , TSH:   Results from last 7 days   Lab Units 05/18/22  1624   TSH 3RD GENERATON uIU/mL 3 446   , Lipid Profile:   Results from last 7 days   Lab Units 05/19/22  0338   HDL mg/dL 73   LDL CALC mg/dL 140*   TRIGLYCERIDES mg/dL 69     Imaging Studies: I have personally reviewed pertinent reports  and I have personally reviewed pertinent films in PACS [facility] wo contrast 5/18/22, CTA H/N wwo contrast 5/18/22  EKG, Pathology, and Other Studies: I have personally reviewed pertinent reports  VTE Prophylaxis: Enoxaparin (Lovenox)    Code Status: Level 1 - Full Code  Advance Directive and Living Will:      Power of :    POLST:      Counseling / Coordination of Care  Total time spent today 60 minutes  Greater than 50% of total time was spent with the patient and / or family counseling and / or coordination of care   A description of the counseling / coordination of care: : discussion of likely causes of symptoms, recommended workup at this time, recommended treatment at this time, risks if choosing not to undergo further workup/treatment

## 2022-05-19 NOTE — CASE MANAGEMENT
Case Management Assessment & Discharge Planning Note    Patient name Luis Bello  Location /-94 MRN [id number]  : 1955 Date 2022       Current Admission Date: 2022  Current Admission Diagnosis:Stroke-like symptoms   Patient Active Problem List    Diagnosis Date Noted    Stroke-like symptoms 2022    Chronic pain syndrome 2021    Chronic pain of left knee 2021    Chronic left hip pain 2021    DDD (degenerative disc disease), lumbar 2021    Left leg numbness 2021    Osteopenia 2019    Elevated TSH 2018    Dyslipidemia 2014    Breast cyst 09/15/2014    Cervical spondylosis 09/15/2014    Chronic allergic conjunctivitis 09/15/2014    Hashimoto's disease 09/15/2014    Herniated cervical disc 09/15/2014    Non-toxic multinodular goiter 09/15/2014    Primary osteoarthritis of left hip 09/15/2014    Rhinitis 09/15/2014    Varicose veins 09/15/2014      LOS (days): 1  Geometric Mean LOS (GMLOS) (days): 2 20  Days to GMLOS:1 3     OBJECTIVE:    Risk of Unplanned Readmission Score: 5 61         Current admission status: Inpatient       Preferred Pharmacy:   [address] [facility] [place] [address]  Phone: [phone] Fax: [phone]    Primary Care Provider: Rae Steele DO    Primary Insurance: MEDICARE  Secondary Insurance: [employer]ET[employer]    ASSESSMENT:  [address], [address] Representative - Friend   Primary Phone: [phone] (Home)               Advance Directives  Does patient have a [address]?: No  Was patient offered paperwork?: Yes (declined)  Does patient currently have a Health Care decision maker?: Yes, please see Health Care Proxy section  Does patient have Advance Directives?: Yes  Advance Directives: Living will (she believes but will check with her )         Readmission Root Cause  30 Day Readmission: No    Patient Information  Admitted from[de-identified] Home  Mental Status: Alert  During Assessment patient was accompanied by: Not accompanied during assessment  Assessment information provided by[de-identified] Patient  Primary Caregiver: Self  Support Systems: DaughterFélix of Residence: [address] [place] [address] do you live in?: [address] [place] [address] entry access options   Select all that apply : Stairs  Number of steps to enter home : 1  Type of Current Residence: 2 story home  Upon entering residence, is there a bedroom on the main floor (no further steps)?: No  A bedroom is located on the following floor levels of residence (select all that apply):: 2nd Floor  Upon entering residence, is there a bathroom on the main floor (no further steps)?: No  Indicate which floors of current residence have a bathroom (select all the apply):: 2nd Floor  Number of steps to 2nd floor from main floor: One Flight  In the last 12 months, was there a time when you were not able to pay the mortgage or rent on time?: No  In the last 12 months, how many places have you lived?: 1  In the last 12 months, was there a time when you did not have a steady place to sleep or slept in a shelter (including now)?: No  Homeless/housing insecurity resource given?: N/A  Living Arrangements: Lives Alone    Activities of Daily Living Prior to Admission  Functional Status: Independent  Completes ADLs independently?: Yes  Ambulates independently?: Yes  Does patient use assisted devices?: No  Does patient currently own DME?: No  Does patient have a history of Outpatient Therapy (PT/OT)?: Yes  Does the patient have a history of Short-Term Rehab?: No  Does patient have a history of HHC?: No  Does patient currently have [facility]?: No         Patient Information Continued  Does patient have prescription coverage?: No  Within the past 12 months, you worried that your food would run out before you got the money to buy more : Never true  Within the past 12 months, the food you bought just didn't last and you didn't have money to get more : Never true  Food insecurity resource given?: N/A  Does patient receive dialysis treatments?: No  Does patient have a history of substance abuse?: No  Does patient have a history of Mental Health Diagnosis?: No         Means of Transportation  Means of Transport to Appts[de-identified] Drives Self  In the past 12 months, has lack of transportation kept you from medical appointments or from getting medications?: No  In the past 12 months, has lack of transportation kept you from meetings, work, or from getting things needed for daily living?: No  Was application for public transport provided?: N/A        DISCHARGE DETAILS:    Discharge planning discussed with[de-identified] patient  Freedom of Choice: Yes  Comments - Freedom of Choice: discussed outpt therapy; list provided  CM contacted family/caregiver?: No- see comments (declined; reports being in contact with family/friends)  Were Treatment Team discharge recommendations reviewed with patient/caregiver?: Yes  Did patient/caregiver verbalize understanding of patient care needs?: Yes  Were patient/caregiver advised of the risks associated with not following Treatment Team discharge recommendations?: Yes    [facility]         Is the patient interested in NEURONIXu  at discharge?: No    DME Referral Provided  Referral made for DME?: No    Other Referral/Resources/Interventions Provided:  Interventions: Outpatient OT, Outpatient PT  Referral Comments: list of facilities given    Treatment Team Recommendation: Home  Discharge Destination Plan[de-identified] Home  Transport at Discharge : Family     IMM Given (Date):: 05/19/22  IMM Given to[de-identified] Patient     Additional Comments: Met with pt to discuss the role of CM and to discuss any help pt may need prior to dc  Pt lives alone in a 2 story home with 1 DANYELL; bed/bathroom on the 2nd floor  No hx of HHC or rehab  No hx of mental health or D&A treatment  Pt's preferred pharmacy [facility]  Pt drives  Pt's family/friend will transport home at dc  Pt received outpt therapy in the past and is agreeable to receiving outpt therapy again at dc

## 2022-05-19 NOTE — QUICK NOTE
MRI brain personally reviewed and reviewed with attending neurologist   MRI brain revealed acute stroke in right frontal region  Per attending neurologist, stroke concerning for embolic source  Recommend ERIC  Thrombosis panel and D-dimer pending  MRI brain reviewed with patient and patient's daughter in room  All questions answered to best of this provider's ability and all concerns addressed  Patient and patient's daughter agreeable to ERIC  Discussed with the primary team and Cardiology  Eric order placed  Patient NPO at midnight

## 2022-05-19 NOTE — PROGRESS NOTES
[facility] [place]  Progress Note - Wil Tran 1955, 73 y o  female MRN: [id number]  Unit/Bed#: -[place] Encounter: [id number]  Primary Care Provider: Milad Mata DO   Date and time admitted to hospital: 2022  5:19 PM    * Stroke-like symptoms  Assessment & Plan  Small CVA on MRI    Continue dual antiplatelet therapy, continue statin, follow-up with Neurology recommendations and monitor on telemetry overnight    Dyslipidemia  Assessment & Plan  Continue Lipitor 40 mg daily    Hashimoto's disease  Assessment & Plan  · Reports she takes thyroid PO supplement as she did not tolerate synthroid   · Last TSH 2021 was 3 830 but free T4 was 0 91  · Recheck TSH with reflex to T4      Code Status: Level 1 - Full Code    Subjective:   No pain    Objective:     Vitals:   Temp (24hrs), Av 3 °F (36 8 °C), Min:97 5 °F (36 4 °C), Max:99 6 °F (37 6 °C)    Temp:  [97 5 °F (36 4 °C)-99 6 °F (37 6 °C)] 97 5 °F (36 4 °C)  HR:  [57-74] 71  Resp:  [16-19] 16  BP: (121-182)/(61-86) 126/75  SpO2:  [92 %-98 %] 93 %  Body mass index is 22 27 kg/m²  Input and Output Summary (last 24 hours):   No intake or output data in the 24 hours ending 22 1634    Physical Exam:   Physical Exam  Vitals and nursing note reviewed  HENT:      Head: Normocephalic and atraumatic  Right Ear: External ear normal       Left Ear: External ear normal    Cardiovascular:      Rate and Rhythm: Normal rate  Pulmonary:      Effort: Pulmonary effort is normal       Breath sounds: Normal breath sounds  No rales  Chest:      Chest wall: No tenderness  Musculoskeletal:      Cervical back: Normal range of motion  Skin:     General: Skin is warm and dry  Findings: No lesion or rash  Neurological:      General: No focal deficit present  Mental Status: She is alert            Additional Data:     Labs:  Results from last 7 days   Lab Units 22  0436 22  1624   WBC Thousand/uL 5 44 7 22 HEMOGLOBIN g/dL 12 7 12 5   HEMATOCRIT % 40 0 39 9   PLATELETS Thousands/uL 282 281   NEUTROS PCT %  --  58   LYMPHS PCT %  --  32   MONOS PCT %  --  8   EOS PCT %  --  1     Results from last 7 days   Lab Units 05/19/22  0338 05/18/22  1624   SODIUM mmol/L 139 137   POTASSIUM mmol/L 4 0 3 6   CHLORIDE mmol/L 105 104   CO2 mmol/L 24 26   BUN mg/dL 13 14   CREATININE mg/dL 0 81 0 79   ANION GAP mmol/L 10 7   CALCIUM mg/dL 8 8 9 4   ALBUMIN g/dL  --  4 2   TOTAL BILIRUBIN mg/dL  --  0 40   ALK PHOS U/L  --  104   ALT U/L  --  14   AST U/L  --  40   GLUCOSE RANDOM mg/dL 94 109     Results from last 7 days   Lab Units 05/18/22  1758   INR  1 10     Results from last 7 days   Lab Units 05/18/22  1751   POC GLUCOSE mg/dl 120     Results from last 7 days   Lab Units 05/18/22  1624   HEMOGLOBIN A1C % 5 6           Lines/Drains:  Invasive Devices  Report    Peripheral Intravenous Line  Duration           Peripheral IV 05/18/22 Right Antecubital 1 day                  Telemetry:  Telemetry Orders (From admission, onward)             48 Hour Telemetry Monitoring  (ED Bridging Orders Panel)  Continuous x 48 hours        References:    Telemetry Guidelines   Question:  Reason for 48 Hour Telemetry  Answer:  Acute CVA (<24 hrs old, hemispheric strokes, selected brainstem strokes, cardiac arrhythmias)                          Imaging: Reviewed radiology reports from this admission including: MRI brain    Recent Cultures (last 7 days):         Last 24 Hours Medication List:   Current Facility-Administered Medications   Medication Dose Route Frequency Provider Last Rate    aspirin  81 mg Oral Daily Terry Maxwell PA-C      atorvastatin  40 mg Oral QPM Terry Maxwell PA-C      clopidogrel  75 mg Oral Daily Terry Maxwell PA-C      enoxaparin  40 mg Subcutaneous Daily Terry Maxwell PA-C      hydrALAZINE  5 mg Intravenous Q6H PRN Terry Maxwell PA-C          Today, Patient Was Seen By: Diana Quintero, DO    **Please Note: This note may have been constructed using a voice recognition system  **

## 2022-05-20 ENCOUNTER — APPOINTMENT (INPATIENT)
Dept: NON INVASIVE DIAGNOSTICS | Facility: HOSPITAL | Age: 67
DRG: 065 | End: 2022-05-20
Payer: MEDICARE

## 2022-05-20 ENCOUNTER — EPISODE CHANGES (OUTPATIENT)
Dept: CASE MANAGEMENT | Facility: OTHER | Age: 67
End: 2022-05-20

## 2022-05-20 VITALS
BODY MASS INDEX: 22.18 KG/M2 | TEMPERATURE: 97.5 F | HEIGHT: 66 IN | HEART RATE: 63 BPM | OXYGEN SATURATION: 96 % | RESPIRATION RATE: 18 BRPM | WEIGHT: 138 LBS | DIASTOLIC BLOOD PRESSURE: 93 MMHG | SYSTOLIC BLOOD PRESSURE: 138 MMHG

## 2022-05-20 PROBLEM — I63.9 STROKE (HCC): Status: ACTIVE | Noted: 2022-05-18

## 2022-05-20 LAB
AORTIC ROOT: 3.7 CM
ASCENDING AORTA: 3.6 CM
ATRIAL RATE: 53 BPM
ATRIAL RATE: 72 BPM
D DIMER PPP FEU-MCNC: 0.73 UG/ML FEU
P AXIS: 53 DEGREES
P AXIS: 59 DEGREES
PR INTERVAL: 184 MS
PR INTERVAL: 188 MS
QRS AXIS: 14 DEGREES
QRS AXIS: 17 DEGREES
QRSD INTERVAL: 90 MS
QRSD INTERVAL: 90 MS
QT INTERVAL: 408 MS
QT INTERVAL: 424 MS
QTC INTERVAL: 397 MS
QTC INTERVAL: 446 MS
SL CV LV EF: 65
T WAVE AXIS: 49 DEGREES
T WAVE AXIS: 54 DEGREES
VENTRICULAR RATE: 53 BPM
VENTRICULAR RATE: 72 BPM

## 2022-05-20 PROCEDURE — 81241 F5 GENE: CPT | Performed by: INTERNAL MEDICINE

## 2022-05-20 PROCEDURE — 85732 THROMBOPLASTIN TIME PARTIAL: CPT | Performed by: INTERNAL MEDICINE

## 2022-05-20 PROCEDURE — 85306 CLOT INHIBIT PROT S FREE: CPT | Performed by: INTERNAL MEDICINE

## 2022-05-20 PROCEDURE — 93312 ECHO TRANSESOPHAGEAL: CPT | Performed by: INTERNAL MEDICINE

## 2022-05-20 PROCEDURE — 85379 FIBRIN DEGRADATION QUANT: CPT | Performed by: INTERNAL MEDICINE

## 2022-05-20 PROCEDURE — 85303 CLOT INHIBIT PROT C ACTIVITY: CPT | Performed by: INTERNAL MEDICINE

## 2022-05-20 PROCEDURE — 99239 HOSP IP/OBS DSCHRG MGMT >30: CPT | Performed by: INTERNAL MEDICINE

## 2022-05-20 PROCEDURE — 93010 ELECTROCARDIOGRAM REPORT: CPT | Performed by: INTERNAL MEDICINE

## 2022-05-20 PROCEDURE — 93312 ECHO TRANSESOPHAGEAL: CPT

## 2022-05-20 PROCEDURE — 93325 DOPPLER ECHO COLOR FLOW MAPG: CPT | Performed by: INTERNAL MEDICINE

## 2022-05-20 PROCEDURE — 99232 SBSQ HOSP IP/OBS MODERATE 35: CPT | Performed by: PSYCHIATRY & NEUROLOGY

## 2022-05-20 PROCEDURE — B24BZZ4 ULTRASONOGRAPHY OF HEART WITH AORTA, TRANSESOPHAGEAL: ICD-10-PCS | Performed by: INTERNAL MEDICINE

## 2022-05-20 PROCEDURE — 85613 RUSSELL VIPER VENOM DILUTED: CPT | Performed by: INTERNAL MEDICINE

## 2022-05-20 PROCEDURE — 93970 EXTREMITY STUDY: CPT | Performed by: SURGERY

## 2022-05-20 PROCEDURE — 81240 F2 GENE: CPT | Performed by: INTERNAL MEDICINE

## 2022-05-20 PROCEDURE — 86147 CARDIOLIPIN ANTIBODY EA IG: CPT | Performed by: INTERNAL MEDICINE

## 2022-05-20 PROCEDURE — 93320 DOPPLER ECHO COMPLETE: CPT | Performed by: INTERNAL MEDICINE

## 2022-05-20 PROCEDURE — 85300 ANTITHROMBIN III ACTIVITY: CPT | Performed by: INTERNAL MEDICINE

## 2022-05-20 PROCEDURE — 85305 CLOT INHIBIT PROT S TOTAL: CPT | Performed by: INTERNAL MEDICINE

## 2022-05-20 PROCEDURE — 86146 BETA-2 GLYCOPROTEIN ANTIBODY: CPT | Performed by: INTERNAL MEDICINE

## 2022-05-20 PROCEDURE — 85705 THROMBOPLASTIN INHIBITION: CPT | Performed by: INTERNAL MEDICINE

## 2022-05-20 PROCEDURE — 93970 EXTREMITY STUDY: CPT

## 2022-05-20 PROCEDURE — 85670 THROMBIN TIME PLASMA: CPT | Performed by: INTERNAL MEDICINE

## 2022-05-20 RX ORDER — ASPIRIN 81 MG/1
81 TABLET, CHEWABLE ORAL DAILY
Refills: 0
Start: 2022-05-21

## 2022-05-20 RX ORDER — CLOPIDOGREL BISULFATE 75 MG/1
75 TABLET ORAL DAILY
Qty: 20 TABLET | Refills: 0 | Status: SHIPPED | OUTPATIENT
Start: 2022-05-21 | End: 2022-07-13

## 2022-05-20 RX ORDER — PROPOFOL 10 MG/ML
INJECTION, EMULSION INTRAVENOUS AS NEEDED
Status: DISCONTINUED | OUTPATIENT
Start: 2022-05-20 | End: 2022-05-20

## 2022-05-20 RX ORDER — ATORVASTATIN CALCIUM 40 MG/1
40 TABLET, FILM COATED ORAL EVERY EVENING
Qty: 30 TABLET | Refills: 0 | Status: SHIPPED | OUTPATIENT
Start: 2022-05-20 | End: 2022-05-26 | Stop reason: SDUPTHER

## 2022-05-20 RX ORDER — SODIUM CHLORIDE 9 MG/ML
INJECTION, SOLUTION INTRAVENOUS CONTINUOUS PRN
Status: DISCONTINUED | OUTPATIENT
Start: 2022-05-20 | End: 2022-05-20

## 2022-05-20 RX ADMIN — SODIUM CHLORIDE: 0.9 INJECTION, SOLUTION INTRAVENOUS at 14:10

## 2022-05-20 RX ADMIN — PROPOFOL 20 MG: 10 INJECTION, EMULSION INTRAVENOUS at 14:24

## 2022-05-20 RX ADMIN — ASPIRIN 81 MG CHEWABLE TABLET 81 MG: 81 TABLET CHEWABLE at 08:59

## 2022-05-20 RX ADMIN — ENOXAPARIN SODIUM 40 MG: 100 INJECTION SUBCUTANEOUS at 08:59

## 2022-05-20 RX ADMIN — PROPOFOL 150 MG: 10 INJECTION, EMULSION INTRAVENOUS at 14:10

## 2022-05-20 RX ADMIN — ATORVASTATIN CALCIUM 40 MG: 40 TABLET, FILM COATED ORAL at 18:23

## 2022-05-20 RX ADMIN — PROPOFOL 20 MG: 10 INJECTION, EMULSION INTRAVENOUS at 14:18

## 2022-05-20 RX ADMIN — PROPOFOL 20 MG: 10 INJECTION, EMULSION INTRAVENOUS at 14:20

## 2022-05-20 RX ADMIN — PROPOFOL 20 MG: 10 INJECTION, EMULSION INTRAVENOUS at 14:14

## 2022-05-20 RX ADMIN — CLOPIDOGREL BISULFATE 75 MG: 75 TABLET ORAL at 08:59

## 2022-05-20 RX ADMIN — PROPOFOL 30 MG: 10 INJECTION, EMULSION INTRAVENOUS at 14:15

## 2022-05-20 NOTE — ASSESSMENT & PLAN NOTE
Small CVA on MRI    Continue dual antiplatelet therapy, continue statin, dual anti-platelet therapy for 21 days total, then aspirin 81 mg daily    Continue Lipitor 40 mg daily, patient advised to adhere to a low-fat low-cholesterol diet    Outpatient physical therapy referral sent, patient found to have a significant PFO on JAYDEN, outpatient evaluation by Cardiology and Neurology      Lower extremity Dopplers completed prior to discharge, negative for DVT

## 2022-05-20 NOTE — DISCHARGE SUMMARY
[facility]  Discharge- Israel Sharma 1955, 73 y o  female MRN: [id number]  Unit/Bed#: -01 Encounter: [id number]  Primary Care Provider: Jazmyn Fajardo DO   Date and time admitted to hospital: 5/18/2022  5:19 PM    * Stroke [facility])  Assessment & Plan  Small CVA on MRI    Continue dual antiplatelet therapy, continue statin, dual anti-platelet therapy for 21 days total, then aspirin 81 mg daily    Continue Lipitor 40 mg daily, patient advised to adhere to a low-fat low-cholesterol diet    Outpatient physical therapy referral sent, patient found to have a significant PFO on JAYDEN, outpatient evaluation by Cardiology and Neurology      Lower extremity Dopplers completed prior to discharge, negative for DVT    Dyslipidemia  Assessment & Plan  Continue Lipitor 40 mg daily    Hashimoto's disease  Assessment & Plan  Patient was unclear about her history with regards to possible Hashimoto's disease, recommend follow-up with PCP, as well as endocrine if deemed necessary by her primary care physician    Chronic pain syndrome  Assessment & Plan  No significant pain reported      Medical Problems             Resolved Problems  Date Reviewed: 5/20/2022   None               Discharging Physician / Practitioner: Flavio Lawson DO  PCP: Jazmyn Fajardo DO  Admission Date:   Admission Orders (From admission, onward)     Ordered        05/18/22 1848  INPATIENT ADMISSION  Once                      Discharge Date: 05/20/22    Consultations During Hospital Stay:  · Neurology    Procedures Performed:   · Transesophageal echocardiogram    Significant Findings / Test Results:   · PFO on JAYDEN    Incidental Findings:   · None     Test Results Pending at Discharge (will require follow up):   · Preliminary report of lower extremity Dopplers is negative for DVT     Reason for Admission:  Please refer to admitting history and physical     Hospital Course:   Israel Sharma is a 73 y o  female patient who originally presented to the hospital on 5/18/2022 due to concerns for acute CVA, patient identified to have a positive MRI with findings of acute to subacute CVA  Patient was seen and evaluated by Neurology, please refer to medical record for details of exam findings       Overall patient with minimal residual deficits, outpatient physical therapy recommended, patient will be treated with 21 days total of dual anti-platelet therapy in the form of Plavix 75 mg daily for an additional 18 days, then aspirin 81 mg daily for life  Elevated LDL cholesterol, Lipitor 40 mg daily ordered at discharge  Please see above list of diagnoses and related plan for additional information  Condition at Discharge: good    Discharge Day Visit / Exam:   Subjective:  Overall feeling better, no acute complaints  Vitals: Blood Pressure: 138/93 (05/20/22 1522)  Pulse: 63 (05/20/22 1522)  Temperature: 97 5 °F (36 4 °C) (05/20/22 1522)  Temp Source: Temporal (05/20/22 1432)  Respirations: 18 (05/20/22 1522)  Height: 5' 6" (167 6 cm) (05/19/22 0925)  Weight - Scale: 62 6 kg (138 lb) (05/19/22 0925)  SpO2: 96 % (05/20/22 1522)  Exam:   Physical Exam  Vitals and nursing note reviewed  Constitutional:       General: She is not in acute distress  Appearance: She is not ill-appearing, toxic-appearing or diaphoretic  Cardiovascular:      Rate and Rhythm: Normal rate  Pulses: Normal pulses  Heart sounds: Normal heart sounds  No murmur heard  No friction rub  No gallop  Skin:     General: Skin is warm and dry  Neurological:      General: No focal deficit present  Mental Status: Mental status is at baseline  Cranial Nerves: No cranial nerve deficit  Motor: No weakness  Psychiatric:         Mood and Affect: Mood normal          Behavior: Behavior normal          Thought Content:  Thought content normal          Judgment: Judgment normal           Discussion with Family: Updated  (daughter) at bedside  Discharge instructions/Information to patient and family:   See after visit summary for information provided to patient and family  Provisions for Follow-Up Care:  See after visit summary for information related to follow-up care and any pertinent home health orders  Disposition:   Home    Planned Readmission: no     Discharge Statement:  I spent 35 minutes discharging the patient  This time was spent on the day of discharge  I had direct contact with the patient on the day of discharge  Greater than 50% of the total time was spent examining patient, answering all patient questions, arranging and discussing plan of care with patient as well as directly providing post-discharge instructions  Additional time then spent on discharge activities  Discharge Medications:  See after visit summary for reconciled discharge medications provided to patient and/or family        **Please Note: This note may have been constructed using a voice recognition system**

## 2022-05-20 NOTE — ANESTHESIA PREPROCEDURE EVALUATION
Procedure:  JAYDEN    Relevant Problems   MUSCULOSKELETAL   (+) Cervical spondylosis   (+) DDD (degenerative disc disease), lumbar   (+) Primary osteoarthritis of left hip      NEURO/PSYCH   (+) Chronic pain syndrome   (+) Stroke (HCC)      Endocrine   (+) Hashimoto's disease      Other   (+) Dyslipidemia        Physical Exam    Airway    Mallampati score: II  TM Distance: >3 FB  Neck ROM: full     Dental   No notable dental hx     Cardiovascular  Cardiovascular exam normal    Pulmonary  Pulmonary exam normal     Other Findings        Anesthesia Plan  ASA Score- 2     Anesthesia Type- IV sedation with anesthesia with ASA Monitors  Additional Monitors:   Airway Plan:           Plan Factors-    Chart reviewed  Patient summary reviewed  Patient is not a current smoker  Induction- intravenous  Postoperative Plan-     Informed Consent- Anesthetic plan and risks discussed with patient  I personally reviewed this patient with the CRNA  Discussed and agreed on the Anesthesia Plan with the CRNA  Gladys Hinton

## 2022-05-20 NOTE — QUICK NOTE
JAYDEN completed  Per cardiology, pt with average size PFO but with a significant amount of bubbles crossed on JAYDEN  Per cardiology, cardiology will discuss closure of PFO with pt as an outpatient  Bilateral lower extremity dopplers pending due to concern for DVT  Recommend lower extremity dopplers be completed prior to discharge  Defer decision for loop recorder to cardiology as an outpatient   Discussed with attending neurologist

## 2022-05-20 NOTE — ASSESSMENT & PLAN NOTE
Patient was unclear about her history with regards to possible Hashimoto's disease, recommend follow-up with PCP, as well as endocrine if deemed necessary by her primary care physician

## 2022-05-20 NOTE — PROGRESS NOTES
Progress Note - Neurology   Miguel Gonzalez 73 y o  female [id number]  Unit/Bed#: /-01    Assessment/Plan:    * Stroke [facility])  Assessment & Plan  73 y o  RHD female with no significant medical history who [facility] on [facility] with L hand, L arm numbness, and difficulty with fine motor skills in L hand that started at 10 AM on 5/19/22  BP on arrival 165/83  NIHSS 1  CTA H/N wwo contrast in ED revealed no significant stenosis in major arteries of the neck, no LVO, and no intracranial aneurysm  [facility] in ED revealed no acute intracranial hemorrhage, mass effect or edema  Pt not a tPA candidate due to being outside of tPA time window  MRI brain completed on 5/19/22 revealed acute ischemia in the posterior R frontal region  Stroke due to undetermined source at this time  Plan   - Stroke pathway   MRI brain completed    TTE completed 5/19/22, revealed EF 65% and normal sized L atrium  No thrombus identified   JAYDEN pending    If JAYDEN unrevealing, recommend loop recorder placement as an outpatient    Labs:    Lipid Panel: total cholesterol 227,     Hemoglobin A1c 5 6   TSH 3 4   D Dimer 0 73   Thrombosis panel pending    S/p  mg and Plavix 300 mg load on 5/18/22   Continue DAPT with Aspirin 81 mg daily and Plavix 75 mg daily on x 21 days (last day 6/8/22), then stop Plavix and continue with ASA as monotherapy   Continue Atorvastatin 40 mg   goal normotension    Euglycemic, normothermic goal   Continue telemetry   PT/OT/ST   Stroke education   Frequent neuro checks  Continue to monitor and notify neurology with any changes   STAT CT head for any acute change in neuro exam  - Medical management and supportive care per primary team  Correction of any metabolic or infectious disturbances                   Dyslipidemia  Assessment & Plan  - continue statin     Case and plan discussed with attending neurologist  Please see attending attestation for any further recommendations and/or changes to plan    Luis Bello will need follow up in in 6 weeks with neurovascular attending or AP   She will not require outpatient neurological testing  Subjective:   Patient reports that she feels like she is getting some strength back in her left fingers  Patient still feels that her abduction and adduction of fingers in her left hand is weaker compared to her right hand  Patient reports she wants to eat but knows that she is NPO  Patient informed that her JAYDEN  is scheduled for 2:00 p m  Today          Past Medical History:   Diagnosis Date    Benign paroxysmal positional vertigo     Resolved: 9/15/2014    Fracture of radius      Past Surgical History:   Procedure Laterality Date    BIOPSY CORE NEEDLE      Thyroid     SECTION      COLPOSCOPY       Family History   Problem Relation Age of Onset    Stroke Mother         CVA    Breast cancer Mother 77    Alcohol abuse Father     COPD Father     Other Father         Nicotine Abuse    Thyroid cancer Brother     Coronary artery disease Brother     Thyroid disease Maternal Grandmother     Breast cancer Maternal Aunt 36    Breast cancer additional onset Maternal Aunt 48    No Known Problems Sister     No Known Problems Daughter     No Known Problems Maternal Grandfather     No Known Problems Paternal Grandmother     No Known Problems Paternal Grandfather     No Known Problems Daughter     No Known Problems Maternal Aunt     No Known Problems Maternal Aunt     No Known Problems Maternal Aunt      Social History     Socioeconomic History    Marital status: Single     Spouse name: None    Number of children: None    Years of education: None    Highest education level: None   Occupational History    None   Tobacco Use    Smoking status: Never Smoker    Smokeless tobacco: Never Used   Vaping Use    Vaping Use: Never used   Substance and Sexual Activity    Alcohol use: Yes     Comment: social    Drug use: No    Sexual activity: Not Currently   Other Topics Concern    None   Social History Narrative    Single, lives alone, part-time employment in [employer] Strain: Not on file   Food Insecurity: No Food Insecurity    Worried About [facility] in [address]: Never true   [address] in the Last Year: Never true   Transportation Needs: No Transportation Needs    Lack of Transportation (Medical): No    Lack of Transportation (Non-Medical): No   Physical Activity: Not on file   Stress: Not on file   Social Connections: Not on file   Intimate Partner Violence: Not on file   Housing Stability: Low Risk     Unable to Pay for Housing in the Last Year: No    Number of Places Lived in the Last Year: 1    Unstable Housing in the Last Year: No     E-Cigarette/Vaping    E-Cigarette Use Never User      E-Cigarette/Vaping Substances    Nicotine No     Flavoring No          Medications: All current active meds have been reviewed and current meds:  Scheduled Meds:  Current Facility-Administered Medications   Medication Dose Route Frequency Provider Last Rate    aspirin  81 mg Oral Daily Alison Cobb PA-C      atorvastatin  40 mg Oral QPM Alison Cobb PA-C      clopidogrel  75 mg Oral Daily Alison Cobb PA-C      enoxaparin  40 mg Subcutaneous Daily Alison Cobb PA-C      hydrALAZINE  5 mg Intravenous Q6H PRN Alison Cobb PA-C       Continuous Infusions:   PRN Meds:   hydrALAZINE       ROS:   Review of Systems   Constitutional: Negative for chills and fever  HENT: Negative for congestion and trouble swallowing  Eyes: Negative for photophobia and visual disturbance  Respiratory: Negative for cough and shortness of breath  Cardiovascular: Negative for chest pain and palpitations  Gastrointestinal: Negative for nausea and vomiting  Genitourinary: Negative for difficulty urinating and dysuria  Musculoskeletal: Positive for gait problem (walks with limp at baseline )  Negative for arthralgias  Neurological: Positive for weakness (in L fingers )  Negative for dizziness, speech difficulty, numbness and headaches  Psychiatric/Behavioral: Negative for agitation, confusion and suicidal ideas  Vitals:   /74 (BP Location: Left arm)   Pulse 64   Temp 97 9 °F (36 6 °C) (Oral)   Resp 18   Ht 5' 6" (1 676 m)   Wt 62 6 kg (138 lb)   SpO2 96%   BMI 22 27 kg/m²     Physical Exam:   Physical Exam  Vitals and nursing note reviewed  Constitutional:       General: She is not in acute distress  Appearance: She is normal weight  She is not diaphoretic  HENT:      Head: Normocephalic and atraumatic  Nose: Nose normal  No congestion or rhinorrhea  Mouth/Throat:      Mouth: Mucous membranes are moist       Pharynx: Oropharynx is clear  No oropharyngeal exudate or posterior oropharyngeal erythema  Eyes:      General: No scleral icterus  Right eye: No discharge  Left eye: No discharge  Extraocular Movements: Extraocular movements intact and EOM normal       Conjunctiva/sclera: Conjunctivae normal    Cardiovascular:      Rate and Rhythm: Normal rate  Pulmonary:      Effort: Pulmonary effort is normal    Musculoskeletal:      Right lower leg: No edema  Left lower leg: No edema  Neurological:      Mental Status: She is alert and oriented to person, place, and time  Coordination: Heel to Shin Test normal    Psychiatric:      Comments: Pleasant and cooperative during exam        Neurologic Exam     Mental Status   Oriented to person, place, and time  Follows 2 step commands  Attention: Appropriately attends to provider  Concentration: No redirection or reorientation required during exam    Speech: (Clear and fluent  No dysarthria appreciated on exam)  Level of consciousness: alert  Able to name object (Mask, gloves, glasses)       Cranial Nerves CN II   Visual acuity: (Grossly intact)  Right visual field deficit: none  Left visual field deficit: none     CN III, IV, VI   Extraocular motions are normal    Right pupil: Size: 3 mm  Shape: regular  Left pupil: Size: 3 mm  Shape: regular  CN III: no CN III palsy  CN VI: no CN VI palsy  Nystagmus: none     CN V   Facial sensation intact  Right facial sensation deficit: none  Left facial sensation deficit: none    CN VII   Facial expression full, symmetric  Right facial weakness: none  Left facial weakness: none    CN VIII   CN VIII normal    Hearing: intact    CN IX, X   CN IX normal    CN X normal    Palate: symmetric    CN XI   CN XI normal    Right trapezius strength: normal  Left trapezius strength: normal    CN XII   CN XII normal    Tongue: not atrophic  Fasciculations: absent  Tongue deviation: none    Motor Exam   Strength to confrontation testing:  -bilateral hand  5/5  -bilateral wrist flexion and extension 5/5  -right interossei muscle strength 5/5  -left interossei muscle strength 4+/5  -right finger flexion 5/5  -left finger flexion 5-/5  -bilateral elbow flexion extension 5/5  -bilateral shoulder abduction and adduction 5/5  -bilateral plantar flexion and dorsiflexion 5/5  -bilateral knee flexion and extension 5/5  -bilateral hip flexion 5/5     Sensory Exam   Light touch normal    Right arm light touch: normal  Left arm light touch: normal  Right leg light touch: normal  Left leg light touch: normal    Gait, Coordination, and Reflexes     Gait  Gait: (Deferred for patient safety)    Coordination   Heel to shin coordination: normal          Labs: I have personally reviewed pertinent reports     Recent Results (from the past 24 hour(s))   D-dimer, quantitative    Collection Time: 05/20/22  4:25 AM   Result Value Ref Range    D-Dimer, Quant 0 73 (H) <0 50 ug/ml FEU       Imaging: I have personally reviewed pertinent imaging in PACS, including MRI brain wo contrast 5/19/22, CTA H/N wwo contrast 5/18/22, CT brain wo contrast 5/18/22,  and I have personally reviewed PACS reports  EKG, Pathology, and Other Studies: I have personally reviewed pertinent reports  VTE Prophylaxis: Enoxaparin (Lovenox)      Counseling / Coordination of Care  Total time spent today 25 minutes  Greater than 50% of total time was spent with the patient and/or family counseling and/or coordination of care  A description of the counseling/coordination of care:  Patient was seen and evaluated  Discussed with attending  Chart reviewed thoroughly including laboratory and imaging studies  Plan of care discussed with patient and primary team       This note was completed in part utilizing [email or web address] Direct Software  Grammatical errors, random word insertions, spelling mistakes, and incomplete sentences may be an occasional consequence of this system secondary to software limitations, ambient noise, and hardware issues  If you have any questions or concerns about the content, text, or information contained within the body of this dictation, please contact the provider for clarification

## 2022-05-20 NOTE — PLAN OF CARE
Problem: Neurological Deficit  Goal: Neurological status is stable or improving  Description: Interventions:  - Monitor and assess patient's level of consciousness, motor function, sensory function, and level of assistance needed for ADLs  - Monitor and report changes from baseline  Collaborate with interdisciplinary team to initiate plan and implement interventions as ordered  - Provide and maintain a safe environment  - Consider seizure precautions  - Consider fall precautions  - Consider aspiration precautions  - Consider bleeding precautions  Outcome: Progressing     Problem: Activity Intolerance/Impaired Mobility  Goal: Mobility/activity is maintained at optimum level for patient  Description: Interventions:  - Assess and monitor patient  barriers to mobility and need for assistive/adaptive devices  - Assess patient's emotional response to limitations  - Collaborate with interdisciplinary team and initiate plans and interventions as ordered  - Encourage independent activity per ability   - Maintain proper body alignment  - Perform active/passive rom as tolerated/ordered  - Plan activities to conserve energy   - Turn patient as appropriate  Outcome: Progressing     Problem: Communication Impairment  Goal: Ability to express needs and understand communication  Description: Assess patient's communication skills and ability to understand information  Patient will demonstrate use of effective communication techniques, alternative methods of communication and understanding even if not able to speak  - Encourage communication and provide alternate methods of communication as needed  - Collaborate with case management/ for discharge needs  - Include patient/family/caregiver in decisions related to communication    Outcome: Progressing     Problem: Potential for Aspiration  Goal: Non-ventilated patient's risk of aspiration is minimized  Description: Assess and monitor vital signs, respiratory status, and labs (WBC)  Monitor for signs of aspiration (tachypnea, cough, rales, wheezing, cyanosis, fever)  - Assess and monitor patient's ability to swallow  - Place patient up in chair to eat if possible  - HOB up at 90 degrees to eat if unable to get patient up into chair   - Supervise patient during oral intake  - Instruct patient/ family to take small bites  - Instruct patient/ family to take small single sips when taking liquids  - Follow patient-specific strategies generated by speech pathologist   Outcome: Progressing     Problem: Nutrition  Goal: Nutrition/Hydration status is improving  Description: Monitor and assess patient's nutrition/hydration status for malnutrition (ex- brittle hair, bruises, dry skin, pale skin and conjunctiva, muscle wasting, smooth red tongue, and disorientation)  Collaborate with interdisciplinary team and initiate plan and interventions as ordered  Monitor patient's weight and dietary intake as ordered or per policy  Utilize nutrition screening tool and intervene per policy  Determine patient's food preferences and provide high-protein, high-caloric foods as appropriate  - Assist patient with eating   - Allow adequate time for meals   - Encourage patient to take dietary supplement as ordered  - Collaborate with clinical nutritionist   - Include patient/family/caregiver in decisions related to nutrition    Outcome: Progressing     Problem: Potential for Falls  Goal: Patient will remain free of falls  Description: INTERVENTIONS:  - Educate patient/family on patient safety including physical limitations  - Instruct patient to call for assistance with activity   - Consult OT/PT to assist with strengthening/mobility   - Keep Call bell within reach  - Keep bed low and locked with side rails adjusted as appropriate  - Keep care items and personal belongings within reach  - Initiate and maintain comfort rounds  - Make Fall Risk Sign visible to staff  - Offer Toileting every 3 Hours, in advance of need  - Initiate/Maintain 3alarm  - Obtain necessary fall risk management equipment: 3  - Apply yellow socks and bracelet for high fall risk patients  - Consider moving patient to room near nurses station  Outcome: Progressing

## 2022-05-21 LAB — DEPRECATED AT III PPP: 124 % OF NORMAL (ref 92–136)

## 2022-05-23 ENCOUNTER — TRANSITIONAL CARE MANAGEMENT (OUTPATIENT)
Dept: FAMILY MEDICINE CLINIC | Facility: HOSPITAL | Age: 67
End: 2022-05-23

## 2022-05-23 ENCOUNTER — TELEPHONE (OUTPATIENT)
Dept: NEUROLOGY | Facility: CLINIC | Age: 67
End: 2022-05-23

## 2022-05-23 ENCOUNTER — TELEPHONE (OUTPATIENT)
Dept: CARDIOLOGY CLINIC | Facility: CLINIC | Age: 67
End: 2022-05-23

## 2022-05-23 LAB
APTT SCREEN TO CONFIRM RATIO: 1.08 RATIO (ref 0–1.34)
CONFIRM APTT/NORMAL: 41.2 SEC (ref 0–47.6)
LA PPP-IMP: NORMAL
PROT C AG ACT/NOR PPP IA: 149 % OF NORMAL (ref 60–150)
PROT S ACT/NOR PPP: 115 % (ref 68–108)
PROT S ACT/NOR PPP: 128 % (ref 61–136)
PROT S PPP-ACNC: 121 % (ref 60–150)
SCREEN APTT: 38.9 SEC (ref 0–51.9)
SCREEN DRVVT: 34.8 SEC (ref 0–47)
THROMBIN TIME: 17.6 SEC (ref 0–23)

## 2022-05-23 NOTE — TELEPHONE ENCOUNTER
SCHEDULED: Fri, 7/13/22 at 10:30am w/ Kathryn Delatorre in [place]  HFU/SLUB 5/20/CVA          NOTES: Brendaarnaldo Lorenzo will need follow up in in 6 weeks with neurovascular attending or AP   She will not require outpatient neurological testing

## 2022-05-23 NOTE — TELEPHONE ENCOUNTER
Pt called to schedule a new patient appt  Had a JAYDEN while admitted in hospital  Was discharged with 18 day supply of Plavix and 30 days of Lipitor  NO REFILLS OF EITHER  Her appt is scheduled for 6/30 and will run out before being seen  Please advise her how to proceed      Thank you!!

## 2022-05-23 NOTE — TELEPHONE ENCOUNTER
Spoke with pt to call PCP as she has not yet been established with a cardiologist  Pt verbalized understanding

## 2022-05-24 ENCOUNTER — PATIENT OUTREACH (OUTPATIENT)
Dept: FAMILY MEDICINE CLINIC | Facility: HOSPITAL | Age: 67
End: 2022-05-24

## 2022-05-24 ENCOUNTER — TELEPHONE (OUTPATIENT)
Dept: CARDIOLOGY CLINIC | Facility: CLINIC | Age: 67
End: 2022-05-24

## 2022-05-24 NOTE — PROGRESS NOTES
Outpatient Care Management Note:    Care manager called Magdalena Adrian, introduced myself and the [facility] program  Magdalena Adrian states that she is feeling well  The only residual deficit she has from her stroke is that her ring finger on her left hand does not work properly  We reviewed warning signs of stroke  Magdalena Adrian knows to call 911 if she experiences any of these symptoms  Magdalena Adrian scheduled her follow up appts  She is scheduled with cardiology on 6/30  She noted that she was told she needed a device implanted, and that Dr Jerome Nichols is the only one who does it  Currently, she is scheduled with Dr Sunshine Almonte  Cm will attempt to follow up with cardiology regarding above  Magdalena Adrian did not schedule with therapy  She states that since her symptoms resolved so quickly, she was told she did not need to do therapy  Med review completed  CM gave Magdalena Adrian my contact information  She knows to call her PCP office directly with any immediate concerns  CM called cardiology and spoke with Amos Agrawal  I reviewed Sandra's questions  Amos Agrawal will follow up to determine if she needs to see Dr Jerome Nichols and will call patient with any update  Voice mail message left for Magdalena Nguyễn, with my contact information, informing her that cardiology will look into her question and will be calling her with an answer

## 2022-05-25 ENCOUNTER — PATIENT OUTREACH (OUTPATIENT)
Dept: FAMILY MEDICINE CLINIC | Facility: HOSPITAL | Age: 67
End: 2022-05-25

## 2022-05-25 LAB
B2 GLYCOPROT1 IGA SERPL IA-ACNC: 1.4
B2 GLYCOPROT1 IGG SERPL IA-ACNC: 1
B2 GLYCOPROT1 IGM SERPL IA-ACNC: <2.9
CARDIOLIPIN IGA SER IA-ACNC: 4
CARDIOLIPIN IGG SER IA-ACNC: 1.1
CARDIOLIPIN IGM SER IA-ACNC: <0.8

## 2022-05-25 NOTE — TELEPHONE ENCOUNTER
Called left vm for pt to cb in regards to canceling her appt with Dr Juhi Rucker on 6/30 as she now has an appt with Dr Marina Mena for closure of her PFO  As well as relaying that the test results havent been gone over yet by a provider

## 2022-05-25 NOTE — PROGRESS NOTES
Outpatient Care Management Note:    Voice mail message received from Jackson Junior stating she has an appt regarding closure of her heart  She is wondering if she needs to keep other cardiology appt  THERESA called Jackson Junior  She is now scheduled with Dr Jayjay Fox on 6/20  She is wondering if she still needs to see Dr Ozzie Puga  CM encouraged her to discuss the question with Dr Vicky Lo tomorrow at her office visit

## 2022-05-26 ENCOUNTER — OFFICE VISIT (OUTPATIENT)
Dept: FAMILY MEDICINE CLINIC | Facility: HOSPITAL | Age: 67
End: 2022-05-26
Payer: MEDICARE

## 2022-05-26 VITALS
BODY MASS INDEX: 23.95 KG/M2 | WEIGHT: 149 LBS | HEART RATE: 74 BPM | TEMPERATURE: 98.2 F | HEIGHT: 66 IN | SYSTOLIC BLOOD PRESSURE: 126 MMHG | DIASTOLIC BLOOD PRESSURE: 78 MMHG

## 2022-05-26 DIAGNOSIS — Z09 HOSPITAL DISCHARGE FOLLOW-UP: Primary | ICD-10-CM

## 2022-05-26 DIAGNOSIS — E78.5 DYSLIPIDEMIA: ICD-10-CM

## 2022-05-26 DIAGNOSIS — Q21.1 PFO (PATENT FORAMEN OVALE): ICD-10-CM

## 2022-05-26 DIAGNOSIS — R79.89 ELEVATED TSH: ICD-10-CM

## 2022-05-26 DIAGNOSIS — I63.9 CEREBROVASCULAR ACCIDENT (CVA), UNSPECIFIED MECHANISM (HCC): ICD-10-CM

## 2022-05-26 DIAGNOSIS — M54.50 THORACOLUMBAR BACK PAIN: ICD-10-CM

## 2022-05-26 DIAGNOSIS — M54.6 THORACOLUMBAR BACK PAIN: ICD-10-CM

## 2022-05-26 PROBLEM — Q21.12 PFO (PATENT FORAMEN OVALE): Status: ACTIVE | Noted: 2022-05-26

## 2022-05-26 PROCEDURE — 99496 TRANSJ CARE MGMT HIGH F2F 7D: CPT | Performed by: INTERNAL MEDICINE

## 2022-05-26 RX ORDER — ATORVASTATIN CALCIUM 40 MG/1
40 TABLET, FILM COATED ORAL EVERY EVENING
Qty: 30 TABLET | Refills: 5 | Status: SHIPPED | OUTPATIENT
Start: 2022-05-26

## 2022-05-26 RX ORDER — CYCLOBENZAPRINE HCL 5 MG
5 TABLET ORAL 3 TIMES DAILY PRN
Qty: 30 TABLET | Refills: 0 | Status: SHIPPED | OUTPATIENT
Start: 2022-05-26 | End: 2022-08-09

## 2022-05-26 NOTE — PROGRESS NOTES
Assessment/Plan:    Stroke [facility])  R posterior frontal lobe acute/sub-acute ischemia noted on MRI, on Asa/Plavix/statin, no deficit noted on exam today, deferring need for PT, has f/u with Neuro in July and Cardio in June, call with any recurrent Neuro symptoms    PFO (patent foramen ovale)  + PFO /+ bubble study on JAYDEN, has f/u with Cardio for further discussion of closure d/t recent stroke, on ASA/Plavix/statin    Dyslipidemia  Tolerating statin w/o SE, con't current regimen, recheck FLP in Aug-Sept - order given    Elevated TSH  Repeat TSH nml - has been up and down in the past, off all thyroid meds other then OTC thyroid supplement/chlorophyll she takes, repeat TFT's with labs in Aug-Sept - order given       Diagnoses and all orders for this visit:    Hospital discharge follow-up    Cerebrovascular accident (CVA), unspecified mechanism ([address] )  -     atorvastatin (LIPITOR) 40 mg tablet; Take 1 tablet (40 mg total) by mouth every evening  -     TSH, 3rd generation with Free T4 reflex  -     Comprehensive metabolic panel    PFO (patent foramen ovale)  -     TSH, 3rd generation with Free T4 reflex  -     Comprehensive metabolic panel    Dyslipidemia  -     Lipid panel  -     TSH, 3rd generation with Free T4 reflex  -     Comprehensive metabolic panel    Elevated TSH  -     TSH, 3rd generation with Free T4 reflex    Thoracolumbar back pain  Comments:  Reassured exam nml today and no radicular symptoms or abnormality noted on exam, will try PT again and trial of Flexeril - SE reviewed and urged no driving/work until she know's how the rx affects her, call with new/worse symptoms  Orders:  -     Ambulatory Referral to Physical Therapy; Future  -     cyclobenzaprine (FLEXERIL) 5 mg tablet;  Take 1 tablet (5 mg total) by mouth 3 (three) times a day as needed for muscle spasms  -     TSH, 3rd generation with Free T4 reflex  -     Comprehensive metabolic panel      Cologuaneela 8/19 - 3 yrs    Mammo 12/21    Ene 11/20 - osteopenia    PAP 7/19    BW 5/22        Subjective:      Patient ID: Julio Soto is a 73 y o  female  HPI Pt here for TCM/Hospital follow  Pt was admitted to [facility] on 5/18 - 5/20/22  Records were reviewed by myself in detail and events are summarized below  TCM Call (since 4/25/2022)     Date and time call was made  5/23/2022 10:45 AM    Hospital care reviewed  Records reviewed    Patient was hospitialized at  [address]  [facility]    Date of Admission  05/18/22    Date of discharge  05/20/22    Diagnosis  stroke    Disposition  Home    Current Symptoms  None      TCM Call (since 4/25/2022)     Post hospital issues  None    Should patient be enrolled in anticoag monitoring? Yes    Scheduled for follow up? Yes    Did you obtain your prescribed medications  Yes    Do you need help managing your prescriptions or medications  No    Is transportation to your appointment needed  No    I have advised the patient to call PCP with any new or worsening symptoms  Kaitlyn Knight MA        Pt presented to [facility] ED on 5/18/22 with c/o L hand and forearm numbness and weakness since earlier that day  Eval in ED notable for /83 and exam notable for sensory deficit of entire LUE in the ED  Eval in ED showed nml troponin/PT/PTT/CMP/CBC  ECG showed sinus ambrocio  CT head and CTA head and neck showed no acute abnormality  Pt was admitted for stroke like symptoms  Neuro was consulted  tPA was not given as pt was outside of tx window  She was loaded with Plavix and ASA  Atorvastatin was started  Echo and MRI were ordered  MRI brain showed posterior R frontal lobe acute/subacute ischemia  TTE showed EF 65% with grade 1 diastolic dysfunction and trace MR and mild TR  JAYDEN was done and + PFO with + bubble study  Case discussed with Cardio and was advised to f/u with Cardio as OP to discuss closure of PFO and poss loop recorder    Notable during hospital stay pts A1c was 5 6, TSH nml, FLP with , HDL 73, TG 69, LDL 140   D-dimer was elevated  Thrombosis panel + protein S activity elevation but Proteins S Ag free and total were normal   LE venous duplex was negative for DVT  Pt had gradual improvement of symptoms and states "I was 90% back"  Pt was discharged home on 5/18/22 with recommendation to con't ASA at 81 mg and Plavix 5 mg q day x 21 days and then con't just ASA  Med list reviewed  Pt has been doing well since discharge  She feels she is almost back to normal  She is back to work and sewing  She notes a bit of issues bringing fingers together and some weakness in the hand (L)  She denies dropping objects  She states every day it con't to get a bit better  She notes no visual issues/speech issues/new focal deficits  She is taking her ASA and Plavix and is bruising easily  She notes no nose bleeds/bleeding gums/blood in stool or urine  She is taking the Atorvastatin w/o SE  She is taking all her other supplements as directed  Thrombosis panel was reviewed  Pt has an appt with Neuro July 22  She has appt with Cardio June 20th  Pt noting limitation in her exercise d/t mid back pain  Pain has been present for 2 yrs  She states pain started after COVID started d/t more sedentary lifestyle and then she started doing power walking and starting with back pain  She started doing PT and had Xrays of knee/hip/plevis/lumbar spine  She then had MRI of lumbar spine Aug 21 and mild multilevel spondylosis was noted  She was following with [facility] and was told it was not surgical   She was again referred to PT and was told "everything was so tight you just need to loosen up"  She has never tried a muscle relaxer but is interested in doing so  She is currently using Aleve tid  She is agreeable to trying PT again  She is doing HEP taught in PT in the past   She has tried heat and massage but had very short lived benefit so she stopped  She is following with a chiropractor and has appt tomorrow    She notes no new LE numbness/tingling/loss of bowel or bladder/F/C/saddle anesthesia/rashes  Review of Systems   Constitutional: Negative for chills and fever  HENT: Negative for congestion, hearing loss and trouble swallowing  Eyes: Negative for pain and visual disturbance  Respiratory: Negative for cough and shortness of breath  Cardiovascular: Negative for chest pain, palpitations and leg swelling  Gastrointestinal: Negative for abdominal pain, blood in stool, constipation, diarrhea, nausea and vomiting  Endocrine: Negative for polydipsia and polyuria  Genitourinary: Negative for difficulty urinating and dysuria  Musculoskeletal: Positive for back pain  Negative for gait problem  Skin: Negative for rash and wound  Neurological: Positive for weakness and numbness  Negative for dizziness, speech difficulty, light-headedness and headaches  Hematological: Bruises/bleeds easily  Psychiatric/Behavioral: Negative for behavioral problems, confusion and sleep disturbance  Objective:    /78   Pulse 74   Temp 98 2 °F (36 8 °C) (Tympanic)   Ht 5' 6" (1 676 m)   Wt 67 6 kg (149 lb)   BMI 24 05 kg/m²      Physical Exam  Vitals and nursing note reviewed  Constitutional:       General: She is not in acute distress  Appearance: She is well-developed  She is not ill-appearing  HENT:      Head: Normocephalic and atraumatic  Right Ear: Tympanic membrane and external ear normal  There is no impacted cerumen  Left Ear: Tympanic membrane and external ear normal  There is no impacted cerumen  Mouth/Throat:      Mouth: Mucous membranes are moist       Pharynx: Oropharynx is clear  No oropharyngeal exudate  Eyes:      General:         Right eye: No discharge  Left eye: No discharge  Extraocular Movements: Extraocular movements intact  Conjunctiva/sclera: Conjunctivae normal       Pupils: Pupils are equal, round, and reactive to light     Neck: Vascular: No carotid bruit  Trachea: No tracheal deviation  Cardiovascular:      Rate and Rhythm: Normal rate and regular rhythm  Heart sounds: Normal heart sounds  No murmur heard  No friction rub  Pulmonary:      Effort: Pulmonary effort is normal  No respiratory distress  Breath sounds: Normal breath sounds  No wheezing, rhonchi or rales  Abdominal:      General: There is no distension  Palpations: Abdomen is soft  Tenderness: There is no abdominal tenderness  There is no guarding or rebound  Musculoskeletal:         General: No deformity or signs of injury  Cervical back: Neck supple  Skin:     General: Skin is warm  Coloration: Skin is not pale  Findings: Bruising present  No rash  Neurological:      General: No focal deficit present  Mental Status: She is alert  Cranial Nerves: No cranial nerve deficit  Sensory: No sensory deficit  Motor: No weakness or abnormal muscle tone  Coordination: Coordination normal       Gait: Gait normal       Deep Tendon Reflexes: Reflexes normal    Psychiatric:         Mood and Affect: Mood normal          Behavior: Behavior normal          Thought Content:  Thought content normal          Judgment: Judgment normal

## 2022-05-26 NOTE — ASSESSMENT & PLAN NOTE
+ PFO /+ bubble study on JAYDEN, has f/u with Cardio for further discussion of closure d/t recent stroke, on ASA/Plavix/statin

## 2022-05-26 NOTE — ASSESSMENT & PLAN NOTE
R posterior frontal lobe acute/sub-acute ischemia noted on MRI, on Asa/Plavix/statin, no deficit noted on exam today, deferring need for PT, has f/u with Neuro in July and Cardio in June, call with any recurrent Neuro symptoms

## 2022-05-26 NOTE — ASSESSMENT & PLAN NOTE
Repeat TSH nml - has been up and down in the past, off all thyroid meds other then OTC thyroid supplement/chlorophyll she takes, repeat TFT's with labs in Aug-Sept - order given

## 2022-05-27 LAB — F5 GENE MUT ANL BLD/T: NORMAL

## 2022-05-31 LAB — F2 GENE MUT ANL BLD/T: NORMAL

## 2022-06-07 ENCOUNTER — PATIENT OUTREACH (OUTPATIENT)
Dept: FAMILY MEDICINE CLINIC | Facility: HOSPITAL | Age: 67
End: 2022-06-07

## 2022-06-07 NOTE — PROGRESS NOTES
Outpatient Care Management Note:    Voice mail message left for Lolis Wheeler, with my contact information, requesting a call back

## 2022-06-07 NOTE — PROGRESS NOTES
Outpatient Care Management Note:    Voice mail message received from Chu Khalil returning my call  CM called Chu Khalil back  She states she is busy working  She noted that she feels everything is back to normal   She denies any further issues with her left hand  She denies any questions or concerns  She declined ongoing outreach  She will keep my contact information and will call with any questions

## 2022-06-10 ENCOUNTER — APPOINTMENT (EMERGENCY)
Dept: RADIOLOGY | Facility: HOSPITAL | Age: 67
End: 2022-06-10
Payer: MEDICARE

## 2022-06-10 ENCOUNTER — HOSPITAL ENCOUNTER (EMERGENCY)
Facility: HOSPITAL | Age: 67
Discharge: HOME | End: 2022-06-10
Attending: EMERGENCY MEDICINE
Payer: MEDICARE

## 2022-06-10 VITALS
HEART RATE: 68 BPM | SYSTOLIC BLOOD PRESSURE: 142 MMHG | TEMPERATURE: 98.4 F | RESPIRATION RATE: 20 BRPM | DIASTOLIC BLOOD PRESSURE: 75 MMHG | OXYGEN SATURATION: 96 %

## 2022-06-10 DIAGNOSIS — R00.2 PALPITATIONS: Primary | ICD-10-CM

## 2022-06-10 LAB
2HR DELTA HS TROPONIN: 0 NG/L
ANION GAP SERPL CALCULATED.3IONS-SCNC: 12 MMOL/L (ref 4–13)
ATRIAL RATE: 77 BPM
BASOPHILS # BLD AUTO: 0.04 THOUSANDS/ΜL (ref 0–0.1)
BASOPHILS NFR BLD AUTO: 1 % (ref 0–1)
BUN SERPL-MCNC: 17 MG/DL (ref 5–25)
CALCIUM SERPL-MCNC: 9.4 MG/DL (ref 8.3–10.1)
CARDIAC TROPONIN I PNL SERPL HS: 2 NG/L
CARDIAC TROPONIN I PNL SERPL HS: 2 NG/L
CHLORIDE SERPL-SCNC: 102 MMOL/L (ref 100–108)
CO2 SERPL-SCNC: 26 MMOL/L (ref 21–32)
CREAT SERPL-MCNC: 0.85 MG/DL (ref 0.6–1.3)
EOSINOPHIL # BLD AUTO: 0.08 THOUSAND/ΜL (ref 0–0.61)
EOSINOPHIL NFR BLD AUTO: 1 % (ref 0–6)
ERYTHROCYTE [DISTWIDTH] IN BLOOD BY AUTOMATED COUNT: 13.1 % (ref 11.6–15.1)
GFR SERPL CREATININE-BSD FRML MDRD: 72 ML/MIN/1.73SQ M
GLUCOSE SERPL-MCNC: 107 MG/DL (ref 65–140)
HCT VFR BLD AUTO: 39.7 % (ref 34.8–46.1)
HGB BLD-MCNC: 12.7 G/DL (ref 11.5–15.4)
IMM GRANULOCYTES # BLD AUTO: 0.01 THOUSAND/UL (ref 0–0.2)
IMM GRANULOCYTES NFR BLD AUTO: 0 % (ref 0–2)
LYMPHOCYTES # BLD AUTO: 2.95 THOUSANDS/ΜL (ref 0.6–4.47)
LYMPHOCYTES NFR BLD AUTO: 39 % (ref 14–44)
MCH RBC QN AUTO: 28.7 PG (ref 26.8–34.3)
MCHC RBC AUTO-ENTMCNC: 32 G/DL (ref 31.4–37.4)
MCV RBC AUTO: 90 FL (ref 82–98)
MONOCYTES # BLD AUTO: 0.58 THOUSAND/ΜL (ref 0.17–1.22)
MONOCYTES NFR BLD AUTO: 8 % (ref 4–12)
NEUTROPHILS # BLD AUTO: 3.91 THOUSANDS/ΜL (ref 1.85–7.62)
NEUTS SEG NFR BLD AUTO: 51 % (ref 43–75)
NRBC BLD AUTO-RTO: 0 /100 WBCS
P AXIS: 42 DEGREES
PLATELET # BLD AUTO: 326 THOUSANDS/UL (ref 149–390)
PMV BLD AUTO: 9 FL (ref 8.9–12.7)
POTASSIUM SERPL-SCNC: 3.5 MMOL/L (ref 3.5–5.3)
PR INTERVAL: 180 MS
QRS AXIS: -6 DEGREES
QRSD INTERVAL: 88 MS
QT INTERVAL: 402 MS
QTC INTERVAL: 454 MS
RBC # BLD AUTO: 4.43 MILLION/UL (ref 3.81–5.12)
SODIUM SERPL-SCNC: 140 MMOL/L (ref 136–145)
T WAVE AXIS: 41 DEGREES
VENTRICULAR RATE: 77 BPM
WBC # BLD AUTO: 7.57 THOUSAND/UL (ref 4.31–10.16)

## 2022-06-10 PROCEDURE — 85025 COMPLETE CBC W/AUTO DIFF WBC: CPT | Performed by: EMERGENCY MEDICINE

## 2022-06-10 PROCEDURE — 93010 ELECTROCARDIOGRAM REPORT: CPT | Performed by: INTERNAL MEDICINE

## 2022-06-10 PROCEDURE — 99282 EMERGENCY DEPT VISIT SF MDM: CPT | Performed by: EMERGENCY MEDICINE

## 2022-06-10 PROCEDURE — 93005 ELECTROCARDIOGRAM TRACING: CPT

## 2022-06-10 PROCEDURE — 71045 X-RAY EXAM CHEST 1 VIEW: CPT

## 2022-06-10 PROCEDURE — 84484 ASSAY OF TROPONIN QUANT: CPT | Performed by: EMERGENCY MEDICINE

## 2022-06-10 PROCEDURE — 80048 BASIC METABOLIC PNL TOTAL CA: CPT | Performed by: EMERGENCY MEDICINE

## 2022-06-10 PROCEDURE — 36415 COLL VENOUS BLD VENIPUNCTURE: CPT | Performed by: EMERGENCY MEDICINE

## 2022-06-10 PROCEDURE — 99285 EMERGENCY DEPT VISIT HI MDM: CPT

## 2022-06-10 NOTE — ED PROVIDER NOTES
History  Chief Complaint   Patient presents with    Chest Pain     Pt had sudden chest pain lasting 20minutes, pt had recent tia and started on plavix  Pt denies chest pain now         78-year-old female presents for evaluation chest pain and palpitations that started shortly prior to arrival   Patient states symptoms are now fully resolved but she was concerned because she recently had a TIA  EMS administered aspirin in route  Patient denies associated shortness of breath, nausea vomiting or diaphoresis  No radiation of symptoms  Prior to Admission Medications   Prescriptions Last Dose Informant Patient Reported? Taking? Chlorophyll 100 MG TABS  Self No No   Sig: Take 1 tablet (100 mg total) by mouth daily   Magnesium 250 MG TABS  Self Yes No   Sig: Take 3 tablets by mouth in the morning  Misc Natural Products (ADRENAL PO)  Self Yes No   Sig: Take by mouth   Multiple Vitamin (MULTIVITAMINS PO)  Self Yes No   Sig: Take 1 capsule by mouth daily   Multiple Vitamins-Minerals (ZINC PO)  Self Yes No   Sig: Take 1 tablet by mouth daily   Naproxen Sodium 220 MG CAPS  Self Yes No   Sig: Take by mouth   THYROID PO  Self Yes No   Sig: Take by mouth   aspirin 81 mg chewable tablet   No No   Sig: Chew 1 tablet (81 mg total)  in the morning  atorvastatin (LIPITOR) 40 mg tablet   No No   Sig: Take 1 tablet (40 mg total) by mouth every evening   calcium carbonate (OS-WALKER) 1250 (500 Ca) MG tablet   Yes No   Sig: Take 2 tablets by mouth in the morning  cholecalciferol (VITAMIN D3) 1,000 units tablet   Yes No   Sig: Take 1,000 Units by mouth in the morning  clopidogrel (PLAVIX) 75 mg tablet   No No   Sig: Take 1 tablet (75 mg total) by mouth in the morning for 18 days  cyclobenzaprine (FLEXERIL) 5 mg tablet   No No   Sig: Take 1 tablet (5 mg total) by mouth 3 (three) times a day as needed for muscle spasms   zinc sulfate (ZINCATE) 220 mg capsule   Yes No   Sig: Take 220 mg by mouth in the morning  Facility-Administered Medications: None       Past Medical History:   Diagnosis Date    Benign paroxysmal positional vertigo     Resolved: 9/15/2014    Fracture of radius        Past Surgical History:   Procedure Laterality Date    BIOPSY CORE NEEDLE      Thyroid     SECTION      COLPOSCOPY         Family History   Problem Relation Age of Onset   [place] Stroke Mother         CVA    Breast cancer Mother 77    Alcohol abuse Father    [place] COPD Father     Other Father         Nicotine Abuse    Thyroid cancer Brother     Coronary artery disease Brother     Thyroid disease Maternal Grandmother     Breast cancer Maternal Aunt 36    Breast cancer additional onset Maternal Aunt 48    No Known Problems Sister     No Known Problems Daughter     No Known Problems Maternal Grandfather     No Known Problems Paternal Grandmother     No Known Problems Paternal Grandfather     No Known Problems Daughter     No Known Problems Maternal Aunt     No Known Problems Maternal Aunt     No Known Problems Maternal Aunt      I have reviewed and agree with the history as documented  E-Cigarette/Vaping    E-Cigarette Use Never User      E-Cigarette/Vaping Substances    Nicotine No     Flavoring No      Social History     Tobacco Use    Smoking status: Never Smoker    Smokeless tobacco: Never Used   Vaping Use    Vaping Use: Never used   Substance Use Topics    Alcohol use: Yes     Comment: social    Drug use: No       Review of Systems   Constitutional: Negative for fever  Cardiovascular: Positive for chest pain and palpitations  All other systems reviewed and are negative  Physical Exam  Physical Exam  Vitals and nursing note reviewed  Constitutional:       Appearance: She is well-developed  HENT:      Head: Normocephalic and atraumatic  Right Ear: External ear normal       Left Ear: External ear normal       Nose: Nose normal    Eyes:      General: No scleral icterus    Cardiovascular: Rate and Rhythm: Normal rate  Pulmonary:      Effort: Pulmonary effort is normal  No respiratory distress  Abdominal:      General: There is no distension  Musculoskeletal:         General: No deformity  Normal range of motion  Cervical back: Normal range of motion  Skin:     Findings: No rash  Neurological:      General: No focal deficit present  Mental Status: She is alert and oriented to person, place, and time     Psychiatric:         Mood and Affect: Mood normal          Vital Signs  ED Triage Vitals [06/10/22 1201]   Temperature Pulse Respirations Blood Pressure SpO2   98 4 °F (36 9 °C) 83 18 152/80 98 %      Temp src Heart Rate Source Patient Position - Orthostatic VS BP Location FiO2 (%)   -- -- -- -- --      Pain Score       --           Vitals:    06/10/22 1230 06/10/22 1300 06/10/22 1400 06/10/22 1500   BP: 150/73 160/74 154/80 142/75   Pulse: 73 69 70 68         Visual Acuity  Visual Acuity    Flowsheet Row Most Recent Value   L Pupil Size (mm) 3   R Pupil Size (mm) 3          ED Medications  Medications - No data to display    Diagnostic Studies  Results Reviewed     Procedure Component Value Units Date/Time    HS Troponin I 2hr [[id number]]  (Normal) Collected: 06/10/22 1407    Lab Status: Final result Specimen: Blood from Arm, Left Updated: 06/10/22 1440     hs TnI 2hr 2 ng/L      Delta 2hr hsTnI 0 ng/L     HS Troponin I 4hr [[id number]]     Lab Status: No result Specimen: Blood     HS Troponin 0hr (reflex protocol) [[id number]]  (Normal) Collected: 06/10/22 1216    Lab Status: Final result Specimen: Blood from Arm, Left Updated: 06/10/22 1250     hs TnI 0hr 2 ng/L     Basic metabolic panel [[id number]] Collected: 06/10/22 1216    Lab Status: Final result Specimen: Blood from Arm, Left Updated: 06/10/22 1236     Sodium 140 mmol/L      Potassium 3 5 mmol/L      Chloride 102 mmol/L      CO2 26 mmol/L      ANION GAP 12 mmol/L      BUN 17 mg/dL      Creatinine 0 85 mg/dL      Glucose 107 mg/dL      Calcium 9 4 mg/dL      eGFR 72 ml/min/1 73sq m     Narrative:      National Kidney Disease Foundation guidelines for Chronic Kidney Disease (CKD):     Stage 1 with normal or high GFR (GFR > 90 mL/min/1 73 square meters)    Stage 2 Mild CKD (GFR = 60-89 mL/min/1 73 square meters)    Stage 3A Moderate CKD (GFR = 45-59 mL/min/1 73 square meters)    Stage 3B Moderate CKD (GFR = 30-44 mL/min/1 73 square meters)    Stage 4 Severe CKD (GFR = 15-29 mL/min/1 73 square meters)    Stage 5 End Stage CKD (GFR <15 mL/min/1 73 square meters)  Note: GFR calculation is accurate only with a steady state creatinine    CBC and differential [[id number]] Collected: 06/10/22 1216    Lab Status: Final result Specimen: Blood from Arm, Left Updated: 06/10/22 1221     WBC 7 57 Thousand/uL      RBC 4 43 Million/uL      Hemoglobin 12 7 g/dL      Hematocrit 39 7 %      MCV 90 fL      MCH 28 7 pg      MCHC 32 0 g/dL      RDW 13 1 %      MPV 9 0 fL      Platelets 442 Thousands/uL      nRBC 0 /100 WBCs      Neutrophils Relative 51 %      Immat GRANS % 0 %      Lymphocytes Relative 39 %      Monocytes Relative 8 %      Eosinophils Relative 1 %      Basophils Relative 1 %      Neutrophils Absolute 3 91 Thousands/µL      Immature Grans Absolute 0 01 Thousand/uL      Lymphocytes Absolute 2 95 Thousands/µL      Monocytes Absolute 0 58 Thousand/µL      Eosinophils Absolute 0 08 Thousand/µL      Basophils Absolute 0 04 Thousands/µL                  X-ray chest 1 view portable   Final Result by Whit Leung MD (06/10 3699)      No acute cardiopulmonary disease  Workstation performed: [id number]                    Procedures  Procedures         ED Course                                             MDM  Number of Diagnoses or Management Options  Palpitations: new and requires workup  Diagnosis management comments:   71-year-old female presenting with palpitations which are now resolved    Obtain cardiac evaluation given short episode of chest pain  Delta troponin  Patient has remained asymptomatic  Discussed all results with patient  Follow up primary care provider  Return precautions discussed  Amount and/or Complexity of Data Reviewed  Clinical lab tests: reviewed and ordered  Tests in the radiology section of CPT®: reviewed and ordered  Tests in the medicine section of CPT®: reviewed and ordered  Decide to obtain previous medical records or to obtain history from someone other than the patient: yes  Review and summarize past medical records: yes        Disposition  Final diagnoses:   Palpitations     Time reflects when diagnosis was documented in both MDM as applicable and the Disposition within this note     Time User Action Codes Description Comment    6/10/2022  3:06 PM Yazminradha Fabiola Add [R00 2] Palpitations       ED Disposition     ED Disposition   Discharge    Condition   Stable    Date/Time   Fri Brad 10, 2022  3:06 PM    Comment   Rosa M Yang discharge to home/self care                 Follow-up Information     Follow up With Specialties Details Why Contact Info Additional Rojelio Pemberton [address], DO Internal Medicine, Family Medicine   [facility]  [address]  [address] [facility] [address] [facility],[address]        [facility] [facility] Emergency Medicine  If symptoms worsen [address] [place], [id number]  [address] [facility], [address] [facility] Fran Barros [address]          Discharge Medication List as of 6/10/2022  3:07 PM      CONTINUE these medications which have NOT CHANGED    Details   aspirin 81 mg chewable tablet Chew 1 tablet (81 mg total)  in the morning , Starting Sat 5/21/2022, No Print      atorvastatin (LIPITOR) 40 mg tablet Take 1 tablet (40 mg total) by mouth every evening, Starting Thu 5/26/2022, Normal      calcium carbonate (OS-WALKER) 1250 (500 Ca) MG tablet Take 2 tablets by mouth in the morning , Historical Med      Chlorophyll 100 MG TABS Take 1 tablet (100 mg total) by mouth daily, Starting Tue 9/15/2020, No Print      cholecalciferol (VITAMIN D3) 1,000 units tablet Take 1,000 Units by mouth in the morning , Historical Med      clopidogrel (PLAVIX) 75 mg tablet Take 1 tablet (75 mg total) by mouth in the morning for 18 days  , Starting Sat 5/21/2022, Until Wed 6/8/2022, Normal      cyclobenzaprine (FLEXERIL) 5 mg tablet Take 1 tablet (5 mg total) by mouth 3 (three) times a day as needed for muscle spasms, Starting Thu 5/26/2022, Normal      Magnesium 250 MG TABS Take 3 tablets by mouth in the morning , Historical Med      Misc Natural Products (ADRENAL PO) Take by mouth, Historical Med      Multiple Vitamin (MULTIVITAMINS PO) Take 1 capsule by mouth daily, Starting Mon 9/15/2014, Historical Med      !! Multiple Vitamins-Minerals (ZINC PO) Take 1 tablet by mouth daily, Historical Med      Naproxen Sodium 220 MG CAPS Take by mouth, Historical Med      THYROID PO Take by mouth, Historical Med      !! zinc sulfate (ZINCATE) 220 mg capsule Take 220 mg by mouth in the morning , Historical Med       !! - Potential duplicate medications found  Please discuss with provider  No discharge procedures on file      PDMP Review     None          ED Provider  Electronically Signed by           Ana Damon DO  06/10/22 3015

## 2022-06-13 ENCOUNTER — PATIENT OUTREACH (OUTPATIENT)
Dept: FAMILY MEDICINE CLINIC | Facility: HOSPITAL | Age: 67
End: 2022-06-13

## 2022-06-13 NOTE — PROGRESS NOTES
Outpatient Care Management Note:    Voice mail message left for Bry Al, with my contact information, attempting to follow up after her ER visit, and requesting she call me back if she has any questions or needs  CM did instruct Bry Al to call Dr Lauren Ramirez to schedule a follow up appt

## 2022-06-13 NOTE — PROGRESS NOTES
Outpatient Care Management Note:    Voice mail message received from Leobardo Champagne returning my call  Voice mail message left for Leobardo Champagne, with my contact information, returning her call

## 2022-06-15 ENCOUNTER — EVALUATION (OUTPATIENT)
Dept: PHYSICAL THERAPY | Facility: CLINIC | Age: 67
End: 2022-06-15
Payer: MEDICARE

## 2022-06-15 DIAGNOSIS — M54.6 THORACOLUMBAR BACK PAIN: Primary | ICD-10-CM

## 2022-06-15 DIAGNOSIS — M54.50 THORACOLUMBAR BACK PAIN: Primary | ICD-10-CM

## 2022-06-15 DIAGNOSIS — M54.6 THORACIC SPINE PAIN: ICD-10-CM

## 2022-06-15 PROCEDURE — 97162 PT EVAL MOD COMPLEX 30 MIN: CPT | Performed by: PHYSICAL THERAPIST

## 2022-06-15 NOTE — PROGRESS NOTES
PT Evaluation     Today's date: 6/15/2022  Patient name: Sol Blanco  : 1955  MRN: [id number]  Referring provider: Norma Guadalupe DO  Dx:   Encounter Diagnosis     ICD-10-CM    1  Thoracolumbar back pain  M54 50 Ambulatory Referral to Physical Therapy    M54 6     Reassured exam nml today and no radicular symptoms or abnormality noted on exam, will try PT again and trial of Flexeril - SE reviewed and urged no driving/work   2  Thoracic spine pain  M54 6                   Assessment  Assessment details: Pt is a 73y o  year old female coming to outpatient PT with a diagnosis of thoracolumbar pain  Pt presents with increased pain, decreased ROM in lumbar spine and L hip, decreased L hip strength, and overall decreased functional mobility  Pt would benefit from skilled PT services in order to address these deficits and reach maximum level of function  Thank you kindly for the referral!    Pt has severe OA in L hip with significant strength and ROM limitations  Pt does not have L hip pain( and has been advised not to have a SAMEER), however therapist believes that L hip deficits are a major contributing factor in thoracolumbar pain  Impairments: abnormal gait, abnormal muscle firing, abnormal or restricted ROM, activity intolerance, impaired physical strength, lacks appropriate home exercise program, pain with function and weight-bearing intolerance  Understanding of Dx/Px/POC: good   Prognosis: fair    Goals  STG's ( 3-4 weeks)  1  Pt will be independent in HEP  2  Decrease pain to 3-4/10 with activity  LTG's ( 6- 8 weeks)  1  Improve FOTO score by 6-8 points   2  Improve lumbar ROM to max ability  3  Pt will have improved tolerance for walking in the community  4   Pt will return to light household cleaning activities    Plan  Patient would benefit from: PT eval and skilled physical therapy  Planned modality interventions: TENS  Planned therapy interventions: manual therapy, joint mobilization, neuromuscular re-education, home exercise program, flexibility, functional ROM exercises, strengthening, stretching, therapeutic activities and therapeutic exercise  Frequency: 2x week  Duration in weeks: 6  Plan of Care beginning date: 6/15/2022  Plan of Care expiration date: 2022  Treatment plan discussed with: patient        Subjective Evaluation    History of Present Illness  Mechanism of injury: Pt reports mid back and low back pain onset about 3 years ago  Pt has tried PT in the past  Pt feels like her back is very tight  Pt feels like the prior PT stretches hurting her back and made it more knotty  Pt has tried muscle relaxers, but it makes her very tired and she can't get out of bed  Pt is now walking with a SPC due to pain  Pt has had blood work  MRI testing was performed  Pt has less pain when sitting and increased pain and stiffness when standing and walking  Pt changes positions frequently at work  Pt does not feel like work aggravates her symptoms  Pt denies radiating pain or changes in bowel and bladder  Pt is not able to clean her home and her daughter comes 1 per month to help  Pt is not able to do her lawn work  Work: works on a [employer]  Hobbies: recreational walking, being outside  Gait: pt walks with SPC, trunk in flexion, L hip flexion contracture  Pain  At best pain ratin  At worst pain rating: 10  Location: thorcolumbar spine  Quality: tight and pressure    Social Support  Steps to enter house: yes  1  Stairs in house: yes   13  Lives in: multiple-level home    Employment status: working  Treatments  Previous treatment: chiropractic, massage, physical therapy and medication  Patient Goals  Patient goal: to get rid of the tightness; to be able to walk better so that I can be outside        Objective     Neurological Testing     Sensation     Lumbar   Left   Intact: light touch    Right   Intact: light touch    Reflexes   Left   Patellar (L4): normal (2+)  Achilles (S1): normal (2+)    Right   Patellar (L4): normal (2+)  Achilles (S1): normal (2+)    Active Range of Motion     Lumbar   Flexion: 65 degrees   Extension: 0 degrees   Left lateral flexion: 15 degrees       Right lateral flexion: 15 degrees   Left rotation:  Restriction level: minimal  Right rotation:  Restriction level: minimal  Left Hip   Flexion: 90 degrees   External rotation (90/90): 15 degrees   Internal rotation (90/90): 10 degrees     Additional Active Range of Motion Details  Increased lumbar tightness with rotation AROM  In standing: symmetrical iliac crest and PSIS levels  Increased muscle tone B lumbar psp with increased tenderness  HS flexibility; WFL's  Pt unable to perform figure 4 stretch on L side    Strength/Myotome Testing     Left Hip   Planes of Motion   Flexion: 4  Extension: 3  Abduction: 3  External rotation: 4  Internal rotation: 4    Right Hip   Planes of Motion   Flexion: 4+  Extension: 3  Abduction: 4+  External rotation: 4+  Internal rotation: 4+    Left Knee   Flexion: 5  Extension: 5    Right Knee   Flexion: 5  Extension: 5    Left Ankle/Foot   Dorsiflexion: 5    Right Ankle/Foot   Dorsiflexion: 5            Dx: thoracolumbar pain  EPOC: 7/27/22  CO-MORBIDITIES:  PERSONAL FACTORS:  Precautions: severed L hip OA      Manuals             thoroacolumbar MFR/ GISTM             Lumbar PA's                                       Neuro Re-Ed             DLS: abd bracing             clamshells             Modified bird dog             bridges                                                    Ther Ex             LTR             QL stretch             Angry cat stretch             HS stretch             L hip flexor stretch                                                    Ther Activity                                       Gait Training                                       Modalities             MH post tx -prn

## 2022-06-17 ENCOUNTER — OFFICE VISIT (OUTPATIENT)
Dept: PHYSICAL THERAPY | Facility: CLINIC | Age: 67
End: 2022-06-17
Payer: MEDICARE

## 2022-06-17 DIAGNOSIS — M54.50 THORACOLUMBAR BACK PAIN: Primary | ICD-10-CM

## 2022-06-17 DIAGNOSIS — M54.6 THORACIC SPINE PAIN: ICD-10-CM

## 2022-06-17 DIAGNOSIS — M54.6 THORACOLUMBAR BACK PAIN: Primary | ICD-10-CM

## 2022-06-17 PROCEDURE — 97140 MANUAL THERAPY 1/> REGIONS: CPT

## 2022-06-17 PROCEDURE — 97110 THERAPEUTIC EXERCISES: CPT

## 2022-06-17 NOTE — PROGRESS NOTES
Daily Note     Today's date: 2022  Patient name: Kaela Macedo  : 1955  MRN: [id number]  Referring provider: Rachel Robins DO  Dx:   Encounter Diagnosis     ICD-10-CM    1  Thoracolumbar back pain  M54 50     M54 6    2  Thoracic spine pain  M54 6        Start Time: 0700  Stop Time: 0745  Total time in clinic (min): 45 minutes    Subjective: [place] Area states that she has an appointment with ortho in August for hip replacement surgery  Patient states that she had a blood clot on the  of last month, but denies current blood clots  She states that she currently takes baby aspirin  Objective: See treatment diary below  Lotion (Soledad Glad) tested on patient's wrist to test for allergies, no allergies elicited  Assessment: Initiated PT POC as charted below  Patient tolerated treatment fair  Moderate pain with pelvic stabilization TE such as glut and clam shells so stopped this session  [facility] response to manual stretching, MFR, and GISTM with tone in l/s and hip flexors noted post  Manuals performed in SL with one pillow between knees  Patient did not tolerate EOB hip flexor stretch with cramping in groin self reported, but did well with manual hip flexor stretch  Hip flexor length visibly improved post session  Patient would benefit from continued PT      Plan: Continue per plan of care        Dx: thoracolumbar pain  EPOC: 22  CO-MORBIDITIES:  PERSONAL FACTORS:  Precautions: severed L hip OA, Allergies to some lotions (vijay extract)      Manuals             thoroacolumbar MFR/ GISTM  LQ, MFR hip flexors + hip flex stretch           Lumbar PA's                            25 min           Neuro Re-Ed             DLS: abd bracing  5", x20           clamshells  DECLINED           Modified bird dog             bridges  X5, HELD Pain on L hip                                                  Ther Ex             LTR  2x10, 10"           QL stretch  20"x4 ea           Angry cat stretch  modified X10, 5"           HS stretch  Seated 4x20" ea           L hip flexor stretch  HELD                                                  Ther Activity                                       Gait Training                                       Modalities             MH post tx -prn  Declined

## 2022-06-20 ENCOUNTER — OFFICE VISIT (OUTPATIENT)
Dept: CARDIAC SURGERY | Facility: CLINIC | Age: 67
End: 2022-06-20
Payer: MEDICARE

## 2022-06-20 ENCOUNTER — PREP FOR PROCEDURE (OUTPATIENT)
Dept: NON INVASIVE DIAGNOSTICS | Facility: HOSPITAL | Age: 67
End: 2022-06-20

## 2022-06-20 VITALS
HEART RATE: 89 BPM | HEIGHT: 66 IN | OXYGEN SATURATION: 96 % | DIASTOLIC BLOOD PRESSURE: 82 MMHG | SYSTOLIC BLOOD PRESSURE: 138 MMHG | BODY MASS INDEX: 24.43 KG/M2 | WEIGHT: 152 LBS

## 2022-06-20 DIAGNOSIS — Q21.1 PFO (PATENT FORAMEN OVALE): Primary | ICD-10-CM

## 2022-06-20 DIAGNOSIS — I63.9 CRYPTOGENIC STROKE (HCC): ICD-10-CM

## 2022-06-20 DIAGNOSIS — I48.91 ATRIAL FIBRILLATION, UNSPECIFIED TYPE (HCC): Primary | ICD-10-CM

## 2022-06-20 PROCEDURE — 99203 OFFICE O/P NEW LOW 30 MIN: CPT | Performed by: INTERNAL MEDICINE

## 2022-06-20 NOTE — PROGRESS NOTES
Cardiology Consultation      Adolph Perez  1955  [id number]  [facility] [id number]  [phone]  [phone]    1  PFO (patent foramen ovale)     2  Cryptogenic stroke [facility])            Discussion/Summary    1  Embolic stroke undetermined source, has PFO, unclear if pfo related given age    Plan:  Implantable loop recorder, will observe several months for atrial fibrillation  Hypercoagulable panel negative    Discussed in detail absence of randomized data in patients over the age of 61years old due to statistically other causes of stroke/embolism other than PFO  RESPECT and REDUCE  trials 18-59yo  Will follow-up after implantable loop recorder  If no other etiology is found, can consider offering closure if felt to be PFO related      History:     71-year-old female referred for discussion PFO closure in the setting of presumptive embolic stroke    May 1791 had posterior right frontal lobe infarct on MRI    She states she is getting ready to go to work and felt numbness in her left hand    Nonetheless, workup revealed PFO on her JAYDEN  No AFib    Hypercoagulable panel negative    Patient Active Problem List   Diagnosis    Breast cyst    Cervical spondylosis    Chronic allergic conjunctivitis    Dyslipidemia    Hashimoto's disease    Herniated cervical disc    Non-toxic multinodular goiter    Primary osteoarthritis of left hip    Rhinitis    Varicose veins    Elevated TSH    Osteopenia    DDD (degenerative disc disease), lumbar    Left leg numbness    Chronic pain syndrome    Chronic pain of left knee    Chronic left hip pain    Cryptogenic stroke ([address] )    PFO (patent foramen ovale)     Past Medical History:   Diagnosis Date    Benign paroxysmal positional vertigo     Resolved: 9/15/2014    Fracture of radius      Social History     Socioeconomic History    Marital status: Single     Spouse name: Not on file    Number of children: Not on file    Years of education: Not on file    Highest education level: Not on file   Occupational History    Not on file   Tobacco Use    Smoking status: Never Smoker    Smokeless tobacco: Never Used   Vaping Use    Vaping Use: Never used   Substance and Sexual Activity    Alcohol use: Yes     Comment: social    Drug use: No    Sexual activity: Not Currently   Other Topics Concern    Not on file   Social History Narrative    Single, lives alone, part-time employment in [employer] [facility] [employer] Strain: Not on file   Food Insecurity: No Food Insecurity    Worried About [address] in [address]: Never true   [address] in the Last Year: Never true   Transportation Needs: No Transportation Needs    Lack of Transportation (Medical): No    Lack of Transportation (Non-Medical):  No   Physical Activity: Not on file   Stress: Not on file   Social Connections: Not on file   Intimate Partner Violence: Not on file   Housing Stability: Low Risk     Unable to Pay for Housing in the Last Year: No    Number of Places Lived in the Last Year: 1    Unstable Housing in the Last Year: No      Family History   Problem Relation Age of Onset    Stroke Mother         CVA    Breast cancer Mother 77    Alcohol abuse Father     COPD Father     Other Father         Nicotine Abuse    Thyroid cancer Brother     Coronary artery disease Brother     Thyroid disease Maternal Grandmother     Breast cancer Maternal Aunt 36    Breast cancer additional onset Maternal Aunt 48    No Known Problems Sister     No Known Problems Daughter     No Known Problems Maternal Grandfather     No Known Problems Paternal Grandmother     No Known Problems Paternal Grandfather     No Known Problems Daughter     No Known Problems Maternal Aunt     No Known Problems Maternal Aunt     No Known Problems Maternal Aunt      Past Surgical History:   Procedure Laterality Date    BIOPSY CORE NEEDLE      Thyroid     SECTION      COLPOSCOPY         Current Outpatient Medications:     aspirin 81 mg chewable tablet, Chew 1 tablet (81 mg total)  in the morning , Disp: , Rfl: 0    atorvastatin (LIPITOR) 40 mg tablet, Take 1 tablet (40 mg total) by mouth every evening, Disp: 30 tablet, Rfl: 5    calcium carbonate (OS-WALKER) 1250 (500 Ca) MG tablet, Take 2 tablets by mouth in the morning , Disp: , Rfl:     Chlorophyll 100 MG TABS, Take 1 tablet (100 mg total) by mouth daily, Disp: , Rfl: 0    cholecalciferol (VITAMIN D3) 1,000 units tablet, Take 1,000 Units by mouth in the morning , Disp: , Rfl:     clopidogrel (PLAVIX) 75 mg tablet, Take 1 tablet (75 mg total) by mouth in the morning for 18 days  , Disp: 20 tablet, Rfl: 0    cyclobenzaprine (FLEXERIL) 5 mg tablet, Take 1 tablet (5 mg total) by mouth 3 (three) times a day as needed for muscle spasms, Disp: 30 tablet, Rfl: 0    Magnesium 250 MG TABS, Take 3 tablets by mouth in the morning , Disp: , Rfl:     Misc Natural Products (ADRENAL PO), Take by mouth, Disp: , Rfl:     Multiple Vitamin (MULTIVITAMINS PO), Take 1 capsule by mouth daily, Disp: , Rfl:     Multiple Vitamins-Minerals (ZINC PO), Take 1 tablet by mouth daily, Disp: , Rfl:     Naproxen Sodium 220 MG CAPS, Take by mouth, Disp: , Rfl:     THYROID PO, Take by mouth, Disp: , Rfl:     zinc sulfate (ZINCATE) 220 mg capsule, Take 220 mg by mouth in the morning , Disp: , Rfl:   No Known Allergies    Social, Family and medication history as listed, reviewed and updated as necessary    Labs:   Lab Results   Component Value Date    K 3 5 06/10/2022     06/10/2022    CO2 26 06/10/2022    BUN 17 06/10/2022    CREATININE 0 85 06/10/2022    CREATININE 0 81 2022    CALCIUM 9 4 06/10/2022       Lab Results   Component Value Date    WBC 7 57 06/10/2022    HGB 12 7 06/10/2022    HGB 12 7 2022    HCT 39 7 06/10/2022    HCT 40 0 2022     06/10/2022     05/19/2022       No results found for: CHOL  Lab Results   Component Value Date    HDL 73 05/19/2022     09/03/2021     Lab Results   Component Value Date    LDLCALC 140 (H) 05/19/2022    LDLCALC 156 (H) 09/03/2021     Lab Results   Component Value Date    TRIG 69 05/19/2022    TRIG 63 09/03/2021     No results found for: LDLDIRECT    Lab Results   Component Value Date    ALT 14 05/18/2022    ALT 21 09/03/2021    AST 40 05/18/2022    AST 43 09/03/2021             No results found for: NTBNP    Lab Results   Component Value Date    HGBA1C 5 6 05/18/2022       Imaging: Reviewed in epic      Review of Systems:  14 systems reviewed and negative with exception of the above       PHYSICAL EXAM:        Vitals:    06/20/22 1424   BP: 138/82   Pulse: 89   SpO2: 96%     Body mass index is 24 53 kg/m²  Weight (last 2 days)     None             Gen: No acute distress  HEENT: anicteric, mucous membranes moist  Neck: supple, no jugular venous distention, or carotid bruit  Heart: regular, normal s1 and s2, no murmur/rub or gallop  Lungs :clear to auscultation bilaterally, no rales/rhonchi or wheeze  Abdomen: soft nontender, normoactive bowel sounds, no organomegaly  Ext: warm and perfused, normal femoral pulses, no edema, or clubbing  Skin: warm, no rashes  Neuro: AAO x 3, no focal findings  Psychiatric: normal affect  Musculoskeletal: no obvious joint deformities  This note was completed in part utilizing [employer] direct voice recognition software  Grammatical errors, random word insertion, spelling mistakes, and incomplete sentences may be an occasional consequence of the system secondary to software limitations, ambient noise and hardware issues  At the time of dictation, efforts were made to edit, clarify and /or correct errors  Please read the chart carefully and recognize, using context, where substitutions have occurred    If you have any questions or concerns about the context, text or information contained within the body of this dictation, please contact myself, the provider, for further clarification

## 2022-06-23 ENCOUNTER — TELEPHONE (OUTPATIENT)
Dept: FAMILY MEDICINE CLINIC | Facility: HOSPITAL | Age: 67
End: 2022-06-23

## 2022-06-23 NOTE — TELEPHONE ENCOUNTER
Sure, can drop off a urine sample to ensure not a UTI as urinary symptoms are not common SE of statins    TY

## 2022-06-23 NOTE — TELEPHONE ENCOUNTER
Spoke to pt, states she started taking atorvastatin and for about a week now she has noticed that she has low back pain and has had an increase in urination  Also states that she has discomfort too  Unsure if this is related to med or not, no other symptoms at this time  Would you like pt to drop urine sample off to check it?

## 2022-06-24 ENCOUNTER — OFFICE VISIT (OUTPATIENT)
Dept: PHYSICAL THERAPY | Facility: CLINIC | Age: 67
End: 2022-06-24
Payer: MEDICARE

## 2022-06-24 DIAGNOSIS — M54.50 THORACOLUMBAR BACK PAIN: Primary | ICD-10-CM

## 2022-06-24 DIAGNOSIS — M54.6 THORACOLUMBAR BACK PAIN: Primary | ICD-10-CM

## 2022-06-24 DIAGNOSIS — M54.6 THORACIC SPINE PAIN: ICD-10-CM

## 2022-06-24 DIAGNOSIS — R35.0 URINARY FREQUENCY: Primary | ICD-10-CM

## 2022-06-24 LAB
SL AMB  POCT GLUCOSE, UA: NEGATIVE
SL AMB LEUKOCYTE ESTERASE,UA: NEGATIVE
SL AMB POCT BILIRUBIN,UA: NEGATIVE
SL AMB POCT BLOOD,UA: NEGATIVE
SL AMB POCT CLARITY,UA: CLEAR
SL AMB POCT COLOR,UA: YELLOW
SL AMB POCT KETONES,UA: NEGATIVE
SL AMB POCT NITRITE,UA: NEGATIVE
SL AMB POCT PH,UA: 6.5
SL AMB POCT SPECIFIC GRAVITY,UA: 1.01
SL AMB POCT URINE PROTEIN: NEGATIVE
SL AMB POCT UROBILINOGEN: 0.2

## 2022-06-24 PROCEDURE — 97110 THERAPEUTIC EXERCISES: CPT | Performed by: PHYSICAL THERAPIST

## 2022-06-24 PROCEDURE — 97140 MANUAL THERAPY 1/> REGIONS: CPT | Performed by: PHYSICAL THERAPIST

## 2022-06-24 PROCEDURE — 81002 URINALYSIS NONAUTO W/O SCOPE: CPT

## 2022-06-24 NOTE — TELEPHONE ENCOUNTER
Please notify pt that her urine showed no sign of infection or abnormality, keep hydrated and call with persistent/new/worse symptoms -would need to be seen in the office

## 2022-06-24 NOTE — PROGRESS NOTES
Daily Note     Today's date: 2022  Patient name: Vic Sharpe  : 1955  MRN: [id number]  Referring provider: Elsa David DO  Dx:   Encounter Diagnosis     ICD-10-CM    1  Thoracolumbar back pain  M54 50     M54 6    2  Thoracic spine pain  M54 6                   Subjective: Pt felt some good relief after manual therapy last visit  Pt is compliant in stretching HEP  Pt reports she has an apt with an ortho physician in the beginning of August to have a consult about getting a SAMEER  Objective: See treatment diary below      Assessment: Tolerated treatment fair  Patient was not able to place weight on L LE for standing bird dog  Pt continues to have increased muscle tone in lumbar psp  Plan: Continue per plan of care  Focus on decreasing pain       Dx: thoracolumbar pain  EPOC: 22  CO-MORBIDITIES:  PERSONAL FACTORS:  Precautions: severe L hip OA, Allergies to some lotions (vijay extract)      Manuals            thoroacolumbar MFR/ GISTM  LQ, MFR hip flexors + hip flex stretch th          Lumbar PA's   th                         25 min 25'          Neuro Re-Ed             DLS: abd bracing  5", x20 5"x20          clamshells  DECLINED           Modified bird dog   UE onlyx10          bridges  X5, HELD Pain on L hip x10                                                 Ther Ex             LTR  2x10, 10" 10x5"          QL stretch  20"x4 ea 20"x3          Angry cat stretch  modified X10, 5" Mod stand 10"x5          HS stretch  Seated 4x20" ea 3x20"          L hip flexor stretch  HELD                                                  Ther Activity                                       Gait Training                                       Modalities             MH post tx -prn  Declined

## 2022-06-27 ENCOUNTER — TELEPHONE (OUTPATIENT)
Dept: NEUROLOGY | Facility: CLINIC | Age: 67
End: 2022-06-27

## 2022-06-28 ENCOUNTER — OFFICE VISIT (OUTPATIENT)
Dept: PHYSICAL THERAPY | Facility: CLINIC | Age: 67
End: 2022-06-28
Payer: MEDICARE

## 2022-06-28 DIAGNOSIS — M54.6 THORACOLUMBAR BACK PAIN: Primary | ICD-10-CM

## 2022-06-28 DIAGNOSIS — M54.6 THORACIC SPINE PAIN: ICD-10-CM

## 2022-06-28 DIAGNOSIS — M54.50 THORACOLUMBAR BACK PAIN: Primary | ICD-10-CM

## 2022-06-28 PROCEDURE — 97140 MANUAL THERAPY 1/> REGIONS: CPT

## 2022-06-28 PROCEDURE — 97110 THERAPEUTIC EXERCISES: CPT

## 2022-06-28 PROCEDURE — 97112 NEUROMUSCULAR REEDUCATION: CPT

## 2022-06-28 NOTE — PROGRESS NOTES
Daily Note     Today's date: 2022  Patient name: Daniel Shafer  : 1955  MRN: [id number]  Referring provider: Selina Barnett DO  Dx:   Encounter Diagnosis     ICD-10-CM    1  Thoracolumbar back pain  M54 50     M54 6    2  Thoracic spine pain  M54 6                   Subjective: Pt reports she is having so much hip pain that it influensces her gait making her back hurt  Objective: See treatment diary below      Assessment: Tolerated treatment fair  Patient w/ limited hip ROM to the point she is unable to perform a PPT in standing  Pt shown towel roll under her side while manuals were performed and reported less mid back pain  Pt to try towel roll in SL when sleeping  Pt reported some hip relief w/ long leg distraction  Plan: Continue per plan of care  Progress treatment as tolerated         Dx: thoracolumbar pain  EPOC: 22  CO-MORBIDITIES:  PERSONAL FACTORS:  Precautions: severe L hip OA, Allergies to some lotions (vijay extract)      Manuals           thoroacolumbar MFR/ GISTM  LQ, MFR hip flexors + hip flex stretch th JK MFR hip flexors + hip flex stretch         Lumbar PA's   th                         25 min 25' 20 min         Neuro Re-Ed             DLS: abd bracing  5", x20 5"x20 5"x20         clamshells  DECLINED           Modified bird dog   UE onlyx10 UE onlyx10         bridges  X5, HELD Pain on L hip x10 8x                                                Ther Ex             LTR  2x10, 10" 10x5" 10x5"         QL stretch  20"x4 ea 20"x3 20"x3         Angry cat stretch  modified X10, 5" Mod stand 10"x5 Mod stand 10"x5         HS stretch  Seated 4x20" ea 3x20" seated 3x20"         L hip flexor stretch  HELD                                                  Ther Activity                                       Gait Training                                       Modalities             MH post tx -prn  Declined

## 2022-07-01 ENCOUNTER — OFFICE VISIT (OUTPATIENT)
Dept: PHYSICAL THERAPY | Facility: CLINIC | Age: 67
End: 2022-07-01
Payer: MEDICARE

## 2022-07-01 DIAGNOSIS — M54.6 THORACIC SPINE PAIN: Primary | ICD-10-CM

## 2022-07-01 DIAGNOSIS — M54.6 THORACOLUMBAR BACK PAIN: ICD-10-CM

## 2022-07-01 DIAGNOSIS — M54.50 THORACOLUMBAR BACK PAIN: ICD-10-CM

## 2022-07-01 PROCEDURE — 97110 THERAPEUTIC EXERCISES: CPT

## 2022-07-01 PROCEDURE — 97140 MANUAL THERAPY 1/> REGIONS: CPT

## 2022-07-01 NOTE — PROGRESS NOTES
Daily Note     Today's date: 2022  Patient name: Santana Page  : 1955  MRN: [id number]  Referring provider: Ailyn Trejo DO  Dx:   Encounter Diagnosis     ICD-10-CM    1  Thoracic spine pain  M54 6    2  Thoracolumbar back pain  M54 50     M54 6                   Subjective: Pt reports she has been in a lot of pain for the last 3 days  Pt states she has difficulty walking  Presents w/ SPC and flexed L knee w/ gait  Objective: See treatment diary below      Assessment: Tolerated treatment poor due to increased pain and limited hip ROM  Patient demonstrated fatigue post treatment due to pain  Pt w/ tightness in mm surrounding jt in quad, adductors, glut med  Pt unable to perform PPT when lying supine due to pain in hip flexor when knee Is extended  Will con't attempts to decrease pt's pain to help w/ hip AROM  Plan: Continue per plan of care  Progress treatment as tolerated         Dx: thoracolumbar pain  EPOC: 22  CO-MORBIDITIES:  PERSONAL FACTORS:  Precautions: severe L hip OA, Allergies to some lotions (vijay extract)      Manuals          thoroacolumbar MFR/ GISTM  LQ, MFR hip flexors + hip flex stretch th JK MFR hip flexors + hip flex stretch JK MFR hip flexors + hip flex stretch        Lumbar PA's   th                         25 min 25' 20 min 25'        Neuro Re-Ed             DLS: abd bracing  5", x20 5"x20 5"x20         clamshells  DECLINED           Modified bird dog   UE onlyx10 UE onlyx10         bridges  X5, HELD Pain on L hip x10 8x 6x                                               Ther Ex             LTR  2x10, 10" 10x5" 10x5" fallouts SL 10x ea        QL stretch  20"x4 ea 20"x3 20"x3         Angry cat stretch  modified X10, 5" Mod stand 10"x5 Mod stand 10"x5         HS stretch  Seated 4x20" ea 3x20" seated 3x20" seated 3x20"        L hip flexor stretch  HELD                                                  Ther Activity Gait Training                                       Modalities             MH post tx -prn  Declined

## 2022-07-05 ENCOUNTER — OFFICE VISIT (OUTPATIENT)
Dept: PHYSICAL THERAPY | Facility: CLINIC | Age: 67
End: 2022-07-05
Payer: MEDICARE

## 2022-07-05 DIAGNOSIS — M54.6 THORACIC SPINE PAIN: Primary | ICD-10-CM

## 2022-07-05 DIAGNOSIS — M54.50 THORACOLUMBAR BACK PAIN: ICD-10-CM

## 2022-07-05 DIAGNOSIS — M54.6 THORACOLUMBAR BACK PAIN: ICD-10-CM

## 2022-07-05 PROCEDURE — 97110 THERAPEUTIC EXERCISES: CPT

## 2022-07-05 PROCEDURE — 97140 MANUAL THERAPY 1/> REGIONS: CPT

## 2022-07-05 NOTE — PROGRESS NOTES
Daily Note     Today's date: 2022  Patient name: Go Hitchcock  : 1955  MRN: [id number]  Referring provider: Maximo Malone DO  Dx:   Encounter Diagnosis     ICD-10-CM    1  Thoracic spine pain  M54 6    2  Thoracolumbar back pain  M54 50     M54 6                   Subjective: Pt reports doing ex's in the pool yesterday and felt good w/ min pain  States she walked to [employer] day at the park  Pt states she is "Paying for it today"  Pt presents w/ flexed hip and knee w/ gait w/ SPC  Objective: See treatment diary below      Assessment: Tolerated treatment fair  Patient w/ TrP's t/o hip, hip flexor and glut med  Pt's limited hip ext is influencing her tspine, when performing hip ext pt c/o's tspine pain  Pt would benefit from cont'd PT to work on ROM of hip and pain management  Pt seems to be increasing activity since starting PT  Plan: Continue per plan of care  Progress treatment as tolerated         Dx: thoracolumbar pain  EPOC: 22  CO-MORBIDITIES:  PERSONAL FACTORS:  Precautions: severe L hip OA, Allergies to some lotions (vijay extract)      Manuals         thoroacolumbar MFR/ GISTM  LQ, MFR hip flexors + hip flex stretch th JK MFR hip flexors + hip flex stretch JK MFR hip flexors + hip flex stretch JK MFR hip flexors + hip flex        Lumbar PA's   th                         25 min 25' 20 min 25' 25 min       Neuro Re-Ed             DLS: abd bracing  5", x20 5"x20 5"x20         clamshells  DECLINED           Modified bird dog   UE onlyx10 UE onlyx10  UE onlyx10       bridges  X5, HELD Pain on L hip x10 8x 6x np                                              Ther Ex             LTR  2x10, 10" 10x5" 10x5" fallouts SL 10x ea fallouts SL 10x ea       QL stretch  20"x4 ea 20"x3 20"x3         Angry cat stretch  modified X10, 5" Mod stand 10"x5 Mod stand 10"x5  Mod stand 10"x5       HS stretch  Seated 4x20" ea 3x20" seated 3x20" seated 3x20" seated 3x20"       L hip flexor stretch  HELD    Trial 40"       PPT sit on cushion      10"x10                                 Ther Activity                                       Gait Training                                       Modalities             MH post tx -prn  Declined

## 2022-07-08 ENCOUNTER — APPOINTMENT (OUTPATIENT)
Dept: PHYSICAL THERAPY | Facility: CLINIC | Age: 67
End: 2022-07-08
Payer: MEDICARE

## 2022-07-11 ENCOUNTER — APPOINTMENT (OUTPATIENT)
Dept: PHYSICAL THERAPY | Facility: CLINIC | Age: 67
End: 2022-07-11
Payer: MEDICARE

## 2022-07-13 ENCOUNTER — OFFICE VISIT (OUTPATIENT)
Dept: NEUROLOGY | Facility: CLINIC | Age: 67
End: 2022-07-13
Payer: MEDICARE

## 2022-07-13 VITALS
SYSTOLIC BLOOD PRESSURE: 149 MMHG | RESPIRATION RATE: 18 BRPM | BODY MASS INDEX: 24.01 KG/M2 | WEIGHT: 149.4 LBS | HEART RATE: 63 BPM | TEMPERATURE: 97.2 F | DIASTOLIC BLOOD PRESSURE: 77 MMHG | HEIGHT: 66 IN

## 2022-07-13 DIAGNOSIS — I63.9 CEREBROVASCULAR ACCIDENT (CVA), UNSPECIFIED MECHANISM (HCC): ICD-10-CM

## 2022-07-13 DIAGNOSIS — Q21.12 PFO (PATENT FORAMEN OVALE): ICD-10-CM

## 2022-07-13 DIAGNOSIS — I63.9 CRYPTOGENIC STROKE (HCC): Primary | ICD-10-CM

## 2022-07-13 PROCEDURE — 99214 OFFICE O/P EST MOD 30 MIN: CPT | Performed by: PHYSICIAN ASSISTANT

## 2022-07-13 NOTE — PATIENT INSTRUCTIONS
Continue aspirin 81mg daily and Lipitor 40mg daily for ongoing stroke prevention  Agree with cardiology considering a Loop recorder to look for evidence of atrial fibrillation as a cause of stroke, vs the PFO causing this  I sent a message to cardiology and will let you know regarding the process for the Loop   If there is evidence of Afib, then you would require anticoagulation  Continue to work with PCP on management of blood pressure, cholesterol and blood sugar  Goal blood pressure should be less than 130/80 routinely  Heart healthy diet and routine exercise as tolerated  Follow up in 4-6 months or sooner if needed    If you experience any facial droop, weakness on one side of the body, speech or swallowing difficulty, painless loss of vision in one eye, double vision, vertigo that does not resolve quickly/imbalance, go to the ER/call 911

## 2022-07-13 NOTE — PROGRESS NOTES
Patient ID: Alina Campbell is a 78 y o  female  Assessment:  Alicja Marie is a 78year old female with history of dyslipidemia, Hashimoto's, OA, chronic pain, who presented to the ED on 5/18/22 with L UE numbness and incoordination/difficulty with fine motor skills  She was not a candidate for tPA due to being outside the time window, NIHSS was 1  CTH/CTA unremarkable  A1C 5 6, Lipid panel ,   MRI brain demonstrated cortical infarct in the posterior right frontal lobe  ECHO with EF 54%, grade 1 diastolic dysfunction, no atrial dilation  She then had a JAYDEN which per cardiology showed a moderate sized PFO confirmed at rest with bidirectional shunting by spectral Doppler and saline contrast   No intracardiac thrombus  Bilateral LE dopplers were negative for DVT  Thrombosis panel was normal   She was placed on AP therapy with ASA and Plavix x 21 days, then reduced to ASA monotherapy, along with Lipitor 40mg daily  Outpatient cardiology follow up to discuss PFO, as well as potential loop  We discussed her stroke is embolic stroke of undetermined source  She has not had any new neurologic symptoms to indicate recurrent TIA/CVA  She saw Dr Candia Nageotte to discuss closer of PFO  Due to her age, he was not sure this was the cause of stroke and suggested Loop recorder to look for Afib for a few months and if negative, would consider PFO closure  She has not heard anything regarding the Loop yet  Will reach out to cardiology to see if they will be scheduling, or I can order as well  Reasonable to consider the Loop first and if Afib is seen, she would require full AC  We discussed continuing to follow with PCP for management of cardiovascular risk factors  Goal BP less than 130/80 routinely, suggest home monitoring  Additionally, she has severe left hip arthritis and is scheduled to have a hip replacement on 8/15/22 at [facility]    Discussed typically we suggest waiting a minimum of 3 months, prefer 6 months, post-stroke for an elective procedure which requires holding AP/AC  This will be exactly 3 months post-stroke  Discussed holding AP/AC puts one at risk for risk of recurrent stroke  She is very set on having this surgery as her hip pain is severe and limits her activities significantly  Plan:  -for ongoing stroke prevention continue ASA 81mg daily, lipitor 40mg daily, along with appropriate BP and glycemic control  -agree with Loop recorder to monitor for Afib  If Afib is detected, would require full anticoagulation  If negative, would consider closure of PFO given ESUS  -will defer management of blood pressure, cholesterol and blood sugar  -heart healthy diet and routine exercise as tolerated  -reasonable to consider holding ASA for hip replacement surgery next month, as this will be 3 months post-stroke  Patient counseled on risk of recurrent stroke while holding AP  -follow up in 4-6 months or sooner if needed    Signs and symptoms of stroke discussed with patient and included in the AVS today         Diagnoses and all orders for this visit:    Cryptogenic stroke [facility])    Cerebrovascular accident (CVA), unspecified mechanism ([facility] [address] )  -     Ambulatory referral to Neurology    PFO (patent foramen ovale)           Subjective:    LUANA Castillo is a 78year old female with history of HLD, lumbar spine pain, Hashimoto's thyroid disease, who presents today for a hospital follow up  Patient presented to the ED on 5/18/22 with L hand and L arm numbness, and difficulty with fine motor skills in L hand  BP on arrival 165/83  NIHSS 1  Patient was not a tPA candidate due to being outside the time window  CTH in the ED unremarkable  CTA with no LVO  She was admitted on stroke pathway  A1C 5 6, lipid panel ,   MRI brain revealed small cortical/subcortical focus of restricted diffusion in the posterior right frontal lobe consistent with an acute/early subacute infarct    2D echo with EF 92%, grade 1 diastolic dysfunction, no atrial dilation  She then had a JAYDEN which per cardiology showed a moderate sized PFO confirmed at rest with bidirectional shunting by spectral Doppler and saline contrast   No intracardiac thrombus  Bilateral LE dopplers were negative for DVT  Thrombosis panel was normal   She was started on ASA and Plavix with plan to continue DAPT x 21 days (last day 6/8/22), then stop Plavix and continue with ASA as monotherapy  Neurology was suspicious for PFO causing stroke, vs underlying arrhythmia such as AFib  Cardiology said they would see her in the outpatient setting to discuss PFO closure and/or Loop  She was seen by Dr Nick Packer to discuss PFO closure, however he would like her to have a Loop first to evaluate for Afib for a few months and if none seen, consider PFO closure  Today, patient reports she is doing well from a neurologic standpoint  She denies any new symptoms  She is taking ASA and statin  She has not heard anything regarding the Loop yet  Her biggest issue is left hip pain  She has severe OA and reports she is following at [facility] and a hip replacement is being planned for August 15th  She says her surgeon will need clearance from us  The following portions of the patient's history were reviewed and updated as appropriate: current medications, past family history, past medical history, past social history, past surgical history and problem list      Objective:    Blood pressure 149/77, pulse 63, temperature (!) 97 2 °F (36 2 °C), temperature source Tympanic, resp  rate 18, height 5' 6" (1 676 m), weight 67 8 kg (149 lb 6 4 oz)  Physical Exam  Constitutional:       Appearance: Normal appearance  HENT:      Head: Normocephalic and atraumatic  Eyes:      Extraocular Movements: EOM normal       Pupils: Pupils are equal, round, and reactive to light  Neurological:      Mental Status: She is alert  Coordination: Coordination is intact  Deep Tendon Reflexes: Strength normal and reflexes are normal and symmetric  Psychiatric:         Mood and Affect: Mood normal          Speech: Speech normal          Behavior: Behavior normal          Neurological Exam  Mental Status  Alert  Oriented to person, place, time and situation  Speech is normal  Language is fluent with no aphasia  Attention and concentration are normal     Cranial Nerves  CN II: Visual fields full to confrontation  CN III, IV, VI: Extraocular movements intact bilaterally  Pupils equal round and reactive to light bilaterally  CN V: Facial sensation is normal   CN VII: Full and symmetric facial movement  CN VIII: Hearing is normal   CN IX, X: Palate elevates symmetrically  CN XI: Shoulder shrug strength is normal   CN XII: Tongue midline without atrophy or fasciculations  Motor   Normal muscle tone  Strength is 5/5 throughout all four extremities  Testing of the left hip is limited by pain  Sensory  Light touch is normal in upper and lower extremities  Reflexes  Deep tendon reflexes are 2+ and symmetric in all four extremities  Coordination    Finger-to-nose, rapid alternating movements and heel-to-shin normal bilaterally without dysmetria  Gait    Limping type gait due to left hip pain and arthritis  She is using a cane to ambulate   ROS:    Review of Systems   Constitutional: Negative for chills and fever  HENT: Negative for ear pain and sore throat  Eyes: Negative for pain and visual disturbance  Respiratory: Negative for cough and shortness of breath  Cardiovascular: Negative for chest pain and palpitations  Gastrointestinal: Negative for abdominal pain and vomiting  Genitourinary: Negative for dysuria and hematuria  Musculoskeletal: Positive for back pain and myalgias (leg pain )  Negative for arthralgias  Skin: Negative for color change and rash  Neurological: Positive for numbness (left knee)  Negative for seizures and syncope  Psychiatric/Behavioral: Positive for sleep disturbance (trouble sleeping due to left side hip pain )  All other systems reviewed and are negative      I personally reviewed and updated the ROS as appropriate

## 2022-07-14 ENCOUNTER — TELEPHONE (OUTPATIENT)
Dept: CARDIOLOGY CLINIC | Facility: CLINIC | Age: 67
End: 2022-07-14

## 2022-07-14 NOTE — TELEPHONE ENCOUNTER
Called pt letting her know about the scheduling issues with not having a day for loops  Suggested Rick or [employer]  She declined and said she'll wait

## 2022-07-15 ENCOUNTER — APPOINTMENT (OUTPATIENT)
Dept: PHYSICAL THERAPY | Facility: CLINIC | Age: 67
End: 2022-07-15
Payer: MEDICARE

## 2022-07-18 ENCOUNTER — OFFICE VISIT (OUTPATIENT)
Dept: PHYSICAL THERAPY | Facility: CLINIC | Age: 67
End: 2022-07-18
Payer: MEDICARE

## 2022-07-18 DIAGNOSIS — M54.6 THORACIC SPINE PAIN: Primary | ICD-10-CM

## 2022-07-18 DIAGNOSIS — M54.50 THORACOLUMBAR BACK PAIN: ICD-10-CM

## 2022-07-18 DIAGNOSIS — M54.6 THORACOLUMBAR BACK PAIN: ICD-10-CM

## 2022-07-18 PROCEDURE — 97140 MANUAL THERAPY 1/> REGIONS: CPT | Performed by: PHYSICAL THERAPIST

## 2022-07-18 NOTE — PROGRESS NOTES
PT Discharge    Today's date: 2022  Patient name: Alina Campbell  : 1955  MRN: [id number]  Referring provider: Corinna Zamudio DO  Dx:   Encounter Diagnosis     ICD-10-CM    1  Thoracic spine pain  M54 6    2  Thoracolumbar back pain  M54 50     M54 6                   Assessment  Assessment details: Since starting skilled PT, pain levels in her lumbar spine are decreased, some gains made in LE strength, with good functional progress  Recommend pt be discharged from skilled PT for her low back at this time  Pt will be having a SAMEER on 8/15/22  Pt will be discharged from skilled PT at this time for her lumbar spine and return 3 days post op to initiate hip rehab  Impairments: abnormal gait, abnormal or restricted ROM, activity intolerance, impaired physical strength and weight-bearing intolerance  Understanding of Dx/Px/POC: good   Prognosis: fair    Goals  STG's ( 3-4 weeks)  1  Pt will be independent in HEP-met   2  Decrease pain to 3-4/10 with activity- met  LTG's ( 6- 8 weeks)  1  Improve FOTO score by 6-8 points -met  2  Improve lumbar ROM to max ability-met  3  Pt will have improved tolerance for walking in the community- met  4  Pt will return to light household cleaning activities- met    Plan  Frequency: D/c to HEP  Plan of Care beginning date: 2022  Plan of Care expiration date: 2022  Treatment plan discussed with: patient        Subjective Evaluation    History of Present Illness  Mechanism of injury: I E: Pt reports mid back and low back pain onset about 3 years ago  Pt has tried PT in the past  Pt feels like her back is very tight  Pt feels like the prior PT stretches hurting her back and made it more knotty  Pt has tried muscle relaxers, but it makes her very tired and she can't get out of bed  Pt is now walking with a SPC due to pain  Pt has had blood work  MRI testing was performed  Pt has less pain when sitting and increased pain and stiffness when standing and walking   Pt changes positions frequently at work  Pt does not feel like work aggravates her symptoms  Pt denies radiating pain or changes in bowel and bladder  Pt is not able to clean her home and her daughter comes 1 per month to help  Pt is not able to do her lawn work  22: Pt reports she is compliant in HEP and doing light exercises in the pool  Pt is able to walk in her yard and do light gardening  Pt is able to walk up the steps, and is not "crawling" as she was doing previously prior to therapy  Pt feels like she does not have back pain anymore  Pt is able to walk around her home without her [facility], but uses her cane at work in small spaces  Work: works on a sewing machine;  [employer]  Hobbies: recreational walking, being outside  Gait: pt walks with SPC, trunk in flexion, L hip flexion contracture  Pain  At best pain ratin  At worst pain ratin  Location: thorcolumbar spine  Quality: tight    Social Support  Steps to enter house: yes  1  Stairs in house: yes   13  Lives in: multiple-level home    Employment status: working  Treatments  Previous treatment: chiropractic, massage, physical therapy and medication  Patient Goals  Patient goal: to get rid of the tightness; to be able to walk better so that I can be outside- partial met        Objective     Neurological Testing     Sensation     Lumbar   Left   Intact: light touch    Right   Intact: light touch    Reflexes   Left   Patellar (L4): normal (2+)  Achilles (S1): normal (2+)    Right   Patellar (L4): normal (2+)  Achilles (S1): normal (2+)    Active Range of Motion     Lumbar   Flexion: 75 degrees   Extension: 10 degrees   Left lateral flexion: 18 degrees       Right lateral flexion: 22 degrees   Left rotation:  WFL  Right rotation:  WFL  Left Hip   Flexion: 90 degrees   External rotation (90/90): 15 degrees   Internal rotation (90/90): 10 degrees     Additional Active Range of Motion Details  Increased lumbar tightness with rotation AROM  In standing: symmetrical iliac crest and PSIS levels  Increased muscle tone B lumbar psp with increased tenderness  HS flexibility; WFL's  Pt unable to perform figure 4 stretch on L side    Strength/Myotome Testing     Left Hip   Planes of Motion   Flexion: 4  Extension: 3-  Abduction: 4  External rotation: 4  Internal rotation: 4    Right Hip   Planes of Motion   Flexion: 4+  Extension: 4  Abduction: 5  External rotation: 4+  Internal rotation: 4+    Left Knee   Flexion: 5  Extension: 5    Right Knee   Flexion: 5  Extension: 5    Left Ankle/Foot   Dorsiflexion: 5    Right Ankle/Foot   Dorsiflexion: 5          Dx: thoracolumbar pain  EPOC: 7/27/22  CO-MORBIDITIES:  PERSONAL FACTORS:  Precautions: severe L hip OA, Allergies to some lotions (vijay extract)      Manuals  6/17 6/24 6/28 7/1 7/5 7/18      thoroacolumbar MFR/ GISTM  LQ, MFR hip flexors + hip flex stretch th JK MFR hip flexors + hip flex stretch JK MFR hip flexors + hip flex stretch JK MFR hip flexors + hip flex  th      Lumbar PA's   th          Progress note       th        25 min 25' 20 min 25' 25 min 40'      Neuro Re-Ed             DLS: abd bracing  5", x20 5"x20 5"x20         clamshells  DECLINED           Modified bird dog   UE onlyx10 UE onlyx10  UE onlyx10       bridges  X5, HELD Pain on L hip x10 8x 6x np                                              Ther Ex             LTR  2x10, 10" 10x5" 10x5" fallouts SL 10x ea fallouts SL 10x ea       QL stretch  20"x4 ea 20"x3 20"x3         Angry cat stretch  modified X10, 5" Mod stand 10"x5 Mod stand 10"x5  Mod stand 10"x5       HS stretch  Seated 4x20" ea 3x20" seated 3x20" seated 3x20" seated 3x20"       L hip flexor stretch  HELD    Trial 40"       PPT sit on cushion      10"x10       Review HEP       5'                   Ther Activity                                       Gait Training                                       Modalities             MH post tx -prn  Declined

## 2022-07-21 ENCOUNTER — APPOINTMENT (OUTPATIENT)
Dept: LAB | Facility: HOSPITAL | Age: 67
End: 2022-07-21
Payer: MEDICARE

## 2022-07-21 DIAGNOSIS — Z01.818 OTHER SPECIFIED PRE-OPERATIVE EXAMINATION: ICD-10-CM

## 2022-07-21 LAB
ALBUMIN SERPL BCP-MCNC: 3.9 G/DL (ref 3.5–5)
ALP SERPL-CCNC: 103 U/L (ref 46–116)
ALT SERPL W P-5'-P-CCNC: 39 U/L (ref 12–78)
ANION GAP SERPL CALCULATED.3IONS-SCNC: 10 MMOL/L (ref 4–13)
AST SERPL W P-5'-P-CCNC: 60 U/L (ref 5–45)
BASOPHILS # BLD AUTO: 0.03 THOUSANDS/ΜL (ref 0–0.1)
BASOPHILS NFR BLD AUTO: 1 % (ref 0–1)
BILIRUB SERPL-MCNC: 0.5 MG/DL (ref 0.2–1)
BUN SERPL-MCNC: 17 MG/DL (ref 5–25)
CALCIUM SERPL-MCNC: 9.5 MG/DL (ref 8.3–10.1)
CHLORIDE SERPL-SCNC: 101 MMOL/L (ref 96–108)
CO2 SERPL-SCNC: 26 MMOL/L (ref 21–32)
CREAT SERPL-MCNC: 0.85 MG/DL (ref 0.6–1.3)
EOSINOPHIL # BLD AUTO: 0.26 THOUSAND/ΜL (ref 0–0.61)
EOSINOPHIL NFR BLD AUTO: 4 % (ref 0–6)
ERYTHROCYTE [DISTWIDTH] IN BLOOD BY AUTOMATED COUNT: 13.2 % (ref 11.6–15.1)
GFR SERPL CREATININE-BSD FRML MDRD: 71 ML/MIN/1.73SQ M
GLUCOSE P FAST SERPL-MCNC: 97 MG/DL (ref 65–99)
HCT VFR BLD AUTO: 39.4 % (ref 34.8–46.1)
HGB BLD-MCNC: 12.6 G/DL (ref 11.5–15.4)
IMM GRANULOCYTES # BLD AUTO: 0.01 THOUSAND/UL (ref 0–0.2)
IMM GRANULOCYTES NFR BLD AUTO: 0 % (ref 0–2)
LYMPHOCYTES # BLD AUTO: 2.47 THOUSANDS/ΜL (ref 0.6–4.47)
LYMPHOCYTES NFR BLD AUTO: 38 % (ref 14–44)
MCH RBC QN AUTO: 29.2 PG (ref 26.8–34.3)
MCHC RBC AUTO-ENTMCNC: 32 G/DL (ref 31.4–37.4)
MCV RBC AUTO: 91 FL (ref 82–98)
MONOCYTES # BLD AUTO: 0.52 THOUSAND/ΜL (ref 0.17–1.22)
MONOCYTES NFR BLD AUTO: 8 % (ref 4–12)
NEUTROPHILS # BLD AUTO: 3.18 THOUSANDS/ΜL (ref 1.85–7.62)
NEUTS SEG NFR BLD AUTO: 49 % (ref 43–75)
NRBC BLD AUTO-RTO: 0 /100 WBCS
PLATELET # BLD AUTO: 288 THOUSANDS/UL (ref 149–390)
PMV BLD AUTO: 8.5 FL (ref 8.9–12.7)
POTASSIUM SERPL-SCNC: 3.9 MMOL/L (ref 3.5–5.3)
PROT SERPL-MCNC: 7.7 G/DL (ref 6.4–8.4)
RBC # BLD AUTO: 4.32 MILLION/UL (ref 3.81–5.12)
SODIUM SERPL-SCNC: 137 MMOL/L (ref 135–147)
WBC # BLD AUTO: 6.47 THOUSAND/UL (ref 4.31–10.16)

## 2022-07-21 PROCEDURE — 36415 COLL VENOUS BLD VENIPUNCTURE: CPT

## 2022-07-21 PROCEDURE — 85025 COMPLETE CBC W/AUTO DIFF WBC: CPT

## 2022-07-21 PROCEDURE — 80053 COMPREHEN METABOLIC PANEL: CPT

## 2022-07-22 ENCOUNTER — APPOINTMENT (OUTPATIENT)
Dept: PHYSICAL THERAPY | Facility: CLINIC | Age: 67
End: 2022-07-22
Payer: MEDICARE

## 2022-07-25 ENCOUNTER — APPOINTMENT (OUTPATIENT)
Dept: PHYSICAL THERAPY | Facility: CLINIC | Age: 67
End: 2022-07-25
Payer: MEDICARE

## 2022-07-29 ENCOUNTER — APPOINTMENT (OUTPATIENT)
Dept: PHYSICAL THERAPY | Facility: CLINIC | Age: 67
End: 2022-07-29
Payer: MEDICARE

## 2022-08-03 ENCOUNTER — RA CDI HCC (OUTPATIENT)
Dept: OTHER | Facility: HOSPITAL | Age: 67
End: 2022-08-03

## 2022-08-03 NOTE — PROGRESS NOTES
[facility] [address]  coding opportunities       Chart reviewed, no opportunity found: CHART REVIEWED, NO OPPORTUNITY FOUND        Patients Insurance     Medicare Insurance: Medicare

## 2022-08-09 ENCOUNTER — OFFICE VISIT (OUTPATIENT)
Dept: FAMILY MEDICINE CLINIC | Facility: HOSPITAL | Age: 67
End: 2022-08-09
Payer: MEDICARE

## 2022-08-09 VITALS
DIASTOLIC BLOOD PRESSURE: 76 MMHG | SYSTOLIC BLOOD PRESSURE: 124 MMHG | TEMPERATURE: 98.3 F | WEIGHT: 150.4 LBS | HEART RATE: 84 BPM | BODY MASS INDEX: 24.17 KG/M2 | HEIGHT: 66 IN

## 2022-08-09 DIAGNOSIS — M16.12 PRIMARY OSTEOARTHRITIS OF LEFT HIP: ICD-10-CM

## 2022-08-09 DIAGNOSIS — Z12.11 SCREEN FOR COLON CANCER: ICD-10-CM

## 2022-08-09 DIAGNOSIS — E78.5 DYSLIPIDEMIA: ICD-10-CM

## 2022-08-09 DIAGNOSIS — Q21.12 PFO (PATENT FORAMEN OVALE): ICD-10-CM

## 2022-08-09 DIAGNOSIS — E06.3 HASHIMOTO'S DISEASE: ICD-10-CM

## 2022-08-09 DIAGNOSIS — Z01.818 PREOPERATIVE CLEARANCE: Primary | ICD-10-CM

## 2022-08-09 DIAGNOSIS — I63.9 CRYPTOGENIC STROKE (HCC): ICD-10-CM

## 2022-08-09 PROCEDURE — 99214 OFFICE O/P EST MOD 30 MIN: CPT | Performed by: INTERNAL MEDICINE

## 2022-08-09 NOTE — PROGRESS NOTES
Subjective:     Joan Wise is a 78 y o  female who presents to the office today for a preoperative consultation at the request of surgeon Dr Danii Puente who plans on performing L THR on August 15, 2022  Prior anesthesia adverse reactions - None    Exercise capacity - Able to walk 4 blocks or climb 2 flights of stairs without symptoms - Yes    Easy bleeding/bruising - Yes, bruises and bleeds easily on ASA     Chest pain/palpitation/edema - No    Dyspnea/wheezing/cough - yes, has intermittent cough but no F/C/SOB/wheezing    Sleep apnea/asthma/COPD - No    HPI Pt with chronic L hip pain  Has failed conservative medical treatment and is going for L THR next week  Pt had a R posterior frontal lobe acute/subacute ischemic stroke in May  She was started on ASA/Plavix and statin d/t poss A fib and + PFO/+bubble study while IP in May 22  Is not on Plavix but is still taking ASA and Lipitor  Was told not to stop the ASA d/t recent stroke        Past Medical History:   Diagnosis Date    Benign paroxysmal positional vertigo     Resolved: 9/15/2014    Fracture of radius          Past Surgical History:   Procedure Laterality Date    BIOPSY CORE NEEDLE      Thyroid     SECTION      COLPOSCOPY           Family History   Problem Relation Age of Onset    Stroke Mother         CVA    Breast cancer Mother 77    Alcohol abuse Father    Rc Carrasco COPD Father     Other Father         Nicotine Abuse    Thyroid cancer Brother     Coronary artery disease Brother     Thyroid disease Maternal Grandmother     Breast cancer Maternal Aunt 36    Breast cancer additional onset Maternal Aunt 48    No Known Problems Sister     No Known Problems Daughter     No Known Problems Maternal Grandfather     No Known Problems Paternal Grandmother     No Known Problems Paternal Grandfather     No Known Problems Daughter     No Known Problems Maternal Aunt     No Known Problems Maternal Aunt     No Known Problems Maternal Aunt Social History     Tobacco Use    Smoking status: Never Smoker    Smokeless tobacco: Never Used   Vaping Use    Vaping Use: Never used   Substance Use Topics    Alcohol use: Yes     Comment: social    Drug use: No       Current Outpatient Medications   Medication Sig Dispense Refill    aspirin 81 mg chewable tablet Chew 1 tablet (81 mg total)  in the morning  0    atorvastatin (LIPITOR) 40 mg tablet Take 1 tablet (40 mg total) by mouth every evening 30 tablet 5    calcium carbonate (OS-WALKER) 1250 (500 Ca) MG tablet Take 2 tablets by mouth in the morning   Chlorophyll 100 MG TABS Take 1 tablet (100 mg total) by mouth daily  0    cholecalciferol (VITAMIN D3) 1,000 units tablet Take 1,000 Units by mouth in the morning   Magnesium 250 MG TABS Take 3 tablets by mouth in the morning   Multiple Vitamin (MULTIVITAMINS PO) Take 1 capsule by mouth daily      Multiple Vitamins-Minerals (ZINC PO) Take 1 tablet by mouth daily (Patient not taking: Reported on 7/13/2022)      mupirocin (BACTROBAN) 2 % ointment APPLY A SMALL AMOUNT TO BOTH NOSTRILS TWICE A DAY FOR 5 DAYS PRIOR TO SURGERY      Naproxen Sodium 220 MG CAPS Take by mouth (Patient not taking: Reported on 7/13/2022)      THYROID PO Take by mouth      zinc sulfate (ZINCATE) 220 mg capsule Take 220 mg by mouth in the morning  (Patient not taking: Reported on 7/13/2022)       No current facility-administered medications for this visit  Allergies:  Patient has no known allergies  Review of Systems  Review of Systems   Constitutional: Negative for chills, fever and unexpected weight change  HENT: Negative for congestion and trouble swallowing  Eyes: Negative for pain and visual disturbance  Respiratory: Positive for cough  Negative for shortness of breath and wheezing  Cardiovascular: Negative for chest pain, palpitations and leg swelling     Gastrointestinal: Negative for abdominal pain, constipation, diarrhea, nausea and vomiting  Endocrine: Negative for polydipsia and polyuria  Genitourinary: Negative for difficulty urinating, dysuria and hematuria  Musculoskeletal: Positive for arthralgias and gait problem  Skin: Negative for rash and wound  Neurological: Negative for dizziness, light-headedness and headaches  Hematological: Bruises/bleeds easily  Psychiatric/Behavioral: Negative for dysphoric mood  Objective:    /76   Pulse 84   Temp 98 3 °F (36 8 °C) (Tympanic)   Ht 5' 6" (1 676 m)   Wt 68 2 kg (150 lb 6 4 oz)   BMI 24 28 kg/m²     Physical Exam  Vitals and nursing note reviewed  Constitutional:       General: She is not in acute distress  Appearance: She is well-developed  She is not ill-appearing  HENT:      Head: Normocephalic and atraumatic  Right Ear: Tympanic membrane and external ear normal  There is no impacted cerumen  Left Ear: Tympanic membrane and external ear normal    Eyes:      General:         Right eye: No discharge  Left eye: No discharge  Conjunctiva/sclera: Conjunctivae normal    Neck:      Vascular: No carotid bruit  Trachea: No tracheal deviation  Cardiovascular:      Rate and Rhythm: Normal rate and regular rhythm  Heart sounds: Normal heart sounds  No murmur heard  Pulmonary:      Effort: Pulmonary effort is normal  No respiratory distress  Breath sounds: Normal breath sounds  No wheezing, rhonchi or rales  Abdominal:      General: Bowel sounds are normal  There is no distension  Palpations: Abdomen is soft  Tenderness: There is no abdominal tenderness  There is no guarding or rebound  Musculoskeletal:         General: No deformity or signs of injury  Normal range of motion  Cervical back: Neck supple  Lymphadenopathy:      Cervical: No cervical adenopathy  Skin:     General: Skin is warm and dry  Coloration: Skin is not pale  Findings: No rash     Neurological:      General: No focal deficit present  Mental Status: She is alert  Motor: No abnormal muscle tone  Gait: Gait abnormal       Comments: Ambulates with limp with a cane   Psychiatric:         Mood and Affect: Mood normal          Behavior: Behavior normal          Thought Content:  Thought content normal          Judgment: Judgment normal          Cardiographics  EC/10/22 - NSR @ 77bpm, nml ME and borderline nml Qtc at 454, no acute arrhythmia or ischemia noted    Imaging  Chest x-ray: 6/10/22 - no acute dz    Lab Review   Recent labs: 22 UA, CBC, CMP - mild increase in AST at 60, all other labs wnl    Assessment:    Patient Active Problem List   Diagnosis    Breast cyst    Cervical spondylosis    Chronic allergic conjunctivitis    Dyslipidemia    Hashimoto's disease    Herniated cervical disc    Non-toxic multinodular goiter    Primary osteoarthritis of left hip    Rhinitis    Varicose veins    Elevated TSH    Osteopenia    DDD (degenerative disc disease), lumbar    Left leg numbness    Chronic pain syndrome    Chronic pain of left knee    Chronic left hip pain    Cryptogenic stroke (HCC)    PFO (patent foramen ovale)          Plan:     Surgical Clearance - Cleared    Risk - Pt acceptable risk for hip replacement    Discussion/Summary:   (1) Preoperative clearance - pt intermediate risk d/t recent cryptogenic stroke () but currently w/o symptoms, con't ASA d/t recent CVA, hold  OTC supplements other then ASA 5 days prior to surgery, call with any changes in health btw now and surgery    (2) Primary OA of L hip - for L THR with Dr Danii Puente    (3) Cryptogenic stroke  - had cardiac eval, off Plavix and just on ASA (do NOT hold prior to surgery), con't statin, call with Neuro symptoms    (4) PFO - Echo with bubble study done , con't tx and f/u as per Cardio    (5) Hashimoto's with elevated TSH - has repeat BW next month, will follow and monitor off levothyroxine for now    (6) Dyslipidemia - on statin, FLP done 5/22, will follow      Cologuard 8/19 - 3 yrs - Cologuard ordered today    Mammo 12/21    Dexa 11/20 - osteopenia     PAP 7/19     BW 5/22      Sally Maurer DO

## 2022-08-18 ENCOUNTER — PATIENT OUTREACH (OUTPATIENT)
Dept: FAMILY MEDICINE CLINIC | Facility: HOSPITAL | Age: 67
End: 2022-08-18

## 2022-08-18 ENCOUNTER — OFFICE VISIT (OUTPATIENT)
Dept: PHYSICAL THERAPY | Facility: CLINIC | Age: 67
End: 2022-08-18
Payer: MEDICARE

## 2022-08-18 DIAGNOSIS — M16.12 PRIMARY OSTEOARTHRITIS OF LEFT HIP: Primary | ICD-10-CM

## 2022-08-18 DIAGNOSIS — Z96.642 STATUS POST TOTAL REPLACEMENT OF LEFT HIP: ICD-10-CM

## 2022-08-18 PROCEDURE — 97110 THERAPEUTIC EXERCISES: CPT | Performed by: PHYSICAL THERAPIST

## 2022-08-18 PROCEDURE — 97162 PT EVAL MOD COMPLEX 30 MIN: CPT | Performed by: PHYSICAL THERAPIST

## 2022-08-18 NOTE — PROGRESS NOTES
PT Evaluation     Today's date: 2022  Patient name: Elias Moore  : 1955  MRN: [id number]  Referring provider: Wilton Camejo DO  Dx:   Encounter Diagnosis     ICD-10-CM    1  Primary osteoarthritis of left hip  M16 12    2  Status post total replacement of left hip  Z96 [id number]                   Assessment  Assessment details: Pt is a 78y o  year old female coming to outpatient PT s/p L SAMEER on 8/15/22  Pt presents with increased pain, decreased L hip ROM, decreased L LE strength, and overall decreased functional mobility  Pt would benefit from skilled PT services in order to address these deficits and reach maximum level of function  Thank you kindly for the referral!    Pt was instructed in total hip precautions for posterior approach and issued an initial HEP  Impairments: abnormal gait, abnormal or restricted ROM, activity intolerance, impaired physical strength, lacks appropriate home exercise program, pain with function and weight-bearing intolerance  Understanding of Dx/Px/POC: good   Prognosis: good    Goals  STG's ( 3-4 weeks)  1  Pt will be independent in HEP  2  Pt will have improved L hip abduction strength by 1 grade  LTG's ( 6- 8 weeks)  1  Improve FOTO score by 8-10 points  2  Pt will ambulate without an assistive device community distances  3  Pt will be able to go up and down the steps reciprocally  4  Pt will RTW full duty  5   Pt will be able to sleep with less pain    Plan  Patient would benefit from: PT eval and skilled physical therapy  Planned modality interventions: cryotherapy  Planned therapy interventions: manual therapy, neuromuscular re-education, balance, strengthening, stretching, therapeutic activities, therapeutic exercise, flexibility, functional ROM exercises and home exercise program  Frequency: 2x week  Duration in weeks: 6  Plan of Care beginning date: 2022  Plan of Care expiration date: 2022  Treatment plan discussed with: patient        Subjective Evaluation    History of Present Illness  Date of surgery: 8/15/2022  Mechanism of injury: surgery  Mechanism of injury: Pt underwent L SAMEER on 8/15/22  Pt is not sleeping well at night and can sleep about 2 hours  Pt was not given prescription pain medication and is relying on OTC Tylenol  Pt is waiting for a call back from doctor's office  Pt had a posterior approach  Pt has increased difficulty picking up objects from the floor  Pt has increased difficulty walking distances  Pt is able to go up the steps one at a time  Pt is able to stand for 15-20 minutes and is able to do light cooking activities  Pt is not working at this time      Work: [employer]: outdoor activities, gardening, walking  Gait: (+) antalgic gait, decreased weight bearing L LE  Pain  At best pain ratin  At worst pain ratin  Location: L hip  Quality: dull ache    Social Support  Steps to enter house: yes (1)  Stairs in house: yes (13)   Lives in: multiple-level home  Lives with: alone    Employment status: not working  Treatments  Previous treatment: chiropractic, massage and physical therapy  Patient Goals  Patient goals for therapy: decreased pain and return to work  Patient goal: walking, gardening; to return to work; to be able to walk at the [facility] in October        Objective     Neurological Testing     Sensation     Hip   Left Hip   Intact: light touch    Right Hip   Intact: light touch    Reflexes   Left   Patellar (L4): normal (2+)  Achilles (S1): normal (2+)    Right   Patellar (L4): normal (2+)  Achilles (S1): normal (2+)    Active Range of Motion   Left Hip   Flexion: 90 degrees   Abduction: 10 degrees   External rotation (90/90): 15 degrees   Internal rotation (90/90): 15 degrees     Right Hip   External rotation (90/90): 25 degrees   Internal rotation (90/90): 32 degrees     Additional Active Range of Motion Details  Mild TTP L hip  HS flexibility: R: WFL's  L: 75* with opposite knee flexed  Mild edema L upper thigh upon visual inspection    Passive Range of Motion   Left Hip   Flexion: 90 degrees   Abduction: 25 degrees     Strength/Myotome Testing     Left Hip   Planes of Motion   Flexion: 3  Left hip extension strength: NT   Abduction: 2-  External rotation: 4  Internal rotation: 4-    Right Hip   Normal muscle strength    Left Knee   Normal strength    Right Knee   Normal strength    Additional Strength Details  Ankle df MMT: 5/5            Dx: s/p L SAMEER ( 8/15/22) posterior approach  EPOC: 9/29/22  CO-MORBIDITIES:  PERSONAL FACTORS:  Precautions: posterior precautions      Manuals 8/18            L hip MFR/ PROM                                                    Neuro Re-Ed             sidestepping             Mini squats                                                                              Ther Ex             Total hip precautions educ/ HEP th                                                                                                       Ther Activity                                       Gait Training             Gait training w/ SPC and cane height check th                         Modalities

## 2022-08-18 NOTE — LETTER
[id number]    Puneet Acosta MD  [facility] [address] [place] [address]    Patient: Joan Wise   YOB: 1955   Date of Visit: 2022     Encounter Diagnosis     ICD-10-CM    1  Primary osteoarthritis of left hip  M16 12    2  Status post total replacement of left hip  [id number]        Dear Dr Danii Puente: Thank you for your recent referral of Joan Wise  Please review the attached evaluation summary from Sandra's recent visit  Please verify that you agree with the plan of care by signing the attached order  If you have any questions or concerns, please do not hesitate to call  I sincerely appreciate the opportunity to share in the care of one of your patients and hope to have another opportunity to work with you in the near future  Sincerely,    Chu Meadows, PT      Referring Provider:      I certify that I have read the below Plan of Care and certify the need for these services furnished under this plan of treatment while under my care  Puneet Acosta MD  [facility]  Via Fax: [phone]          PT Evaluation     Today's date: 2022  Patient name: Joan Wise  : 1955  MRN: [id number]  Referring provider: Sulema Boeck, DO  Dx:   Encounter Diagnosis     ICD-10-CM    1  Primary osteoarthritis of left hip  M16 12    2  Status post total replacement of left hip  Z96 [id number]                   Assessment  Assessment details: Pt is a 78y o  year old female coming to outpatient PT s/p L SAMEER on 8/15/22  Pt presents with increased pain, decreased L hip ROM, decreased L LE strength, and overall decreased functional mobility  Pt would benefit from skilled PT services in order to address these deficits and reach maximum level of function  Thank you kindly for the referral!    Pt was instructed in total hip precautions for posterior approach and issued an initial HEP    Impairments: abnormal gait, abnormal or restricted ROM, activity intolerance, impaired physical strength, lacks appropriate home exercise program, pain with function and weight-bearing intolerance  Understanding of Dx/Px/POC: good   Prognosis: good    Goals  STG's ( 3-4 weeks)  1  Pt will be independent in HEP  2  Pt will have improved L hip abduction strength by 1 grade  LTG's ( 6- 8 weeks)  1  Improve FOTO score by 8-10 points  2  Pt will ambulate without an assistive device community distances  3  Pt will be able to go up and down the steps reciprocally  4  Pt will RTW full duty  5  Pt will be able to sleep with less pain    Plan  Patient would benefit from: PT eval and skilled physical therapy  Planned modality interventions: cryotherapy  Planned therapy interventions: manual therapy, neuromuscular re-education, balance, strengthening, stretching, therapeutic activities, therapeutic exercise, flexibility, functional ROM exercises and home exercise program  Frequency: 2x week  Duration in weeks: 6  Plan of Care beginning date: 2022  Plan of Care expiration date: 2022  Treatment plan discussed with: patient        Subjective Evaluation    History of Present Illness  Date of surgery: 8/15/2022  Mechanism of injury: surgery  Mechanism of injury: Pt underwent L SAMEER on 8/15/22  Pt is not sleeping well at night and can sleep about 2 hours  Pt was not given prescription pain medication and is relying on OTC Tylenol  Pt is waiting for a call back from doctor's office  Pt had a posterior approach  Pt has increased difficulty picking up objects from the floor  Pt has increased difficulty walking distances  Pt is able to go up the steps one at a time  Pt is able to stand for 15-20 minutes and is able to do light cooking activities  Pt is not working at this time      Work: [employer]  Hobbies: outdoor activities, gardening, walking  Gait: (+) antalgic gait, decreased weight bearing L LE  Pain  At best pain ratin  At worst pain rating: 5  Location: L hip  Quality: dull ache    Social Support  Steps to enter house: yes (1)  Stairs in house: yes (13)   Lives in: multiple-level home  Lives with: alone    Employment status: not working  Treatments  Previous treatment: chiropractic, massage and physical therapy  Patient Goals  Patient goals for therapy: decreased pain and return to work  Patient goal: walking, gardening; to return to work; to be able to walk at the [facility] in October        Objective     Neurological Testing     Sensation     Hip   Left Hip   Intact: light touch    Right Hip   Intact: light touch    Reflexes   Left   Patellar (L4): normal (2+)  Achilles (S1): normal (2+)    Right   Patellar (L4): normal (2+)  Achilles (S1): normal (2+)    Active Range of Motion   Left Hip   Flexion: 90 degrees   Abduction: 10 degrees   External rotation (90/90): 15 degrees   Internal rotation (90/90): 15 degrees     Right Hip   External rotation (90/90): 25 degrees   Internal rotation (90/90): 32 degrees     Additional Active Range of Motion Details  Mild TTP L hip  HS flexibility: R: WFL's  L: 75* with opposite knee flexed  Mild edema L upper thigh upon visual inspection    Passive Range of Motion   Left Hip   Flexion: 90 degrees   Abduction: 25 degrees     Strength/Myotome Testing     Left Hip   Planes of Motion   Flexion: 3  Left hip extension strength: NT   Abduction: 2-  External rotation: 4  Internal rotation: 4-    Right Hip   Normal muscle strength    Left Knee   Normal strength    Right Knee   Normal strength    Additional Strength Details  Ankle df MMT: 5/5            Dx: s/p L SAMEER ( 8/15/22) posterior approach  EPOC: 9/29/22  CO-MORBIDITIES:  PERSONAL FACTORS:  Precautions: posterior precautions      Manuals 8/18            L hip MFR/ PROM                                                    Neuro Re-Ed             sidestepping             Mini squats                                                                              Ther Ex             Total hip precautions educ/ HEP th                                                                                                       Ther Activity                                       Gait Training             Gait training w/ SPC and cane height check th                         Modalities

## 2022-08-22 ENCOUNTER — OFFICE VISIT (OUTPATIENT)
Dept: PHYSICAL THERAPY | Facility: CLINIC | Age: 67
End: 2022-08-22
Payer: MEDICARE

## 2022-08-22 DIAGNOSIS — Z96.642 STATUS POST TOTAL REPLACEMENT OF LEFT HIP: Primary | ICD-10-CM

## 2022-08-22 PROCEDURE — 97110 THERAPEUTIC EXERCISES: CPT

## 2022-08-22 PROCEDURE — 97140 MANUAL THERAPY 1/> REGIONS: CPT

## 2022-08-22 PROCEDURE — 97112 NEUROMUSCULAR REEDUCATION: CPT

## 2022-08-22 NOTE — PROGRESS NOTES
Daily Note     Today's date: 2022  Patient name: Khadra Rao  : 1955  MRN: [id number]  Referring provider: Sherrie Salcido DO  Dx:   Encounter Diagnosis     ICD-10-CM    1  Status post total replacement of left hip  Z96 642                   Subjective: Pt reports her dressing is supposed to be taken off  Reports take it down to the mesh  C/o swelling of R hip and pain at the bruising which is now in the post thigh  Pt       Objective: See treatment diary below      Assessment: Tolerated treatment fair  Patient anxious about her incision  Pt's tegaderm layer w/ gauze taken off and a layer of gauze stuck to incision was left donned   's office, Shanti Cervantes, called to verify layer taken off  Pt had difficulty w/ SLR and reports all over "mm issues" are still present  Pt w/ palpable tightness in the quad, hip flexor and hip add  Pt w/ poor yanci for PROM into abduction  Plan: Continue per plan of care  Progress treatment as tolerated         Dx: s/p L SAMEER ( 8/15/22) posterior approach  EPOC: 22  CO-MORBIDITIES:  PERSONAL FACTORS:  Precautions: posterior precautions      Manuals            L hip MFR/ PROM  JK           Removal of dressing  JK                          15'           Neuro Re-Ed             sidestepping  3 laps           Mini squats  15           bridges  10x                                                               Ther Ex             Total hip precautions educ/ HEP th            HR   alt  10           Std hip abd bl  10 // bars           Std HS curl bl  10 // bars           Step ups fwd  6" 10           SLR AA  5                        Ther Activity                                       Gait Training             Gait training w/ SPC and cane height check th                         Modalities

## 2022-08-25 ENCOUNTER — OFFICE VISIT (OUTPATIENT)
Dept: PHYSICAL THERAPY | Facility: CLINIC | Age: 67
End: 2022-08-25
Payer: MEDICARE

## 2022-08-25 DIAGNOSIS — Z96.642 STATUS POST TOTAL REPLACEMENT OF LEFT HIP: Primary | ICD-10-CM

## 2022-08-25 PROCEDURE — 97110 THERAPEUTIC EXERCISES: CPT

## 2022-08-25 PROCEDURE — 97112 NEUROMUSCULAR REEDUCATION: CPT

## 2022-08-25 PROCEDURE — 97140 MANUAL THERAPY 1/> REGIONS: CPT

## 2022-08-25 NOTE — PROGRESS NOTES
Daily Note     Today's date: 2022  Patient name: Al Howell  : 1955  MRN: [id number]       Referring provider: Radha Ruffin DO  Dx:   Encounter Diagnosis     ICD-10-CM    1  Status post total replacement of left hip  Z96 [id number]                   Subjective: Pt reports she is much better than she was LV  States stopped taking her pain meds and feels better in the head  States not sleeping well w/ hip pain  Objective: See treatment diary below      Assessment: Tolerated treatment well  Patient would benefit from continued PT for con'td hip strengthening and ROM  Pt w/ mm tightness in glut/piriformis region  Pt w/ better yanci for activity today  Plan: Continue per plan of care  Progress treatment as tolerated  Dx: s/p L SAMEER ( 8/15/22) posterior approach  EPOC: 22  CO-MORBIDITIES:  PERSONAL FACTORS:  Precautions: posterior precautions      Manuals           L hip MFR/ PROM  JK JK          Removal of dressing  JK                          15 15'          Neuro Re-Ed             sidestepping  3 laps 4 laps          Mini squats  15 10x5"          bridges  10x 10x                                                              Ther Ex             Total hip precautions educ/ HEP th            HR   alt  10 10          Std hip abd bl  10 // bars 15 // bars          Std HS curl bl  10 // bars 15 // bars          Step ups fwd  6" 10 6" 12          SLR AA  5 5          Heel slides   nv/pball?           HL march   15                                    Ther Activity                                       Gait Training             Gait training w/ SPC and cane height check th                         Modalities

## 2022-08-25 NOTE — TELEPHONE ENCOUNTER
Patient schedule for a loop recorder to be done at [facility] on 11/02/22 with lilia Robison aware of general instructions  Patient cover under medicare

## 2022-08-29 ENCOUNTER — APPOINTMENT (OUTPATIENT)
Dept: PHYSICAL THERAPY | Facility: CLINIC | Age: 67
End: 2022-08-29
Payer: MEDICARE

## 2022-08-30 ENCOUNTER — EVALUATION (OUTPATIENT)
Dept: PHYSICAL THERAPY | Facility: CLINIC | Age: 67
End: 2022-08-30
Payer: MEDICARE

## 2022-08-30 DIAGNOSIS — M16.12 PRIMARY OSTEOARTHRITIS OF LEFT HIP: ICD-10-CM

## 2022-08-30 DIAGNOSIS — Z96.642 STATUS POST TOTAL REPLACEMENT OF LEFT HIP: Primary | ICD-10-CM

## 2022-08-30 PROCEDURE — 97140 MANUAL THERAPY 1/> REGIONS: CPT | Performed by: PHYSICAL THERAPIST

## 2022-08-30 PROCEDURE — 97110 THERAPEUTIC EXERCISES: CPT | Performed by: PHYSICAL THERAPIST

## 2022-08-30 PROCEDURE — 97112 NEUROMUSCULAR REEDUCATION: CPT | Performed by: PHYSICAL THERAPIST

## 2022-08-30 NOTE — PROGRESS NOTES
Daily Note     Today's date: 2022  Patient name: Osiel Rascon  : 1955  MRN: [id number]  Referring provider: Kiki Carrasco DO  Dx:   Encounter Diagnosis     ICD-10-CM    1  Status post total replacement of left hip  Z96 642    2  Primary osteoarthritis of left hip  M16 12                   Subjective: Pt reports L hip pain rated 0/10  Pt is performing her HEP 2 times daily  Pt reports her dressing is gradually peeling off and then she trims it down as instructed by physician's office  Objective: See treatment diary below      Assessment: Tolerated treatment well  Patient exhibited good technique with therapeutic exercises  Pt was instructed in ways to modify her SLR  Pt is not able to perform a clamshell  Pain with AA clamshell trial  Pt tolerated increased weight with standing ex  Pt has visible swelling in L posterior knee  Plan: Continue per plan of care   Trial of pneumatic compression pump NV     Dx: s/p L SAEMER ( 8/15/22) posterior approach  EPOC: 22  CO-MORBIDITIES:  PERSONAL FACTORS:  Precautions: posterior precautions      Manuals          L hip MFR/ PROM  Tracey Orchard th         Removal of dressing  JK                          15' 15' 10'         Neuro Re-Ed             sidestepping  3 laps 4 laps 2 laps Y TB         Mini squats  15 10x5" 10x5"         bridges  10x 10x 10x5"         BW walking    2 laps                                                Ther Ex             Total hip precautions educ/ HEP th            HR  20 20 15         March alt  10 10 15         Std hip abd bl  10 // bars 15 // bars 10 1 5# ea         Std HS curl bl  10 // bars 15 // bars 10 1 5# ea         Step ups fwd  6" 10 6" 12 -         SLR AA  5 5 5 AA         Heel slides   nv/pball? 10         HS stretch    20"x3         HL march   15 20 1 5#         LAQ    10x5" 1 5#         hooklying hip abduction w/ TB    10x5"         Ther Activity             Step ups FW    6" x10         Step down heel tap    4" x10         Step ups lat    4" x10         Gait Training             Gait training w/ SPC and cane height check th                         Modalities

## 2022-09-01 ENCOUNTER — EVALUATION (OUTPATIENT)
Dept: PHYSICAL THERAPY | Facility: CLINIC | Age: 67
End: 2022-09-01
Payer: MEDICARE

## 2022-09-01 DIAGNOSIS — Z96.642 STATUS POST TOTAL REPLACEMENT OF LEFT HIP: Primary | ICD-10-CM

## 2022-09-01 DIAGNOSIS — M16.12 PRIMARY OSTEOARTHRITIS OF LEFT HIP: ICD-10-CM

## 2022-09-01 PROCEDURE — 97110 THERAPEUTIC EXERCISES: CPT | Performed by: PHYSICAL THERAPIST

## 2022-09-01 PROCEDURE — 97016 VASOPNEUMATIC DEVICE THERAPY: CPT | Performed by: PHYSICAL THERAPIST

## 2022-09-01 PROCEDURE — 97140 MANUAL THERAPY 1/> REGIONS: CPT | Performed by: PHYSICAL THERAPIST

## 2022-09-01 NOTE — PROGRESS NOTES
Daily Note     Today's date: 2022  Patient name: Rico Michel  : 1955  MRN: [id number]  Referring provider: Rivera Barbosa DO  Dx:   Encounter Diagnosis     ICD-10-CM    1  Status post total replacement of left hip  Z96 642    2  Primary osteoarthritis of left hip  M16 12                   Subjective: Pt reports no increase in pain after last visit, but was very tired  Pt took several naps after therapy apt  Objective: See treatment diary below      Assessment: Tolerated treatment well  Patient tolerated increased step height with lateral step ups and increased weight with marches  Pt tolerated standing ex better when wearing a knee brace on her L knee      Plan: Continue per plan of care        Dx: s/p L SAMEER ( 8/15/22) posterior approach  EPOC: 22  CO-MORBIDITIES:  PERSONAL FACTORS:  Precautions: posterior precautions      Manuals         L hip MFR/ PROM  Sophronia Severance         Removal of dressing  JK                          15' 15' 10' 10'        Neuro Re-Ed             sidestepping  3 laps 4 laps 2 laps Y TB 2 laps YTB        Mini squats  15 10x5" 10x5" 10x5"        bridges  10x 10x 10x5" 10x5"        BW walking    2 laps 2 laps                                               Ther Ex             Total hip precautions educ/ HEP th            HR  20 20 15 15 slt bd        Stand hip extension     L 10 1 5#        March alt  10 10 15 10 1 5# ea        Std hip abd bl  10 // bars 15 // bars 10 1 5# ea 10 1 5# ea        Std HS curl bl  10 // bars 15 // bars 10 1 5# ea 10 1 5# ea        Seated hip IR/ER AROM  6" 10 6" 12 - 10 ea        SLR AA  5 5 5 AA 5 AA        Heel slides   nv/pball? 10 HEP        HS stretch    20"x3 20"x3        HL march   15 20 1 5# 10 2#        LAQ    10x5" 1 5# 10x5"        hooklying hip abduction w/ TB    10x5" 10x5" GTB        Ther Activity             Step ups FW    6" x10 6"x10        Step down heel tap    4" x10         Step ups lat    4" x10 6" x10 Gait Training             Gait training w/ SPC and cane height check th                         Modalities             Cp post tx     10'        Pneumatic compression pump     15'

## 2022-09-06 ENCOUNTER — EVALUATION (OUTPATIENT)
Dept: PHYSICAL THERAPY | Facility: CLINIC | Age: 67
End: 2022-09-06
Payer: MEDICARE

## 2022-09-06 DIAGNOSIS — M16.12 PRIMARY OSTEOARTHRITIS OF LEFT HIP: ICD-10-CM

## 2022-09-06 DIAGNOSIS — Z96.642 STATUS POST TOTAL REPLACEMENT OF LEFT HIP: Primary | ICD-10-CM

## 2022-09-06 PROCEDURE — 97112 NEUROMUSCULAR REEDUCATION: CPT | Performed by: PHYSICAL THERAPIST

## 2022-09-06 PROCEDURE — 97140 MANUAL THERAPY 1/> REGIONS: CPT | Performed by: PHYSICAL THERAPIST

## 2022-09-06 PROCEDURE — 97110 THERAPEUTIC EXERCISES: CPT | Performed by: PHYSICAL THERAPIST

## 2022-09-06 NOTE — PROGRESS NOTES
Daily Note     Today's date: 2022  Patient name: Reyna Merida  : 1955  MRN: [id number]  Referring provider: Timmy Mccann DO  Dx:   Encounter Diagnosis     ICD-10-CM    1  Status post total replacement of left hip  Z96 642    2  Primary osteoarthritis of left hip  M16 12                   Subjective: Pt reports no pain currently  Pt feels like her knee brace aggravated her knee more than helping  Pt was able to do a SLR and clamshell this weekend  Objective: See treatment diary below      Assessment: Tolerated treatment well  Patient exhibited good technique with therapeutic exercises  Pt tolerated increased reps with there ex program        Plan: Continue per plan of care  Continue to progress strengthening ex  Trial of TM and SLS NV       Dx: s/p L SAMEER ( 8/15/22) posterior approach  EPOC: 22  CO-MORBIDITIES:  PERSONAL FACTORS:  Precautions: posterior precautions      Manuals        L hip MFR/ PROM  Elbert Reynolds  th       Removal of dressing  JK                          15' 15' 10' 10' 10'       Neuro Re-Ed             sidestepping  3 laps 4 laps 2 laps Y TB 2 laps YTB 3 laps       Mini squats  15 10x5" 10x5" 10x5" 10x5" airex       bridges  10x 10x 10x5" 10x5" 10 x5" green tb hip abd       BW walking    2 laps 2 laps 2 laps       SLS      NV       Sidestepping on foam beam      2 laps                    Ther Ex             Total hip precautions educ/ HEP             HR  20 20 15 15 slt bd 20 slt bd       Stand hip extension     L 10 1 5# 10 1 5#       March alt  10 10 15 10 1 5# ea 15 1 5#       Std hip abd bl  10 // bars 15 // bars 10 1 5# ea 10 1 5# ea 15 1 5#       Std HS curl bl  10 // bars 15 // bars 10 1 5# ea 10 1 5# ea 15 1 5#       Seated hip IR/ER AROM  6" 10 6" 12 - 10 ea 10 ea       SLR AA  5 5 5 AA 5 AA 10 active       Heel slides   nv/pball? 10 HEP        HS stretch    20"x3 20"x3 20"x3       HL march   15 20 1 5# 10 2# D/c       LAQ    10x5" 1 5# 10x5" 10x5" 2#       hooklying hip abduction w/ TB    10x5" 10x5" GTB 10x10" GTB       Ther Activity             Step ups FW    6" x10 6"x10 6"x10       Step down heel tap    4" x10  4"x10       treadmill      NV       Step ups lat    4" x10 6" x10 6"x10       Gait Training             Gait training w/ SPC and cane height check th                         Modalities             Cp post tx     10'        Pneumatic compression pump     15'

## 2022-09-08 ENCOUNTER — APPOINTMENT (OUTPATIENT)
Dept: PHYSICAL THERAPY | Facility: CLINIC | Age: 67
End: 2022-09-08
Payer: MEDICARE

## 2022-09-09 ENCOUNTER — EVALUATION (OUTPATIENT)
Dept: PHYSICAL THERAPY | Facility: CLINIC | Age: 67
End: 2022-09-09
Payer: MEDICARE

## 2022-09-09 DIAGNOSIS — M16.12 PRIMARY OSTEOARTHRITIS OF LEFT HIP: ICD-10-CM

## 2022-09-09 DIAGNOSIS — Z96.642 STATUS POST TOTAL REPLACEMENT OF LEFT HIP: Primary | ICD-10-CM

## 2022-09-09 PROCEDURE — 97110 THERAPEUTIC EXERCISES: CPT | Performed by: PHYSICAL THERAPIST

## 2022-09-09 PROCEDURE — 97140 MANUAL THERAPY 1/> REGIONS: CPT | Performed by: PHYSICAL THERAPIST

## 2022-09-09 PROCEDURE — 97112 NEUROMUSCULAR REEDUCATION: CPT | Performed by: PHYSICAL THERAPIST

## 2022-09-09 NOTE — PROGRESS NOTES
Daily Note     Today's date: 2022  Patient name: Mojgan Zavala  : 1955  MRN: [id number]  Referring provider: Kianna Roldan DO  Dx:   Encounter Diagnosis     ICD-10-CM    1  Status post total replacement of left hip  Z96 642    2  Primary osteoarthritis of left hip  M16 12                   Subjective: Pt reports her physician said she is cleared for all activities, but should use pain as her guide  Pt had increased soreness with driving activities and and walking in [employer] to get a prescription filled  Objective: See treatment diary below      Assessment: Tolerated treatment well  Patient tolerated bike well and has improving ROM in her hip  ER RoM is still stiff, but improving  Pt has increased pain with SLS ex  Plan: Progress note during next visit        Dx: s/p L SAMEER ( 8/15/22) posterior approach  EPOC: 22  CO-MORBIDITIES:  PERSONAL FACTORS:  Precautions: posterior precautions      Manuals       L hip MFR/ PROM  Gerhardt [place] th  th      Removal of dressing  JK                          15' 15' 10' 10' 10' 10'      Neuro Re-Ed             sidestepping  3 laps 4 laps 2 laps Y TB 2 laps YTB 3 laps 3 laps YTB      Mini squats  15 10x5" 10x5" 10x5" 10x5" airex 10x5" airex      bridges  10x 10x 10x5" 10x5" 10 x5" green tb hip abd 10x10"      BW walking    2 laps 2 laps 2 laps D/c      SLS      NV 10"x1      Sidestepping on foam beam      2 laps 2 laps                    Ther Ex             bike       6'      HR  20 20 15 15 slt bd 20 slt bd 20 slt bd      Stand hip extension     L 10 1 5# 10 1 5# 15 1 5#      March alt  10 10 15 10 1 5# ea 15 1 5# 15 1 5#      Std hip abd bl  10 // bars 15 // bars 10 1 5# ea 10 1 5# ea 15 1 5# 15 1 5#      Std HS curl bl  10 // bars 15 // bars 10 1 5# ea 10 1 5# ea 15 1 5# 15 1 5#      Seated hip IR/ER AROM  6" 10 6" 12 - 10 ea 10 ea 10 ea      SLR AA  5 5 5 AA 5 AA 10 active 10 active      Heel slides   nv/pball? 10 HEP HS stretch    20"x3 20"x3 20"x3 20"x3      HL march   15 20 1 5# 10 2# D/c       LAQ    10x5" 1 5# 10x5" 10x5" 2# 10x10" 2 5#      hooklying hip abduction w/ TB    10x5" 10x5" GTB 10x10" GTB 10x10" GTB       Ther Activity             Step ups FW    6" x10 6"x10 6"x10 6"x10      Step down heel tap    4" x10  4"x10 4"x10      treadmill      NV 3' 1 0 mph      Step ups lat    4" x10 6" x10 6"x10 6"x10      Gait Training             Gait training w/ SPC and cane height check th                         Modalities             Cp post tx     10'        Pneumatic compression pump     15'

## 2022-09-12 ENCOUNTER — EVALUATION (OUTPATIENT)
Dept: PHYSICAL THERAPY | Facility: CLINIC | Age: 67
End: 2022-09-12
Payer: MEDICARE

## 2022-09-12 DIAGNOSIS — M16.12 PRIMARY OSTEOARTHRITIS OF LEFT HIP: ICD-10-CM

## 2022-09-12 DIAGNOSIS — Z96.642 STATUS POST TOTAL REPLACEMENT OF LEFT HIP: Primary | ICD-10-CM

## 2022-09-12 PROCEDURE — 97112 NEUROMUSCULAR REEDUCATION: CPT | Performed by: PHYSICAL THERAPIST

## 2022-09-12 PROCEDURE — 97140 MANUAL THERAPY 1/> REGIONS: CPT | Performed by: PHYSICAL THERAPIST

## 2022-09-12 PROCEDURE — 97110 THERAPEUTIC EXERCISES: CPT | Performed by: PHYSICAL THERAPIST

## 2022-09-12 NOTE — LETTER
[id number]    MD Lorene Ayon [address] [place] [address]    Patient: Shorty Soto   YOB: 1955   Date of Visit: 2022     Encounter Diagnosis     ICD-10-CM    1  Status post total replacement of left hip  Z96 642    2  Primary osteoarthritis of left hip  M16 12        Dear Dr Gordon Spine: Thank you for your recent referral of Shorty Soto  Please review the attached evaluation summary from Sandra's recent visit  Please verify that you agree with the plan of care by signing the attached order  If you have any questions or concerns, please do not hesitate to call  I sincerely appreciate the opportunity to share in the care of one of your patients and hope to have another opportunity to work with you in the near future  Sincerely,    Fito Herron, PT      Referring Provider:      I certify that I have read the below Plan of Care and certify the need for these services furnished under this plan of treatment while under my care  MD Lorene Ayon [address] [place] [address]  Via Fax: [phone]          PT Re-Evaluation     Today's date: 2022  Patient name: Shorty Soto  : 1955  MRN: [id number]  Referring provider: Rashad De La Cruz DO  Dx:   Encounter Diagnosis     ICD-10-CM    1  Status post total replacement of left hip  Z96 642    2  Primary osteoarthritis of left hip  M16 12                   Assessment  Assessment details: Since starting skilled PT, pain levels are decreased, L hip ROM and strength are progressing nicely with good functional progress  Recommend pt continue skilled PT focusing on improving strength for her RTW and improving ER ROM so that she can put her shoe and sock on with greater ease    Impairments: abnormal gait, abnormal or restricted ROM, activity intolerance, impaired physical strength, pain with function and weight-bearing intolerance  Understanding of Dx/Px/POC: good   Prognosis: good    Goals  STG's ( 3-4 weeks)  1  Pt will be independent in HEP- met  2  Pt will have improved L hip abduction strength by 1 grade- met  LTG's ( 6- 8 weeks)  1  Improve FOTO score by 8-10 points- met  2  Pt will ambulate without an assistive device community distances- not met  3  Pt will be able to go up and down the steps reciprocally-met  4  Pt will RTW full duty- not met -will start tomorrow  5  Pt will be able to sleep with less pain- partial met    Plan  Patient would benefit from: skilled physical therapy  Planned modality interventions: cryotherapy  Planned therapy interventions: manual therapy, neuromuscular re-education, balance, strengthening, stretching, therapeutic activities, therapeutic exercise, flexibility, functional ROM exercises and home exercise program  Frequency: 2x week  Duration in weeks: 6  Plan of Care beginning date: 9/12/2022  Plan of Care expiration date: 10/24/2022  Treatment plan discussed with: patient        Subjective Evaluation    History of Present Illness  Date of surgery: 8/15/2022  Mechanism of injury: surgery  Mechanism of injury: I E: Pt underwent L SAMEER on 8/15/22  Pt is not sleeping well at night and can sleep about 2 hours  Pt was not given prescription pain medication and is relying on OTC Tylenol  Pt is waiting for a call back from doctor's office  Pt had a posterior approach  Pt has increased difficulty picking up objects from the floor  Pt has increased difficulty walking distances  Pt is able to go up the steps one at a time  Pt is able to stand for 15-20 minutes and is able to do light cooking activities  Pt is not working at this time  9/12/22: Pt is complaint in HEP  Pt is walking with a SPC in the community and does not use her SPC in her home  Pt is able to go up and down the steps reciprocally  Pt is able to stand for at least 30 minutes  Pt is cooking and doing laundry   Pt was able to get up and down off the floor to  an object that she dropped  Pt is returning to work tomorrow, with less hours  Pt notices more hip pain if she walks longer hours without her cane and she has noticed her hip dropping  Pt has returned to driving activities  Pt is generally sleeping better at night, but had increased pain last night due to 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      Work: [employer]: outdoor activities, gardening, walking  Gait: mild antalgic gait with SPC  Pain  At best pain ratin  At worst pain rating: 3  Location: L hip  Quality: dull ache    Social Support  Steps to enter house: yes (1)  Stairs in house: yes (13)   Lives in: multiple-level home  Lives with: alone    Employment status: not working  Treatments  Previous treatment: chiropractic, massage and physical therapy  Patient Goals  Patient goals for therapy: decreased pain and return to work  Patient goal: walking, gardening; to return to work; to be able to walk at the [facility] in October        Objective     Neurological Testing     Sensation     Hip   Left Hip   Intact: light touch    Right Hip   Intact: light touch    Reflexes   Left   Patellar (L4): normal (2+)  Achilles (S1): normal (2+)    Right   Patellar (L4): normal (2+)  Achilles (S1): normal (2+)    Active Range of Motion   Left Hip   Flexion: 90 degrees   Abduction: 10 degrees   External rotation (90/90): 15 degrees   Internal rotation (90/90): 15 degrees     Right Hip   External rotation (90/90): 25 degrees   Internal rotation (90/90): 32 degrees     Additional Active Range of Motion Details  Mild TTP L hip  HS flexibility: R: WFL's  L: 75* with opposite knee flexed  Mild edema L upper thigh upon visual inspection    Passive Range of Motion   Left Hip   Flexion: 115 degrees   Abduction: 25 degrees   External rotation (90/90): 25 degrees   Internal rotation (90/90): 30 degrees     Strength/Myotome Testing     Left Hip   Planes of Motion   Flexion: 4+  Extension: 4-  Abduction: 4-  External rotation: 4  Internal rotation: 4+    Right Hip   Normal muscle strength    Left Knee   Normal strength    Right Knee   Normal strength    Additional Strength Details  Ankle df MMT: 5/5          Dx: s/p L SAMEER ( 8/15/22) posterior approach  EPOC: 9/29/22  CO-MORBIDITIES:  PERSONAL FACTORS:  Precautions: posterior precautions      Manuals 8/18 8/22 8/25 8/30 9/1 9/6 9/9 9/12     L hip MFR/ PROM  Acie Cousin th th th th th     Progress note  JK      th                    15' 15' 10' 10' 10' 10' 10'     Neuro Re-Ed             sidestepping  3 laps 4 laps 2 laps Y TB 2 laps YTB 3 laps 3 laps YTB 3 laps YTB     Mini squats  15 10x5" 10x5" 10x5" 10x5" airex 10x5" airex 10x5" airex     bridges  10x 10x 10x5" 10x5" 10 x5" green tb hip abd 10x10" 10x5" GTB hip and     BW walking    2 laps 2 laps 2 laps D/c      SLS      NV 10"x1 10"x3     Sidestepping on foam beam      2 laps 2 laps  2 laps     Bird dog        NV     Ther Ex             bike th      6' 6'     HR  20 20 15 15 slt bd 20 slt bd 20 slt bd 20 slt bd     Stand hip extension     L 10 1 5# 10 1 5# 15 1 5# 15 2#     March alt  10 10 15 10 1 5# ea 15 1 5# 15 1 5# 15 2#     Std hip abd bl  10 // bars 15 // bars 10 1 5# ea 10 1 5# ea 15 1 5# 15 1 5# 15 2#     Std HS curl bl  10 // bars 15 // bars 10 1 5# ea 10 1 5# ea 15 1 5# 15 1 5# 15 2#     Seated hip IR/ER AROM  6" 10 6" 12 - 10 ea 10 ea 10 ea 15     SLR AA  5 5 5 AA 5 AA 10 active 10 active 2x10 active     S/l hip abduction   nv/pball? 10 HEP   10     HS stretch    20"x3 20"x3 20"x3 20"x3 20"x3     HL march   15 20 1 5# 10 2# D/c       LAQ    10x5" 1 5# 10x5" 10x5" 2# 10x10" 2 5# 10"x10 3#     hooklying hip abduction w/ TB    10x5" 10x5" GTB 10x10" GTB 10x10" GTB  10x10" GTB     Ther Activity             Step ups FW    6" x10 6"x10 6"x10 6"x10 6"x10     Step down heel tap    4" x10  4"x10 4"x10 4"x10     treadmill      NV 3' 1 0 mph 4' 1 0 mph     Step ups lat    4" x10 6" x10 6"x10 6"x10 6"x10     Gait Training             Gait training w/ SPC and cane height check th                         Modalities             Cp post tx     10'        Pneumatic compression pump     15'

## 2022-09-12 NOTE — PROGRESS NOTES
PT Re-Evaluation     Today's date: 2022  Patient name: Ibeth Goodwin  : 1955  MRN: [id number]  Referring provider: Theodora Goodell, DO  Dx:   Encounter Diagnosis     ICD-10-CM    1  Status post total replacement of left hip  Z96 642    2  Primary osteoarthritis of left hip  M16 12                   Assessment  Assessment details: Since starting skilled PT, pain levels are decreased, L hip ROM and strength are progressing nicely with good functional progress  Recommend pt continue skilled PT focusing on improving strength for her RTW and improving ER ROM so that she can put her shoe and sock on with greater ease  Impairments: abnormal gait, abnormal or restricted ROM, activity intolerance, impaired physical strength, pain with function and weight-bearing intolerance  Understanding of Dx/Px/POC: good   Prognosis: good    Goals  STG's ( 3-4 weeks)  1  Pt will be independent in HEP- met  2  Pt will have improved L hip abduction strength by 1 grade- met  LTG's ( 6- 8 weeks)  1  Improve FOTO score by 8-10 points- met  2  Pt will ambulate without an assistive device community distances- not met  3  Pt will be able to go up and down the steps reciprocally-met  4  Pt will RTW full duty- not met -will start tomorrow  5  Pt will be able to sleep with less pain- partial met    Plan  Patient would benefit from: skilled physical therapy  Planned modality interventions: cryotherapy  Planned therapy interventions: manual therapy, neuromuscular re-education, balance, strengthening, stretching, therapeutic activities, therapeutic exercise, flexibility, functional ROM exercises and home exercise program  Frequency: 2x week  Duration in weeks: 6  Plan of Care beginning date: 2022  Plan of Care expiration date: 10/24/2022  Treatment plan discussed with: patient        Subjective Evaluation    History of Present Illness  Date of surgery: 8/15/2022  Mechanism of injury: surgery  Mechanism of injury:  I E: Pt underwent L SAMEER on 8/15/22  Pt is not sleeping well at night and can sleep about 2 hours  Pt was not given prescription pain medication and is relying on OTC Tylenol  Pt is waiting for a call back from doctor's office  Pt had a posterior approach  Pt has increased difficulty picking up objects from the floor  Pt has increased difficulty walking distances  Pt is able to go up the steps one at a time  Pt is able to stand for 15-20 minutes and is able to do light cooking activities  Pt is not working at this time  22: Pt is complaint in HEP  Pt is walking with a SPC in the community and does not use her SPC in her home  Pt is able to go up and down the steps reciprocally  Pt is able to stand for at least 30 minutes  Pt is cooking and doing laundry  Pt was able to get up and down off the floor to  an object that she dropped  Pt is returning to work tomorrow, with less hours  Pt notices more hip pain if she walks longer hours without her cane and she has noticed her hip dropping  Pt has returned to driving activities  Pt is generally sleeping better at night, but had increased pain last night due to 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      Work: [employer]: outdoor activities, gardening, walking  Gait: mild antalgic gait with SPC  Pain  At best pain ratin  At worst pain rating: 3  Location: L hip  Quality: dull ache    Social Support  Steps to enter house: yes (1)  Stairs in house: yes (13)   Lives in: multiple-level home  Lives with: alone    Employment status: not working  Treatments  Previous treatment: chiropractic, massage and physical therapy  Patient Goals  Patient goals for therapy: decreased pain and return to work  Patient goal: walking, gardening; to return to work; to be able to walk at the [facility] in October        Objective     Neurological Testing     Sensation     Hip   Left Hip   Intact: light touch    Right Hip   Intact: light touch    Reflexes Left   Patellar (L4): normal (2+)  Achilles (S1): normal (2+)    Right   Patellar (L4): normal (2+)  Achilles (S1): normal (2+)    Active Range of Motion   Left Hip   Flexion: 90 degrees   Abduction: 10 degrees   External rotation (90/90): 15 degrees   Internal rotation (90/90): 15 degrees     Right Hip   External rotation (90/90): 25 degrees   Internal rotation (90/90): 32 degrees     Additional Active Range of Motion Details  Mild TTP L hip  HS flexibility: R: WFL's  L: 75* with opposite knee flexed  Mild edema L upper thigh upon visual inspection    Passive Range of Motion   Left Hip   Flexion: 115 degrees   Abduction: 25 degrees   External rotation (90/90): 25 degrees   Internal rotation (90/90): 30 degrees     Strength/Myotome Testing     Left Hip   Planes of Motion   Flexion: 4+  Extension: 4-  Abduction: 4-  External rotation: 4  Internal rotation: 4+    Right Hip   Normal muscle strength    Left Knee   Normal strength    Right Knee   Normal strength    Additional Strength Details  Ankle df MMT: 5/5          Dx: s/p L SAMEER ( 8/15/22) posterior approach  EPOC: 9/29/22  CO-MORBIDITIES:  PERSONAL FACTORS:  Precautions: posterior precautions      Manuals 8/18 8/22 8/25 8/30 9/1 9/6 9/9 9/12     L hip MFR/ PROM  JK JK th th th th th     Progress note  JK      th                    15' 15' 10' 10' 10' 10' 10'     Neuro Re-Ed             sidestepping  3 laps 4 laps 2 laps Y TB 2 laps YTB 3 laps 3 laps YTB 3 laps YTB     Mini squats  15 10x5" 10x5" 10x5" 10x5" airex 10x5" airex 10x5" airex     bridges  10x 10x 10x5" 10x5" 10 x5" green tb hip abd 10x10" 10x5" GTB hip and     BW walking    2 laps 2 laps 2 laps D/c      SLS      NV 10"x1 10"x3     Sidestepping on foam beam      2 laps 2 laps  2 laps     Bird dog        NV     Ther Ex             bike th      6' 6'     HR  20 20 15 15 slt bd 20 slt bd 20 slt bd 20 slt bd     Stand hip extension     L 10 1 5# 10 1 5# 15 1 5# 15 2#     March alt  10 10 15 10 1 5# ea 15 1 5# 15 1 5# 15 2#     Std hip abd bl  10 // bars 15 // bars 10 1 5# ea 10 1 5# ea 15 1 5# 15 1 5# 15 2#     Std HS curl bl  10 // bars 15 // bars 10 1 5# ea 10 1 5# ea 15 1 5# 15 1 5# 15 2#     Seated hip IR/ER AROM  6" 10 6" 12 - 10 ea 10 ea 10 ea 15     SLR AA  5 5 5 AA 5 AA 10 active 10 active 2x10 active     S/l hip abduction   nv/pball? 10 HEP   10     HS stretch    20"x3 20"x3 20"x3 20"x3 20"x3     HL march   15 20 1 5# 10 2# D/c       LAQ    10x5" 1 5# 10x5" 10x5" 2# 10x10" 2 5# 10"x10 3#     hooklying hip abduction w/ TB    10x5" 10x5" GTB 10x10" GTB 10x10" GTB  10x10" GTB     Ther Activity             Step ups FW    6" x10 6"x10 6"x10 6"x10 6"x10     Step down heel tap    4" x10  4"x10 4"x10 4"x10     treadmill      NV 3' 1 0 mph 4' 1 0 mph     Step ups lat    4" x10 6" x10 6"x10 6"x10 6"x10     Gait Training             Gait training w/ SPC and cane height check th                         Modalities             Cp post tx     10'        Pneumatic compression pump     15'

## 2022-09-15 ENCOUNTER — EVALUATION (OUTPATIENT)
Dept: PHYSICAL THERAPY | Facility: CLINIC | Age: 67
End: 2022-09-15
Payer: MEDICARE

## 2022-09-15 DIAGNOSIS — M16.12 PRIMARY OSTEOARTHRITIS OF LEFT HIP: ICD-10-CM

## 2022-09-15 DIAGNOSIS — Z96.642 STATUS POST TOTAL REPLACEMENT OF LEFT HIP: Primary | ICD-10-CM

## 2022-09-15 PROCEDURE — 97140 MANUAL THERAPY 1/> REGIONS: CPT | Performed by: PHYSICAL THERAPIST

## 2022-09-15 PROCEDURE — 97112 NEUROMUSCULAR REEDUCATION: CPT | Performed by: PHYSICAL THERAPIST

## 2022-09-15 PROCEDURE — 97110 THERAPEUTIC EXERCISES: CPT | Performed by: PHYSICAL THERAPIST

## 2022-09-15 NOTE — PROGRESS NOTES
Daily Note     Today's date: 9/15/2022  Patient name: Elias Moore  : 1955  MRN: [id number]  Referring provider: Esther Lesch, MD  Dx:   Encounter Diagnosis     ICD-10-CM    1  Status post total replacement of left hip  Z96 642    2  Primary osteoarthritis of left hip  M16 12                   Subjective: Pt had increased L hip and low back pain at work after standing for 2 hours on  pt was able to work for 7 hours and did more sitting activities, with less pain  Objective: See treatment diary below      Assessment: Tolerated treatment well  Patient exhibited good technique with therapeutic exercises  Pt tolerated bird dog well  Pt fatigued after walking on TM for 4 minutes  Plan: Continue per plan of care   Continue to progress strengthening ex and ER PROM             Dx: s/p L SAMEER ( 8/15/22) posterior approach  EPOC: 22  CO-MORBIDITIES:  PERSONAL FACTORS:  Precautions: posterior precautions      Manuals 8/18 8/22 8/25 8/30 9/1 9/6 9/9 9/12 9/15    L hip MFR/ PROM  Doris Jadyn th th th     Progress note  JK                         15' 15' 10' 10' 10' 10' 10' 10'    Neuro Re-Ed             sidestepping  3 laps 4 laps 2 laps Y TB 2 laps YTB 3 laps 3 laps YTB 3 laps YTB 3 laps YTB    Mini squats  15 10x5" 10x5" 10x5" 10x5" airex 10x5" airex 10x5" airex 10x5" airex    bridges  10x 10x 10x5" 10x5" 10 x5" green tb hip abd 10x10" 10x5" GTB hip and 10x5" GTB    BW walking    2 laps 2 laps 2 laps D/c      SLS      NV 10"x1 10"x3 np    Sidestepping on foam beam      2 laps 2 laps  2 laps 2 laps    Bird dog        NV 10    Ther Ex             bike       6' 6' 6'    HR  20 20 15 15 slt bd 20 slt bd 20 slt bd 20 slt bd 20 slt bd    Stand hip extension     L 10 1 5# 10 1 5# 15 1 5# 15 2# 15 2#    March alt  10 10 15 10 1 5# ea 15 1 5# 15 1 5# 15 2# 15 2#    Std hip abd bl  10 // bars 15 // bars 10 1 5# ea 10 1 5# ea 15 1 5# 15 1 5# 15 2# 15 2#    Std HS curl bl  10 // bars 15 // bars 10 1 5# ea 10 1 5# ea 15 1 5# 15 1 5# 15 2# 15 2#    Seated hip IR/ER AROM  6" 10 6" 12 - 10 ea 10 ea 10 ea 15 15    SLR AA  5 5 5 AA 5 AA 10 active 10 active 2x10 active 10 active    S/l hip abduction   nv/pball? 10 HEP   10 10    HS stretch    20"x3 20"x3 20"x3 20"x3 20"x3 20"x3    Fig 4 stretch   15 20 1 5# 10 2# D/c   NV    LAQ    10x5" 1 5# 10x5" 10x5" 2# 10x10" 2 5# 10"x10 3# 10"x10 3#    hooklying hip abduction w/ TB    10x5" 10x5" GTB 10x10" GTB 10x10" GTB  10x10" GTB 10x10" GTB    Ther Activity             Step ups FW    6" x10 6"x10 6"x10 6"x10 6"x10 6"x10    Step down heel tap    4" x10  4"x10 4"x10 4"x10 4"x10    treadmill      NV 3' 1 0 mph 4' 1 0 mph 4' 1 2 mph    Step ups lat    4" x10 6" x10 6"x10 6"x10 6"x10 6"x10    Gait Training             Gait training w/ SPC and cane height check th                         Modalities             Cp post tx     10'        Pneumatic compression pump     15'

## 2022-09-19 ENCOUNTER — OFFICE VISIT (OUTPATIENT)
Dept: PHYSICAL THERAPY | Facility: CLINIC | Age: 67
End: 2022-09-19
Payer: MEDICARE

## 2022-09-19 DIAGNOSIS — M16.12 PRIMARY OSTEOARTHRITIS OF LEFT HIP: ICD-10-CM

## 2022-09-19 DIAGNOSIS — Z96.642 STATUS POST TOTAL REPLACEMENT OF LEFT HIP: Primary | ICD-10-CM

## 2022-09-19 PROCEDURE — 97112 NEUROMUSCULAR REEDUCATION: CPT

## 2022-09-19 PROCEDURE — 97110 THERAPEUTIC EXERCISES: CPT

## 2022-09-19 PROCEDURE — 97140 MANUAL THERAPY 1/> REGIONS: CPT

## 2022-09-19 NOTE — PROGRESS NOTES
Daily Note     Today's date: 2022  Patient name: Kashif Allen  : 1955  MRN: [id number]  Referring provider: Devon Ojeda MD  Dx:   Encounter Diagnosis     ICD-10-CM    1  Status post total replacement of left hip  Z96 642    2  Primary osteoarthritis of left hip  M16 12                   Subjective: Pt reports she has been feeling great  She has not been using her cane anymore      Objective: See treatment diary below      Assessment: was able to progress reps today with no adverse effects  ROM moving well in all directions  Some soreness in the glutes post session due to exercise and weakness  Tolerated treatment well  Patient demonstrated fatigue post treatment, exhibited good technique with therapeutic exercises and would benefit from continued PT      Plan: Continue per plan of care  Progress treatment as tolerated               Dx: s/p L SAMEER ( 8/15/22) posterior approach  EPOC: 22  CO-MORBIDITIES:  PERSONAL FACTORS:  Precautions: posterior precautions      Manuals 8/18 8/22 8/25 8/30 9/1 9/6 9/9 9/12 9/15 9/19   L hip MFR/ PROM  Scooter Solum th th th th  WE   Progress note  JK                         13' 15' 10' 10' 10' 10' 10' 10' 10'   Neuro Re-Ed             sidestepping  3 laps 4 laps 2 laps Y TB 2 laps YTB 3 laps 3 laps YTB 3 laps YTB 3 laps YTB 3 laps YTB   Mini squats  15 10x5" 10x5" 10x5" 10x5" airex 10x5" airex 10x5" airex 10x5" airex 10x5" airex   bridges  10x 10x 10x5" 10x5" 10 x5" green tb hip abd 10x10" 10x5" GTB hip and 10x5" GTB 15x5" GTB   BW walking    2 laps 2 laps 2 laps D/c      SLS      NV 10"x1 10"x3 np    Sidestepping on foam beam      2 laps 2 laps  2 laps 2 laps 2 laps   Tandem on foam beam          2 laps   Bird dog        NV 10 10   Ther Ex             bike th      6' 6' 6' 6'   HR  20 20 15 15 slt bd 20 slt bd 20 slt bd 20 slt bd 20 slt bd    Stand hip extension     L 10 1 5# 10 1 5# 15 1 5# 15 2# 15 2#    March alt  10 10 15 10 1 5# ea 15 1 5# 15 1 5# 15 2# 15 2#    Std hip abd bl  10 // bars 15 // bars 10 1 5# ea 10 1 5# ea 15 1 5# 15 1 5# 15 2# 15 2#    Std HS curl bl  10 // bars 15 // bars 10 1 5# ea 10 1 5# ea 15 1 5# 15 1 5# 15 2# 15 2#    Seated hip IR/ER AROM  6" 10 6" 12 - 10 ea 10 ea 10 ea 15 15 15   SLR AA  5 5 5 AA 5 AA 10 active 10 active 2x10 active 10 active 15x  active   S/l hip abduction   nv/pball? 10 HEP   10 10 x15   HS stretch    20"x3 20"x3 20"x3 20"x3 20"x3 20"x3    Fig 4 stretch   15 20 1 5# 10 2# D/c   NV    LAQ    10x5" 1 5# 10x5" 10x5" 2# 10x10" 2 5# 10"x10 3# 10"x10 3#    hooklying hip abduction w/ TB    10x5" 10x5" GTB 10x10" GTB 10x10" GTB  10x10" GTB 10x10" GTB    Ther Activity             Step ups FW    6" x10 6"x10 6"x10 6"x10 6"x10 6"x10    Step down heel tap    4" x10  4"x10 4"x10 4"x10 4"x10    treadmill      NV 3' 1 0 mph 4' 1 0 mph 4' 1 2 mph    Step ups lat    4" x10 6" x10 6"x10 6"x10 6"x10 6"x10    Gait Training             Gait training w/ SPC and cane height check th                         Modalities             Cp post tx     10'        Pneumatic compression pump     15'

## 2022-09-22 ENCOUNTER — OFFICE VISIT (OUTPATIENT)
Dept: PHYSICAL THERAPY | Facility: CLINIC | Age: 67
End: 2022-09-22
Payer: MEDICARE

## 2022-09-22 DIAGNOSIS — M16.12 PRIMARY OSTEOARTHRITIS OF LEFT HIP: ICD-10-CM

## 2022-09-22 DIAGNOSIS — Z96.642 STATUS POST TOTAL REPLACEMENT OF LEFT HIP: Primary | ICD-10-CM

## 2022-09-22 PROCEDURE — 97112 NEUROMUSCULAR REEDUCATION: CPT | Performed by: PHYSICAL THERAPIST

## 2022-09-22 PROCEDURE — 97140 MANUAL THERAPY 1/> REGIONS: CPT | Performed by: PHYSICAL THERAPIST

## 2022-09-22 PROCEDURE — 97110 THERAPEUTIC EXERCISES: CPT | Performed by: PHYSICAL THERAPIST

## 2022-09-22 NOTE — PROGRESS NOTES
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  Objective: See treatment diary below      Assessment: Tolerated treatment well  Patient exhibited good technique with therapeutic exercises  Manual therapy performed on L posterior hip today  Instructed pt in thoracic stretches and ex for home  Pt was instructed wall slide with longer hold to strengthen quad to be able to sustain positions for work activities  Pt is improving with hip ER ROM and feels some L knee pain with figure 4 stretch  Plan: Continue per plan of care  Focus on strengthening ex             Dx: s/p L SAMEER ( 8/15/22) posterior approach  EPOC: 22  CO-MORBIDITIES:  PERSONAL FACTORS:  Precautions: none      Manuals 9/22     9/6 9/9 9/12 9/15 9/19   L hip MFR/ PROM  WE   Progress note                          10'     10' 10' 10' 10' 10'   Neuro Re-Ed             sidestepping 2 laps GTB     3 laps 3 laps YTB 3 laps YTB 3 laps YTB 3 laps YTB   Mini squats at wall 30"x1     10x5" airex 10x5" airex 10x5" airex 10x5" airex 10x5" airex   Bridges w/ hip abduction 10x10" BTB      10 x5" green tb hip abd 10x10" 10x5" GTB hip and 10x5" GTB 15x5" GTB   BW walking      2 laps D/c      SLS      NV 10"x1 10"x3 np    Sidestepping on foam beam      2 laps 2 laps  2 laps 2 laps 2 laps   Tandem on foam beam          2 laps   Bird dog 10       NV 10 10   Ther Ex bike 6'      6' 6' 6' 6'   HR on slt bd 20     20 slt bd 20 slt bd 20 slt bd 20 slt bd 20 slt bd   Stand hip extension 2# x15    L 10 1 5# 10 1 5# 15 1 5# 15 2# 15 2# 15 2#   March alt 2# x15     15 1 5# 15 1 5# 15 2# 15 2# 15 2#   Std hip abd bl 2# x15     15 1 5# 15 1 5# 15 2# 15 2#    Std HS curl bl 2# x15     15 1 5# 15 1 5# 15 2# 15 2#    Seated hip IR/ER AROM      10 ea 10 ea 15 15 15   SLR  15     10 active 10 active 2x10 active 10 active 15x  active   S/l hip abduction 10       10 10 x15   HS stretch      20"x3 20"x3 20"x3 20"x3    Fig 4 stretch 10"x4  15 20 1 5# 10 2# D/c   NV    LAQ    10x5" 1 5# 10x5" 10x5" 2# 10x10" 2 5# 10"x10 3# 10"x10 3#    hooklying hip abduction w/ TB    10x5" 10x5" GTB 10x10" GTB 10x10" GTB  10x10" GTB 10x10" GTB    Ther Activity             Step ups FW    6" x10 6"x10 6"x10 6"x10 6"x10 6"x10    Step down heel tap    4" x10  4"x10 4"x10 4"x10 4"x10    treadmill 1 2 mph 4'     NV 3' 1 0 mph 4' 1 0 mph 4' 1 2 mph    Step ups lat    4" x10 6" x10 6"x10 6"x10 6"x10 6"x10    Gait Training                                       Modalities             Cp post tx     10'             15'

## 2022-09-26 ENCOUNTER — OFFICE VISIT (OUTPATIENT)
Dept: PHYSICAL THERAPY | Facility: CLINIC | Age: 67
End: 2022-09-26
Payer: MEDICARE

## 2022-09-26 DIAGNOSIS — Z96.642 STATUS POST TOTAL REPLACEMENT OF LEFT HIP: ICD-10-CM

## 2022-09-26 DIAGNOSIS — M16.12 PRIMARY OSTEOARTHRITIS OF LEFT HIP: Primary | ICD-10-CM

## 2022-09-26 PROCEDURE — 97140 MANUAL THERAPY 1/> REGIONS: CPT

## 2022-09-26 PROCEDURE — 97110 THERAPEUTIC EXERCISES: CPT

## 2022-09-26 PROCEDURE — 97112 NEUROMUSCULAR REEDUCATION: CPT

## 2022-09-26 NOTE — PROGRESS NOTES
Daily Note     Today's date: 2022  Patient name: Gold Hunter  : 1955  MRN: [id number]  Referring provider: Guanakito Bradley MD  Dx:   Encounter Diagnosis     ICD-10-CM    1  Primary osteoarthritis of left hip  M16 12    2  Status post total replacement of left hip  Z96 642                   Subjective: Pt reports having cont'd med knee pain  Reports her hip feels great  Objective: See treatment diary below      Assessment: Tolerated treatment fair  Patient cont's to limit hip ext w/ gait possibly causing her knee pain  Pt had TrP in the proximal HS that relieved some of her burning pain  Pt would benefit from cont'd PT to work on increasing hip ext and strengthening  Pt may benefit from sacral mobilization  Plan: Continue per plan of care  Progress treatment as tolerated               Dx: s/p L SAMEER ( 8/15/22) posterior approach  EPOC: 22  CO-MORBIDITIES:  PERSONAL FACTORS:  Precautions: none      Manuals 9/22 9/26    9/6 9/9 9/12 9/15 9/19   L hip MFR/ PROM th JK     th WE   Progress note                          10' 12'    10' 10' 10' 10' 10'   Neuro Re-Ed             sidestepping 2 laps GTB 2 laps GTB    3 laps 3 laps YTB 3 laps YTB 3 laps YTB 3 laps YTB   Mini squats at wall 30"x1 30"x2    10x5" airex 10x5" airex 10x5" airex 10x5" airex 10x5" airex   Bridges w/ hip abduction 10x10" BTB  10x10" BTB     10 x5" green tb hip abd 10x10" 10x5" GTB hip and 10x5" GTB 15x5" GTB   BW walking      2 laps D/c      SLS      NV 10"x1 10"x3 np    Sidestepping on foam beam  2 laps     2 laps 2 laps  2 laps 2 laps 2 laps   Tandem on foam beam  2 laps        2 laps   Bird dog 10 15      NV 10 10   Ther Ex             bike 6' 6'     6' 6' 6' 6'   HR on slt bd 20 20    20 slt bd 20 slt bd 20 slt bd 20 slt bd 20 slt bd   Stand hip extension 2# x15 2# 20   L 10 1 5# 10 1 5# 15 1 5# 15 2# 15 2# 15 2#   March alt 2# x15 2# 20    15 1 5# 15 1 5# 15 2# 15 2# 15 2#   Std hip abd bl 2# x15 2# 20 15 1 5# 15 1 5# 15 2# 15 2#    Std HS curl bl 2# x15 2# 20    15 1 5# 15 1 5# 15 2# 15 2#    Seated hip IR/ER AROM      10 ea 10 ea 15 15 15   SLR  15 20    10 active 10 active 2x10 active 10 active 15x  active   S/l hip abduction 10       10 10 x15   HS stretch      20"x3 20"x3 20"x3 20"x3    Fig 4 stretch 10"x4  15 20 1 5# 10 2# D/c   NV    LAQ    10x5" 1 5# 10x5" 10x5" 2# 10x10" 2 5# 10"x10 3# 10"x10 3#    hooklying hip abduction w/ TB  W/ bridge  10x5" 10x5" GTB 10x10" GTB 10x10" GTB  10x10" GTB 10x10" GTB    Ther Activity             Step ups FW    6" x10 6"x10 6"x10 6"x10 6"x10 6"x10    Step down heel tap    4" x10  4"x10 4"x10 4"x10 4"x10    treadmill 1 2 mph 4' 1 3 mph 5'    NV 3' 1 0 mph 4' 1 0 mph 4' 1 2 mph    Step ups lat    4" x10 6" x10 6"x10 6"x10 6"x10 6"x10    Gait Training                                       Modalities             Cp post tx     10'             15'

## 2022-09-29 ENCOUNTER — OFFICE VISIT (OUTPATIENT)
Dept: PHYSICAL THERAPY | Facility: CLINIC | Age: 67
End: 2022-09-29
Payer: MEDICARE

## 2022-09-29 DIAGNOSIS — M54.16 LUMBAR RADICULOPATHY: ICD-10-CM

## 2022-09-29 DIAGNOSIS — M54.12 CERVICAL RADICULOPATHY: Primary | ICD-10-CM

## 2022-09-29 PROCEDURE — 97112 NEUROMUSCULAR REEDUCATION: CPT

## 2022-09-29 PROCEDURE — 97110 THERAPEUTIC EXERCISES: CPT

## 2022-09-29 PROCEDURE — 97140 MANUAL THERAPY 1/> REGIONS: CPT

## 2022-09-29 NOTE — PROGRESS NOTES
Daily Note     Today's date: 2022  Patient name: Khadra Rao   : 1955  MRN: [id number]  Referring provider: Cindy Wilks MD  Dx:   Encounter Diagnosis     ICD-10-CM    1  Cervical radiculopathy  M54 12    2  Lumbar radiculopathy  M54 16                   Subjective: Pt reports mm soreness due to LV and being back to work standing a lot  Objective: See treatment diary below      Assessment: Tolerated treatment fair  Patient demonstrated fatigue post treatment and would benefit from continued PT to work on LE endurance and strength  Pt demonstrated better hip ext while on the tmill holding the railings  Plan: Continue per plan of care  Progress treatment as tolerated               Dx: s/p L SAMEER ( 8/15/22) posterior approach  EPOC: 22  CO-MORBIDITIES:  PERSONAL FACTORS:  Precautions: none      Manuals 9/22 9/26 9/29      9/15 9/19   L hip MFR/ PROM th JK JK      th WE   Progress note         th                  10' 12' 15'      10' 10'   Neuro Re-Ed             sidestepping 2 laps GTB 2 laps GTB 2 laps GTB      3 laps YTB 3 laps YTB   Mini squats at wall 30"x1 30"x2       10x5" airex 10x5" airex   Bridges w/ hip abduction 10x10" BTB  10x10" BTB  10x10"   GTB      10x5" GTB 15x5" GTB   BW walking             SLS         np    Sidestepping on foam beam  2 laps  3 laps      2 laps 2 laps   Tandem on foam beam  2 laps 3 laps       2 laps   Bird dog 10 15 15      10 10   Ther Ex             bike 6' 6' 6'      6' 6'   HR on slt bd 20 20 20      20 slt bd 20 slt bd   Stand hip extension 2# x15 2# 20 2# 20      15 2# 15 2#   March alt 2# x15 2# 20 2# 20      15 2# 15 2#   Std hip abd bl 2# x15 2# 20 2# 20      15 2#    Std HS curl bl 2# x15 2# 20 2# 20      15 2#    Seated hip IR/ER AROM         15 15   SLR  15 20 10      10 active 15x  active   S/l hip abduction 10  10x2      10 x15   HS stretch         20"x3    Fig 4 stretch 10"x4  15      NV    LAQ         10"x10 3#    hooklying hip abduction w/ TB  W/ bridge W/ bridge      10x10" GTB    Ther Activity             Step ups FW         6"x10    Step down heel tap   Lat 8" 15x      4"x10    treadmill 1 2 mph 4' 1 3 mph 5' 1 5 mph 5'      4' 1 2 mph    Step ups lat         6"x10    Gait Training                                       Modalities             Cp post tx

## 2022-10-03 ENCOUNTER — EVALUATION (OUTPATIENT)
Dept: PHYSICAL THERAPY | Facility: CLINIC | Age: 67
End: 2022-10-03
Payer: MEDICARE

## 2022-10-03 DIAGNOSIS — Z96.642 STATUS POST TOTAL REPLACEMENT OF LEFT HIP: Primary | ICD-10-CM

## 2022-10-03 DIAGNOSIS — M16.12 PRIMARY OSTEOARTHRITIS OF LEFT HIP: ICD-10-CM

## 2022-10-03 PROCEDURE — 97140 MANUAL THERAPY 1/> REGIONS: CPT | Performed by: PHYSICAL THERAPIST

## 2022-10-03 PROCEDURE — 97110 THERAPEUTIC EXERCISES: CPT | Performed by: PHYSICAL THERAPIST

## 2022-10-03 NOTE — LETTER
October 3, 3919    MD Lorene De La Rosa [address] [address]    Patient: Mary Tong   YOB: 1955   Date of Visit: 10/3/2022     Encounter Diagnosis     ICD-10-CM    1  Status post total replacement of left hip  Z96 642    2  Primary osteoarthritis of left hip  M16 12        Dear Dr Lawson Bias: Thank you for your recent referral of Mary Tong  Please review the attached evaluation summary from Sandra's recent visit  Please verify that you agree with the plan of care by signing the attached order  If you have any questions or concerns, please do not hesitate to call  I sincerely appreciate the opportunity to share in the care of one of your patients and hope to have another opportunity to work with you in the near future  Sincerely,    Izabela Morales, PT      Referring Provider:      I certify that I have read the below Plan of Care and certify the need for these services furnished under this plan of treatment while under my care  MD Lorene De La Rosa [address] [address]  Via Fax: [phone]          PT Discharge    Today's date: 10/3/2022  Patient name: Mary Tong  : 1955  MRN: [id number]  Referring provider: Marylee Gentry, MD  Dx:   Encounter Diagnosis     ICD-10-CM    1  Status post total replacement of left hip  Z96 642    2  Primary osteoarthritis of left hip  M16 12                   Assessment  Assessment details: Since starting skilled PT, pain levels are decreased, L hip ROM and strength are WFL with good functional progress  Recommend pt be discharged to an independent [facility]  Understanding of Dx/Px/POC: good   Prognosis: good    Goals  STG's ( 3-4 weeks)  1  Pt will be independent in HEP- met  2  Pt will have improved L hip abduction strength by 1 grade- met  LTG's ( 6- 8 weeks)  1  Improve FOTO score by 8-10 points- met  2   Pt will ambulate without an assistive device community distances- met  3  Pt will be able to go up and down the steps reciprocally-met  4  Pt will RTW full duty- met  5  Pt will be able to sleep with less pain-  met    Plan  Frequency: D/c to HEP  Plan of Care beginning date: 9/12/2022  Plan of Care expiration date: 10/24/2022  Treatment plan discussed with: patient        Subjective Evaluation    History of Present Illness  Date of surgery: 8/15/2022  Mechanism of injury: surgery  Mechanism of injury: I E: Pt underwent L SAMEER on 8/15/22  Pt is not sleeping well at night and can sleep about 2 hours  Pt was not given prescription pain medication and is relying on OTC Tylenol  Pt is waiting for a call back from doctor's office  Pt had a posterior approach  Pt has increased difficulty picking up objects from the floor  Pt has increased difficulty walking distances  Pt is able to go up the steps one at a time  Pt is able to stand for 15-20 minutes and is able to do light cooking activities  Pt is not working at this time  9/12/22: Pt is complaint in HEP  Pt is walking with a SPC in the community and does not use her SPC in her home  Pt is able to go up and down the steps reciprocally  Pt is able to stand for at least 30 minutes  Pt is cooking and doing laundry  Pt was able to get up and down off the floor to  an object that she dropped  Pt is returning to work tomorrow, with less hours  Pt notices more hip pain if she walks longer hours without her cane and she has noticed her hip dropping  Pt has returned to driving activities  Pt is generally sleeping better at night, but had increased pain last night due to 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  10/3/22: Pt has returned to work full duty, full hours  Pt is able to walk community distances  Pt was able to walk at a flea market for 4 hours  Pt stands long time periods at work  Pt was able to walk over in a park over uneven surfaces without aggravation of symptoms   Pt is able to cross her L ankle over her R knee      Work: [employer]: outdoor activities, gardening, walking  Gait: mild antalgic gait with SPC  Pain  At best pain ratin  At worst pain rating: 3  Location: L hip  Quality: dull ache    Social Support  Steps to enter house: yes (1)  Stairs in house: yes (13)   Lives in: multiple-level home  Lives with: alone    Employment status: not working  Treatments  Previous treatment: chiropractic, massage and physical therapy  Patient Goals  Patient goals for therapy: decreased pain and return to work  Patient goal: walking, gardening; to return to work; to be able to walk at the [facility] in October        Objective     Neurological Testing     Sensation     Hip   Left Hip   Intact: light touch    Right Hip   Intact: light touch    Reflexes   Left   Patellar (L4): normal (2+)  Achilles (S1): normal (2+)    Right   Patellar (L4): normal (2+)  Achilles (S1): normal (2+)    Active Range of Motion   Left Hip   Flexion: 130 degrees   Abduction: [facility]  External rotation (90/90): 35 degrees   Internal rotation (90/90): 34 degrees     Right Hip   External rotation (90/90): 25 degrees   Internal rotation (90/90): 32 degrees     Additional Active Range of Motion Details  10/3/22: HS flexibility: WFL's    I E: Mild TTP L hip  HS flexibility: R: WFL's  L: 75* with opposite knee flexed  Mild edema L upper thigh upon visual inspection    Passive Range of Motion   Left Hip   Flexion: WFL  Abduction: 25 degrees   External rotation (90/90): 35 degrees   Internal rotation (90/90): 35 degrees     Strength/Myotome Testing     Left Hip   Planes of Motion   Flexion: 4+  Extension: 4+  Abduction: 4+  External rotation: 4+  Internal rotation: 4+    Right Hip   Normal muscle strength    Left Knee   Normal strength    Right Knee   Normal strength    Additional Strength Details  Ankle df MMT: 5/5         Dx: s/p L SAMEER ( 8/15/22) posterior approach  EPOC: 22  CO-MORBIDITIES:  PERSONAL FACTORS:  Precautions: none      Manuals 9/22 9/26 9/29 10/3     9/15 9/19   L hip MFR/ PROM th JK JK th     th WE   Progress note    th     th                  10' 12' 15' 15'     10' 10'   Neuro Re-Ed             sidestepping 2 laps GTB 2 laps GTB 2 laps GTB      3 laps YTB 3 laps YTB   Mini squats at wall 30"x1 30"x2       10x5" airex 10x5" airex   Bridges w/ hip abduction 10x10" BTB  10x10" BTB  10x10"   GTB      10x5" GTB 15x5" GTB   BW walking             SLS         np    Sidestepping on foam beam  2 laps  3 laps 3 laps     2 laps 2 laps   Tandem on foam beam  2 laps 3 laps 3 laps      2 laps   Bird dog 10 15 15      10 10   Ther Ex             bike 6' 6' 6' 6'     6' 6'   HR on slt bd 20 20 20      20 slt bd 20 slt bd   Stand hip extension 2# x15 2# 20 2# 20      15 2# 15 2#   March alt 2# x15 2# 20 2# 20      15 2# 15 2#   Std hip abd bl 2# x15 2# 20 2# 20      15 2#    Std HS curl bl 2# x15 2# 20 2# 20      15 2#    Seated hip IR/ER AROM         15 15   SLR  15 20 10      10 active 15x  active   S/l hip abduction 10  10x2      10 x15   HS stretch         20"x3    Fig 4 stretch 10"x4  15      NV    LAQ         10"x10 3#    hooklying hip abduction w/ TB  W/ bridge W/ bridge      10x10" GTB    Ther Activity             Step ups FW         6"x10    Step down heel tap   Lat 8" 15x      4"x10    treadmill 1 2 mph 4' 1 3 mph 5' 1 5 mph 5'      4' 1 2 mph    Step ups lat         6"x10    Gait Training                                       Modalities             Cp post tx

## 2022-10-03 NOTE — PROGRESS NOTES
PT Discharge    Today's date: 10/3/2022  Patient name: Basilio Bay  : 1955  MRN: [id number]  Referring provider: Luis Fernando Devries MD  Dx:   Encounter Diagnosis     ICD-10-CM    1  Status post total replacement of left hip  Z96 642    2  Primary osteoarthritis of left hip  M16 12                   Assessment  Assessment details: Since starting skilled PT, pain levels are decreased, L hip ROM and strength are WFL with good functional progress  Recommend pt be discharged to an independent HEP  Understanding of Dx/Px/POC: good   Prognosis: good    Goals  STG's ( 3-4 weeks)  1  Pt will be independent in HEP- met  2  Pt will have improved L hip abduction strength by 1 grade- met  LTG's ( 6- 8 weeks)  1  Improve FOTO score by 8-10 points- met  2  Pt will ambulate without an assistive device community distances- met  3  Pt will be able to go up and down the steps reciprocally-met  4  Pt will RTW full duty- met  5  Pt will be able to sleep with less pain-  met    Plan  Frequency: D/c to HEP  Plan of Care beginning date: 2022  Plan of Care expiration date: 10/24/2022  Treatment plan discussed with: patient        Subjective Evaluation    History of Present Illness  Date of surgery: 8/15/2022  Mechanism of injury: surgery  Mechanism of injury: I E: Pt underwent L SAMEER on 8/15/22  Pt is not sleeping well at night and can sleep about 2 hours  Pt was not given prescription pain medication and is relying on OTC Tylenol  Pt is waiting for a call back from doctor's office  Pt had a posterior approach  Pt has increased difficulty picking up objects from the floor  Pt has increased difficulty walking distances  Pt is able to go up the steps one at a time  Pt is able to stand for 15-20 minutes and is able to do light cooking activities  Pt is not working at this time  22: Pt is complaint in HEP  Pt is walking with a SPC in the community and does not use her SPC in her home   Pt is able to go up and down the steps reciprocally  Pt is able to stand for at least 30 minutes  Pt is cooking and doing laundry  Pt was able to get up and down off the floor to  an object that she dropped  Pt is returning to work tomorrow, with less hours  Pt notices more hip pain if she walks longer hours without her cane and she has noticed her hip dropping  Pt has returned to driving activities  Pt is generally sleeping better at night, but had increased pain last night due to 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  10/3/22: Pt has returned to work full duty, full hours  Pt is able to walk community distances  Pt was able to walk at a flea market for 4 hours  Pt stands long time periods at work  Pt was able to walk over in a park over uneven surfaces without aggravation of symptoms  Pt is able to cross her L ankle over her R knee      Work: [employer]: outdoor activities, gardening, walking  Gait: mild antalgic gait with SPC  Pain  At best pain ratin  At worst pain rating: 3  Location: L hip  Quality: dull ache    Social Support  Steps to enter house: yes (1)  Stairs in house: yes (13)   Lives in: multiple-level home  Lives with: alone    Employment status: not working  Treatments  Previous treatment: chiropractic, massage and physical therapy  Patient Goals  Patient goals for therapy: decreased pain and return to work  Patient goal: walking, gardening; to return to work; to be able to walk at the [facility] in October        Objective     Neurological Testing     Sensation     Hip   Left Hip   Intact: light touch    Right Hip   Intact: light touch    Reflexes   Left   Patellar (L4): normal (2+)  Achilles (S1): normal (2+)    Right   Patellar (L4): normal (2+)  Achilles (S1): normal (2+)    Active Range of Motion   Left Hip   Flexion: 130 degrees   Abduction: [facility]  External rotation (90/90): 35 degrees   Internal rotation (90/90): 34 degrees     Right Hip   External rotation (90/90): 25 degrees Internal rotation (90/90): 32 degrees     Additional Active Range of Motion Details  10/3/22: HS flexibility: WFL's    I E: Mild TTP L hip  HS flexibility: R: WFL's  L: 75* with opposite knee flexed  Mild edema L upper thigh upon visual inspection    Passive Range of Motion   Left Hip   Flexion: WFL  Abduction: 25 degrees   External rotation (90/90): 35 degrees   Internal rotation (90/90): 35 degrees     Strength/Myotome Testing     Left Hip   Planes of Motion   Flexion: 4+  Extension: 4+  Abduction: 4+  External rotation: 4+  Internal rotation: 4+    Right Hip   Normal muscle strength    Left Knee   Normal strength    Right Knee   Normal strength    Additional Strength Details  Ankle df MMT: 5/5         Dx: s/p L SAMEER ( 8/15/22) posterior approach  EPOC: 9/29/22  CO-MORBIDITIES:  PERSONAL FACTORS:  Precautions: none      Manuals 9/22 9/26 9/29 10/3     9/15 9/19   L hip MFR/ PROM th JK JK th th WE   Progress note    th th                  10' 12' 15' 15'     10' 10'   Neuro Re-Ed             sidestepping 2 laps GTB 2 laps GTB 2 laps GTB      3 laps YTB 3 laps YTB   Mini squats at wall 30"x1 30"x2       10x5" airex 10x5" airex   Bridges w/ hip abduction 10x10" BTB  10x10" BTB  10x10"   GTB      10x5" GTB 15x5" GTB   BW walking             SLS         np    Sidestepping on foam beam  2 laps  3 laps 3 laps     2 laps 2 laps   Tandem on foam beam  2 laps 3 laps 3 laps      2 laps   Bird dog 10 15 15      10 10   Ther Ex             bike 6' 6' 6' 6'     6' 6'   HR on slt bd 20 20 20      20 slt bd 20 slt bd   Stand hip extension 2# x15 2# 20 2# 20      15 2# 15 2#   March alt 2# x15 2# 20 2# 20      15 2# 15 2#   Std hip abd bl 2# x15 2# 20 2# 20      15 2#    Std HS curl bl 2# x15 2# 20 2# 20      15 2#    Seated hip IR/ER AROM         15 15   SLR  15 20 10      10 active 15x  active   S/l hip abduction 10  10x2      10 x15   HS stretch         20"x3    Fig 4 stretch 10"x4  15      NV    LAQ         10"x10 3# hooklying hip abduction w/ TB  W/ bridge W/ bridge      10x10" GTB    Ther Activity             Step ups FW         6"x10    Step down heel tap   Lat 8" 15x      4"x10    treadmill 1 2 mph 4' 1 3 mph 5' 1 5 mph 5'      4' 1 2 mph    Step ups lat         6"x10    Gait Training                                       Modalities             Cp post tx

## 2022-10-05 ENCOUNTER — APPOINTMENT (OUTPATIENT)
Dept: LAB | Facility: HOSPITAL | Age: 67
End: 2022-10-05
Payer: MEDICARE

## 2022-10-05 LAB
ALBUMIN SERPL BCP-MCNC: 3.8 G/DL (ref 3.5–5)
ALP SERPL-CCNC: 129 U/L (ref 46–116)
ALT SERPL W P-5'-P-CCNC: 29 U/L (ref 12–78)
ANION GAP SERPL CALCULATED.3IONS-SCNC: 9 MMOL/L (ref 4–13)
AST SERPL W P-5'-P-CCNC: 55 U/L (ref 5–45)
BILIRUB SERPL-MCNC: 0.5 MG/DL (ref 0.2–1)
BUN SERPL-MCNC: 13 MG/DL (ref 5–25)
CALCIUM SERPL-MCNC: 9.5 MG/DL (ref 8.3–10.1)
CHLORIDE SERPL-SCNC: 103 MMOL/L (ref 96–108)
CHOLEST SERPL-MCNC: 174 MG/DL
CO2 SERPL-SCNC: 29 MMOL/L (ref 21–32)
CREAT SERPL-MCNC: 0.93 MG/DL (ref 0.6–1.3)
GFR SERPL CREATININE-BSD FRML MDRD: 64 ML/MIN/1.73SQ M
GLUCOSE P FAST SERPL-MCNC: 94 MG/DL (ref 65–99)
HDLC SERPL-MCNC: 92 MG/DL
LDLC SERPL CALC-MCNC: 70 MG/DL (ref 0–100)
NONHDLC SERPL-MCNC: 82 MG/DL
POTASSIUM SERPL-SCNC: 3.7 MMOL/L (ref 3.5–5.3)
PROT SERPL-MCNC: 7.5 G/DL (ref 6.4–8.4)
SODIUM SERPL-SCNC: 141 MMOL/L (ref 135–147)
T4 FREE SERPL-MCNC: 0.83 NG/DL (ref 0.76–1.46)
TRIGL SERPL-MCNC: 58 MG/DL
TSH SERPL DL<=0.05 MIU/L-ACNC: 5.34 UIU/ML (ref 0.45–4.5)

## 2022-10-08 LAB — COLOGUARD RESULT REPORTABLE: NEGATIVE

## 2022-10-14 ENCOUNTER — TELEMEDICINE (OUTPATIENT)
Dept: FAMILY MEDICINE CLINIC | Facility: HOSPITAL | Age: 67
End: 2022-10-14
Payer: MEDICARE

## 2022-10-14 VITALS
TEMPERATURE: 100.6 F | HEIGHT: 62 IN | HEART RATE: 111 BPM | WEIGHT: 150 LBS | OXYGEN SATURATION: 94 % | BODY MASS INDEX: 27.6 KG/M2

## 2022-10-14 DIAGNOSIS — U07.1 COVID-19: Primary | ICD-10-CM

## 2022-10-14 PROCEDURE — 99213 OFFICE O/P EST LOW 20 MIN: CPT | Performed by: INTERNAL MEDICINE

## 2022-10-14 NOTE — PROGRESS NOTES
COVID-19 Outpatient Progress Note    Assessment/Plan:    Problem List Items Addressed This Visit    None     Visit Diagnoses     COVID-19    -  Primary         Disposition:     Patient has asymptomatic or mild COVID-19 infection  Based off CDC guidelines, they were recommended to isolate for 5 days  If they are asymptomatic or symptoms are improving with no fevers in the past 24 hours, isolation may be ended followed by 5 days of wearing a mask when around othes to minimize risk of infecting others  If still have a fever or other symptoms have not improved, continue to isolate until they improve  Regardless of when they end isolation, avoid being around people who are more likely to get very sick from COVID-19 until at least day 11  Discussed symptom directed medication options with patient  Discussed vitamin D, vitamin C, and/or zinc supplementation with patient  She was advised to self isolate and to avoid all public places/transportation/stores for 5 + 5 = 10 days  She was encouraged to wear a mask when around other household contacts if they are not sick/COVID + and to use her own bathroom if at all possible  She was encouraged to wipe down surfaces/handles and to use and wash her own utensils  She was advised to check her pulse ox  2-3 x's a day and to call with any O2 sat <90% OR with persistent fever past 10 days OR with any new/worse symptoms      I have spent 10 minutes directly with the patient  Greater than 50% of this time was spent in counseling/coordination of care regarding: prognosis, risks and benefits of treatment options, instructions for management, patient and family education, importance of treatment compliance, risk factor reductions and impressions   Pt deferring Paxlovid - importance of having to start within 5 days of onset of symptoms reviewed - call if she changes her mind      Encounter provider: Rodney Chacko DO     [facility] [place] [id number] [facility] [address]   [facility] [address]  [facility] [id number]     Recent Visits  No visits were found meeting these conditions  Showing recent visits within past 7 days and meeting all other requirements  Today's Visits  Date Type Provider Dept   10/14/22 Telemedicine DO [facility]   Showing today's visits and meeting all other requirements  Future Appointments  No visits were found meeting these conditions  Showing future appointments within next 150 days and meeting all other requirements     This virtual check-in was done via [address] and patient was informed that this is a secure, HIPAA-compliant platform  She agrees to proceed  Patient agrees to participate in a virtual check in via telephone or video visit instead of presenting to the office to address urgent/immediate medical needs  Patient is aware this is a billable service  She acknowledged consent and understanding of privacy and security of the video platform  The patient has agreed to participate and understands they can discontinue the visit at any time  After connecting through [facility], the patient was identified by name and date of birth  Mary Tong was informed that this was a telemedicine visit and that the exam was being conducted confidentially over secure lines  My office door was closed  No one else was in the room  Mary Tong acknowledged consent and understanding of privacy and security of the telemedicine visit  I informed the patient that I have reviewed her record in Epic and presented the opportunity for her to ask any questions regarding the visit today  The patient agreed to participate  Verification of patient location:  Patient is located in the following state in which I hold an active license: [place]    Subjective:   Mary Tong is a 78 y o  female who has been screened for COVID-19  Symptom change since last report: improving   Patient's symptoms include fever, chills, fatigue, nasal congestion, sore throat, cough, chest tightness, myalgias and headache  Patient denies anosmia, loss of taste, shortness of breath, abdominal pain, nausea, vomiting and diarrhea  - Date of symptom onset: 10/14/2022  - Date of positive COVID-19 test: 10/14/2022  Type of test: Home antigen  COVID-19 vaccination status: Fully vaccinated (primary series)    Pt woke up this am with a HA and ST this am  Symptoms progressed to chills and fever (Tm 100 6) and body aches  She has a mild cough but no SOB  She notes some chest tightness  She recently was on a bus trip and mult members now have [employer]  Lab Results   Component Value Date    SARSCOV2 Negative 05/18/2022       Review of Systems   Constitutional: Positive for chills, fatigue and fever  HENT: Positive for congestion and sore throat  Respiratory: Positive for cough and chest tightness  Negative for shortness of breath  Gastrointestinal: Negative for abdominal pain, diarrhea, nausea and vomiting  Musculoskeletal: Positive for myalgias  Neurological: Positive for headaches  Current Outpatient Medications on File Prior to Visit   Medication Sig   • aspirin 81 mg chewable tablet Chew 1 tablet (81 mg total)  in the morning  • atorvastatin (LIPITOR) 40 mg tablet Take 1 tablet (40 mg total) by mouth every evening   • calcium carbonate (OS-WALKER) 1250 (500 Ca) MG tablet Take 2 tablets by mouth in the morning  • Chlorophyll 100 MG TABS Take 1 tablet (100 mg total) by mouth daily   • cholecalciferol (VITAMIN D3) 1,000 units tablet Take 1,000 Units by mouth in the morning  • Magnesium 250 MG TABS Take 3 tablets by mouth in the morning     • Multiple Vitamin (MULTIVITAMINS PO) Take 1 capsule by mouth daily   • Multiple Vitamins-Minerals (ZINC PO) Take 1 tablet by mouth daily (Patient not taking: Reported on 7/13/2022)   • mupirocin (BACTROBAN) 2 % ointment APPLY A SMALL AMOUNT TO BOTH NOSTRILS TWICE A DAY FOR 5 DAYS PRIOR TO SURGERY   • Naproxen Sodium 220 MG CAPS Take by mouth (Patient not taking: Reported on 7/13/2022)   • THYROID PO Take by mouth   • zinc sulfate (ZINCATE) 220 mg capsule Take 220 mg by mouth in the morning  (Patient not taking: Reported on 7/13/2022)       Objective:    Pulse (!) 111   Temp (!) 100 6 °F (38 1 °C)   Ht 5' 2" (1 575 m)   Wt 68 kg (150 lb)   SpO2 94%   BMI 27 44 kg/m²      Physical Exam  Vitals and nursing note reviewed  Constitutional:       General: She is not in acute distress  Appearance: She is not ill-appearing  HENT:      Head: Normocephalic and atraumatic  Eyes:      General:         Right eye: No discharge  Left eye: No discharge  Conjunctiva/sclera: Conjunctivae normal    Pulmonary:      Effort: Pulmonary effort is normal  No respiratory distress  Skin:     Coloration: Skin is not pale  Findings: No rash  Psychiatric:         Behavior: Behavior normal          Thought Content:  Thought content normal          Judgment: Judgment normal        Yina Hills DO

## 2022-10-28 ENCOUNTER — OFFICE VISIT (OUTPATIENT)
Dept: FAMILY MEDICINE CLINIC | Facility: HOSPITAL | Age: 67
End: 2022-10-28
Payer: MEDICARE

## 2022-10-28 VITALS
HEIGHT: 66 IN | WEIGHT: 153.2 LBS | HEART RATE: 76 BPM | SYSTOLIC BLOOD PRESSURE: 118 MMHG | OXYGEN SATURATION: 98 % | DIASTOLIC BLOOD PRESSURE: 70 MMHG | TEMPERATURE: 98 F | BODY MASS INDEX: 24.62 KG/M2

## 2022-10-28 DIAGNOSIS — R05.2 SUBACUTE COUGH: ICD-10-CM

## 2022-10-28 DIAGNOSIS — E78.5 DYSLIPIDEMIA: ICD-10-CM

## 2022-10-28 DIAGNOSIS — R79.89 ELEVATED TSH: ICD-10-CM

## 2022-10-28 DIAGNOSIS — Z12.31 ENCOUNTER FOR SCREENING MAMMOGRAM FOR MALIGNANT NEOPLASM OF BREAST: ICD-10-CM

## 2022-10-28 DIAGNOSIS — R74.01 TRANSAMINITIS: Primary | ICD-10-CM

## 2022-10-28 DIAGNOSIS — R74.8 ELEVATED ALKALINE PHOSPHATASE LEVEL: ICD-10-CM

## 2022-10-28 DIAGNOSIS — Z00.00 MEDICARE ANNUAL WELLNESS VISIT, SUBSEQUENT: ICD-10-CM

## 2022-10-28 DIAGNOSIS — R94.6 ABNORMAL RESULTS OF THYROID FUNCTION STUDIES: ICD-10-CM

## 2022-10-28 DIAGNOSIS — M85.9 DISORDER OF BONE DENSITY AND STRUCTURE, UNSPECIFIED: ICD-10-CM

## 2022-10-28 PROCEDURE — 99214 OFFICE O/P EST MOD 30 MIN: CPT | Performed by: INTERNAL MEDICINE

## 2022-10-28 PROCEDURE — G0439 PPPS, SUBSEQ VISIT: HCPCS | Performed by: INTERNAL MEDICINE

## 2022-10-28 RX ORDER — BENZONATATE 100 MG/1
100 CAPSULE ORAL 3 TIMES DAILY PRN
Qty: 30 CAPSULE | Refills: 0 | Status: SHIPPED | OUTPATIENT
Start: 2022-10-28

## 2022-10-28 NOTE — PROGRESS NOTES
Assessment and Plan:     Problem List Items Addressed This Visit        Other    Dyslipidemia     LDL at goal, con't current statin, healthy diet and regular exercise encouraged         Relevant Orders    Comprehensive metabolic panel    Elevated TSH     Labile and up and down - currently back up - FT4 wnl, pt clinically euthyroid, she is going to restart her thyroid supplements OTC (had to stop for surgery) and will recheck TSH in 3 mo - BW order given, will follow         Relevant Orders    TSH, 3rd generation with Free T4 reflex      Other Visit Diagnoses     Transaminitis    -  Primary    AST slightly better but Alk Phos now up, recheck CMP/Vit D in 3 mos - BW order given, denies ETOH daily, neg Hep C Ab Sept 2020, will follow    Relevant Orders    Comprehensive metabolic panel    Abnormal results of thyroid function studies         recheck TFT in 3 mo - BW order given    Relevant Orders    TSH, 3rd generation with Free T4 reflex    Elevated alkaline phosphatase level        Recheck CMP with Vit D in 3 mo - BW order given    Relevant Orders    Vitamin D 25 hydroxy    Disorder of bone density and structure, unspecified         Dexa in Nov 20 showed osteopenia - pt deferring Dexa, poss progression to osteoporosis reviewed and pt con't to defer, will check Vit D - BW order given    Relevant Orders    Vitamin D 25 hydroxy    Subacute cough        s/p COVID early Oct - rx for [employer] given, d/w pt that cough can last 3 mos after COVID - call with SOB/F/C    Relevant Medications    benzonatate (TESSALON PERLES) 100 mg capsule    Medicare annual wellness visit, subsequent        Encounter for screening mammogram for malignant neoplasm of breast        Relevant Orders    Mammo screening bilateral w 3d & cad          Depression Screening and Follow-up Plan: Patient was screened for depression during today's encounter  They screened negative with a PHQ-2 score of 0        Preventive health issues were discussed with patient, and age appropriate screening tests were ordered as noted in patient's After Visit Summary  Coljamesuaneela 10/22 - 3 yrs    Mammo 11/21 - order given today    Dexa 11/20 - osteopenia - pt deferring    BW 10/22    Pt deferring flu and Prevnar      Personalized health advice and appropriate referrals for health education or preventive services given if needed, as noted in patient's After Visit Summary  History of Present Illness:     Patient presents for a Medicare Wellness Visit    HPI Pt here for follow up appt/BW results and AWV    BW results were d/w pt in detail: AST 55 and Alk Phos 129, rest of CMP and FLP were wnl, TSH a bit elevated at 5 344 but FT4 was wnl  Nml range for LFT's reviewed  She does not drink ETOH on a daily basis  She was drinking 2 glasses of wine on vacation and returned home 10/6/22 and has not had anything to drink since then  Hep C Ab neg 9/2020    Goal FLP was d/w pt in detail  Diet/exercise reviewed  She is watching red meats/fatty foods  She is getting adequate fruits/veggies  She is just getting back into an exercise program   She is taking her Atorvastatin daily as directed w/o SE  She notes no stroke/TIA symptoms/CP  Nml range for TSH reviewed  She has never been on levothyroxine in the past but does take OTC thyroid supplement ("Thyroid complex")  She notes no excessive fatigue/hair loss/skin changes/tremor/palp/C/D/intolerance to heat or cold or unintentional significant wgt changes  Pt had COVID earlier in the month  She notes the fatigue was much better as of yesterday  She is coughing still  She notes no SOB/wheezing/F/C  Patient Care Team:  Milad Montano DO as PCP - General (Internal Medicine)  Sheeba Barber MD (Neurology)  Keisha Ochoa MD (Cardiology)     Review of Systems:     Review of Systems   Constitutional: Negative for chills, fatigue, fever and unexpected weight change     HENT: Negative for congestion, hearing loss and trouble swallowing  Eyes: Negative for pain and visual disturbance  Respiratory: Negative for cough and shortness of breath  Cardiovascular: Negative for chest pain, palpitations and leg swelling  Gastrointestinal: Negative for abdominal pain, blood in stool, constipation, diarrhea, nausea and vomiting  Genitourinary: Negative for difficulty urinating, dysuria, hematuria, vaginal bleeding and vaginal pain  Musculoskeletal: Positive for arthralgias  Negative for back pain and neck pain  Skin: Negative for rash and wound  Neurological: Negative for dizziness, light-headedness and headaches  Hematological: Bruises/bleeds easily  Psychiatric/Behavioral: Negative for dysphoric mood and sleep disturbance          Problem List:     Patient Active Problem List   Diagnosis   • Breast cyst   • Cervical spondylosis   • Chronic allergic conjunctivitis   • Dyslipidemia   • Hashimoto's disease   • Herniated cervical disc   • Non-toxic multinodular goiter   • Primary osteoarthritis of left hip   • Rhinitis   • Varicose veins   • Elevated TSH   • Osteopenia   • DDD (degenerative disc disease), lumbar   • Left leg numbness   • Chronic pain syndrome   • Chronic pain of left knee   • Chronic left hip pain   • Cryptogenic stroke (HCC)   • PFO (patent foramen ovale)      Past Medical and Surgical History:     Past Medical History:   Diagnosis Date   • Benign paroxysmal positional vertigo     Resolved: 9/15/2014   • Cataracts, bilateral    • Fracture of radius      Past Surgical History:   Procedure Laterality Date   • BIOPSY CORE NEEDLE      Thyroid   •  SECTION     • COLPOSCOPY     • HIP SURGERY        Family History:     Family History   Problem Relation Age of Onset   • Stroke Mother         CVA   • Breast cancer Mother 77   • Alcohol abuse Father    • COPD Father    • Other Father         Nicotine Abuse   • Thyroid cancer Brother    • Coronary artery disease Brother    • Thyroid disease Maternal Grandmother    • Breast cancer Maternal Aunt 40   • Breast cancer additional onset Maternal Aunt 50   • No Known Problems Sister    • No Known Problems Daughter    • No Known Problems Maternal Grandfather    • No Known Problems Paternal Grandmother    • No Known Problems Paternal Grandfather    • No Known Problems Daughter    • No Known Problems Maternal Aunt    • No Known Problems Maternal Aunt    • No Known Problems Maternal Aunt       Social History:     Social History     Socioeconomic History   • Marital status: Single     Spouse name: None   • Number of children: None   • Years of education: None   • Highest education level: None   Occupational History   • None   Tobacco Use   • Smoking status: Never Smoker   • Smokeless tobacco: Never Used   Vaping Use   • Vaping Use: Never used   Substance and Sexual Activity   • Alcohol use: Yes     Comment: social   • Drug use: No   • Sexual activity: Not Currently   Other Topics Concern   • None   Social History Narrative    Single, lives alone, part-time employment in [employer]     Social Determinants of Health     Financial Resource Strain: Low Risk    • Difficulty of Paying Living Expenses: Not hard at all   Food Insecurity: No Food Insecurity   • Worried About Running Out of Food in the Last Year: Never true   • Ran Out of Food in the Last Year: Never true   Transportation Needs: No Transportation Needs   • Lack of Transportation (Medical): No   • Lack of Transportation (Non-Medical):  No   Physical Activity: Not on file   Stress: Not on file   Social Connections: Not on file   Intimate Partner Violence: Not on file   Housing Stability: Low Risk    • Unable to Pay for Housing in the Last Year: No   • Number of Places Lived in the Last Year: 1   • Unstable Housing in the Last Year: No      Medications and Allergies:     Current Outpatient Medications   Medication Sig Dispense Refill   • benzonatate (TESSALON PERLES) 100 mg capsule Take 1 capsule (100 mg total) by mouth 3 (three) times a day as needed for cough 30 capsule 0   • THYROID PO Take by mouth     • zinc sulfate (ZINCATE) 220 mg capsule Take 220 mg by mouth daily     • aspirin 81 mg chewable tablet Chew 1 tablet (81 mg total)  in the morning  0   • atorvastatin (LIPITOR) 40 mg tablet Take 1 tablet (40 mg total) by mouth every evening 30 tablet 5   • calcium carbonate (OS-WALKER) 1250 (500 Ca) MG tablet Take 2 tablets by mouth in the morning  • Chlorophyll 100 MG TABS Take 1 tablet (100 mg total) by mouth daily  0   • cholecalciferol (VITAMIN D3) 1,000 units tablet Take 1,000 Units by mouth in the morning  • Magnesium 250 MG TABS Take 3 tablets by mouth in the morning  • Multiple Vitamin (MULTIVITAMINS PO) Take 1 capsule by mouth daily       No current facility-administered medications for this visit  No Known Allergies   Immunizations:     Immunization History   Administered Date(s) Administered   • COVID-19 PFIZER VACCINE 0 3 ML IM 04/25/2021, 05/19/2021      Health Maintenance:         Topic Date Due   • Cervical Cancer Screening  07/22/2022   • DXA SCAN  11/17/2022   • Breast Cancer Screening: Mammogram  12/19/2022   • Colorectal Cancer Screening  10/04/2025   • Hepatitis C Screening  Completed         Topic Date Due   • Pneumococcal Vaccine: 65+ Years (1 - PCV) Never done   • COVID-19 Vaccine (3 - Booster for Pfizer series) 10/19/2021   • Influenza Vaccine (1) Never done      Medicare Screening Tests and Risk Assessments:     Jan Fuentes is here for her Subsequent Wellness visit  Last Medicare Wellness visit information reviewed, patient interviewed and updates made to the record today  Health Risk Assessment:   Patient rates overall health as very good  Patient feels that their physical health rating is slightly better  Patient is very satisfied with their life  Eyesight was rated as much worse  Hearing was rated as same  Patient feels that their emotional and mental health rating is same   Patients states they are never, rarely angry  Patient states they are always unusually tired/fatigued  Pain experienced in the last 7 days has been a lot  Patient's pain rating has been 8/10  Patient states that she has experienced no weight loss or gain in last 6 months  Eyesight worse with cataracts - needs to have surgery    Depression Screening:   PHQ-2 Score: 0      Fall Risk Screening: In the past year, patient has experienced: no history of falling in past year      Urinary Incontinence Screening:   Patient has not leaked urine accidently in the last six months  Home Safety:  Patient does not have trouble with stairs inside or outside of their home  Patient has working smoke alarms and has no working carbon monoxide detector  Home safety hazards include: none  Nutrition:   Current diet is Low Carb  Medications:   Patient is currently taking over-the-counter supplements  OTC medications include: see medication list  Patient is able to manage medications  Activities of Daily Living (ADLs)/Instrumental Activities of Daily Living (IADLs):   Walk and transfer into and out of bed and chair?: Yes  Dress and groom yourself?: Yes    Bathe or shower yourself?: Yes    Feed yourself? Yes  Do your laundry/housekeeping?: Yes  Manage your money, pay your bills and track your expenses?: Yes  Make your own meals?: Yes    Do your own shopping?: Yes    Previous Hospitalizations:   Any hospitalizations or ED visits within the last 12 months?: Yes    How many hospitalizations have you had in the last year?: 1-2    Hospitalization Comments: 1 hospitalization  1 ER visit    Advance Care Planning:   Living will: Yes    Durable POA for healthcare:  Yes    Advanced directive: Yes      Cognitive Screening:   Provider or family/friend/caregiver concerned regarding cognition?: No    PREVENTIVE SCREENINGS      Cardiovascular Screening:    General: Screening Current, Risks and Benefits Discussed and History Lipid Disorder Diabetes Screening:     General: Screening Current and Risks and Benefits Discussed      Colorectal Cancer Screening:     General: Screening Current      Breast Cancer Screening:     General: Screening Current and Risks and Benefits Discussed      Cervical Cancer Screening:    General: Screening Not Indicated and Risks and Benefits Discussed      Osteoporosis Screening:    General: Risks and Benefits Discussed and Patient Declines      Abdominal Aortic Aneurysm (AAA) Screening:        General: Risks and Benefits Discussed and Screening Not Indicated      Lung Cancer Screening:     General: Screening Not Indicated and Risks and Benefits Discussed      Hepatitis C Screening:    General: Screening Current and Risks and Benefits Discussed    Screening, Brief Intervention, and Referral to Treatment (SBIRT)    Screening  Typical number of drinks in a day: 0  Typical number of drinks in a week: 0  Interpretation: Low risk drinking behavior  Single Item Drug Screening:  How often have you used an illegal drug (including marijuana) or a prescription medication for non-medical reasons in the past year? never    Single Item Drug Screen Score: 0  Interpretation: Negative screen for possible drug use disorder    Other Counseling Topics:   Car/seat belt/driving safety and regular weightbearing exercise and calcium and vitamin D intake  Visual Acuity Screening    Right eye Left eye Both eyes   Without correction:      With correction: 20/40 20/25 20/25        Physical Exam:     /70   Pulse 76   Temp 98 °F (36 7 °C) (Tympanic)   Ht 5' 6" (1 676 m)   Wt 69 5 kg (153 lb 3 2 oz)   SpO2 98%   BMI 24 73 kg/m²     Physical Exam  Vitals and nursing note reviewed  Constitutional:       General: She is not in acute distress  Appearance: She is well-developed  She is not ill-appearing  HENT:      Head: Atraumatic        Right Ear: Tympanic membrane and external ear normal       Left Ear: Tympanic membrane and external ear normal    Eyes:      General:         Right eye: No discharge  Left eye: No discharge  Conjunctiva/sclera: Conjunctivae normal    Neck:      Thyroid: No thyromegaly  Trachea: No tracheal deviation  Cardiovascular:      Rate and Rhythm: Normal rate and regular rhythm  Heart sounds: Normal heart sounds  No murmur heard  Pulmonary:      Effort: Pulmonary effort is normal  No respiratory distress  Breath sounds: Normal breath sounds  No wheezing, rhonchi or rales  Abdominal:      General: There is no distension  Palpations: Abdomen is soft  Tenderness: There is no abdominal tenderness  There is no guarding or rebound  Musculoskeletal:         General: No deformity or signs of injury  Cervical back: Neck supple  Lymphadenopathy:      Cervical: No cervical adenopathy  Skin:     General: Skin is warm and dry  Coloration: Skin is not pale  Findings: No bruising or rash  Neurological:      General: No focal deficit present  Mental Status: She is alert  Mental status is at baseline  Motor: No abnormal muscle tone  Gait: Gait normal    Psychiatric:         Mood and Affect: Mood normal          Behavior: Behavior normal          Thought Content:  Thought content normal          Judgment: Judgment normal           Rio Tatum DO 0 = independent

## 2022-10-28 NOTE — PATIENT INSTRUCTIONS
Medicare Preventive Visit Patient Instructions  Thank you for completing your Welcome to Medicare Visit or Medicare Annual Wellness Visit today  Your next wellness visit will be due in one year (10/29/2023)  The screening/preventive services that you may require over the next 5-10 years are detailed below  Some tests may not apply to you based off risk factors and/or age  Screening tests ordered at today's visit but not completed yet may show as past due  Also, please note that scanned in results may not display below  Preventive Screenings:  Service Recommendations Previous Testing/Comments   Colorectal Cancer Screening  * Colonoscopy    * Fecal Occult Blood Test (FOBT)/Fecal Immunochemical Test (FIT)  * Fecal DNA/Cologuard Test  * Flexible Sigmoidoscopy Age: 39-70 years old   Colonoscopy: every 10 years (may be performed more frequently if at higher risk)  OR  FOBT/FIT: every 1 year  OR  Cologuard: every 3 years  OR  Sigmoidoscopy: every 5 years  Screening may be recommended earlier than age 39 if at higher risk for colorectal cancer  Also, an individualized decision between you and your healthcare provider will decide whether screening between the ages of 74-80 would be appropriate  Colonoscopy: 10/04/2022  FOBT/FIT: Not on file  Cologuard: 10/04/2022  Sigmoidoscopy: Not on file    Screening Current     Breast Cancer Screening Age: 36 years old  Frequency: every 1-2 years  Not required if history of left and right mastectomy Mammogram: 12/19/2021    Screening Current   Cervical Cancer Screening Between the ages of 21-29, pap smear recommended once every 3 years  Between the ages of 33-67, can perform pap smear with HPV co-testing every 5 years     Recommendations may differ for women with a history of total hysterectomy, cervical cancer, or abnormal pap smears in past  Pap Smear: 07/22/2019    Screening Not Indicated   Hepatitis C Screening Once for adults born between 1945 and 1965  More frequently in patients at high risk for Hepatitis C Hep C Antibody: 09/17/2020    Screening Current   Diabetes Screening 1-2 times per year if you're at risk for diabetes or have pre-diabetes Fasting glucose: 94 mg/dL (10/5/2022)  A1C: 5 6 % (5/18/2022)  Screening Current   Cholesterol Screening Once every 5 years if you don't have a lipid disorder  May order more often based on risk factors  Lipid panel: 10/05/2022    Screening Current     Other Preventive Screenings Covered by Medicare:  1  Abdominal Aortic Aneurysm (AAA) Screening: covered once if your at risk  You're considered to be at risk if you have a family history of AAA  2  Lung Cancer Screening: covers low dose CT scan once per year if you meet all of the following conditions: (1) Age 50-69; (2) No signs or symptoms of lung cancer; (3) Current smoker or have quit smoking within the last 15 years; (4) You have a tobacco smoking history of at least 20 pack years (packs per day multiplied by number of years you smoked); (5) You get a written order from a healthcare provider  3  Glaucoma Screening: covered annually if you're considered high risk: (1) You have diabetes OR (2) Family history of glaucoma OR (3)  aged 48 and older OR (3)  American aged 72 and older  3  Osteoporosis Screening: covered every 2 years if you meet one of the following conditions: (1) You're estrogen deficient and at risk for osteoporosis based off medical history and other findings; (2) Have a vertebral abnormality; (3) On glucocorticoid therapy for more than 3 months; (4) Have primary hyperparathyroidism; (5) On osteoporosis medications and need to assess response to drug therapy  · Last bone density test (DXA Scan): 11/17/2020   5  HIV Screening: covered annually if you're between the age of 15-65  Also covered annually if you are younger than 13 and older than 72 with risk factors for HIV infection   For pregnant patients, it is covered up to 3 times per pregnancy  Immunizations:  Immunization Recommendations   Influenza Vaccine Annual influenza vaccination during flu season is recommended for all persons aged >= 6 months who do not have contraindications   Pneumococcal Vaccine   * Pneumococcal conjugate vaccine = PCV13 (Prevnar 13), PCV15 (Vaxneuvance), PCV20 (Prevnar 20)  * Pneumococcal polysaccharide vaccine = PPSV23 (Pneumovax) Adults 25-60 years old: 1-3 doses may be recommended based on certain risk factors  Adults 72 years old: 1-2 doses may be recommended based off what pneumonia vaccine you previously received   Hepatitis B Vaccine 3 dose series if at intermediate or high risk (ex: diabetes, end stage renal disease, liver disease)   Tetanus (Td) Vaccine - COST NOT COVERED BY MEDICARE PART B Following completion of primary series, a booster dose should be given every 10 years to maintain immunity against tetanus  Td may also be given as tetanus wound prophylaxis  Tdap Vaccine - COST NOT COVERED BY MEDICARE PART B Recommended at least once for all adults  For pregnant patients, recommended with each pregnancy  Shingles Vaccine (Shingrix) - COST NOT COVERED BY MEDICARE PART B  2 shot series recommended in those aged 48 and above     Health Maintenance Due:      Topic Date Due   • Cervical Cancer Screening  07/22/2022   • DXA SCAN  11/17/2022   • Breast Cancer Screening: Mammogram  12/19/2022   • Colorectal Cancer Screening  10/04/2025   • Hepatitis C Screening  Completed     Immunizations Due:      Topic Date Due   • Pneumococcal Vaccine: 65+ Years (1 - PCV) Never done   • COVID-19 Vaccine (3 - Booster for Pfizer series) 10/19/2021   • Influenza Vaccine (1) Never done     Advance Directives   What are advance directives? Advance directives are legal documents that state your wishes and plans for medical care  These plans are made ahead of time in case you lose your ability to make decisions for yourself   Advance directives can apply to any medical decision, such as the treatments you want, and if you want to donate organs  What are the types of advance directives? There are many types of advance directives, and each state has rules about how to use them  You may choose a combination of any of the following:  · Living will: This is a written record of the treatment you want  You can also choose which treatments you do not want, which to limit, and which to stop at a certain time  This includes surgery, medicine, IV fluid, and tube feedings  · Durable power of  for healthcare [facility]): This is a written record that states who you want to make healthcare choices for you when you are unable to make them for yourself  This person, called a proxy, is usually a family member or a friend  You may choose more than 1 proxy  · Do not resuscitate (DNR) order:  A DNR order is used in case your heart stops beating or you stop breathing  It is a request not to have certain forms of treatment, such as CPR  A DNR order may be included in other types of advance directives  · Medical directive: This covers the care that you want if you are in a coma, near death, or unable to make decisions for yourself  You can list the treatments you want for each condition  Treatment may include pain medicine, surgery, blood transfusions, dialysis, IV or tube feedings, and a ventilator (breathing machine)  · Values history: This document has questions about your views, beliefs, and how you feel and think about life  This information can help others choose the care that you would choose  Why are advance directives important? An advance directive helps you control your care  Although spoken wishes may be used, it is better to have your wishes written down  Spoken wishes can be misunderstood, or not followed  Treatments may be given even if you do not want them  An advance directive may make it easier for your family to make difficult choices about your care         © Copyright [address] 2018 Information is for [employer] use only and may not be sold, redistributed or otherwise used for commercial purposes  All illustrations and images included in CareNotes® are the copyrighted property of [employer]  or [facility] Preventive Visit Patient Instructions  Thank you for completing your Welcome to Medicare Visit or Medicare Annual Wellness Visit today  Your next wellness visit will be due in one year (10/29/2023)  The screening/preventive services that you may require over the next 5-10 years are detailed below  Some tests may not apply to you based off risk factors and/or age  Screening tests ordered at today's visit but not completed yet may show as past due  Also, please note that scanned in results may not display below  Preventive Screenings:  Service Recommendations Previous Testing/Comments   Colorectal Cancer Screening  * Colonoscopy    * Fecal Occult Blood Test (FOBT)/Fecal Immunochemical Test (FIT)  * Fecal DNA/Cologuard Test  * Flexible Sigmoidoscopy Age: 39-70 years old   Colonoscopy: every 10 years (may be performed more frequently if at higher risk)  OR  FOBT/FIT: every 1 year  OR  Cologuard: every 3 years  OR  Sigmoidoscopy: every 5 years  Screening may be recommended earlier than age 39 if at higher risk for colorectal cancer  Also, an individualized decision between you and your healthcare provider will decide whether screening between the ages of 74-80 would be appropriate  Colonoscopy: 10/04/2022  FOBT/FIT: Not on file  Cologuard: 10/04/2022  Sigmoidoscopy: Not on file    Screening Current     Breast Cancer Screening Age: 36 years old  Frequency: every 1-2 years  Not required if history of left and right mastectomy Mammogram: 12/19/2021    Screening Current   Cervical Cancer Screening Between the ages of 21-29, pap smear recommended once every 3 years     Between the ages of 33-67, can perform pap smear with HPV co-testing every 5 years  Recommendations may differ for women with a history of total hysterectomy, cervical cancer, or abnormal pap smears in past  Pap Smear: 07/22/2019    Screening Not Indicated   Hepatitis C Screening Once for adults born between 1945 and 1965  More frequently in patients at high risk for Hepatitis C Hep C Antibody: 09/17/2020    Screening Current   Diabetes Screening 1-2 times per year if you're at risk for diabetes or have pre-diabetes Fasting glucose: 94 mg/dL (10/5/2022)  A1C: 5 6 % (5/18/2022)  Screening Current   Cholesterol Screening Once every 5 years if you don't have a lipid disorder  May order more often based on risk factors  Lipid panel: 10/05/2022    Screening Current     Other Preventive Screenings Covered by Medicare:  6  Abdominal Aortic Aneurysm (AAA) Screening: covered once if your at risk  You're considered to be at risk if you have a family history of AAA  7  Lung Cancer Screening: covers low dose CT scan once per year if you meet all of the following conditions: (1) Age 50-69; (2) No signs or symptoms of lung cancer; (3) Current smoker or have quit smoking within the last 15 years; (4) You have a tobacco smoking history of at least 20 pack years (packs per day multiplied by number of years you smoked); (5) You get a written order from a healthcare provider  8  Glaucoma Screening: covered annually if you're considered high risk: (1) You have diabetes OR (2) Family history of glaucoma OR (3)  aged 48 and older OR (3)  American aged 72 and older  5  Osteoporosis Screening: covered every 2 years if you meet one of the following conditions: (1) You're estrogen deficient and at risk for osteoporosis based off medical history and other findings; (2) Have a vertebral abnormality; (3) On glucocorticoid therapy for more than 3 months; (4) Have primary hyperparathyroidism; (5) On osteoporosis medications and need to assess response to drug therapy     · Last bone density test (DXA Scan): 11/17/2020  10  HIV Screening: covered annually if you're between the age of 12-76  Also covered annually if you are younger than 13 and older than 72 with risk factors for HIV infection  For pregnant patients, it is covered up to 3 times per pregnancy  Immunizations:  Immunization Recommendations   Influenza Vaccine Annual influenza vaccination during flu season is recommended for all persons aged >= 6 months who do not have contraindications   Pneumococcal Vaccine   * Pneumococcal conjugate vaccine = PCV13 (Prevnar 13), PCV15 (Vaxneuvance), PCV20 (Prevnar 20)  * Pneumococcal polysaccharide vaccine = PPSV23 (Pneumovax) Adults 25-60 years old: 1-3 doses may be recommended based on certain risk factors  Adults 72 years old: 1-2 doses may be recommended based off what pneumonia vaccine you previously received   Hepatitis B Vaccine 3 dose series if at intermediate or high risk (ex: diabetes, end stage renal disease, liver disease)   Tetanus (Td) Vaccine - COST NOT COVERED BY MEDICARE PART B Following completion of primary series, a booster dose should be given every 10 years to maintain immunity against tetanus  Td may also be given as tetanus wound prophylaxis  Tdap Vaccine - COST NOT COVERED BY MEDICARE PART B Recommended at least once for all adults  For pregnant patients, recommended with each pregnancy     Shingles Vaccine (Shingrix) - COST NOT COVERED BY MEDICARE PART B  2 shot series recommended in those aged 48 and above     Health Maintenance Due:      Topic Date Due   • Cervical Cancer Screening  07/22/2022   • DXA SCAN  11/17/2022   • Breast Cancer Screening: Mammogram  12/19/2022   • Colorectal Cancer Screening  10/04/2025   • Hepatitis C Screening  Completed     Immunizations Due:      Topic Date Due   • Pneumococcal Vaccine: 65+ Years (1 - PCV) Never done   • COVID-19 Vaccine (3 - Booster for Pfizer series) 10/19/2021   • Influenza Vaccine (1) Never done     Advance Directives   What are advance directives? Advance directives are legal documents that state your wishes and plans for medical care  These plans are made ahead of time in case you lose your ability to make decisions for yourself  Advance directives can apply to any medical decision, such as the treatments you want, and if you want to donate organs  What are the types of advance directives? There are many types of advance directives, and each state has rules about how to use them  You may choose a combination of any of the following:  · Living will: This is a written record of the treatment you want  You can also choose which treatments you do not want, which to limit, and which to stop at a certain time  This includes surgery, medicine, IV fluid, and tube feedings  · Durable power of  for healthcare [facility]): This is a written record that states who you want to make healthcare choices for you when you are unable to make them for yourself  This person, called a proxy, is usually a family member or a friend  You may choose more than 1 proxy  · Do not resuscitate (DNR) order:  A DNR order is used in case your heart stops beating or you stop breathing  It is a request not to have certain forms of treatment, such as CPR  A DNR order may be included in other types of advance directives  · Medical directive: This covers the care that you want if you are in a coma, near death, or unable to make decisions for yourself  You can list the treatments you want for each condition  Treatment may include pain medicine, surgery, blood transfusions, dialysis, IV or tube feedings, and a ventilator (breathing machine)  · Values history: This document has questions about your views, beliefs, and how you feel and think about life  This information can help others choose the care that you would choose  Why are advance directives important? An advance directive helps you control your care   Although spoken wishes may be used, it is better to have your wishes written down  Spoken wishes can be misunderstood, or not followed  Treatments may be given even if you do not want them  An advance directive may make it easier for your family to make difficult choices about your care  © Copyright [facility] 2018 Information is for End User's use only and may not be sold, redistributed or otherwise used for commercial purposes   All illustrations and images included in CareNotes® are the copyrighted property of [employer]  or [address] [place] [address]

## 2022-10-28 NOTE — H&P (VIEW-ONLY)
Assessment and Plan:     Problem List Items Addressed This Visit        Other    Dyslipidemia     LDL at goal, con't current statin, healthy diet and regular exercise encouraged         Relevant Orders    Comprehensive metabolic panel    Elevated TSH     Labile and up and down - currently back up - FT4 wnl, pt clinically euthyroid, she is going to restart her thyroid supplements OTC (had to stop for surgery) and will recheck TSH in 3 mo - BW order given, will follow         Relevant Orders    TSH, 3rd generation with Free T4 reflex      Other Visit Diagnoses     Transaminitis    -  Primary    AST slightly better but Alk Phos now up, recheck CMP/Vit D in 3 mos - BW order given, denies ETOH daily, neg Hep C Ab Sept 2020, will follow    Relevant Orders    Comprehensive metabolic panel    Abnormal results of thyroid function studies         recheck TFT in 3 mo - BW order given    Relevant Orders    TSH, 3rd generation with Free T4 reflex    Elevated alkaline phosphatase level        Recheck CMP with Vit D in 3 mo - BW order given    Relevant Orders    Vitamin D 25 hydroxy    Disorder of bone density and structure, unspecified         Dexa in Nov 20 showed osteopenia - pt deferring Dexa, poss progression to osteoporosis reviewed and pt con't to defer, will check Vit D - BW order given    Relevant Orders    Vitamin D 25 hydroxy    Subacute cough        s/p COVID early Oct - rx for [employer] given, d/w pt that cough can last 3 mos after COVID - call with SOB/F/C    Relevant Medications    benzonatate (TESSALON PERLES) 100 mg capsule    Medicare annual wellness visit, subsequent        Encounter for screening mammogram for malignant neoplasm of breast        Relevant Orders    Mammo screening bilateral w 3d & cad          Depression Screening and Follow-up Plan: Patient was screened for depression during today's encounter  They screened negative with a PHQ-2 score of 0        Preventive health issues were discussed with patient, and age appropriate screening tests were ordered as noted in patient's After Visit Summary  Coljamesuaneela 10/22 - 3 yrs    Mammo 11/21 - order given today    Dexa 11/20 - osteopenia - pt deferring    BW 10/22    Pt deferring flu and Prevnar      Personalized health advice and appropriate referrals for health education or preventive services given if needed, as noted in patient's After Visit Summary  History of Present Illness:     Patient presents for a Medicare Wellness Visit    HPI Pt here for follow up appt/BW results and AWV    BW results were d/w pt in detail: AST 55 and Alk Phos 129, rest of CMP and FLP were wnl, TSH a bit elevated at 5 344 but FT4 was wnl  Nml range for LFT's reviewed  She does not drink ETOH on a daily basis  She was drinking 2 glasses of wine on vacation and returned home 10/6/22 and has not had anything to drink since then  Hep C Ab neg 9/2020    Goal FLP was d/w pt in detail  Diet/exercise reviewed  She is watching red meats/fatty foods  She is getting adequate fruits/veggies  She is just getting back into an exercise program   She is taking her Atorvastatin daily as directed w/o SE  She notes no stroke/TIA symptoms/CP  Nml range for TSH reviewed  She has never been on levothyroxine in the past but does take OTC thyroid supplement ("Thyroid complex")  She notes no excessive fatigue/hair loss/skin changes/tremor/palp/C/D/intolerance to heat or cold or unintentional significant wgt changes  Pt had COVID earlier in the month  She notes the fatigue was much better as of yesterday  She is coughing still  She notes no SOB/wheezing/F/C  Patient Care Team:  Enrique Pierce DO as PCP - General (Internal Medicine)  Mariluz Tracy MD (Neurology)  Domi Bill MD (Cardiology)     Review of Systems:     Review of Systems   Constitutional: Negative for chills, fatigue, fever and unexpected weight change     HENT: Negative for congestion, hearing loss and trouble swallowing  Eyes: Negative for pain and visual disturbance  Respiratory: Negative for cough and shortness of breath  Cardiovascular: Negative for chest pain, palpitations and leg swelling  Gastrointestinal: Negative for abdominal pain, blood in stool, constipation, diarrhea, nausea and vomiting  Genitourinary: Negative for difficulty urinating, dysuria, hematuria, vaginal bleeding and vaginal pain  Musculoskeletal: Positive for arthralgias  Negative for back pain and neck pain  Skin: Negative for rash and wound  Neurological: Negative for dizziness, light-headedness and headaches  Hematological: Bruises/bleeds easily  Psychiatric/Behavioral: Negative for dysphoric mood and sleep disturbance          Problem List:     Patient Active Problem List   Diagnosis   • Breast cyst   • Cervical spondylosis   • Chronic allergic conjunctivitis   • Dyslipidemia   • Hashimoto's disease   • Herniated cervical disc   • Non-toxic multinodular goiter   • Primary osteoarthritis of left hip   • Rhinitis   • Varicose veins   • Elevated TSH   • Osteopenia   • DDD (degenerative disc disease), lumbar   • Left leg numbness   • Chronic pain syndrome   • Chronic pain of left knee   • Chronic left hip pain   • Cryptogenic stroke (HCC)   • PFO (patent foramen ovale)      Past Medical and Surgical History:     Past Medical History:   Diagnosis Date   • Benign paroxysmal positional vertigo     Resolved: 9/15/2014   • Cataracts, bilateral    • Fracture of radius      Past Surgical History:   Procedure Laterality Date   • BIOPSY CORE NEEDLE      Thyroid   •  SECTION     • COLPOSCOPY     • HIP SURGERY        Family History:     Family History   Problem Relation Age of Onset   • Stroke Mother         CVA   • Breast cancer Mother 77   • Alcohol abuse Father    • COPD Father    • Other Father         Nicotine Abuse   • Thyroid cancer Brother    • Coronary artery disease Brother    • Thyroid disease Maternal Grandmother    • Breast cancer Maternal Aunt 40   • Breast cancer additional onset Maternal Aunt 50   • No Known Problems Sister    • No Known Problems Daughter    • No Known Problems Maternal Grandfather    • No Known Problems Paternal Grandmother    • No Known Problems Paternal Grandfather    • No Known Problems Daughter    • No Known Problems Maternal Aunt    • No Known Problems Maternal Aunt    • No Known Problems Maternal Aunt       Social History:     Social History     Socioeconomic History   • Marital status: Single     Spouse name: None   • Number of children: None   • Years of education: None   • Highest education level: None   Occupational History   • None   Tobacco Use   • Smoking status: Never Smoker   • Smokeless tobacco: Never Used   Vaping Use   • Vaping Use: Never used   Substance and Sexual Activity   • Alcohol use: Yes     Comment: social   • Drug use: No   • Sexual activity: Not Currently   Other Topics Concern   • None   Social History Narrative    Single, lives alone, part-time employment in [employer]     Social Determinants of Health     Financial Resource Strain: Low Risk    • Difficulty of Paying Living Expenses: Not hard at all   Food Insecurity: No Food Insecurity   • Worried About Running Out of Food in the Last Year: Never true   • Ran Out of Food in the Last Year: Never true   Transportation Needs: No Transportation Needs   • Lack of Transportation (Medical): No   • Lack of Transportation (Non-Medical):  No   Physical Activity: Not on file   Stress: Not on file   Social Connections: Not on file   Intimate Partner Violence: Not on file   Housing Stability: Low Risk    • Unable to Pay for Housing in the Last Year: No   • Number of Places Lived in the Last Year: 1   • Unstable Housing in the Last Year: No      Medications and Allergies:     Current Outpatient Medications   Medication Sig Dispense Refill   • benzonatate (TESSALON PERLES) 100 mg capsule Take 1 capsule (100 mg total) by mouth 3 (three) times a day as needed for cough 30 capsule 0   • THYROID PO Take by mouth     • zinc sulfate (ZINCATE) 220 mg capsule Take 220 mg by mouth daily     • aspirin 81 mg chewable tablet Chew 1 tablet (81 mg total)  in the morning  0   • atorvastatin (LIPITOR) 40 mg tablet Take 1 tablet (40 mg total) by mouth every evening 30 tablet 5   • calcium carbonate (OS-WALKER) 1250 (500 Ca) MG tablet Take 2 tablets by mouth in the morning  • Chlorophyll 100 MG TABS Take 1 tablet (100 mg total) by mouth daily  0   • cholecalciferol (VITAMIN D3) 1,000 units tablet Take 1,000 Units by mouth in the morning  • Magnesium 250 MG TABS Take 3 tablets by mouth in the morning  • Multiple Vitamin (MULTIVITAMINS PO) Take 1 capsule by mouth daily       No current facility-administered medications for this visit  No Known Allergies   Immunizations:     Immunization History   Administered Date(s) Administered   • COVID-19 PFIZER VACCINE 0 3 ML IM 04/25/2021, 05/19/2021      Health Maintenance:         Topic Date Due   • Cervical Cancer Screening  07/22/2022   • DXA SCAN  11/17/2022   • Breast Cancer Screening: Mammogram  12/19/2022   • Colorectal Cancer Screening  10/04/2025   • Hepatitis C Screening  Completed         Topic Date Due   • Pneumococcal Vaccine: 65+ Years (1 - PCV) Never done   • COVID-19 Vaccine (3 - Booster for Pfizer series) 10/19/2021   • Influenza Vaccine (1) Never done      Medicare Screening Tests and Risk Assessments:     Diane Escalona is here for her Subsequent Wellness visit  Last Medicare Wellness visit information reviewed, patient interviewed and updates made to the record today  Health Risk Assessment:   Patient rates overall health as very good  Patient feels that their physical health rating is slightly better  Patient is very satisfied with their life  Eyesight was rated as much worse  Hearing was rated as same  Patient feels that their emotional and mental health rating is same   Patients states they are never, rarely angry  Patient states they are always unusually tired/fatigued  Pain experienced in the last 7 days has been a lot  Patient's pain rating has been 8/10  Patient states that she has experienced no weight loss or gain in last 6 months  Eyesight worse with cataracts - needs to have surgery    Depression Screening:   PHQ-2 Score: 0      Fall Risk Screening: In the past year, patient has experienced: no history of falling in past year      Urinary Incontinence Screening:   Patient has not leaked urine accidently in the last six months  Home Safety:  Patient does not have trouble with stairs inside or outside of their home  Patient has working smoke alarms and has no working carbon monoxide detector  Home safety hazards include: none  Nutrition:   Current diet is Low Carb  Medications:   Patient is currently taking over-the-counter supplements  OTC medications include: see medication list  Patient is able to manage medications  Activities of Daily Living (ADLs)/Instrumental Activities of Daily Living (IADLs):   Walk and transfer into and out of bed and chair?: Yes  Dress and groom yourself?: Yes    Bathe or shower yourself?: Yes    Feed yourself? Yes  Do your laundry/housekeeping?: Yes  Manage your money, pay your bills and track your expenses?: Yes  Make your own meals?: Yes    Do your own shopping?: Yes    Previous Hospitalizations:   Any hospitalizations or ED visits within the last 12 months?: Yes    How many hospitalizations have you had in the last year?: 1-2    Hospitalization Comments: 1 hospitalization  1 ER visit    Advance Care Planning:   Living will: Yes    Durable POA for healthcare:  Yes    Advanced directive: Yes      Cognitive Screening:   Provider or family/friend/caregiver concerned regarding cognition?: No    PREVENTIVE SCREENINGS      Cardiovascular Screening:    General: Screening Current, Risks and Benefits Discussed and History Lipid Disorder Diabetes Screening:     General: Screening Current and Risks and Benefits Discussed      Colorectal Cancer Screening:     General: Screening Current      Breast Cancer Screening:     General: Screening Current and Risks and Benefits Discussed      Cervical Cancer Screening:    General: Screening Not Indicated and Risks and Benefits Discussed      Osteoporosis Screening:    General: Risks and Benefits Discussed and Patient Declines      Abdominal Aortic Aneurysm (AAA) Screening:        General: Risks and Benefits Discussed and Screening Not Indicated      Lung Cancer Screening:     General: Screening Not Indicated and Risks and Benefits Discussed      Hepatitis C Screening:    General: Screening Current and Risks and Benefits Discussed    Screening, Brief Intervention, and Referral to Treatment (SBIRT)    Screening  Typical number of drinks in a day: 0  Typical number of drinks in a week: 0  Interpretation: Low risk drinking behavior  Single Item Drug Screening:  How often have you used an illegal drug (including marijuana) or a prescription medication for non-medical reasons in the past year? never    Single Item Drug Screen Score: 0  Interpretation: Negative screen for possible drug use disorder    Other Counseling Topics:   Car/seat belt/driving safety and regular weightbearing exercise and calcium and vitamin D intake  Visual Acuity Screening    Right eye Left eye Both eyes   Without correction:      With correction: 20/40 20/25 20/25        Physical Exam:     /70   Pulse 76   Temp 98 °F (36 7 °C) (Tympanic)   Ht 5' 6" (1 676 m)   Wt 69 5 kg (153 lb 3 2 oz)   SpO2 98%   BMI 24 73 kg/m²     Physical Exam  Vitals and nursing note reviewed  Constitutional:       General: She is not in acute distress  Appearance: She is well-developed  She is not ill-appearing  HENT:      Head: Atraumatic        Right Ear: Tympanic membrane and external ear normal       Left Ear: Tympanic membrane and external ear normal    Eyes:      General:         Right eye: No discharge  Left eye: No discharge  Conjunctiva/sclera: Conjunctivae normal    Neck:      Thyroid: No thyromegaly  Trachea: No tracheal deviation  Cardiovascular:      Rate and Rhythm: Normal rate and regular rhythm  Heart sounds: Normal heart sounds  No murmur heard  Pulmonary:      Effort: Pulmonary effort is normal  No respiratory distress  Breath sounds: Normal breath sounds  No wheezing, rhonchi or rales  Abdominal:      General: There is no distension  Palpations: Abdomen is soft  Tenderness: There is no abdominal tenderness  There is no guarding or rebound  Musculoskeletal:         General: No deformity or signs of injury  Cervical back: Neck supple  Lymphadenopathy:      Cervical: No cervical adenopathy  Skin:     General: Skin is warm and dry  Coloration: Skin is not pale  Findings: No bruising or rash  Neurological:      General: No focal deficit present  Mental Status: She is alert  Mental status is at baseline  Motor: No abnormal muscle tone  Gait: Gait normal    Psychiatric:         Mood and Affect: Mood normal          Behavior: Behavior normal          Thought Content:  Thought content normal          Judgment: Judgment normal           Heidi Barrett,

## 2022-10-28 NOTE — ASSESSMENT & PLAN NOTE
Labile and up and down - currently back up - FT4 wnl, pt clinically euthyroid, she is going to restart her thyroid supplements OTC (had to stop for surgery) and will recheck TSH in 3 mo - BW order given, will follow

## 2022-11-02 ENCOUNTER — HOSPITAL ENCOUNTER (OUTPATIENT)
Facility: HOSPITAL | Age: 67
Setting detail: OUTPATIENT SURGERY
Discharge: HOME | End: 2022-11-02
Attending: INTERNAL MEDICINE | Admitting: INTERNAL MEDICINE

## 2022-11-02 DIAGNOSIS — I48.91 ATRIAL FIBRILLATION, UNSPECIFIED TYPE (HCC): ICD-10-CM

## 2022-11-02 DEVICE — IMPLANTABLE DEVICE: Type: IMPLANTABLE DEVICE | Site: CHEST | Status: FUNCTIONAL

## 2022-11-02 RX ORDER — LIDOCAINE HYDROCHLORIDE AND EPINEPHRINE 10; 10 MG/ML; UG/ML
INJECTION, SOLUTION INFILTRATION; PERINEURAL AS NEEDED
Status: DISCONTINUED | OUTPATIENT
Start: 2022-11-02 | End: 2022-11-02 | Stop reason: HOSPADM

## 2022-11-02 RX ORDER — LIDOCAINE HYDROCHLORIDE AND EPINEPHRINE 10; 10 MG/ML; UG/ML
INJECTION, SOLUTION INFILTRATION; PERINEURAL
Status: DISCONTINUED
Start: 2022-11-02 | End: 2022-11-02 | Stop reason: HOSPADM

## 2022-11-02 NOTE — DISCHARGE INSTRUCTIONS
OK to shower with with the glue, glue will fall off in 1 week on its own, do not scrub the area or swim during the next 14 days  not use lotions/powders/creams on incision  Remove outer bandage on for 24 hours after procedure  Please call the office [phone] if you notice redness, swelling, bleeding, or drainage from incision or if you develop fevers  Cardiac Loop Recorder Insertion      WHAT YOU SHOULD KNOW:    A cardiac loop recorder is a device used to diagnose heart rhythm problems, such as a fast or irregular heartbeat  It is implanted in your left chest, just under the skin  The device records a pattern of your heart's rhythm, called an EKG  Your device records automatic EKGs, depending on how your caregiver programs it  You may also receive a handheld controller  You press a button on the controller when you have symptoms, such as dizziness, lightheadedness, or palpitations  The device will record an EKG at that moment  The recording can help your caregiver see if your symptoms may be caused by heart rhythm problems  Your caregiver will remove the device after it has collected enough data  You may need the device for up to 5 years  The procedure to remove the device is similar to the procedure used to implant it  AFTER YOU LEAVE:    Follow up with our loop recorder clinic: You will need to return in 1 to 2 weeks to meet the staff in our loop recorder clinic  At this appointment they will check your incision and remove your stitches  We will discuss how we retrieve data from your loop recorder at this appointment  You will be able to transmit data from your device from home as well, this will also be explained by our loop recorder clinic staff  Ask for information about this process  Write down your questions so you remember to ask them during your visits        Wound care: the glue over your loop recorder is water proof, you can shower as your normally wound, please do not pick the glue off the wound  Do not use lotions/powders/creams on incision  Remove outer bandage 24 hours after procedure, you will notice a few stitches which will be removed at your two week follow up appointment  Please call the office if you notice redness, swelling, bleeding, or drainage from incision or if you develop fevers  Keep the loop recorder area clean until it heals  Return to activity: If you received anesthesia, you will not be able to drive for 24 hours  Otherwise, most people can return to normal activities soon after the procedure  Your cardiologist may want to know if your work involves electrical current or high-voltage equipment  Ask about other electrical items that could interfere with your cardiac loop recorder  Contact your cardiologist if:   You have a fever or chills  Your wound is red, swollen, or draining pus  You have questions or concerns about your condition or care  Seek care immediately or call 911 if: You feel weak, dizzy, or faint  You lose consciousness  © 2014 [employer] is for End User's use only and may not be sold, redistributed or otherwise used for commercial purposes  All illustrations and images included in CareNotes® are the copyrighted property of [employer]  or [employer]  The above information is an  only  It is not intended as medical advice for individual conditions or treatments  Talk to your doctor, nurse or pharmacist before following any medical regimen to see if it is safe and effective for you

## 2022-11-17 ENCOUNTER — IN-CLINIC DEVICE VISIT (OUTPATIENT)
Dept: CARDIOLOGY CLINIC | Facility: CLINIC | Age: 67
End: 2022-11-17

## 2022-11-17 DIAGNOSIS — Z95.818 PRESENCE OF OTHER CARDIAC IMPLANTS AND GRAFTS: Primary | ICD-10-CM

## 2022-11-17 NOTE — PROGRESS NOTES
Results for orders placed or performed in visit on 11/17/22   Cardiac EP device report    Narrative    SJM DOT ILR/ ACTIVE SYSTEM IS MRI CONDITIONAL  DEVICE INTERROGATED IN THE [place] OFFICE  BATTERY VOLTAGE ADEQUATE  NO DEVICE DETECTED & NO PT ACTIVATED EPISODES  PRESENTING RHYTHM NSR  NORMAL DEVICE FUNCTION  WOUND CHECK: INCISION CLEAN AND DRY WITH EDGES APPROXIMATED; SUTURES REMOVED; WOUND CARE AND RESTRICTIONS REVIEWED WITH PATIENT   GV

## 2022-12-22 DIAGNOSIS — I63.9 CEREBROVASCULAR ACCIDENT (CVA), UNSPECIFIED MECHANISM (HCC): ICD-10-CM

## 2022-12-22 RX ORDER — ATORVASTATIN CALCIUM 40 MG/1
TABLET, FILM COATED ORAL
Qty: 90 TABLET | Refills: 1 | Status: SHIPPED | OUTPATIENT
Start: 2022-12-22

## 2023-01-26 ENCOUNTER — OFFICE VISIT (OUTPATIENT)
Dept: NEUROLOGY | Facility: CLINIC | Age: 68
End: 2023-01-26

## 2023-01-26 VITALS
WEIGHT: 156.5 LBS | TEMPERATURE: 97.5 F | SYSTOLIC BLOOD PRESSURE: 128 MMHG | HEIGHT: 66 IN | DIASTOLIC BLOOD PRESSURE: 72 MMHG | BODY MASS INDEX: 25.15 KG/M2

## 2023-01-26 DIAGNOSIS — E78.5 DYSLIPIDEMIA: ICD-10-CM

## 2023-01-26 DIAGNOSIS — Q21.12 PFO (PATENT FORAMEN OVALE): Primary | ICD-10-CM

## 2023-01-26 DIAGNOSIS — Z86.73 HISTORY OF STROKE: ICD-10-CM

## 2023-01-26 NOTE — PATIENT INSTRUCTIONS
Stroke: Mitra Santana presents with her  for a follow-up evaluation with regard to her prior stroke  We reviewed her MRI together briefly in the office today which does show a right frontal cortical appearing stroke  There is some minimal evidence of microvascular disease on the MRI, and there is evidence of at least 1 possible chronic stroke in her brainstem on the left-hand side  It is entirely possible that this was quite asymptomatic for her inasmuch as she did not know that she had 1  I agree it is quite reasonable for her to be monitored with an implantable loop recorder to make sure there is no atrial fibrillation but as long as none is detected we should continue to treat this like small vessel ischemic disease  I do not have a high suspicion that she would benefit from closure of her PFO at this time but we will defer the ultimate decision to the cardiology group  -For ongoing stroke prevention she should continue her combination of aspirin, Lipitor, and appropriate blood pressure and glycemic control  -She should occasionally check her blood pressure away from a doctor's office to make sure that the numbers are usually less than 130/80 most of the time  -We will defer to her primary care team for monitoring of her cholesterol panel and blood sugar numbers but we would recommend maintaining an LDL cholesterol of less than 70 and keeping her dose of Lipitor at 40 mg or greater  -She should remain physically active inasmuch as she feels capable of doing so  -Over time I expect any discomfort related to the implantable loop recorder to fade but if it does not she should talk to the cardiology group to see if there is any revision that can be done to try and limit any pain or discomfort that she might be experiencing  -She is not in need of repeat cerebrovascular imaging from my standpoint    She will return to the office in 8 months time to see either myself, Nanette Cruz, or Moshe Love    If she were to have any strokelike symptoms such as sudden painless loss of vision or double vision, difficulty speaking or swallowing, vertigo/room spinning that does not quickly resolve, or weakness/numbness/loss of coordination affecting 1 side of the face or body she should proceed by ambulance to the nearest emergency room immediately

## 2023-01-26 NOTE — PROGRESS NOTES
Patient ID: David Luna is a 78 y o  female  Assessment/Plan:   Patient Instructions   Stroke: Lisa Ojeda presents with her  for a follow-up evaluation with regard to her prior stroke  We reviewed her MRI together briefly in the office today which does show a right frontal cortical appearing stroke  There is some minimal evidence of microvascular disease on the MRI, and there is evidence of at least 1 possible chronic stroke in her brainstem on the left-hand side  It is entirely possible that this was quite asymptomatic for her inasmuch as she did not know that she had 1  I agree it is quite reasonable for her to be monitored with an implantable loop recorder to make sure there is no atrial fibrillation but as long as none is detected we should continue to treat this like small vessel ischemic disease    I do not have a high suspicion that she would benefit from closure of her PFO at this time but we will defer the ultimate decision to the cardiology group  -For ongoing stroke prevention she should continue her combination of aspirin, Lipitor, and appropriate blood pressure and glycemic control  -She should occasionally check her blood pressure away from a doctor's office to make sure that the numbers are usually less than 130/80 most of the time  -We will defer to her primary care team for monitoring of her cholesterol panel and blood sugar numbers but we would recommend maintaining an LDL cholesterol of less than 70 and keeping her dose of Lipitor at 40 mg or greater  -She should remain physically active inasmuch as she feels capable of doing so  -Over time I expect any discomfort related to the implantable loop recorder to fade but if it does not she should talk to the cardiology group to see if there is any revision that can be done to try and limit any pain or discomfort that she might be experiencing  -She is not in need of repeat cerebrovascular imaging from my standpoint    She will return to the office in 8 months time to see either myself, Yessenia Mae, or Marcelina  If she were to have any strokelike symptoms such as sudden painless loss of vision or double vision, difficulty speaking or swallowing, vertigo/room spinning that does not quickly resolve, or weakness/numbness/loss of coordination affecting 1 side of the face or body she should proceed by ambulance to the nearest emergency room immediately  Diagnoses and all orders for this visit:    PFO (patent foramen ovale)    History of stroke    Dyslipidemia         Subjective:    HPI   Have you had any new stroke like symptoms that were not previously present (Sudden loss of vision, difficulty speaking or swallowing,  vertigo/room spinning that did not quickly resolve, weakness or numbness affecting one side of the body)? no    Are you taking all of your medications as prescribed? Yes    Any side effects including bleeding/bruising? no    Linda Saenz presents with her  for a follow-up with regard to her prior strokes  She reports no new strokelike symptoms and is doing well  She reports some ongoing discomfort in the region of the implantable loop recorder  Which affects her during sleep and she feels a little bit of pressure when she takes a deep breath  We reviewed her MRI together in the office and I agree that her right frontal stroke could potentially be characterized as an embolic stroke of undetermined source however she does have some evidence of microvascular disease and potentially a small chronic stroke in the left Karine  We had a discussion with regard to physiology/pathophysiology of stroke  She confirms that she is quite asymptomatic from the standpoint of her prior stroke symptoms  She also did not endorse any depression, anxiety, or the like      Past Medical History:   Diagnosis Date   • Benign paroxysmal positional vertigo     Resolved: 9/15/2014   • Cataracts, bilateral    • Fracture of radius        Social History Socioeconomic History   • Marital status: Single     Spouse name: None   • Number of children: None   • Years of education: None   • Highest education level: None   Occupational History   • None   Tobacco Use   • Smoking status: Never   • Smokeless tobacco: Never   Vaping Use   • Vaping Use: Never used   Substance and Sexual Activity   • Alcohol use: Yes     Comment: social   • Drug use: No   • Sexual activity: Not Currently   Other Topics Concern   • None   Social History Narrative    Single, lives alone, part-time employment in [facility] Strain: Low Risk    • Difficulty of Paying Living Expenses: Not hard at [address]: No Food Insecurity   • Worried About [facility] in [address]: Never true   • [address] in the Last Year: Never true   Transportation Needs: No Transportation Needs   • Lack of Transportation (Medical): No   • Lack of Transportation (Non-Medical):  No   Physical Activity: Not on file   Stress: Not on file   Social Connections: Not on file   Intimate Partner Violence: Not on file   Housing Stability: Low Risk    • Unable to Pay for Housing in the Last Year: No   • Number of Places Lived in the Last Year: 1   • Unstable Housing in the Last Year: No         Current Outpatient Medications:   •  aspirin 81 mg chewable tablet, Chew 1 tablet (81 mg total)  in the morning , Disp: , Rfl: 0  •  atorvastatin (LIPITOR) 40 mg tablet, TAKE 1 TABLET BY MOUTH EVERY DAY IN THE EVENING, Disp: 90 tablet, Rfl: 1  •  calcium carbonate (OS-WALKER) 1250 (500 Ca) MG tablet, Take 2 tablets by mouth in the morning , Disp: , Rfl:   •  Chlorophyll 100 MG TABS, Take 1 tablet (100 mg total) by mouth daily, Disp: , Rfl: 0  •  cholecalciferol (VITAMIN D3) 1,000 units tablet, Take 1,000 Units by mouth in the morning , Disp: , Rfl:   •  Magnesium 250 MG TABS, Take 3 tablets by mouth in the morning , Disp: , Rfl:   •  Multiple Vitamin (MULTIVITAMINS PO), Take 1 capsule by mouth daily, Disp: , Rfl:   •  THYROID PO, Take by mouth, Disp: , Rfl:   •  zinc sulfate (ZINCATE) 220 mg capsule, Take 220 mg by mouth daily, Disp: , Rfl:   •  benzonatate (TESSALON PERLES) 100 mg capsule, Take 1 capsule (100 mg total) by mouth 3 (three) times a day as needed for cough, Disp: 30 capsule, Rfl: 0    No Known Allergies          Objective:    Blood pressure 128/72, temperature 97 5 °F (36 4 °C), temperature source Temporal, height 5' 6" (1 676 m), weight 71 kg (156 lb 8 oz)  Physical Exam    Neurological Exam    At the time of our discussion she was awake, alert, and in no distress  She is an excellent historian with no dysarthria or aphasia  There were no clear cranial neuropathies or lateralizing signs  She was able to rise easily without assistance and her gait was stable  ROS:    Review of Systems   Constitutional: Negative  Negative for appetite change and fever  HENT: Negative  Negative for hearing loss, tinnitus, trouble swallowing and voice change  Eyes: Negative  Negative for photophobia, pain and visual disturbance  Respiratory: Negative  Negative for shortness of breath  Cardiovascular: Negative  Negative for palpitations  Gastrointestinal: Negative  Negative for nausea and vomiting  Endocrine: Negative  Negative for cold intolerance  Genitourinary: Negative  Negative for dysuria, frequency and urgency  Musculoskeletal: Negative  Negative for gait problem, myalgias and neck pain  Skin: Negative  Negative for rash  Allergic/Immunologic: Negative  Neurological: Negative  Negative for dizziness, tremors, seizures, syncope, facial asymmetry, speech difficulty, weakness, light-headedness, numbness and headaches  Hematological: Negative  Does not bruise/bleed easily  Psychiatric/Behavioral: Negative  Negative for confusion, hallucinations and sleep disturbance       Reviewed ROS as entered by medical assistant

## 2023-02-02 ENCOUNTER — OFFICE VISIT (OUTPATIENT)
Dept: CARDIAC SURGERY | Facility: CLINIC | Age: 68
End: 2023-02-02

## 2023-02-02 VITALS
HEART RATE: 69 BPM | WEIGHT: 153.3 LBS | DIASTOLIC BLOOD PRESSURE: 78 MMHG | BODY MASS INDEX: 24.64 KG/M2 | OXYGEN SATURATION: 100 % | HEIGHT: 66 IN | SYSTOLIC BLOOD PRESSURE: 133 MMHG

## 2023-02-02 DIAGNOSIS — Z86.73 HISTORY OF STROKE: ICD-10-CM

## 2023-02-02 DIAGNOSIS — Q21.12 PFO (PATENT FORAMEN OVALE): Primary | ICD-10-CM

## 2023-02-02 NOTE — PROGRESS NOTES
Cardiology Consultation      Carmen Mcintosh  1955  [id number]  [facility] [id number]  [phone]  [phone]    1  PFO (patent foramen ovale)        2  History of stroke               Discussion/Summary    1  Embolic stroke undetermined source, has PFO, and implantable loop recorder placed November 2022    Plan:  Implantable loop recorder, without evidence of A  fib  Continue aspirin, statin  No compelling reason for PFO closure at this time    History:     71-year-old female originally referred last year for discussion PFO closure in the setting of presumptive embolic stroke    May 9749 had posterior right frontal lobe infarct on MRI    Due to her age and absence of randomized trials, implantable loop recorder placed  No A  fib      Patient Active Problem List   Diagnosis   • Breast cyst   • Cervical spondylosis   • Chronic allergic conjunctivitis   • Dyslipidemia   • Hashimoto's disease   • Herniated cervical disc   • Non-toxic multinodular goiter   • Primary osteoarthritis of left hip   • Rhinitis   • Varicose veins   • Elevated TSH   • Osteopenia   • DDD (degenerative disc disease), lumbar   • Left leg numbness   • Chronic pain syndrome   • Chronic pain of left knee   • Chronic left hip pain   • History of stroke   • PFO (patent foramen ovale)     Past Medical History:   Diagnosis Date   • Benign paroxysmal positional vertigo     Resolved: 9/15/2014   • Cataracts, bilateral    • Fracture of radius      Social History     Socioeconomic History   • Marital status: Single     Spouse name: Not on file   • Number of children: Not on file   • Years of education: Not on file   • Highest education level: Not on file   Occupational History   • Not on file   Tobacco Use   • Smoking status: Never   • Smokeless tobacco: Never   Vaping Use   • Vaping Use: Never used   Substance and Sexual Activity   • Alcohol use: Yes     Comment: social   • Drug use: No   • Sexual activity: Not Currently   Other Topics Concern   • Not on file   Social History Narrative    Single, lives alone, part-time employment in [employer] Strain: Low Risk    • Difficulty of Paying Living Expenses: Not hard at all   Food Insecurity: No Food Insecurity   • Worried About [address] in [address]: Never true   • [address] in the Last Year: Never true   Transportation Needs: No Transportation Needs   • Lack of Transportation (Medical): No   • Lack of Transportation (Non-Medical): No   Physical Activity: Not on file   Stress: Not on file   Social Connections: Not on file   Intimate Partner Violence: Not on file   Housing Stability: Low Risk    • Unable to Pay for Housing in the Last Year: No   • Number of Places Lived in the Last Year: 1   • Unstable Housing in the Last Year: No      Family History   Problem Relation Age of Onset   • Stroke Mother         CVA   • Breast cancer Mother 77   • Alcohol abuse Father    • COPD Father    • Other Father         Nicotine Abuse   • Thyroid cancer Brother    • Coronary artery disease Brother    • Thyroid disease Maternal Grandmother    • Breast cancer Maternal Aunt 40   • Breast cancer additional onset Maternal Aunt 50   • No Known Problems Sister    • No Known Problems Daughter    • No Known Problems Maternal Grandfather    • No Known Problems Paternal Grandmother    • No Known Problems Paternal Grandfather    • No Known Problems Daughter    • No Known Problems Maternal Aunt    • No Known Problems Maternal Aunt    • No Known Problems Maternal Aunt      Past Surgical History:   Procedure Laterality Date   • BIOPSY CORE NEEDLE      Thyroid   • CARDIAC ELECTROPHYSIOLOGY PROCEDURE N/A 2022    Procedure: Cardiac loop recorder implant;  Surgeon: Tanvir Steven MD;  [facility];   Service: Cardiology   •  SECTION     • COLPOSCOPY     • HIP SURGERY         Current Outpatient Medications:   •  aspirin 81 mg chewable tablet, Chew 1 tablet (81 mg total)  in the morning , Disp: , Rfl: 0  •  atorvastatin (LIPITOR) 40 mg tablet, TAKE 1 TABLET BY MOUTH EVERY DAY IN THE EVENING, Disp: 90 tablet, Rfl: 1  •  calcium carbonate (OS-WALKER) 1250 (500 Ca) MG tablet, Take 2 tablets by mouth in the morning , Disp: , Rfl:   •  Chlorophyll 100 MG TABS, Take 1 tablet (100 mg total) by mouth daily, Disp: , Rfl: 0  •  cholecalciferol (VITAMIN D3) 1,000 units tablet, Take 1,000 Units by mouth in the morning , Disp: , Rfl:   •  Magnesium 250 MG TABS, Take 3 tablets by mouth in the morning , Disp: , Rfl:   •  Multiple Vitamin (MULTIVITAMINS PO), Take 1 capsule by mouth daily, Disp: , Rfl:   •  THYROID PO, Take by mouth, Disp: , Rfl:   •  zinc sulfate (ZINCATE) 220 mg capsule, Take 220 mg by mouth daily, Disp: , Rfl:   •  benzonatate (TESSALON PERLES) 100 mg capsule, Take 1 capsule (100 mg total) by mouth 3 (three) times a day as needed for cough (Patient not taking: Reported on 2/2/2023), Disp: 30 capsule, Rfl: 0  No Known Allergies    Social, Family and medication history as listed, reviewed and updated as necessary    Labs:   Lab Results   Component Value Date    K 3 7 10/05/2022     10/05/2022    CO2 29 10/05/2022    BUN 13 10/05/2022    CREATININE 0 93 10/05/2022    CREATININE 0 85 07/21/2022    CALCIUM 9 5 10/05/2022       Lab Results   Component Value Date    WBC 6 47 07/21/2022    HGB 12 6 07/21/2022    HGB 12 7 06/10/2022    HCT 39 4 07/21/2022    HCT 39 7 06/10/2022     07/21/2022     06/10/2022       No results found for: CHOL  Lab Results   Component Value Date    HDL 92 10/05/2022    HDL 73 05/19/2022     Lab Results   Component Value Date    LDLCALC 70 10/05/2022    LDLCALC 140 (H) 05/19/2022     Lab Results   Component Value Date    TRIG 58 10/05/2022    TRIG 69 05/19/2022     No results found for: LDLDIRECT    Lab Results   Component Value Date    ALT 29 10/05/2022    ALT 39 07/21/2022    AST 55 (H) 10/05/2022    AST 60 (H) 07/21/2022             No results found for: NTBNP    Lab Results   Component Value Date    HGBA1C 5 6 05/18/2022       Imaging: Reviewed in epic      Review of Systems:  14 systems reviewed and negative with exception of the above       PHYSICAL EXAM:        Vitals:    02/02/23 0839   BP: 133/78   Pulse: 69   SpO2: 100%     Body mass index is 24 74 kg/m²  Weight (last 2 days)     Date/Time Weight    02/02/23 0839 69 5 (153 3)             Gen: No acute distress  HEENT: anicteric, mucous membranes moist  Neck: supple, no jugular venous distention, or carotid bruit  Heart: regular, normal s1 and s2, no murmur/rub or gallop  Lungs :clear to auscultation bilaterally, no rales/rhonchi or wheeze  Abdomen: soft nontender, normoactive bowel sounds, no organomegaly  Ext: warm and perfused, normal femoral pulses, no edema, or clubbing  Skin: warm, no rashes  Neuro: AAO x 3, no focal findings  Psychiatric: normal affect  Musculoskeletal: no obvious joint deformities  This note was completed in part utilizing m-Sound[employer] fluency direct voice recognition software  Grammatical errors, random word insertion, spelling mistakes, and incomplete sentences may be an occasional consequence of the system secondary to software limitations, ambient noise and hardware issues  At the time of dictation, efforts were made to edit, clarify and /or correct errors  Please read the chart carefully and recognize, using context, where substitutions have occurred  If you have any questions or concerns about the context, text or information contained within the body of this dictation, please contact myself, the provider, for further clarification

## 2023-02-06 ENCOUNTER — APPOINTMENT (OUTPATIENT)
Dept: LAB | Facility: HOSPITAL | Age: 68
End: 2023-02-06

## 2023-02-06 DIAGNOSIS — R94.6 ABNORMAL RESULTS OF THYROID FUNCTION STUDIES: ICD-10-CM

## 2023-02-06 DIAGNOSIS — R79.89 ELEVATED TSH: ICD-10-CM

## 2023-02-06 DIAGNOSIS — E78.5 DYSLIPIDEMIA: ICD-10-CM

## 2023-02-06 DIAGNOSIS — R74.8 ELEVATED ALKALINE PHOSPHATASE LEVEL: ICD-10-CM

## 2023-02-06 DIAGNOSIS — R74.01 TRANSAMINITIS: ICD-10-CM

## 2023-02-06 DIAGNOSIS — M85.9 DISORDER OF BONE DENSITY AND STRUCTURE, UNSPECIFIED: ICD-10-CM

## 2023-02-06 LAB
25(OH)D3 SERPL-MCNC: 52.4 NG/ML (ref 30–100)
ALBUMIN SERPL BCP-MCNC: 3.9 G/DL (ref 3.5–5)
ALP SERPL-CCNC: 108 U/L (ref 46–116)
ALT SERPL W P-5'-P-CCNC: 29 U/L (ref 12–78)
ANION GAP SERPL CALCULATED.3IONS-SCNC: 6 MMOL/L (ref 4–13)
AST SERPL W P-5'-P-CCNC: 53 U/L (ref 5–45)
BILIRUB SERPL-MCNC: 0.58 MG/DL (ref 0.2–1)
BUN SERPL-MCNC: 16 MG/DL (ref 5–25)
CALCIUM SERPL-MCNC: 9.4 MG/DL (ref 8.3–10.1)
CHLORIDE SERPL-SCNC: 105 MMOL/L (ref 96–108)
CO2 SERPL-SCNC: 28 MMOL/L (ref 21–32)
CREAT SERPL-MCNC: 0.8 MG/DL (ref 0.6–1.3)
GFR SERPL CREATININE-BSD FRML MDRD: 77 ML/MIN/1.73SQ M
GLUCOSE P FAST SERPL-MCNC: 81 MG/DL (ref 65–99)
POTASSIUM SERPL-SCNC: 3.5 MMOL/L (ref 3.5–5.3)
PROT SERPL-MCNC: 7.6 G/DL (ref 6.4–8.4)
SODIUM SERPL-SCNC: 139 MMOL/L (ref 135–147)
TSH SERPL DL<=0.05 MIU/L-ACNC: 4.09 UIU/ML (ref 0.45–4.5)

## 2023-02-23 ENCOUNTER — REMOTE DEVICE CLINIC VISIT (OUTPATIENT)
Dept: CARDIOLOGY CLINIC | Facility: CLINIC | Age: 68
End: 2023-02-23

## 2023-02-23 DIAGNOSIS — Z95.818 PRESENCE OF OTHER CARDIAC IMPLANTS AND GRAFTS: Primary | ICD-10-CM

## 2023-02-23 NOTE — PROGRESS NOTES
Results for orders placed or performed in visit on 02/23/23   Cardiac EP device report    Narrative    SJM DOT ILR/ ACTIVE SYSTEM IS MRI CONDITIONAL  MERLIN TRANSMISSION: BATTERY STATUS "OK " R WAVE 0 52mV  NO PATIENT OR DEVICE ACTIVATED EPISODES  NORMAL DEVICE FUNCTION   NC

## 2023-02-28 ENCOUNTER — OFFICE VISIT (OUTPATIENT)
Dept: FAMILY MEDICINE CLINIC | Facility: HOSPITAL | Age: 68
End: 2023-02-28

## 2023-02-28 VITALS
HEART RATE: 78 BPM | BODY MASS INDEX: 24.72 KG/M2 | DIASTOLIC BLOOD PRESSURE: 62 MMHG | WEIGHT: 153.8 LBS | TEMPERATURE: 97.4 F | SYSTOLIC BLOOD PRESSURE: 124 MMHG | HEIGHT: 66 IN

## 2023-02-28 DIAGNOSIS — Q21.12 PFO (PATENT FORAMEN OVALE): ICD-10-CM

## 2023-02-28 DIAGNOSIS — E06.3 HASHIMOTO'S DISEASE: ICD-10-CM

## 2023-02-28 DIAGNOSIS — Z86.73 HISTORY OF STROKE: ICD-10-CM

## 2023-02-28 DIAGNOSIS — I63.89 CEREBROVASCULAR ACCIDENT (CVA) DUE TO OTHER MECHANISM (HCC): ICD-10-CM

## 2023-02-28 DIAGNOSIS — E78.5 DYSLIPIDEMIA: ICD-10-CM

## 2023-02-28 DIAGNOSIS — H26.9 CATARACT, UNSPECIFIED CATARACT TYPE, UNSPECIFIED LATERALITY: ICD-10-CM

## 2023-02-28 DIAGNOSIS — M85.80 OSTEOPENIA, UNSPECIFIED LOCATION: ICD-10-CM

## 2023-02-28 DIAGNOSIS — Z01.818 PREOPERATIVE CLEARANCE: Primary | ICD-10-CM

## 2023-02-28 RX ORDER — ATORVASTATIN CALCIUM 20 MG/1
20 TABLET, FILM COATED ORAL EVERY EVENING
Qty: 90 TABLET | Refills: 2 | Status: SHIPPED | OUTPATIENT
Start: 2023-02-28 | End: 2023-02-28

## 2023-02-28 NOTE — PROGRESS NOTES
Subjective:     Pratibha Noland is a 78 y o  female who presents to the office today for a preoperative consultation at the request of surgeon Dr Jose Angel Kaur who plans on performing R cataract extraction with IOLI on 2023  Prior anesthesia adverse reactions - had low BP and nausea with general anesthesia    Exercise capacity - Able to walk 4 blocks or climb 2 flights of stairs without symptoms - Yes    Easy bleeding/bruising - No    Chest pain/palpitation/edema - No    Dyspnea/wheezing/cough - No    Sleep apnea/COPD/asthma - No    HPI Pt with progressive R vision issues due to her cataracts  She notes a film with her vision and has some issues with depth perception  She is going for R cataract extraction  Has an early cataract in L but not mature enough going to have surgery at this time  She notes a lot of fatigue  She notes no significant joint or muscle aches  She feels her symptoms might be d/t her statin  She is concerned her elevated liver test is d/t her statin as well  BW results from 23 reviewed: AST 53, rest of CMP and TSH and Vit D wnl  Past Medical History:   Diagnosis Date   • Benign paroxysmal positional vertigo     Resolved: 9/15/2014   • Cataracts, bilateral    • Fracture of radius        Past Surgical History:   Procedure Laterality Date   • BIOPSY CORE NEEDLE      Thyroid   • CARDIAC ELECTROPHYSIOLOGY PROCEDURE N/A 2022    Procedure: Cardiac loop recorder implant;  Surgeon: Dwain Harvey MD;  Location: [facility];   Service: Cardiology   •  SECTION     • COLPOSCOPY     • HIP SURGERY         Family History   Problem Relation Age of Onset   • Stroke Mother         CVA   • Breast cancer Mother 77   • Alcohol abuse Father    • COPD Father    • Other Father         Nicotine Abuse   • Thyroid cancer Brother    • Coronary artery disease Brother    • Thyroid disease Maternal Grandmother    • Breast cancer Maternal Aunt 40   • Breast cancer additional onset Maternal Aunt 50   • No Known Problems Sister    • No Known Problems Daughter    • No Known Problems Maternal Grandfather    • No Known Problems Paternal Grandmother    • No Known Problems Paternal Grandfather    • No Known Problems Daughter    • No Known Problems Maternal Aunt    • No Known Problems Maternal Aunt    • No Known Problems Maternal Aunt        Social History     Tobacco Use   • Smoking status: Never   • Smokeless tobacco: Never   Vaping Use   • Vaping Use: Never used   Substance Use Topics   • Alcohol use: Yes     Comment: social   • Drug use: No       Current Outpatient Medications   Medication Sig Dispense Refill   • aspirin 81 mg chewable tablet Chew 1 tablet (81 mg total)  in the morning  0   • atorvastatin (LIPITOR) 40 mg tablet TAKE 1 TABLET BY MOUTH EVERY DAY IN THE EVENING 90 tablet 1   • benzonatate (TESSALON PERLES) 100 mg capsule Take 1 capsule (100 mg total) by mouth 3 (three) times a day as needed for cough (Patient not taking: Reported on 2/2/2023) 30 capsule 0   • calcium carbonate (OS-WALKER) 1250 (500 Ca) MG tablet Take 2 tablets by mouth in the morning  • Chlorophyll 100 MG TABS Take 1 tablet (100 mg total) by mouth daily  0   • cholecalciferol (VITAMIN D3) 1,000 units tablet Take 1,000 Units by mouth in the morning  • Magnesium 250 MG TABS Take 3 tablets by mouth in the morning  • Multiple Vitamin (MULTIVITAMINS PO) Take 1 capsule by mouth daily     • THYROID PO Take by mouth     • zinc sulfate (ZINCATE) 220 mg capsule Take 220 mg by mouth daily       No current facility-administered medications for this visit  Allergies:  Patient has no known allergies  Review of Systems  Review of Systems   Constitutional: Positive for fatigue  Negative for chills and fever  HENT: Negative for congestion and sore throat  Eyes: Negative for pain and visual disturbance  Respiratory: Negative for cough, shortness of breath and wheezing      Cardiovascular: Negative for chest pain, palpitations and leg swelling  Gastrointestinal: Positive for nausea  Negative for abdominal pain, blood in stool, constipation and diarrhea  Genitourinary: Negative for difficulty urinating, dysuria, vaginal bleeding and vaginal pain  Musculoskeletal: Positive for arthralgias  Negative for gait problem  Skin: Negative for rash and wound  Neurological: Negative for dizziness, light-headedness and headaches  Hematological: Does not bruise/bleed easily  Psychiatric/Behavioral: Negative for confusion and dysphoric mood  Objective:    /62   Pulse 78   Temp (!) 97 4 °F (36 3 °C) (Tympanic)   Ht 5' 6" (1 676 m)   Wt 69 8 kg (153 lb 12 8 oz)   BMI 24 82 kg/m²     Physical Exam  Vitals and nursing note reviewed  Constitutional:       General: She is not in acute distress  Appearance: She is well-developed  She is not ill-appearing  HENT:      Head: Normocephalic and atraumatic  Right Ear: Tympanic membrane and external ear normal  There is no impacted cerumen  Left Ear: Tympanic membrane and external ear normal  There is no impacted cerumen  Eyes:      General:         Right eye: No discharge  Left eye: No discharge  Conjunctiva/sclera: Conjunctivae normal    Neck:      Vascular: No carotid bruit  Trachea: No tracheal deviation  Cardiovascular:      Rate and Rhythm: Normal rate and regular rhythm  Heart sounds: Normal heart sounds  No murmur heard  Pulmonary:      Effort: Pulmonary effort is normal  No respiratory distress  Breath sounds: Normal breath sounds  No wheezing, rhonchi or rales  Abdominal:      General: Bowel sounds are normal  There is no distension  Palpations: Abdomen is soft  Tenderness: There is no abdominal tenderness  There is no guarding or rebound  Musculoskeletal:         General: No deformity or signs of injury  Normal range of motion  Cervical back: Neck supple     Lymphadenopathy: Cervical: No cervical adenopathy  Skin:     General: Skin is warm and dry  Coloration: Skin is not pale  Findings: No rash  Neurological:      General: No focal deficit present  Mental Status: She is alert  Motor: No abnormal muscle tone  Gait: Gait normal    Psychiatric:         Behavior: Behavior normal          Thought Content:  Thought content normal          Judgment: Judgment normal        Cardiographics  ECG: not indicated as pt has no h/o cardiac disease    Imaging  Chest x-ray: not indicated    Lab Review   Recent labs: 2/26/23 - CMP/TSH/VIt D reviewed    Assessment:    Patient Active Problem List   Diagnosis   • Breast cyst   • Cervical spondylosis   • Chronic allergic conjunctivitis   • Dyslipidemia   • Hashimoto's disease   • Herniated cervical disc   • Non-toxic multinodular goiter   • Primary osteoarthritis of left hip   • Rhinitis   • Varicose veins   • Elevated TSH   • Osteopenia   • DDD (degenerative disc disease), lumbar   • Left leg numbness   • Chronic pain syndrome   • Chronic pain of left knee   • Chronic left hip pain   • History of stroke   • PFO (patent foramen ovale)          Plan:     Surgical Clearance - Cleared    Risk - Pt low risk for low risk surgery    Discussion/Summary:   (1) Preoperative clearance - pt low risk for low risk surgery, no ECG needed as no previous cardiac history, BW reviewed, no further work up needed, call with any changes in healthy btw now and surgery     (2) Cataract of R eye - for extraction with IOLI with Dr Zaheer Marti    (3) Hashimoto's disease - TSH upper range of nml, pt noting some fatigue, UTT levothyroxine, discussed may need to see Endo of symptoms persist/worsen, recheck TSH in 6 mos - BW order given    (4) PFO - has seen both Neuro and Cardio and no reason to close PFO noted at this time    (5) Osteopenia - pt deferring treatment and future Dexa, on Ca/Vit D, encouraged frequent weight bearing exercise    (6) Dyslipidemia - pt requesting to decrease her statin d/t fatigue and concern over mild elevation of AST, will decrease Atorvastatin from 40 mg to 20 mg and recheck CMP and FLP in 6 mos - BW order given, again d/w pt that if fatigue con't we could go back up on statin if LDL goes up and I would recommend Endo eval d/t her h/o thyroid dz, pt agreeable to plan, re-eval in 6 mos after repeat labs done    (7) history of embolic stroke - has had eval with Cardio and Neuro, has loop recorder in place and NO A fib has been noted, on ASA and statin - again pt requesting decrease in statin dose as noted above, will follow    [facility] 10/22 - 3 yrs     Mammo 12/21 - order given    Dexa 11/20 osteopenia - pt deferring        Monserrat Arnold DO

## 2023-04-03 ENCOUNTER — OFFICE VISIT (OUTPATIENT)
Dept: FAMILY MEDICINE CLINIC | Facility: HOSPITAL | Age: 68
End: 2023-04-03

## 2023-04-03 VITALS
OXYGEN SATURATION: 97 % | HEART RATE: 62 BPM | TEMPERATURE: 97.5 F | WEIGHT: 153 LBS | SYSTOLIC BLOOD PRESSURE: 132 MMHG | BODY MASS INDEX: 24.59 KG/M2 | HEIGHT: 66 IN | DIASTOLIC BLOOD PRESSURE: 82 MMHG

## 2023-04-03 DIAGNOSIS — Z01.818 PREOPERATIVE GENERAL PHYSICAL EXAMINATION: Primary | ICD-10-CM

## 2023-04-03 NOTE — PROGRESS NOTES
NAME: Felipa Vernon  AGE: 78 y o  SEX: female  : 1955     DATE: 4/3/2023    [facility] Pre-Operative Evaluation      Chief Complaint: Pre-operative Evaluation     Surgery: Left cataract extraction  Anticipated Date of Surgery: 2023  Referring Provider: Dr Lexine Koyanagi      History of Present Illness:     Felipa Vernon is a 78 y o  female who presents to the office today for a preoperative consultation at the request of surgeon, Dr Lexine Koyanagi, who plans on performing left cataract extraction on 2023  Planned anesthesia is IV sedation  Patient has a bleeding risk of: no recent abnormal bleeding  Patient does not have objections to receiving blood products if needed  Current anti-platelet/anti-coagulation medications that the patient is prescribed includes: Aspirin  Assessment of Chronic Conditions:   - PFO and loop recorder     Assessment of Cardiac Risk:  · Denies unstable or severe angina or MI in the last 6 weeks or history of stent placement in the last year   · Denies decompensated heart failure (e g  New onset heart failure, NYHA functional class IV heart failure, or worsening existing heart failure)  · Denies significant arrhythmias such as high grade AV block, symptomatic ventricular arrhythmia, newly recognized ventricular tachycardia, supraventricular tachycardia with resting heart rate >100, or symptomatic bradycardia  · Denies severe heart valve disease including aortic stenosis or symptomatic mitral stenosis     Exercise Capacity:  · Able to walk 4 blocks without symptoms?: Yes  · Able to walk 2 flights without symptoms?: Yes    Prior Anesthesia Reactions: No     Personal history of venous thromboembolic disease? No    History of steroid use for >2 weeks within last year? No         Review of Systems:     Review of Systems   Constitutional: Negative  HENT: Negative    Negative for congestion, dental problem, ear pain, hearing loss, postnasal drip, rhinorrhea, sinus pressure, sinus pain, sore throat, tinnitus and trouble swallowing  Eyes: Positive for visual disturbance (left eye)  Negative for photophobia, pain, discharge, redness and itching  Respiratory: Negative  Cardiovascular: Negative  Gastrointestinal: Negative  Endocrine: Negative  Genitourinary: Negative  Musculoskeletal: Negative  Skin: Negative  Allergic/Immunologic: Negative  Neurological: Negative  Hematological: Negative  Psychiatric/Behavioral: Negative          Current Problem List:     Patient Active Problem List   Diagnosis   • Breast cyst   • Cervical spondylosis   • Chronic allergic conjunctivitis   • Dyslipidemia   • Hashimoto's disease   • Herniated cervical disc   • Non-toxic multinodular goiter   • Primary osteoarthritis of left hip   • Rhinitis   • Varicose veins   • Elevated TSH   • Osteopenia   • DDD (degenerative disc disease), lumbar   • Left leg numbness   • Chronic pain syndrome   • Chronic pain of left knee   • Chronic left hip pain   • History of stroke   • PFO (patent foramen ovale)       Allergies:     No Known Allergies    Current Medications:       Current Outpatient Medications:   •  aspirin 81 mg chewable tablet, Chew 1 tablet (81 mg total)  in the morning , Disp: , Rfl: 0  •  atorvastatin (LIPITOR) 20 mg tablet, TAKE 1 TABLET BY MOUTH EVERY DAY IN THE EVENING, Disp: 90 tablet, Rfl: 2  •  calcium carbonate (OS-WALKER) 1250 (500 Ca) MG tablet, Take 2 tablets by mouth in the morning , Disp: , Rfl:   •  Chlorophyll 100 MG TABS, Take 1 tablet (100 mg total) by mouth daily, Disp: , Rfl: 0  •  cholecalciferol (VITAMIN D3) 1,000 units tablet, Take 1,000 Units by mouth in the morning , Disp: , Rfl:   •  DHA-EPA-Vitamin E (OMEGA-3 COMPLEX PO), Take 1 tablet by mouth in the morning, Disp: , Rfl:   •  Magnesium 250 MG TABS, Take 3 tablets by mouth in the morning , Disp: , Rfl:   •  Multiple Vitamin (MULTIVITAMINS PO), Take 1 capsule by mouth daily, Disp: , Rfl:   •  THYROID PO, Take by mouth, Disp: , Rfl:   •  zinc sulfate (ZINCATE) 220 mg capsule, Take 220 mg by mouth daily, Disp: , Rfl:     Past Medical History:       Past Medical History:   Diagnosis Date   • Benign paroxysmal positional vertigo     Resolved: 9/15/2014   • Cataracts, bilateral    • Fracture of radius         Past Surgical History:   Procedure Laterality Date   • BIOPSY CORE NEEDLE      Thyroid   • CARDIAC ELECTROPHYSIOLOGY PROCEDURE N/A 2022    Procedure: Cardiac loop recorder implant;  Surgeon: Prashanth Mendes MD;  [facility];   Service: Cardiology   •  SECTION     • COLPOSCOPY     • HIP SURGERY          Family History   Problem Relation Age of Onset   • Stroke Mother         CVA   • Breast cancer Mother 77   • Alcohol abuse Father    • COPD Father    • Other Father         Nicotine Abuse   • Thyroid cancer Brother    • Coronary artery disease Brother    • Thyroid disease Maternal Grandmother    • Breast cancer Maternal Aunt 40   • Breast cancer additional onset Maternal Aunt 50   • No Known Problems Sister    • No Known Problems Daughter    • No Known Problems Maternal Grandfather    • No Known Problems Paternal Grandmother    • No Known Problems Paternal Grandfather    • No Known Problems Daughter    • No Known Problems Maternal Aunt    • No Known Problems Maternal Aunt    • No Known Problems Maternal Aunt         Social History     Socioeconomic History   • Marital status: Single     Spouse name: Not on file   • Number of children: Not on file   • Years of education: Not on file   • Highest education level: Not on file   Occupational History   • Not on file   Tobacco Use   • Smoking status: Never   • Smokeless tobacco: Never   Vaping Use   • Vaping Use: Never used   Substance and Sexual Activity   • Alcohol use: Yes     Comment: social   • Drug use: No   • Sexual activity: Not Currently   Other Topics Concern   • Not on file   Social History Narrative    Single, lives alone, part-time "employment in [employer] Strain: Low Risk    • Difficulty of Paying Living Expenses: Not hard at all   Food Insecurity: No Food Insecurity   • Worried About [address] [employer] [employer] in the Last Year: Never true   • Ran Out of Food in the Last Year: Never true   Transportation Needs: No Transportation Needs   • Lack of Transportation (Medical): No   • Lack of Transportation (Non-Medical): No   Physical Activity: Not on file   Stress: Not on file   Social Connections: Not on file   Intimate Partner Violence: Not on file   Housing Stability: Low Risk    • Unable to Pay for Housing in the Last Year: No   • Number of Places Lived in the Last Year: 1   • Unstable Housing in the Last Year: No        Physical Exam:     /82 (BP Location: Left arm, Patient Position: Sitting, Cuff Size: Standard)   Pulse 62   Temp 97 5 °F (36 4 °C) (Tympanic)   Ht 5' 6\" (1 676 m)   Wt 69 4 kg (153 lb)   SpO2 97%   BMI 24 69 kg/m²     Physical Exam  Vitals reviewed  Constitutional:       Appearance: Normal appearance  She is well-developed and normal weight  HENT:      Head: Normocephalic and atraumatic  Right Ear: Tympanic membrane, ear canal and external ear normal       Left Ear: Tympanic membrane, ear canal and external ear normal       Nose: Nose normal       Mouth/Throat:      Mouth: Mucous membranes are moist       Pharynx: Oropharynx is clear  Eyes:      Conjunctiva/sclera: Conjunctivae normal       Pupils: Pupils are equal, round, and reactive to light  Neck:      Thyroid: No thyromegaly  Cardiovascular:      Rate and Rhythm: Normal rate and regular rhythm  Heart sounds: Normal heart sounds  No murmur heard  Pulmonary:      Effort: Pulmonary effort is normal       Breath sounds: Normal breath sounds  Abdominal:      General: Abdomen is flat  Bowel sounds are normal       Palpations: Abdomen is soft  There is no hepatomegaly or splenomegaly        " Tenderness: There is no abdominal tenderness  Musculoskeletal:         General: Normal range of motion  Cervical back: Normal range of motion and neck supple  Lymphadenopathy:      Cervical: No cervical adenopathy  Skin:     General: Skin is warm and dry  Neurological:      General: No focal deficit present  Mental Status: She is alert and oriented to person, place, and time  Psychiatric:         Mood and Affect: Mood normal          Behavior: Behavior normal          Thought Content: Thought content normal          Judgment: Judgment normal           Data:     Pre-operative work-up    Laboratory Results: I have personally reviewed the pertinent laboratory results/reports      EKG: Not indicated    Chest x-ray: Not indicated      Previous cardiopulmonary studies within the past year:  · Echocardiogram: 5/2022  · Cardiac Catheterization: N/A  · Stress Test: N/A  · Pulmonary Function Testing: N/A      Assessment & Recommendations:     1  Preoperative general physical examination            Pre-Op Evaluation Assessment  78 y o  female with planned surgery: Left cataract extraction  Known risk factors for perioperative complications: None  PFO  Current medications which may produce withdrawal symptoms if withheld perioperatively: none    Pre-Op Evaluation Plan  1  Further preoperative workup as follows:   - None; no further preoperative work-up is required    2  Medication Management/Recommendations:   - None, continue medication regimen including morning of surgery, with sip of water    3  Prophylaxis for cardiac events with perioperative beta-blockers: not indicated  4  Patient requires further consultation with: None    Clearance  Patient is CLEARED for surgery without any additional cardiac testing       DB Saleh  [facility] [address]  [address] [facility] [address]  Phone#  [phone]  Fax#  [phone]

## 2023-04-28 ENCOUNTER — HOSPITAL ENCOUNTER (OUTPATIENT)
Dept: MAMMOGRAPHY | Facility: IMAGING CENTER | Age: 68
Discharge: HOME | End: 2023-04-28

## 2023-04-28 VITALS — HEIGHT: 67 IN | BODY MASS INDEX: 23.54 KG/M2 | WEIGHT: 150 LBS

## 2023-04-28 DIAGNOSIS — Z12.31 ENCOUNTER FOR SCREENING MAMMOGRAM FOR MALIGNANT NEOPLASM OF BREAST: ICD-10-CM

## 2023-05-31 ENCOUNTER — REMOTE DEVICE CLINIC VISIT (OUTPATIENT)
Dept: CARDIOLOGY CLINIC | Facility: CLINIC | Age: 68
End: 2023-05-31

## 2023-05-31 DIAGNOSIS — Z95.818 PRESENCE OF OTHER CARDIAC IMPLANTS AND GRAFTS: Primary | ICD-10-CM

## 2023-06-16 ENCOUNTER — OFFICE VISIT (OUTPATIENT)
Dept: FAMILY MEDICINE CLINIC | Facility: HOSPITAL | Age: 68
End: 2023-06-16
Payer: MEDICARE

## 2023-06-16 VITALS
TEMPERATURE: 98.2 F | SYSTOLIC BLOOD PRESSURE: 122 MMHG | HEART RATE: 72 BPM | BODY MASS INDEX: 23.26 KG/M2 | WEIGHT: 148.2 LBS | HEIGHT: 67 IN | DIASTOLIC BLOOD PRESSURE: 78 MMHG

## 2023-06-16 DIAGNOSIS — E06.3 HASHIMOTO'S DISEASE: ICD-10-CM

## 2023-06-16 DIAGNOSIS — Z01.818 PREOP EXAMINATION: Primary | ICD-10-CM

## 2023-06-16 DIAGNOSIS — Z86.73 HISTORY OF STROKE: ICD-10-CM

## 2023-06-16 DIAGNOSIS — H25.12 NUCLEAR SCLEROTIC CATARACT OF LEFT EYE: ICD-10-CM

## 2023-06-16 PROCEDURE — 99214 OFFICE O/P EST MOD 30 MIN: CPT | Performed by: NURSE PRACTITIONER

## 2023-06-16 NOTE — PROGRESS NOTES
[facility] PRE-OPERATIVE EVALUATION  [facility] - [facility] PRIMARY CARE [id number]     NAME: Jonelle Trejo  AGE: 78 y o  SEX: female  : 1955     DATE: 2023    [facility] Pre-Operative Evaluation      Chief Complaint: Pre-operative Evaluation     Surgery: cataract extraction w/IOL insertion OS  Anticipated Date of Surgery: 2023  Referring Provider: No ref  provider found       History of Present Illness:     Jonelle Trejo is a 78 y o  female who presents to the office today for a preoperative consultation at the request of surgeon, Dr Benigno Magallanes MD, who plans on performing cataract extraction w/IOL insertion OS on 2023  Planned anesthesia is IV sedation and and topical  Patient has a bleeding risk of: no recent abnormal bleeding, no remote history of abnormal bleeding and no use of Ca-channel blockersCurrent anti-platelet/anti-coagulation medications that the patient is prescribed includes: Aspirin  States she had right eye cataract surgery a few months ago and all went well       Assessment of Chronic Conditions:   - Cerebrovascular Disease: hx of - denies residual effects    - hashimoto's thyroid disease - stable      Assessment of Cardiac Risk:  · Denies unstable or severe angina or MI in the last 6 weeks or history of stent placement in the last year   · Denies decompensated heart failure (e g  New onset heart failure, NYHA functional class IV heart failure, or worsening existing heart failure)  · Denies significant arrhythmias such as high grade AV block, symptomatic ventricular arrhythmia, newly recognized ventricular tachycardia, supraventricular tachycardia with resting heart rate >100, or symptomatic bradycardia  · Denies severe heart valve disease including aortic stenosis or symptomatic mitral stenosis     Exercise Capacity:  · Able to walk 4 blocks without symptoms?: Yes  · Able to walk 2 flights without symptoms?: No    Prior Anesthesia Reactions: vomitting/lightheaded w/anesthesia given during THR (she is unsure of anesthesia type)     Personal history of venous thromboembolic disease? No    History of steroid use for >2 weeks within last year? No         Review of Systems:     Review of Systems   Constitutional: Negative  Eyes: Positive for visual disturbance (cataract left eye)  Respiratory: Negative  Cardiovascular: Negative  Gastrointestinal: Negative  Neurological: Negative  Hematological: Negative          Current Problem List:     Patient Active Problem List   Diagnosis   • Breast cyst   • Cervical spondylosis   • Chronic allergic conjunctivitis   • Dyslipidemia   • Hashimoto's disease   • Herniated cervical disc   • Non-toxic multinodular goiter   • Primary osteoarthritis of left hip   • Rhinitis   • Varicose veins   • Elevated TSH   • Osteopenia   • DDD (degenerative disc disease), lumbar   • Left leg numbness   • Chronic pain syndrome   • Chronic pain of left knee   • Chronic left hip pain   • History of stroke   • PFO (patent foramen ovale)       Allergies:     No Known Allergies    Current Medications:       Current Outpatient Medications:   •  aspirin 81 mg chewable tablet, Chew 1 tablet (81 mg total)  in the morning , Disp: , Rfl: 0  •  atorvastatin (LIPITOR) 20 mg tablet, TAKE 1 TABLET BY MOUTH EVERY DAY IN THE EVENING, Disp: 90 tablet, Rfl: 2  •  calcium carbonate (OS-WALKER) 1250 (500 Ca) MG tablet, Take 2 tablets by mouth in the morning , Disp: , Rfl:   •  Chlorophyll 100 MG TABS, Take 1 tablet (100 mg total) by mouth daily, Disp: , Rfl: 0  •  cholecalciferol (VITAMIN D3) 1,000 units tablet, Take 1,000 Units by mouth in the morning , Disp: , Rfl:   •  DHA-EPA-Vitamin E (OMEGA-3 COMPLEX PO), Take 1 tablet by mouth in the morning, Disp: , Rfl:   •  Magnesium 250 MG TABS, Take 3 tablets by mouth in the morning , Disp: , Rfl:   •  Multiple Vitamin (MULTIVITAMINS PO), Take 1 capsule by mouth daily, Disp: , Rfl:   • THYROID PO, Take by mouth, Disp: , Rfl:     Past Medical History:       Past Medical History:   Diagnosis Date   • Benign paroxysmal positional vertigo     Resolved: 9/15/2014   • Cataracts, bilateral    • Fracture of radius         Past Surgical History:   Procedure Laterality Date   • BIOPSY CORE NEEDLE      Thyroid   • CARDIAC ELECTROPHYSIOLOGY PROCEDURE N/A 2022    Procedure: Cardiac loop recorder implant;  Surgeon: Lara Herron MD;  [facility];   Service: Cardiology   •  SECTION     • COLPOSCOPY     • HIP SURGERY          Family History   Problem Relation Age of Onset   • Stroke Mother         CVA   • Breast cancer Mother 77   • Alcohol abuse Father    • COPD Father    • Other Father         Nicotine Abuse   • No Known Problems Sister    • No Known Problems Daughter    • No Known Problems Daughter    • Thyroid disease Maternal Grandmother    • No Known Problems Maternal Grandfather    • No Known Problems Paternal Grandmother    • No Known Problems Paternal Grandfather    • Thyroid cancer Brother 61   • Coronary artery disease Brother    • No Known Problems Brother    • Breast cancer Maternal Aunt 40   • Breast cancer additional onset Maternal Aunt 50   • No Known Problems Maternal Aunt    • No Known Problems Maternal Aunt    • No Known Problems Maternal Aunt         Social History     Socioeconomic History   • Marital status: Single     Spouse name: Not on file   • Number of children: Not on file   • Years of education: Not on file   • Highest education level: Not on file   Occupational History   • Not on file   Tobacco Use   • Smoking status: Never   • Smokeless tobacco: Never   Vaping Use   • Vaping Use: Never used   Substance and Sexual Activity   • Alcohol use: Yes     Comment: social   • Drug use: No   • Sexual activity: Not Currently   Other Topics Concern   • Not on file   Social History Narrative    Single, lives alone, part-time employment in [employer]     Social "Determinants of Health     Financial Resource Strain: Low Risk  (10/28/2022)    Overall Financial Resource Strain (CARDIA)    • Difficulty of Paying Living Expenses: Not hard at all   Food Insecurity: No Food Insecurity (5/19/2022)    Hunger Vital Sign    • Worried About Running Out of Food in the Last Year: Never true    • Ran Out of Food in the Last Year: Never true   Transportation Needs: No Transportation Needs (10/28/2022)    PRAPARE - Transportation    • Lack of Transportation (Medical): No    • Lack of Transportation (Non-Medical): No   Physical Activity: Not on file   Stress: Not on file   Social Connections: Not on file   Intimate Partner Violence: Not on file   Housing Stability: Low Risk  (5/19/2022)    Housing Stability Vital Sign    • Unable to Pay for Housing in the Last Year: No    • Number of Places Lived in the Last Year: 1    • Unstable Housing in the Last Year: No        Physical Exam:     /78   Pulse 72   Temp 98 2 °F (36 8 °C)   Ht 5' 7\" (1 702 m)   Wt 67 2 kg (148 lb 3 2 oz)   BMI 23 21 kg/m²     Physical Exam  Vitals reviewed  Constitutional:       General: She is not in acute distress  Appearance: Normal appearance  HENT:      Head: Normocephalic  Eyes:      General: No scleral icterus  Neck:      Thyroid: No thyromegaly  Vascular: No carotid bruit  Cardiovascular:      Rate and Rhythm: Normal rate and regular rhythm  Heart sounds: No murmur heard  Pulmonary:      Effort: Pulmonary effort is normal  No respiratory distress  Breath sounds: Normal breath sounds  Musculoskeletal:      Cervical back: Normal range of motion  Right lower leg: No edema  Left lower leg: No edema  Lymphadenopathy:      Cervical: No cervical adenopathy  Skin:     General: Skin is warm and dry  Neurological:      General: No focal deficit present  Mental Status: She is alert and oriented to person, place, and time     Psychiatric:         Mood and Affect: " Mood normal          Behavior: Behavior normal          Thought Content: Thought content normal          Judgment: Judgment normal           Data:     Pre-operative work-up    Laboratory Results: n/a     EKG: n/a    Chest x-ray: n/a    Previous cardiopulmonary studies within the past year:  · Echocardiogram: 5/2022  · Cardiac Catheterization: n/a  · Stress Test: n/a  · Pulmonary Function Testing: n/a      Assessment & Recommendations:     1  Preop examination      preop H&P completed and medical clearance issued given stability of co-morbidities - forms to be completed and faxed to surgeon      2  Nuclear sclerotic cataract of left eye        3  History of stroke        4  Hashimoto's disease            Pre-Op Evaluation Assessment  78 y o  female with planned surgery: cataract extraction w/IOL insertion OS  Known risk factors for perioperative complications: None  Current medications which may produce withdrawal symptoms if withheld perioperatively: n/a  Pre-Op Evaluation Plan  1  Further preoperative workup as follows:   - None; no further preoperative work-up is required    2  Medication Management/Recommendations:   - None, continue medication regimen including morning of surgery, with sip of water    3  Prophylaxis for cardiac events with perioperative beta-blockers: not indicated  4  Patient requires further consultation with: None    Clearance  Patient is CLEARED for surgery without any additional cardiac testing       DB Ramesh  [facility] PRIMARY [facility] [address]  [address] [facility] [address]  Phone#  [phone]  Fax#  [phone]

## 2023-08-24 ENCOUNTER — TELEPHONE (OUTPATIENT)
Dept: FAMILY MEDICINE CLINIC | Facility: HOSPITAL | Age: 68
End: 2023-08-24

## 2023-08-24 ENCOUNTER — APPOINTMENT (OUTPATIENT)
Dept: LAB | Facility: HOSPITAL | Age: 68
End: 2023-08-24
Payer: MEDICARE

## 2023-08-24 LAB
ALBUMIN SERPL BCP-MCNC: 4.3 G/DL (ref 3.5–5)
ALP SERPL-CCNC: 73 U/L (ref 34–104)
ALT SERPL W P-5'-P-CCNC: 12 U/L (ref 7–52)
ANION GAP SERPL CALCULATED.3IONS-SCNC: 5 MMOL/L
AST SERPL W P-5'-P-CCNC: 37 U/L (ref 13–39)
BILIRUB SERPL-MCNC: 0.59 MG/DL (ref 0.2–1)
BUN SERPL-MCNC: 14 MG/DL (ref 5–25)
CALCIUM SERPL-MCNC: 9.5 MG/DL (ref 8.4–10.2)
CHLORIDE SERPL-SCNC: 103 MMOL/L (ref 96–108)
CHOLEST SERPL-MCNC: 182 MG/DL
CO2 SERPL-SCNC: 30 MMOL/L (ref 21–32)
CREAT SERPL-MCNC: 0.87 MG/DL (ref 0.6–1.3)
GFR SERPL CREATININE-BSD FRML MDRD: 69 ML/MIN/1.73SQ M
GLUCOSE P FAST SERPL-MCNC: 92 MG/DL (ref 65–99)
HDLC SERPL-MCNC: 81 MG/DL
LDLC SERPL CALC-MCNC: 87 MG/DL (ref 0–100)
NONHDLC SERPL-MCNC: 101 MG/DL
POTASSIUM SERPL-SCNC: 3.6 MMOL/L (ref 3.5–5.3)
PROT SERPL-MCNC: 7.1 G/DL (ref 6.4–8.4)
SODIUM SERPL-SCNC: 138 MMOL/L (ref 135–147)
TRIGL SERPL-MCNC: 68 MG/DL
TSH SERPL DL<=0.05 MIU/L-ACNC: 4 UIU/ML (ref 0.45–4.5)

## 2023-08-24 NOTE — TELEPHONE ENCOUNTER
Nohemy Portillo called saying someone from the office left a message for her to call back. She wasn't sure who called.

## 2023-09-01 ENCOUNTER — REMOTE DEVICE CLINIC VISIT (OUTPATIENT)
Dept: CARDIOLOGY CLINIC | Facility: CLINIC | Age: 68
End: 2023-09-01
Payer: MEDICARE

## 2023-09-01 DIAGNOSIS — Z95.818 PRESENCE OF OTHER CARDIAC IMPLANTS AND GRAFTS: Primary | ICD-10-CM

## 2023-09-01 PROCEDURE — G2066 INTER DEVC REMOTE 30D: HCPCS | Performed by: INTERNAL MEDICINE

## 2023-09-01 PROCEDURE — 93298 REM INTERROG DEV EVAL SCRMS: CPT | Performed by: INTERNAL MEDICINE

## 2023-09-01 NOTE — PROGRESS NOTES
ARLYN ACSILLAS ILR/ ACTIVE SYSTEM IS MRI CONDITIONAL   MERLIN TRANSMISSION: BATTERY STATUS "OK." PRESENTING RHYTHM SHOWS SR, AVG.  66 BPM; NO PATIENT  ACTIVATED EPISODES. 4 DEVICE CLASSIFIED BROWN EPISODES WITH R WAVE UNDERSENSING, AVG RATE 50 BPM. NORMAL DEVICE FUNCTION.  ES

## 2023-09-05 ENCOUNTER — OFFICE VISIT (OUTPATIENT)
Dept: FAMILY MEDICINE CLINIC | Facility: HOSPITAL | Age: 68
End: 2023-09-05
Payer: MEDICARE

## 2023-09-05 VITALS
BODY MASS INDEX: 23.61 KG/M2 | HEIGHT: 67 IN | SYSTOLIC BLOOD PRESSURE: 132 MMHG | HEART RATE: 56 BPM | DIASTOLIC BLOOD PRESSURE: 80 MMHG | WEIGHT: 150.4 LBS | TEMPERATURE: 96.9 F

## 2023-09-05 DIAGNOSIS — Z86.73 HISTORY OF STROKE: ICD-10-CM

## 2023-09-05 DIAGNOSIS — E06.3 HASHIMOTO'S DISEASE: Primary | ICD-10-CM

## 2023-09-05 DIAGNOSIS — E78.5 DYSLIPIDEMIA: ICD-10-CM

## 2023-09-05 PROCEDURE — 99214 OFFICE O/P EST MOD 30 MIN: CPT | Performed by: INTERNAL MEDICINE

## 2023-09-05 NOTE — ASSESSMENT & PLAN NOTE
Clinically euthyroid but TSH is high range of nml, discussed seeing Endo which pt is interested in  - referral placed, will repeat TFT's in Nov-Dec, will follow

## 2023-09-05 NOTE — ASSESSMENT & PLAN NOTE
Currently w/o red flag Neuro symptoms, loop being monitored, has f/u with both Neuro and has will schedule Cardio in the fall, on ASA and statin, does not smoke, will follow

## 2023-09-05 NOTE — PROGRESS NOTES
Name: Shari Yan      : 1955      MRN: [id number]  Encounter Provider: Ivet Gao DO  Encounter Date: 2023   Encounter department: [address]     1. Hashimoto's disease  Assessment & Plan:  Clinically euthyroid but TSH is high range of nml, discussed seeing Endo which pt is interested in  - referral placed, will repeat TFT's in Nov-Dec, will follow    Orders:  -     Ambulatory Referral to Endocrinology; Future  -     TSH, 3rd generation with Free T4 reflex; Future; Expected date: 2023    2. Dyslipidemia  Assessment & Plan:  LDL at goal, con't current statin, repeat FLP annually      3. History of stroke  Assessment & Plan:  Currently w/o red flag Neuro symptoms, loop being monitored, has f/u with both Neuro and has will schedule Cardio in the fall, on ASA and statin, does not smoke, will follow        Depression Screening and Follow-up Plan: Patient was screened for depression during today's encounter. They screened negative with a PHQ-2 score of 0. Cologuaneela 10/22 - 3 yrs    Mammo     Dexa  - osteopenia - refused, on Ca/Vit D    PAP     BW       Subjective      HPI Pt here for follow up appt and BW results    BW results were d/w pt in detail: CMP/FLP/TSH were wnl, TSH upper range of goal at 4.001. Pt is not on any levothyroxine. She is only taking oral thyroid supplement OTC as well as chlorophyll. She is interested in seeing Endo. Goal FLP was d/w pt in detail. Diet/exercise reviewed. She does not eat a lot of red meat. She does good with fruits and veggies. She walks and does weights for exercise. She is taking her Atorvastatin daily as directed w/o Se. She notes no stroke/TIA symptoms/CP. Pt con't to have her loop recorder monitored by Cardio d/t stroke symptoms. NO significant arrhythmias noted. She had 4 episodes of bradycardia but she had no symptoms.   She denies associated dizziness/lightheadedness/fatigue. She notes no CP/palp. She has to schedule f/u with Cardio in the fall. She has appt with Neuro later this month. Review of Systems   Constitutional: Negative for chills, fatigue, fever and unexpected weight change. HENT: Negative for congestion, trouble swallowing and voice change. Eyes: Negative for pain and visual disturbance. Respiratory: Negative for cough and shortness of breath. Cardiovascular: Negative for chest pain and palpitations. Gastrointestinal: Positive for constipation. Negative for abdominal pain, blood in stool, diarrhea, nausea and vomiting. Genitourinary: Negative for difficulty urinating and dysuria. Musculoskeletal: Positive for arthralgias. Negative for back pain and neck pain. Skin: Negative for rash and wound. Neurological: Negative for dizziness, light-headedness and headaches. Hematological: Does not bruise/bleed easily. Psychiatric/Behavioral: Negative for confusion and dysphoric mood. Current Outpatient Medications on File Prior to Visit   Medication Sig   • aspirin 81 mg chewable tablet Chew 1 tablet (81 mg total)  in the morning. • atorvastatin (LIPITOR) 20 mg tablet TAKE 1 TABLET BY MOUTH EVERY DAY IN THE EVENING   • calcium carbonate (OS-WALKER) 1250 (500 Ca) MG tablet Take 2 tablets by mouth in the morning. • Chlorophyll 100 MG TABS Take 1 tablet (100 mg total) by mouth daily   • cholecalciferol (VITAMIN D3) 1,000 units tablet Take 1,000 Units by mouth in the morning. • DHA-EPA-Vitamin E (OMEGA-3 COMPLEX PO) Take 1 tablet by mouth in the morning   • Magnesium 250 MG TABS Take 3 tablets by mouth in the morning.    • Multiple Vitamin (MULTIVITAMINS PO) Take 1 capsule by mouth daily   • THYROID PO Take by mouth       Objective     /80   Pulse 56   Temp (!) 96.9 °F (36.1 °C) (Tympanic)   Ht 5' 7" (1.702 m)   Wt 68.2 kg (150 lb 6.4 oz)   BMI 23.56 kg/m²     Physical Exam  Vitals and nursing note reviewed. Constitutional:       General: She is not in acute distress. Appearance: She is well-developed. She is not ill-appearing. HENT:      Head: Normocephalic and atraumatic. Eyes:      General:         Right eye: No discharge. Left eye: No discharge. Conjunctiva/sclera: Conjunctivae normal.   Neck:      Trachea: No tracheal deviation. Cardiovascular:      Rate and Rhythm: Normal rate and regular rhythm. Heart sounds: Normal heart sounds. No murmur heard. No friction rub. Pulmonary:      Effort: Pulmonary effort is normal. No respiratory distress. Breath sounds: Normal breath sounds. No wheezing, rhonchi or rales. Abdominal:      General: There is no distension. Palpations: Abdomen is soft. Tenderness: There is no abdominal tenderness. There is no guarding or rebound. Musculoskeletal:      Cervical back: Neck supple. Right lower leg: No edema. Left lower leg: No edema. Skin:     General: Skin is warm. Coloration: Skin is not pale. Findings: No rash. Neurological:      General: No focal deficit present. Mental Status: She is alert. Motor: No abnormal muscle tone. Gait: Gait normal.   Psychiatric:         Mood and Affect: Mood normal.         Behavior: Behavior normal.         Thought Content:  Thought content normal.         Judgment: Judgment normal.       Arcenio Cruz DO

## 2023-09-06 ENCOUNTER — TELEPHONE (OUTPATIENT)
Dept: ENDOCRINOLOGY | Facility: CLINIC | Age: 68
End: 2023-09-06

## 2023-09-06 NOTE — TELEPHONE ENCOUNTER
Received referral for Fermín Monae. Left voicemail for patient to contact office to schedule Consult/New Patient Appointment.

## 2023-09-27 ENCOUNTER — OFFICE VISIT (OUTPATIENT)
Dept: NEUROLOGY | Facility: CLINIC | Age: 68
End: 2023-09-27

## 2023-09-27 ENCOUNTER — IN-CLINIC DEVICE VISIT (OUTPATIENT)
Dept: CARDIOLOGY CLINIC | Facility: CLINIC | Age: 68
End: 2023-09-27
Payer: MEDICARE

## 2023-09-27 VITALS
BODY MASS INDEX: 23.86 KG/M2 | DIASTOLIC BLOOD PRESSURE: 68 MMHG | RESPIRATION RATE: 18 BRPM | HEART RATE: 79 BPM | SYSTOLIC BLOOD PRESSURE: 134 MMHG | HEIGHT: 67 IN | OXYGEN SATURATION: 98 % | WEIGHT: 152 LBS | TEMPERATURE: 97.1 F

## 2023-09-27 DIAGNOSIS — E78.5 DYSLIPIDEMIA: ICD-10-CM

## 2023-09-27 DIAGNOSIS — Z95.818 PRESENCE OF OTHER CARDIAC IMPLANTS AND GRAFTS: Primary | ICD-10-CM

## 2023-09-27 DIAGNOSIS — Z86.73 HISTORY OF STROKE: Primary | ICD-10-CM

## 2023-09-27 DIAGNOSIS — Q21.12 PFO (PATENT FORAMEN OVALE): ICD-10-CM

## 2023-09-27 PROCEDURE — 93291 INTERROG DEV EVAL SCRMS IP: CPT | Performed by: INTERNAL MEDICINE

## 2023-09-27 NOTE — PATIENT INSTRUCTIONS
For ongoing stroke prevention, continue aspirin 81mg daily and atorvastatin 20mg daily   Loop will continue to be monitored for any evidence of Afib. If found, you would require full anticoagulation   Continue heart healthy diet and routine exercise   Continue to follow with PCP for ongoing management of blood pressure, cholesterol and blood sugar  Follow up in 8 months or sooner if needed    If you experience any facial droop, weakness on one side of the body, speech or swallowing difficulty, painless loss of vision in one eye, double vision, vertigo that does not resolve quickly/imbalance, go to the ER/call 911.

## 2023-09-27 NOTE — PROGRESS NOTES
Patient ID: Alan Avila is a 80 y.o. female. Assessment:  Patient continues to do well following right frontal cortical appearing stroke which occurred in May 2022. She is maintained on ASA and statin. She has had an implantable loop recorder since November 2022 with no Afib found to date. She was also found to have a PFO during initial evaluation and has followed up with cardiology for this. No plans to close PFO at this point. There have not been any new neurologic symptoms to indicate recurrent TIA/CVA. We discussed the importance of maintaining cardiovascular risk factors under the direction of her PCP. Goal blood pressure is less than 130/80 routinely, goal LDL less than 70, along with appropriate glycemic control. Her last LDL was 87, not at goal.  She does say she was missing some Lipitor around that time. Would suggest PCP rechecking in a few months and increasing the statin if LDL is still not at goal.    Plan:  -For ongoing stroke prevention patient will continue aspirin 81 mg daily, statin, along with appropriate blood pressure and glycemic control  - Will defer management of her blood pressure, lipids and blood sugar to her PCP  - Loop recorder will continue to be monitored. If any atrial fibrillation is identified, she would require transition to full anticoagulation  - Heart healthy diet and routine exercise are advised  - Follow-up in 8 months or sooner if needed    Signs and symptoms of stroke were discussed and included in the AVS today       Diagnoses and all orders for this visit:    History of stroke    PFO (patent foramen ovale)    Dyslipidemia           Subjective:    LUANA Peoples is a 80year old female who presents today for follow up regarding prior stroke. To review, patient presented to the ED on 5/18/22 with L hand and L arm numbness, and difficulty with fine motor skills in L hand. BP on arrival 165/83. NIHSS 1.  Patient was not a tPA candidate due to being outside the time window. CTH in the ED unremarkable. CTA with no LVO. A1C 5.6, lipid panel , . MRI brain revealed small cortical/subcortical focus of restricted diffusion in the posterior right frontal lobe consistent with an acute/early subacute infarct. 2D echo with EF 19%, grade 1 diastolic dysfunction, no atrial dilation. She then had a JAYDEN which per cardiology showed a moderate sized PFO confirmed at rest with bidirectional shunting by spectral Doppler and saline contrast. No intracardiac thrombus. Bilateral LE dopplers were negative for DVT. Thrombosis panel was normal. She was started on ASA and Plavix x 3 weeks of DAPT, then reduced to ASA as monotherapy. She was seen by Dr. Ritika Bland in the outpatient setting to discuss possible PFO closure, however he suggested a Loop first to evaluate for Afib for a few months and if none seen, consider PFO closure. Loop was placed in November 2022. Today, patient reports she is doing well from a neurologic standpoint. She denies any new symptoms. She is taking ASA and statin, although she reports she was missing her statin here and there over the summer. She has had some issues with the loop including discomfort, alarms going off in the middle of the night. She is seeing cardiology soon to adjust some settings apparently. No AFib seen to date. She remains very active including walking daily, weight lifting about 5x/week. She eats a healthy diet, mainly fish, vegetables. Labs 8/24/23 lipid panel , LDL 87. The following portions of the patient's history were reviewed and updated as appropriate: current medications, past family history, past medical history, past social history, past surgical history and problem list.       Objective:    Blood pressure 134/68, pulse 79, temperature (!) 97.1 °F (36.2 °C), temperature source Temporal, resp. rate 18, height 5' 7" (1.702 m), weight 68.9 kg (152 lb), SpO2 98 %.     Physical Exam  Constitutional:       Appearance: Normal appearance. HENT:      Head: Normocephalic and atraumatic. Eyes:      Extraocular Movements: EOM normal.      Pupils: Pupils are equal, round, and reactive to light. Neurological:      Mental Status: She is alert. Motor: Motor strength is normal.     Deep Tendon Reflexes: Reflexes are normal and symmetric. Psychiatric:         Mood and Affect: Mood normal.         Speech: Speech normal.         Behavior: Behavior normal.         Neurological Exam  Mental Status  Alert. Oriented to person, place, time and situation. Speech is normal. Language is fluent with no aphasia. Attention and concentration are normal.    Cranial Nerves  CN II: Visual fields full to confrontation. CN III, IV, VI: Extraocular movements intact bilaterally. Pupils equal round and reactive to light bilaterally. CN V: Facial sensation is normal.  CN VII: Full and symmetric facial movement. CN VIII: Hearing is normal.  CN IX, X: Palate elevates symmetrically  CN XI: Shoulder shrug strength is normal.  CN XII: Tongue midline without atrophy or fasciculations. Motor   Normal muscle tone. Strength is 5/5 throughout all four extremities. Sensory  Light touch is normal in upper and lower extremities. Reflexes  Deep tendon reflexes are 2+ and symmetric in all four extremities. Coordination  Right: Finger-to-nose normal.Left: Finger-to-nose normal.    Gait  Casual gait is normal including stance, stride, and arm swing. ROS:    Review of Systems   Constitutional: Positive for fatigue. Negative for chills and fever. HENT: Negative for ear pain and sore throat. Eyes: Negative for pain and visual disturbance. Respiratory: Negative for cough and shortness of breath. Cardiovascular: Negative for chest pain and palpitations. Gastrointestinal: Negative for abdominal pain and vomiting. Genitourinary: Negative for dysuria and hematuria.    Musculoskeletal: Negative for arthralgias and back pain. Skin: Negative for color change and rash. Neurological: Negative for seizures and syncope. Hematological: Bruises/bleeds easily. Psychiatric/Behavioral: Positive for sleep disturbance. Depression, anxiety, and mood swings   All other systems reviewed and are negative.     I personally reviewed and updated the ROS as appropriate

## 2023-09-27 NOTE — PROGRESS NOTES
Results for orders placed or performed in visit on 09/27/23   Cardiac EP device report    Narrative    SJM DOT ILR/ ACTIVE SYSTEM IS MRI CONDITIONAL  DEVICE INTERROGATED IN THE [place] OFFICE: LOOP DUE TO UNDERSENSING: DEVICE IN FOR CRYPOTGENIC STROKE: BATTERY VOLTAGE GOOD. PRESENTING RHYTHM: NSR @ 70 BPM. R-WAVES: 0.52mV W/ EGM RANGE PROGRAMMED 0.40mV. REPROGRAMMED EGM RANGE TO 0.20mV, RETESTED R-WAVES: 0.26mV. PT HAD BRADYCARDIA EPISODES W/ EGMS SHOWING UNDERSENSING. PROGRAMMED SENSITIVITY FROM 0.75mV TO 0.05mV. NORMAL DEVICE FUNCTION.   [id number] [address]

## 2023-10-06 ENCOUNTER — HOSPITAL ENCOUNTER (EMERGENCY)
Facility: HOSPITAL | Age: 68
Discharge: HOME | End: 2023-10-06
Attending: EMERGENCY MEDICINE
Payer: MEDICARE

## 2023-10-06 ENCOUNTER — APPOINTMENT (EMERGENCY)
Dept: RADIOLOGY | Facility: HOSPITAL | Age: 68
End: 2023-10-06
Payer: MEDICARE

## 2023-10-06 VITALS
DIASTOLIC BLOOD PRESSURE: 72 MMHG | HEART RATE: 78 BPM | TEMPERATURE: 98.1 F | RESPIRATION RATE: 18 BRPM | OXYGEN SATURATION: 98 % | SYSTOLIC BLOOD PRESSURE: 154 MMHG

## 2023-10-06 DIAGNOSIS — S90.32XA CONTUSION OF LEFT FOOT, INITIAL ENCOUNTER: Primary | ICD-10-CM

## 2023-10-06 PROCEDURE — 99283 EMERGENCY DEPT VISIT LOW MDM: CPT

## 2023-10-06 PROCEDURE — 99284 EMERGENCY DEPT VISIT MOD MDM: CPT | Performed by: EMERGENCY MEDICINE

## 2023-10-06 PROCEDURE — 73630 X-RAY EXAM OF FOOT: CPT

## 2023-10-06 NOTE — ED PROVIDER NOTES
History  Chief Complaint   Patient presents with    Foot Injury     Pt was vacuuming when the hose dropped and struck her left foot. 15-year-old female presents for evaluation of left foot injury that occurred earlier today when the hose of the vacuum  recoiled back and struck her in the top of the left foot. The patient now has bruising and swelling of the dorsum of the left foot with some mild pins-and-needles. Prior to Admission Medications   Prescriptions Last Dose Informant Patient Reported? Taking? Chlorophyll 100 MG TABS 10/6/2023 Self No Yes   Sig: Take 1 tablet (100 mg total) by mouth daily   DHA-EPA-Vitamin E (OMEGA-3 COMPLEX PO) 10/6/2023  Yes Yes   Sig: Take 1 tablet by mouth in the morning   Magnesium 250 MG TABS 10/6/2023 Self Yes Yes   Sig: Take 3 tablets by mouth in the morning. Multiple Vitamin (MULTIVITAMINS PO) 10/6/2023 Self Yes Yes   Sig: Take 1 capsule by mouth daily   THYROID PO 10/6/2023 Self Yes Yes   Sig: Take by mouth   aspirin 81 mg chewable tablet 10/6/2023 Self No Yes   Sig: Chew 1 tablet (81 mg total)  in the morning. atorvastatin (LIPITOR) 20 mg tablet 10/6/2023  No Yes   Sig: TAKE 1 TABLET BY MOUTH EVERY DAY IN THE EVENING   calcium carbonate (OS-WALKER) 1250 (500 Ca) MG tablet 10/6/2023 Self Yes Yes   Sig: Take 2 tablets by mouth in the morning. cholecalciferol (VITAMIN D3) 1,000 units tablet 10/6/2023 Self Yes Yes   Sig: Take 1,000 Units by mouth in the morning. Facility-Administered Medications: None       Past Medical History:   Diagnosis Date    Benign paroxysmal positional vertigo     Resolved: 9/15/2014    Cataracts, bilateral     Fracture of radius        Past Surgical History:   Procedure Laterality Date    BIOPSY CORE NEEDLE      Thyroid    CARDIAC ELECTROPHYSIOLOGY PROCEDURE N/A 2022    Procedure: Cardiac loop recorder implant;  Surgeon: Lam Mendez MD;  Location: [facility];   Service: Cardiology     SECTION COLPOSCOPY      HIP SURGERY         Family History   Problem Relation Age of Onset    Stroke Mother         CVA    Breast cancer Mother 77    Alcohol abuse Father     COPD Father     Other Father         Nicotine Abuse    No Known Problems Sister     No Known Problems Daughter     No Known Problems Daughter     Thyroid disease Maternal Grandmother     No Known Problems Maternal Grandfather     No Known Problems Paternal Grandmother     No Known Problems Paternal Grandfather     Thyroid cancer Brother 61    Coronary artery disease Brother     No Known Problems Brother     Breast cancer Maternal Aunt 40    Breast cancer additional onset Maternal Aunt 50    No Known Problems Maternal Aunt     No Known Problems Maternal Aunt     No Known Problems Maternal Aunt      I have reviewed and agree with the history as documented. E-Cigarette/Vaping    E-Cigarette Use Never User      E-Cigarette/Vaping Substances    Nicotine No     Flavoring No      Social History     Tobacco Use    Smoking status: Never    Smokeless tobacco: Never   Vaping Use    Vaping Use: Never used   Substance Use Topics    Alcohol use: Yes     Comment: social    Drug use: No       Review of Systems    Physical Exam  Physical Exam  Musculoskeletal:      Left foot: Normal capillary refill. Swelling and tenderness present. Normal pulse.          Vital Signs  ED Triage Vitals [10/06/23 1017]   Temperature Pulse Respirations Blood Pressure SpO2   98.1 °F (36.7 °C) 78 18 154/72 98 %      Temp src Heart Rate Source Patient Position - Orthostatic VS BP Location FiO2 (%)   -- -- -- -- --      Pain Score       --           Vitals:    10/06/23 1017   BP: 154/72   Pulse: 78         Visual Acuity  Visual Acuity      Flowsheet Row Most Recent Value   L Pupil Size (mm) 3   R Pupil Size (mm) 3            ED Medications  Medications - No data to display    Diagnostic Studies  Results Reviewed       None                   XR foot 3+ views LEFT   ED Interpretation by Khadra Fraire Claire Alberts DO (10/06 1052)   No acute osseous abnormality      Final Result by Kiki Gallo MD (10/06 1058)      No acute osseous abnormality. Workstation performed: [id number]                    Procedures  Procedures         ED Course                               SBIRT 22yo+      Flowsheet Row Most Recent Value   Initial Alcohol Screen: US AUDIT-C     1. How often do you have a drink containing alcohol? 0 Filed at: 10/06/2023 1033   2. How many drinks containing alcohol do you have on a typical day you are drinking? 0 Filed at: 10/06/2023 1033   3a. Male UNDER 65: How often do you have five or more drinks on one occasion? 0 Filed at: 10/06/2023 1033   3b. FEMALE Any Age, or MALE 65+: How often do you have 4 or more drinks on one occassion? 0 Filed at: 10/06/2023 1033   Audit-C Score 0 Filed at: 10/06/2023 1033   REA: How many times in the past year have you. .. Used an illegal drug or used a prescription medication for non-medical reasons? Never Filed at: 10/06/2023 1033                      Medical Decision Making  Plan is to obtain an x-ray to rule out fracture/dislocation versus contusion    Discussed x-ray findings plan for ice elevation compression and over-the-counter pain control    The patient (and any family present) verbalized understanding of the discharge instructions and warnings that would necessitate return to the Emergency Department. All questions were answered prior to discharge. Amount and/or Complexity of Data Reviewed  External Data Reviewed: notes. Details: Last neurology office note from September 27 reviewed noting patient on low-dose aspirin  Radiology: ordered and independent interpretation performed.         Disposition  Final diagnoses:   Contusion of left foot, initial encounter     Time reflects when diagnosis was documented in both MDM as applicable and the Disposition within this note       Time User Action Codes Description Comment    10/6/2023 10:52 AM Martie Najjar Add [S90.32XA] Contusion of left foot, initial encounter           ED Disposition       ED Disposition   Discharge    Condition   Stable    Date/Time   Fri Oct 6, 2023 [facility] discharge to home/self care. Follow-up Information       Follow up With Specialties Details Why Contact Info Additional Information    [facility] Schedule an appointment as soon as possible for a visit in 3 days For further evaluation, if not improved [facility] [address] [facility] [id number]  150 [facility], [address] [facility], [address] [place], [place], [address]            Discharge Medication List as of 10/6/2023 10:53 AM        CONTINUE these medications which have NOT CHANGED    Details   aspirin 81 mg chewable tablet Chew 1 tablet (81 mg total)  in the morning., Starting Sat 5/21/2022, No Print      atorvastatin (LIPITOR) 20 mg tablet TAKE 1 TABLET BY MOUTH EVERY DAY IN THE EVENING, Normal      calcium carbonate (OS-WALKER) 1250 (500 Ca) MG tablet Take 2 tablets by mouth in the morning., Historical Med      Chlorophyll 100 MG TABS Take 1 tablet (100 mg total) by mouth daily, Starting Tue 9/15/2020, No Print      cholecalciferol (VITAMIN D3) 1,000 units tablet Take 1,000 Units by mouth in the morning., Historical Med      DHA-EPA-Vitamin E (OMEGA-3 COMPLEX PO) Take 1 tablet by mouth in the morning, Historical Med      Magnesium 250 MG TABS Take 3 tablets by mouth in the morning., Historical Med      Multiple Vitamin (MULTIVITAMINS PO) Take 1 capsule by mouth daily, Starting Mon 9/15/2014, Historical Med      THYROID PO Take by mouth, Historical Med             No discharge procedures on file.     PDMP Review       None            ED Provider  Electronically Signed by             Jay De La Cruz DO  10/06/23 [id number]

## 2023-10-12 ENCOUNTER — CONSULT (OUTPATIENT)
Dept: ENDOCRINOLOGY | Facility: HOSPITAL | Age: 68
End: 2023-10-12
Payer: MEDICARE

## 2023-10-12 VITALS
SYSTOLIC BLOOD PRESSURE: 110 MMHG | DIASTOLIC BLOOD PRESSURE: 60 MMHG | WEIGHT: 151.8 LBS | RESPIRATION RATE: 98 BRPM | HEART RATE: 76 BPM | BODY MASS INDEX: 23.78 KG/M2

## 2023-10-12 DIAGNOSIS — E03.8 SUBCLINICAL HYPOTHYROIDISM: Primary | ICD-10-CM

## 2023-10-12 DIAGNOSIS — E04.2 MULTINODULAR GOITER: ICD-10-CM

## 2023-10-12 DIAGNOSIS — E06.3 HASHIMOTO'S DISEASE: ICD-10-CM

## 2023-10-12 PROCEDURE — 99204 OFFICE O/P NEW MOD 45 MIN: CPT | Performed by: STUDENT IN AN ORGANIZED HEALTH CARE EDUCATION/TRAINING PROGRAM

## 2023-10-12 PROCEDURE — 99214 OFFICE O/P EST MOD 30 MIN: CPT | Performed by: STUDENT IN AN ORGANIZED HEALTH CARE EDUCATION/TRAINING PROGRAM

## 2023-10-12 NOTE — PROGRESS NOTES
Chief Complaint   Patient presents with    Hypothyroidism      Referring Provider  Ivan Navarrete Do  [address] [address]  [place],  [address]     History of Present Illness:   Khushi Velasco is a 80 y.o. female with subclinical hypothyroidism, non toxic MNG seen in consultation at the request of Latisha    Symptoms at initial diagnosis:- reports has lots of energy for 5 days and then 2 days feels tired. Previous thyroid regime tried:- none, only on OTC thyroid support   Last dose adjustment- currently not on thyroid medication  Last TSH checked- 08/23 TSH 4.00. Current symptoms:- Denies any palpitations, tremor, changes in hair/skin/nails, changes in menstrual cycle, diarrhea, weight changes, constipation, Surgery to the neck, changes in neck, changes in voice, dysphagia. Reports energy fluctuates. Generally sleeps 6-7hrs a night and then when days she's tired sleeps the whole day    Patient has known non toxic MNG had US in 2014 with repeat [address] in 2018 with 1 subcentimeter right thyroid nodule, 5 left thyroid nodule largest 1.4cm TR 3, 3 isthmus nodules largest 1.2cm TR4. Reports was found to have nodules on exam and hence had US done. Given stable nodules no repeat US done since 2018. Denies any dysphagia, odynophagia, hx of radiation to neck. Fhx:   thyroid disorder- brother had thyroid cancer, grandmother had thyroid issues  Social hx- denies smoking, alcohol use.        Patient Active Problem List   Diagnosis    Breast cyst    Cervical spondylosis    Chronic allergic conjunctivitis    Dyslipidemia    Hashimoto's disease    Herniated cervical disc    Non-toxic multinodular goiter    Primary osteoarthritis of left hip    Rhinitis    Varicose veins    Elevated TSH    Osteopenia    DDD (degenerative disc disease), lumbar    Left leg numbness    Chronic pain syndrome    Chronic pain of left knee    Chronic left hip pain    History of stroke    PFO (patent foramen ovale)      Past Medical History: Diagnosis Date    Benign paroxysmal positional vertigo     Resolved: 9/15/2014    Cataracts, bilateral     Fracture of radius       Past Surgical History:   Procedure Laterality Date    BIOPSY CORE NEEDLE      Thyroid    CARDIAC ELECTROPHYSIOLOGY PROCEDURE N/A 2022    Procedure: Cardiac loop recorder implant;  Surgeon: Buddy Hutchison MD;  Location: [facility];   Service: Cardiology     SECTION      COLPOSCOPY      HIP SURGERY        Family History   Problem Relation Age of Onset    Stroke Mother         CVA    Breast cancer Mother 77    Alcohol abuse Father     COPD Father     Other Father         Nicotine Abuse    No Known Problems Sister     No Known Problems Daughter     No Known Problems Daughter     Thyroid disease Maternal Grandmother     No Known Problems Maternal Grandfather     No Known Problems Paternal Grandmother     No Known Problems Paternal Grandfather     Thyroid cancer Brother 61    Coronary artery disease Brother     No Known Problems Brother     Breast cancer Maternal Aunt 40    Breast cancer additional onset Maternal Aunt 50    No Known Problems Maternal Aunt     No Known Problems Maternal Aunt     No Known Problems Maternal Aunt      Social History     Tobacco Use    Smoking status: Never    Smokeless tobacco: Never   Substance Use Topics    Alcohol use: Yes     Comment: social     No Known Allergies      Current Outpatient Medications:     aspirin 81 mg chewable tablet, Chew 1 tablet (81 mg total)  in the morning., Disp: , Rfl: 0    atorvastatin (LIPITOR) 20 mg tablet, TAKE 1 TABLET BY MOUTH EVERY DAY IN THE EVENING, Disp: 90 tablet, Rfl: 2    calcium carbonate (OS-WALKER) 1250 (500 Ca) MG tablet, Take 2 tablets by mouth in the morning., Disp: , Rfl:     Chlorophyll 100 MG TABS, Take 1 tablet (100 mg total) by mouth daily, Disp: , Rfl: 0    cholecalciferol (VITAMIN D3) 1,000 units tablet, Take 1,000 Units by mouth in the morning., Disp: , Rfl:     DHA-EPA-Vitamin E (OMEGA-3 COMPLEX PO), Take 1 tablet by mouth in the morning, Disp: , Rfl:     Magnesium 250 MG TABS, Take 3 tablets by mouth in the morning., Disp: , Rfl:     Multiple Vitamin (MULTIVITAMINS PO), Take 1 capsule by mouth daily, Disp: , Rfl:   Review of Systems   Constitutional:  Negative for fatigue and unexpected weight change. HENT:  Negative for trouble swallowing and voice change. Eyes:  Negative for photophobia and visual disturbance. Respiratory:  Negative for choking and shortness of breath. Gastrointestinal:  Negative for constipation and diarrhea. Endocrine: Negative for cold intolerance and heat intolerance. Musculoskeletal:  Negative for arthralgias and myalgias. Skin:  Negative for rash. Physical Exam:  Body mass index is 23.78 kg/m². /60   Pulse 76   Resp (!) 98   Wt 68.9 kg (151 lb 12.8 oz)   BMI 23.78 kg/m²    Wt Readings from Last 3 Encounters:   10/12/23 68.9 kg (151 lb 12.8 oz)   09/27/23 68.9 kg (152 lb)   09/05/23 68.2 kg (150 lb 6.4 oz)       GEN: NAD  E/n/m nl facies, hearing intact bilat, tongue midline, lips nl  Eyes: no stare or proptosis, nl lids and conjunctiva, EOMI  Neck: trachea midline, goitrous thyroid with left thyroid enlarged with several palpable nodules, no cervical LAD  CV; heart reg rate s1s2 nl, no m/r/g appreciated, no COCO  Resp: CTAB, good effort  Ab+BS  MS: no c/c in digits, moves all 4 ext, nl muscle bulk, gait nl  Skin: warm and dry, no palmar erythema  Psych: nl mood and affect, no gross lapses in memory    DATA:  Labs:    Latest Reference Range & Units 10/05/22 07:03 02/06/23 10:52 08/24/23 08:06   TSH 3RD GENERATON 0.450 - 4.500 uIU/mL 5.344 (H) 4.090 4.001   Free T4 0.76 - 1.46 ng/dL 0.83     (H): Data is abnormally high    Radiology  THYROID ULTRASOUND     INDICATION:    E04.2: Nontoxic multinodular goiter.      COMPARISON:  September 22, 2014     TECHNIQUE:   Ultrasound of the thyroid was performed with a high frequency linear transducer in transverse and sagittal planes including volumetric imaging sweeps as well as traditional still imaging technique. FINDINGS:     RIGHT LOBE: 5.6 x 2.3 x 1.9 cm. Volume = 12 mL. Parenchymal echotexture is heterogeneous. Nodule as follows: Lower pole posterior heterogeneous hypoechoic nodule measuring 0.7 x 0.5 x 0.5 cm, previously 0.7 x 0.6 x 0.6 cm.    COMPOSITION:  2 points, solid or almost completely solid . ECHOGENICITY:  2 points, hypoechoic. SHAPE:  0 points, wider-than-tall. MARGIN: 0 points, ill-defined. ECHOGENIC FOCI:  0 points, none or large comet-tail artifacts. TI-RADS Classification: TR 4 (4-6 points), Moderately suspicious. FNA if > 1.5 cm. Follow if > 1cm. LEFT LOBE: 5.6 x 2.0 x 1.8 cm. Volume = 9 mL. Parenchymal echotexture is heterogeneous. Nodules as follows:  Upper interpolar peripherally calcified nodule measuring approximately 0.5 cm. Not visible previously. COMPOSITION:  2 points, could not be determined do to calcification. ECHOGENICITY:  1 point, echogenicity cannot be determined. SHAPE:  0 points, wider-than-tall. MARGIN: 0 points, cannot be determined. ECHOGENIC FOCI:  2 points, peripheral(rim) calcifications. TI-RADS Classification: TR 4 (4-6 points), Moderately suspicious. FNA if > 1.5 cm. Follow if > 1cm. No FNA or follow-up required     Lower pole heterogeneous hypoechoic nodule measuring 0.8 x 0.8 x 0.7 cm, previously this was not present. COMPOSITION:  2 points, solid or almost completely solid . ECHOGENICITY:  2 points, hypoechoic. SHAPE:  0 points, wider-than-tall. MARGIN: 0 points, ill-defined. ECHOGENIC FOCI:  0 points, none or large comet-tail artifacts. TI-RADS Classification: TR 4 (4-6 points), Moderately suspicious. FNA if > 1.5 cm. Follow if > 1cm. No FNA or follow-up required     Lower pole heterogeneous hyperechoic nodule measuring 1.4 x 1.1 x 1.1 cm, previously 1.6 x 1.2 x 1.1 cm.   COMPOSITION:  2 points, solid or almost completely solid . ECHOGENICITY:  1 point, hyperechoic or isoechoic. SHAPE:  0 points, wider-than-tall. MARGIN: 0 points, ill-defined. ECHOGENIC FOCI:  0 points, none or large comet-tail artifacts. TI-RADS Classification: TR 3 (3 points), Mildly suspicious. FNA if >2.5 cm. Follow if >1.5 cm. Smaller than prior exam.  No FNA or follow-up required. Lower interpolar heterogeneous partially calcified nodule measuring 0.4 x 0.4 x 0.3 cm. Not present previously   COMPOSITION:  2 points, could not be determined do to calcification. ECHOGENICITY:  1 point, echogenicity cannot be determined. SHAPE:  Cannot be determined accurately due to acoustic shadowing. MARGIN: 0 points, cannot be determined. ECHOGENIC FOCI:  1 point, macrocalcifications. TI-RADS Classification: TR 4 (4-6 points), Moderately suspicious. FNA if > 1.5 cm. Follow if > 1cm. No FNA or follow-up required. Interpolar posterior mixed solid and cystic structure measuring 1.0 x 0.8 x 0.4 cm. Not measured previously and more likely a perithyroidal lymph node. ISTHMUS: 0.8 cm. Parenchymal echotexture is heterogeneous. Nodules as follows:  Left thyroid isthmus isoechoic nodule with cystic changes measuring 1.1 x 0.8 x 0.8 cm. Previously 1.2 x 1.0 x 0.7 cm. COMPOSITION:  1 point, mixed cystic and solid. ECHOGENICITY:  1 point, hyperechoic or isoechoic. SHAPE:  0 points, wider-than-tall. MARGIN: 0 points, ill-defined. ECHOGENIC FOCI:  0 points, none or large comet-tail artifacts. TI-RADS Classification: TR 2 (2 points)  Not suspious. No FNA. No FNA or follow-up required. Midline thyroid isthmus nodule measuring 1.2 x 1.1 x 0.9 cm, previously 1.6 x 1.4 x 0.8 cm. Previously 1.2 x 1.1 x 0.6 cm in March 2010  COMPOSITION:  1 point, mixed cystic and solid. ECHOGENICITY:  1 point, hyperechoic or isoechoic. SHAPE:  0 points, wider-than-tall. MARGIN: 0 points, ill-defined.   ECHOGENIC FOCI: 3 points, punctate echogenic foci. TI-RADS Classification: TR 4 (4-6 points), Moderately suspicious. FNA if > 1.5 cm. Follow if > 1cm. The lesion is smaller than the previous exam, and hepatic grossly similar size in March 2010. Since the nodule has been stable for more than 5 years, further follow-up is not warranted. Small left thyroid isthmus nodule which is echogenic measuring 0.8 cm, not measured previously  COMPOSITION:  2 points, solid or almost completely solid . ECHOGENICITY:  1 point, hyperechoic or isoechoic. SHAPE:  0 points, wider-than-tall. MARGIN: 0 points, smooth. ECHOGENIC FOCI:  0 points, none or large comet-tail artifacts. TI-RADS Classification: TR 3 (3 points), Mildly suspicious. FNA if >2.5 cm. Follow if >1.5 cm. No FNA or follow-up required           IMPRESSION:  Enlarged heterogeneous thyroid gland with numerous nodules as detailed above. None meet ACR criteria for FNA biopsy. None require follow-up by ACR criteria. Further management should be based on clinical scenario. Reference: ACR Thyroid Imaging, Reporting and Data System (TI-RADS): White Paper of the [employer]. J AM Kofi Radiol 0146;37:[id number]. (additional recommendations based on American Thyroid Association 2015 guidelines.)        Workstation performed: [id number]          Impression:  1. Subclinical hypothyroidism    2. Hashimoto's disease    3. Multinodular goiter           Plan:    Xiomara Sauceda was seen today for hypothyroidism. Diagnoses and all orders for this visit:    Subclinical hypothyroidism  -     Cancel: Anti-TPO Ab; Future  -     Cancel: T4, free; Future  -     Cancel: TSH, 3rd generation; Future  -     Anti-TPO Ab; Future  -     T4, free; Future  -     TSH, 3rd generation; Future    Hashimoto's disease  -     Ambulatory Referral to Endocrinology  -     Cancel: Anti-TPO Ab; Future  -     Cancel: T4, free; Future  -     Cancel: TSH, 3rd generation;  Future  -     Anti-TPO Ab; Future  -     T4, free; Future  -     TSH, 3rd generation; Future    Multinodular goiter  -     Cancel: US thyroid; Future  -     US thyroid; Future      1. Hashimoto's disease    - Ambulatory Referral to Endocrinology    Subclinical hypothyroidism d/t hashimoto thyroiditis:- reviewed pathophysiology of subclinical hypothyroidism. Does not have any symptoms of overt hypothyroidism. Discussed feeling tired 2 days of the week could be d/t being over active the rest 5 days, however without other specific symptoms of hypothyroidism and since most recent TSH was normal, do not believe she needs thyroid replacement for now. Will follow TFT periodically in 6 months and check TPO Ab as well. Discussed if new symptoms arise, reach out to me sooner for newer labs   2. Non toxic MNG- will repeat US for follow up, no compressive symptoms. Most nodules are small and some >1cm but low-inter risk based on TR and hence will simply follow up for a 5 years follow up. If stable, no further follow up needed unless symptomatic     RTC in 6 months     Discussed with the patient and all questioned fully answered. She will call me if any problems arise.         Chi Wolf MD

## 2023-11-19 DIAGNOSIS — I63.89 CEREBROVASCULAR ACCIDENT (CVA) DUE TO OTHER MECHANISM (HCC): ICD-10-CM

## 2023-11-19 RX ORDER — ATORVASTATIN CALCIUM 20 MG/1
TABLET, FILM COATED ORAL
Qty: 90 TABLET | Refills: 2 | Status: SHIPPED | OUTPATIENT
Start: 2023-11-19

## 2023-11-21 ENCOUNTER — APPOINTMENT (OUTPATIENT)
Dept: LAB | Facility: HOSPITAL | Age: 68
End: 2023-11-21
Payer: MEDICARE

## 2023-11-21 DIAGNOSIS — E06.3 HASHIMOTO'S DISEASE: ICD-10-CM

## 2023-11-21 LAB — TSH SERPL DL<=0.05 MIU/L-ACNC: 4.21 UIU/ML (ref 0.45–4.5)

## 2023-11-21 PROCEDURE — 36415 COLL VENOUS BLD VENIPUNCTURE: CPT

## 2023-11-21 PROCEDURE — 84443 ASSAY THYROID STIM HORMONE: CPT

## 2023-12-07 ENCOUNTER — OFFICE VISIT (OUTPATIENT)
Dept: FAMILY MEDICINE CLINIC | Facility: HOSPITAL | Age: 68
End: 2023-12-07
Payer: MEDICARE

## 2023-12-07 VITALS
HEIGHT: 67 IN | TEMPERATURE: 97.9 F | BODY MASS INDEX: 24.11 KG/M2 | WEIGHT: 153.6 LBS | SYSTOLIC BLOOD PRESSURE: 112 MMHG | HEART RATE: 74 BPM | DIASTOLIC BLOOD PRESSURE: 70 MMHG

## 2023-12-07 DIAGNOSIS — Z91.89 AT RISK FOR NARCOLEPSY: ICD-10-CM

## 2023-12-07 DIAGNOSIS — R79.89 ELEVATED TSH: ICD-10-CM

## 2023-12-07 DIAGNOSIS — Z00.00 MEDICARE ANNUAL WELLNESS VISIT, SUBSEQUENT: Primary | ICD-10-CM

## 2023-12-07 DIAGNOSIS — Z12.31 ENCOUNTER FOR SCREENING MAMMOGRAM FOR MALIGNANT NEOPLASM OF BREAST: ICD-10-CM

## 2023-12-07 PROCEDURE — G0439 PPPS, SUBSEQ VISIT: HCPCS | Performed by: INTERNAL MEDICINE

## 2023-12-07 NOTE — PROGRESS NOTES
Assessment and Plan:     Problem List Items Addressed This Visit          Other    Elevated TSH     Saw Endo, has repeat BW and f/u with Endo, will follow          Other Visit Diagnoses       Medicare annual wellness visit, subsequent    -  Primary    At risk for narcolepsy        Episodes of sudden fatigue and falling asleep - needs eval with sleep medicine - referral placed    Relevant Orders    Ambulatory Referral to Sleep Medicine    Encounter for screening mammogram for malignant neoplasm of breast        Relevant Orders    Mammo screening bilateral w 3d & cad            Depression Screening and Follow-up Plan: Patient was screened for depression during today's encounter. They screened negative with a PHQ-2 score of 0. Preventive health issues were discussed with patient, and age appropriate screening tests were ordered as noted in patient's After Visit Summary. Personalized health advice and appropriate referrals for health education or preventive services given if needed, as noted in patient's After Visit Summary. History of Present Illness:     Patient presents for a Medicare Wellness Visit    HPI  Pt here for AWV    Pt saw Endo (Dr. Elier Cabrales) in Oct for eval of subclinical hypothyroidism - OV note reviewed. No med changes were made. She was told to f/u with TFTs and TPO Ab in 6 mos. Thyroid US was recommended q 5 yrs -told to schedule before f/u appt. She con't to have episodes where she feels very tired and just falls asleep. She does not think she snores/apnea episodes. She denies HA's/concentration/daytime somnolence. She denies CP/palp/SOB/edema. She doesn't feel tired during the day and has good energy but gets these sudden episodes of severe fatigue and just falls asleep. Has not had eval for this.      Cologuard 10/22 - 3 yrs    Mammo 4/23    Dexa 11/20 - osteopenia - refused by pt today    PAP 7/19    Thyroid US 7/18 - 5 yrs    BW 8/23    Pt deferring flu vaccine      Patient Care Team:  Lakhwinder Alvarado DO as PCP - General (Internal Medicine)  Hartford Lesch, MD as PCP - Endocrinology (Endocrinology)  Tobin Gambino MD (Neurology)  Kareem Franks MD (Cardiology)     Review of Systems:     Review of Systems   Constitutional:  Positive for fatigue. Negative for appetite change, chills, fever and unexpected weight change. HENT:  Negative for congestion, hearing loss and trouble swallowing. Eyes:  Positive for visual disturbance. Negative for pain. Respiratory:  Negative for cough, shortness of breath and wheezing. Cardiovascular:  Negative for chest pain, palpitations and leg swelling. Gastrointestinal:  Negative for abdominal pain, blood in stool, constipation, diarrhea, nausea and vomiting. Genitourinary:  Negative for difficulty urinating, dysuria, vaginal bleeding and vaginal pain. Musculoskeletal:  Negative for arthralgias, back pain and neck pain. Skin:  Negative for rash and wound. Neurological:  Negative for dizziness, light-headedness and headaches. Hematological:  Does not bruise/bleed easily. Psychiatric/Behavioral:  Negative for confusion, decreased concentration and sleep disturbance.          Problem List:     Patient Active Problem List   Diagnosis    Breast cyst    Cervical spondylosis    Chronic allergic conjunctivitis    Dyslipidemia    Hashimoto's disease    Herniated cervical disc    Non-toxic multinodular goiter    Primary osteoarthritis of left hip    Rhinitis    Varicose veins    Elevated TSH    Osteopenia    DDD (degenerative disc disease), lumbar    Left leg numbness    Chronic pain syndrome    Chronic pain of left knee    Chronic left hip pain    History of stroke    PFO (patent foramen ovale)      Past Medical and Surgical History:     Past Medical History:   Diagnosis Date    Benign paroxysmal positional vertigo     Resolved: 9/15/2014    Cataracts, bilateral     Fracture of radius      Past Surgical History:   Procedure Laterality Date BIOPSY CORE NEEDLE      Thyroid    CARDIAC ELECTROPHYSIOLOGY PROCEDURE N/A 2022    Procedure: Cardiac loop recorder implant;  Surgeon: Geovanny Mendez MD;  Location: [facility];   Service: Cardiology     SECTION      COLPOSCOPY      HIP SURGERY        Family History:     Family History   Problem Relation Age of Onset    Stroke Mother         CVA    Breast cancer Mother 77    Alcohol abuse Father     COPD Father     Other Father         Nicotine Abuse    No Known Problems Sister     No Known Problems Daughter     No Known Problems Daughter     Thyroid disease Maternal Grandmother     No Known Problems Maternal Grandfather     No Known Problems Paternal Grandmother     No Known Problems Paternal Grandfather     Thyroid cancer Brother 61    Coronary artery disease Brother     No Known Problems Brother     Breast cancer Maternal Aunt 40    Breast cancer additional onset Maternal Aunt 50    No Known Problems Maternal Aunt     No Known Problems Maternal Aunt     No Known Problems Maternal Aunt       Social History:     Social History     Socioeconomic History    Marital status: Single     Spouse name: None    Number of children: None    Years of education: None    Highest education level: None   Occupational History    None   Tobacco Use    Smoking status: Never    Smokeless tobacco: Never   Vaping Use    Vaping Use: Never used   Substance and Sexual Activity    Alcohol use: Yes     Comment: social    Drug use: No    Sexual activity: Not Currently   Other Topics Concern    None   Social History Narrative    Single, lives alone, part-time employment in [employer]     Social Determinants of Health     Financial Resource Strain: Low Risk  (2023)    Overall Financial Resource Strain (CARDIA)     Difficulty of Paying Living Expenses: Not hard at all   Food Insecurity: No Food Insecurity (2022)    Hunger Vital Sign     Worried About Running Out of Food in the Last Year: Never true     Ran Out of Food in the Last Year: Never true   Transportation Needs: No Transportation Needs (12/7/2023)    PRAPARE - Transportation     Lack of Transportation (Medical): No     Lack of Transportation (Non-Medical): No   Physical Activity: Not on file   Stress: Not on file   Social Connections: Not on file   Intimate Partner Violence: Not on file   Housing Stability: Low Risk  (5/19/2022)    Housing Stability Vital Sign     Unable to Pay for Housing in the Last Year: No     Number of Places Lived in the Last Year: 1     Unstable Housing in the Last Year: No      Medications and Allergies:     Current Outpatient Medications   Medication Sig Dispense Refill    aspirin 81 mg chewable tablet Chew 1 tablet (81 mg total)  in the morning. 0    atorvastatin (LIPITOR) 20 mg tablet TAKE 1 TABLET BY MOUTH EVERY DAY IN THE EVENING 90 tablet 2    calcium carbonate (OS-WALKER) 1250 (500 Ca) MG tablet Take 2 tablets by mouth in the morning. Chlorophyll 100 MG TABS Take 1 tablet (100 mg total) by mouth daily  0    cholecalciferol (VITAMIN D3) 1,000 units tablet Take 1,000 Units by mouth in the morning. DHA-EPA-Vitamin E (OMEGA-3 COMPLEX PO) Take 1 tablet by mouth in the morning      Magnesium 250 MG TABS Take 3 tablets by mouth in the morning. Multiple Vitamin (MULTIVITAMINS PO) Take 1 capsule by mouth daily       No current facility-administered medications for this visit.      No Known Allergies   Immunizations:     Immunization History   Administered Date(s) Administered    COVID-19 PFIZER VACCINE 0.3 ML IM 04/25/2021, 05/19/2021      Health Maintenance:         Topic Date Due    Cervical Cancer Screening  07/22/2022    DXA SCAN  11/17/2022    Breast Cancer Screening: Mammogram  04/28/2024    Colorectal Cancer Screening  10/04/2025    Hepatitis C Screening  Completed         Topic Date Due    Pneumococcal Vaccine: 65+ Years (1 - PCV) Never done    COVID-19 Vaccine (3 - Pfizer series) 07/14/2021    Influenza Vaccine (1) Never done      Medicare Screening Tests and Risk Assessments:     Orestes Sterling is here for her Subsequent Wellness visit. Last Medicare Wellness visit information reviewed, patient interviewed and updates made to the record today. Health Risk Assessment:   Patient rates overall health as very good. Patient feels that their physical health rating is slightly worse. Patient is satisfied with their life. Eyesight was rated as slightly worse. Hearing was rated as same. Patient feels that their emotional and mental health rating is same. Patients states they are never, rarely angry. Patient states they are sometimes unusually tired/fatigued. Pain experienced in the last 7 days has been none. Patient states that she has experienced no weight loss or gain in last 6 months. Physical health worse "I just did not like that feeling" of suddenly feeling tired/falling asleep  Eyesight issues since cataract surgery - foillows with optho regularly    Depression Screening:   PHQ-2 Score: 0      Fall Risk Screening: In the past year, patient has experienced: no history of falling in past year      Urinary Incontinence Screening:   Patient has not leaked urine accidently in the last six months. Home Safety:  Patient does not have trouble with stairs inside or outside of their home. Patient has working smoke alarms and has no working carbon monoxide detector. Home safety hazards include: none. Nutrition:   Current diet is Regular. Medications:   Patient is currently taking over-the-counter supplements. OTC medications include: see medication list. Patient is able to manage medications. Activities of Daily Living (ADLs)/Instrumental Activities of Daily Living (IADLs):   Walk and transfer into and out of bed and chair?: Yes  Dress and groom yourself?: Yes    Bathe or shower yourself?: Yes    Feed yourself?  Yes  Do your laundry/housekeeping?: Yes  Manage your money, pay your bills and track your expenses?: Yes  Make your own meals?: Yes    Do your own shopping?: Yes    Previous Hospitalizations:   Any hospitalizations or ED visits within the last 12 months?: No      Advance Care Planning:   Living will: Yes    Durable POA for healthcare: Yes    Advanced directive: Yes      Cognitive Screening:   Provider or family/friend/caregiver concerned regarding cognition?: No    PREVENTIVE SCREENINGS      Cardiovascular Screening:    General: Screening Current and Risks and Benefits Discussed      Diabetes Screening:     General: Screening Current and Risks and Benefits Discussed      Colorectal Cancer Screening:     General: Screening Current      Breast Cancer Screening:     General: Screening Current and Risks and Benefits Discussed      Cervical Cancer Screening:    General: Screening Not Indicated and Risks and Benefits Discussed      Osteoporosis Screening:    General: Risks and Benefits Discussed and Patient Declines      Abdominal Aortic Aneurysm (AAA) Screening:        General: Risks and Benefits Discussed and Screening Not Indicated      Lung Cancer Screening:     General: Screening Not Indicated and Risks and Benefits Discussed      Hepatitis C Screening:    General: Screening Current and Risks and Benefits Discussed    Screening, Brief Intervention, and Referral to Treatment (SBIRT)    Screening  Typical number of drinks in a day: 0  Typical number of drinks in a week: 0  Interpretation: Low risk drinking behavior. Single Item Drug Screening:  How often have you used an illegal drug (including marijuana) or a prescription medication for non-medical reasons in the past year? never    Single Item Drug Screen Score: 0  Interpretation: Negative screen for possible drug use disorder    Other Counseling Topics:   Car/seat belt/driving safety and regular weightbearing exercise.      Vision Screening    Right eye Left eye Both eyes   Without correction 20/25 20/40 20/25   With correction           Physical Exam:     /70   Pulse 74 Temp 97.9 °F (36.6 °C) (Tympanic)   Ht 5' 7" (1.702 m)   Wt 69.7 kg (153 lb 9.6 oz)   BMI 24.06 kg/m²     Physical Exam  Vitals and nursing note reviewed. Constitutional:       General: She is not in acute distress. Appearance: She is well-developed. She is not ill-appearing. HENT:      Head: Normocephalic and atraumatic. Right Ear: Tympanic membrane and external ear normal. There is no impacted cerumen. Left Ear: Tympanic membrane and external ear normal. There is no impacted cerumen. Mouth/Throat:      Mouth: Mucous membranes are moist.      Pharynx: Oropharynx is clear. No oropharyngeal exudate. Eyes:      General:         Right eye: No discharge. Left eye: No discharge. Conjunctiva/sclera: Conjunctivae normal.   Neck:      Thyroid: No thyromegaly. Vascular: No carotid bruit. Trachea: No tracheal deviation. Cardiovascular:      Rate and Rhythm: Normal rate and regular rhythm. Heart sounds: Normal heart sounds. No murmur heard. Pulmonary:      Effort: Pulmonary effort is normal. No respiratory distress. Breath sounds: Normal breath sounds. No wheezing, rhonchi or rales. Abdominal:      General: There is no distension. Palpations: Abdomen is soft. Tenderness: There is no abdominal tenderness. There is no guarding or rebound. Musculoskeletal:         General: No deformity or signs of injury. Cervical back: Neck supple. Lymphadenopathy:      Cervical: No cervical adenopathy. Skin:     General: Skin is warm and dry. Coloration: Skin is not pale. Findings: No bruising or rash. Neurological:      General: No focal deficit present. Mental Status: She is alert. Mental status is at baseline. Motor: No abnormal muscle tone. Gait: Gait normal.   Psychiatric:         Mood and Affect: Mood normal.         Behavior: Behavior normal.         Thought Content:  Thought content normal.         Judgment: Judgment normal. Bronson Parnell DO

## 2023-12-07 NOTE — PATIENT INSTRUCTIONS
Medicare Preventive Visit Patient Instructions  Thank you for completing your Welcome to Medicare Visit or Medicare Annual Wellness Visit today. Your next wellness visit will be due in one year (12/7/2024). The screening/preventive services that you may require over the next 5-10 years are detailed below. Some tests may not apply to you based off risk factors and/or age. Screening tests ordered at today's visit but not completed yet may show as past due. Also, please note that scanned in results may not display below. Preventive Screenings:  Service Recommendations Previous Testing/Comments   Colorectal Cancer Screening  * Colonoscopy    * Fecal Occult Blood Test (FOBT)/Fecal Immunochemical Test (FIT)  * Fecal DNA/Cologuard Test  * Flexible Sigmoidoscopy Age: 43-73 years old   Colonoscopy: every 10 years (may be performed more frequently if at higher risk)  OR  FOBT/FIT: every 1 year  OR  Cologuard: every 3 years  OR  Sigmoidoscopy: every 5 years  Screening may be recommended earlier than age 39 if at higher risk for colorectal cancer. Also, an individualized decision between you and your healthcare provider will decide whether screening between the ages of 77-80 would be appropriate. Colonoscopy: 10/04/2022  FOBT/FIT: Not on file  Cologuard: 10/04/2022  Sigmoidoscopy: Not on file    Screening Current     Breast Cancer Screening Age: 36 years old  Frequency: every 1-2 years  Not required if history of left and right mastectomy Mammogram: 04/28/2023    Screening Current   Cervical Cancer Screening Between the ages of 21-29, pap smear recommended once every 3 years. Between the ages of 32-69, can perform pap smear with HPV co-testing every 5 years.    Recommendations may differ for women with a history of total hysterectomy, cervical cancer, or abnormal pap smears in past. Pap Smear: 07/22/2019    Screening Not Indicated   Hepatitis C Screening Once for adults born between 1945 and 1965  More frequently in patients at high risk for Hepatitis C Hep C Antibody: 09/17/2020    Screening Current   Diabetes Screening 1-2 times per year if you're at risk for diabetes or have pre-diabetes Fasting glucose: 92 mg/dL (8/24/2023)  A1C: 5.6 % (5/18/2022)  Screening Current   Cholesterol Screening Once every 5 years if you don't have a lipid disorder. May order more often based on risk factors. Lipid panel: 08/24/2023    Screening Current     Other Preventive Screenings Covered by Medicare:  Abdominal Aortic Aneurysm (AAA) Screening: covered once if your at risk. You're considered to be at risk if you have a family history of AAA. Lung Cancer Screening: covers low dose CT scan once per year if you meet all of the following conditions: (1) Age 48-67; (2) No signs or symptoms of lung cancer; (3) Current smoker or have quit smoking within the last 15 years; (4) You have a tobacco smoking history of at least 20 pack years (packs per day multiplied by number of years you smoked); (5) You get a written order from a healthcare provider. Glaucoma Screening: covered annually if you're considered high risk: (1) You have diabetes OR (2) Family history of glaucoma OR (3)  aged 48 and older OR (3)  American aged 72 and older  Osteoporosis Screening: covered every 2 years if you meet one of the following conditions: (1) You're estrogen deficient and at risk for osteoporosis based off medical history and other findings; (2) Have a vertebral abnormality; (3) On glucocorticoid therapy for more than 3 months; (4) Have primary hyperparathyroidism; (5) On osteoporosis medications and need to assess response to drug therapy. Last bone density test (DXA Scan): 11/17/2020. HIV Screening: covered annually if you're between the age of 14-79. Also covered annually if you are younger than 13 and older than 72 with risk factors for HIV infection.  For pregnant patients, it is covered up to 3 times per pregnancy. Immunizations:  Immunization Recommendations   Influenza Vaccine Annual influenza vaccination during flu season is recommended for all persons aged >= 6 months who do not have contraindications   Pneumococcal Vaccine   * Pneumococcal conjugate vaccine = PCV13 (Prevnar 13), PCV15 (Vaxneuvance), PCV20 (Prevnar 20)  * Pneumococcal polysaccharide vaccine = PPSV23 (Pneumovax) Adults 73-51 yo with certain risk factors or if 69+ yo  If never received any pneumonia vaccine: recommend Prevnar 20 (PCV20)  Give PCV20 if previously received 1 dose of PCV13 or PPSV23   Hepatitis B Vaccine 3 dose series if at intermediate or high risk (ex: diabetes, end stage renal disease, liver disease)   Respiratory syncytial virus (RSV) Vaccine - COVERED BY MEDICARE PART D  * RSVPreF3 (Arexvy) CDC recommends that adults 61years of age and older may receive a single dose of RSV vaccine using shared clinical decision-making (SCDM)   Tetanus (Td) Vaccine - COST NOT COVERED BY MEDICARE PART B Following completion of primary series, a booster dose should be given every 10 years to maintain immunity against tetanus. Td may also be given as tetanus wound prophylaxis. Tdap Vaccine - COST NOT COVERED BY MEDICARE PART B Recommended at least once for all adults. For pregnant patients, recommended with each pregnancy.    Shingles Vaccine (Shingrix) - COST NOT COVERED BY MEDICARE PART B  2 shot series recommended in those 19 years and older who have or will have weakened immune systems or those 50 years and older     Health Maintenance Due:      Topic Date Due   • Cervical Cancer Screening  07/22/2022   • DXA SCAN  11/17/2022   • Breast Cancer Screening: Mammogram  04/28/2024   • Colorectal Cancer Screening  10/04/2025   • Hepatitis C Screening  Completed     Immunizations Due:      Topic Date Due   • Pneumococcal Vaccine: 65+ Years (1 - PCV) Never done   • COVID-19 Vaccine (3 - Pfizer series) 07/14/2021   • Influenza Vaccine (1) Never done     Advance Directives   What are advance directives? Advance directives are legal documents that state your wishes and plans for medical care. These plans are made ahead of time in case you lose your ability to make decisions for yourself. Advance directives can apply to any medical decision, such as the treatments you want, and if you want to donate organs. What are the types of advance directives? There are many types of advance directives, and each state has rules about how to use them. You may choose a combination of any of the following:  Living will: This is a written record of the treatment you want. You can also choose which treatments you do not want, which to limit, and which to stop at a certain time. This includes surgery, medicine, IV fluid, and tube feedings. Durable power of  for [facility]): This is a written record that states who you want to make healthcare choices for you when you are unable to make them for yourself. This person, called a proxy, is usually a family member or a friend. You may choose more than 1 proxy. Do not resuscitate (DNR) order:  A DNR order is used in case your heart stops beating or you stop breathing. It is a request not to have certain forms of treatment, such as CPR. A DNR order may be included in other types of advance directives. Medical directive: This covers the care that you want if you are in a coma, near death, or unable to make decisions for yourself. You can list the treatments you want for each condition. Treatment may include pain medicine, surgery, blood transfusions, dialysis, IV or tube feedings, and a ventilator (breathing machine). Values history: This document has questions about your views, beliefs, and how you feel and think about life. This information can help others choose the care that you would choose. Why are advance directives important? An advance directive helps you control your care.  Although spoken wishes may be used, it is better to have your wishes written down. Spoken wishes can be misunderstood, or not followed. Treatments may be given even if you do not want them. An advance directive may make it easier for your family to make difficult choices about your care. © Copyright [employer] 2018 Information is for End User's use only and may not be sold, redistributed or otherwise used for commercial purposes. All illustrations and images included in CareNotes® are the copyrighted property of [employer] or [employer] [facility] Preventive Visit Patient Instructions  Thank you for completing your Welcome to Medicare Visit or Medicare Annual Wellness Visit today. Your next wellness visit will be due in one year (12/7/2024). The screening/preventive services that you may require over the next 5-10 years are detailed below. Some tests may not apply to you based off risk factors and/or age. Screening tests ordered at today's visit but not completed yet may show as past due. Also, please note that scanned in results may not display below. Preventive Screenings:  Service Recommendations Previous Testing/Comments   Colorectal Cancer Screening  * Colonoscopy    * Fecal Occult Blood Test (FOBT)/Fecal Immunochemical Test (FIT)  * Fecal DNA/Cologuard Test  * Flexible Sigmoidoscopy Age: 43-73 years old   Colonoscopy: every 10 years (may be performed more frequently if at higher risk)  OR  FOBT/FIT: every 1 year  OR  Cologuard: every 3 years  OR  Sigmoidoscopy: every 5 years  Screening may be recommended earlier than age 39 if at higher risk for colorectal cancer. Also, an individualized decision between you and your healthcare provider will decide whether screening between the ages of 77-80 would be appropriate.  Colonoscopy: 10/04/2022  FOBT/FIT: Not on file  Cologuard: 10/04/2022  Sigmoidoscopy: Not on file    Screening Current     Breast Cancer Screening Age: 37 years old  Frequency: every 1-2 years  Not required if history of left and right mastectomy Mammogram: 04/28/2023    Screening Current   Cervical Cancer Screening Between the ages of 21-29, pap smear recommended once every 3 years. Between the ages of 32-69, can perform pap smear with HPV co-testing every 5 years. Recommendations may differ for women with a history of total hysterectomy, cervical cancer, or abnormal pap smears in past. Pap Smear: 07/22/2019    Screening Not Indicated   Hepatitis C Screening Once for adults born between 1945 and 1965  More frequently in patients at high risk for Hepatitis C Hep C Antibody: 09/17/2020    Screening Current   Diabetes Screening 1-2 times per year if you're at risk for diabetes or have pre-diabetes Fasting glucose: 92 mg/dL (8/24/2023)  A1C: 5.6 % (5/18/2022)  Screening Current   Cholesterol Screening Once every 5 years if you don't have a lipid disorder. May order more often based on risk factors. Lipid panel: 08/24/2023    Screening Current     Other Preventive Screenings Covered by Medicare:  Abdominal Aortic Aneurysm (AAA) Screening: covered once if your at risk. You're considered to be at risk if you have a family history of AAA. Lung Cancer Screening: covers low dose CT scan once per year if you meet all of the following conditions: (1) Age 48-67; (2) No signs or symptoms of lung cancer; (3) Current smoker or have quit smoking within the last 15 years; (4) You have a tobacco smoking history of at least 20 pack years (packs per day multiplied by number of years you smoked); (5) You get a written order from a healthcare provider.   Glaucoma Screening: covered annually if you're considered high risk: (1) You have diabetes OR (2) Family history of glaucoma OR (3)  aged 48 and older OR (3)  American aged 72 and older  Osteoporosis Screening: covered every 2 years if you meet one of the following conditions: (1) You're estrogen deficient and at risk for osteoporosis based off medical history and other findings; (2) Have a vertebral abnormality; (3) On glucocorticoid therapy for more than 3 months; (4) Have primary hyperparathyroidism; (5) On osteoporosis medications and need to assess response to drug therapy. Last bone density test (DXA Scan): 11/17/2020. HIV Screening: covered annually if you're between the age of 14-79. Also covered annually if you are younger than 13 and older than 72 with risk factors for HIV infection. For pregnant patients, it is covered up to 3 times per pregnancy. Immunizations:  Immunization Recommendations   Influenza Vaccine Annual influenza vaccination during flu season is recommended for all persons aged >= 6 months who do not have contraindications   Pneumococcal Vaccine   * Pneumococcal conjugate vaccine = PCV13 (Prevnar 13), PCV15 (Vaxneuvance), PCV20 (Prevnar 20)  * Pneumococcal polysaccharide vaccine = PPSV23 (Pneumovax) Adults 44-62 yo with certain risk factors or if 69+ yo  If never received any pneumonia vaccine: recommend Prevnar 20 (PCV20)  Give PCV20 if previously received 1 dose of PCV13 or PPSV23   Hepatitis B Vaccine 3 dose series if at intermediate or high risk (ex: diabetes, end stage renal disease, liver disease)   Respiratory syncytial virus (RSV) Vaccine - COVERED BY MEDICARE PART D  * RSVPreF3 (Arexvy) CDC recommends that adults 61years of age and older may receive a single dose of RSV vaccine using shared clinical decision-making (SCDM)   Tetanus (Td) Vaccine - COST NOT COVERED BY MEDICARE PART B Following completion of primary series, a booster dose should be given every 10 years to maintain immunity against tetanus. Td may also be given as tetanus wound prophylaxis. Tdap Vaccine - COST NOT COVERED BY MEDICARE PART B Recommended at least once for all adults. For pregnant patients, recommended with each pregnancy.    Shingles Vaccine (Shingrix) - COST NOT COVERED BY MEDICARE PART B  2 shot series recommended in those 19 years and older who have or will have weakened immune systems or those 50 years and older     Health Maintenance Due:      Topic Date Due   • Cervical Cancer Screening  07/22/2022   • DXA SCAN  11/17/2022   • Breast Cancer Screening: Mammogram  04/28/2024   • Colorectal Cancer Screening  10/04/2025   • Hepatitis C Screening  Completed     Immunizations Due:      Topic Date Due   • Pneumococcal Vaccine: 65+ Years (1 - PCV) Never done   • COVID-19 Vaccine (3 - Pfizer series) 07/14/2021   • Influenza Vaccine (1) Never done     Advance Directives   What are advance directives? Advance directives are legal documents that state your wishes and plans for medical care. These plans are made ahead of time in case you lose your ability to make decisions for yourself. Advance directives can apply to any medical decision, such as the treatments you want, and if you want to donate organs. What are the types of advance directives? There are many types of advance directives, and each state has rules about how to use them. You may choose a combination of any of the following:  Living will: This is a written record of the treatment you want. You can also choose which treatments you do not want, which to limit, and which to stop at a certain time. This includes surgery, medicine, IV fluid, and tube feedings. Durable power of  for healthcare [facility]): This is a written record that states who you want to make healthcare choices for you when you are unable to make them for yourself. This person, called a proxy, is usually a family member or a friend. You may choose more than 1 proxy. Do not resuscitate (DNR) order:  A DNR order is used in case your heart stops beating or you stop breathing. It is a request not to have certain forms of treatment, such as CPR. A DNR order may be included in other types of advance directives. Medical directive:   This covers the care that you want if you are in a coma, near death, or unable to make decisions for yourself. You can list the treatments you want for each condition. Treatment may include pain medicine, surgery, blood transfusions, dialysis, IV or tube feedings, and a ventilator (breathing machine). Values history: This document has questions about your views, beliefs, and how you feel and think about life. This information can help others choose the care that you would choose. Why are advance directives important? An advance directive helps you control your care. Although spoken wishes may be used, it is better to have your wishes written down. Spoken wishes can be misunderstood, or not followed. Treatments may be given even if you do not want them. An advance directive may make it easier for your family to make difficult choices about your care. © Copyright [employer] 2018 Information is for End User's use only and may not be sold, redistributed or otherwise used for commercial purposes.  All illustrations and images included in CareNotes® are the copyrighted property of [employer] or [address] [place] [address]

## 2024-01-03 ENCOUNTER — TELEPHONE (OUTPATIENT)
Dept: CARDIAC SURGERY | Facility: CLINIC | Age: 69
End: 2024-01-03

## 2024-01-03 ENCOUNTER — OFFICE VISIT (OUTPATIENT)
Dept: CARDIAC SURGERY | Facility: CLINIC | Age: 69
End: 2024-01-03
Payer: MEDICARE

## 2024-01-03 VITALS
BODY MASS INDEX: 23.54 KG/M2 | WEIGHT: 150 LBS | SYSTOLIC BLOOD PRESSURE: 120 MMHG | HEART RATE: 69 BPM | DIASTOLIC BLOOD PRESSURE: 70 MMHG | OXYGEN SATURATION: 97 % | HEIGHT: 67 IN

## 2024-01-03 DIAGNOSIS — Q21.12 PFO (PATENT FORAMEN OVALE): Primary | ICD-10-CM

## 2024-01-03 DIAGNOSIS — Z86.73 HISTORY OF STROKE: Primary | ICD-10-CM

## 2024-01-03 DIAGNOSIS — Z86.73 HISTORY OF STROKE: ICD-10-CM

## 2024-01-03 PROCEDURE — 93000 ELECTROCARDIOGRAM COMPLETE: CPT | Performed by: INTERNAL MEDICINE

## 2024-01-03 PROCEDURE — 99213 OFFICE O/P EST LOW 20 MIN: CPT | Performed by: INTERNAL MEDICINE

## 2024-01-03 NOTE — PROGRESS NOTES
Cardiology Consultation      Sandra Rodas  1955  [id number]  CARDIOVASC PHYSICIAN  [address] [facility] [address]  [phone]  [phone]    1. PFO (patent foramen ovale)  POCT ECG      2. History of stroke               Discussion/Summary    1. Presumptive ischemic/Embolic stroke undetermined source 2022, has PFO, and implantable loop recorder placed November 2022    Plan:  Implantable loop recorder, without evidence of A. Fib since loop recorder implanted November 2022.  This causes her significant pain when trying to exercise and wants this removed. Therefore,  will schedule loop recorder explant.  Prefers continued antiplatelet therapy for secondary stroke prevention. Left follow up open ended, pts preference.       History:     68-year-old female originally referred 2022  for discussion PFO closure in the setting of presumptive embolic stroke    May 2022 had posterior right frontal lobe infarct on MRI    Due to her age and absence of randomized trials, implantable loop recorder placed.  No A. Fib seen.  No hypercoagulable state    No recurrent neuro events on asa 81 and atorva 20mg.     Loop recorder implant has caused significant pain particularly with exercise.  She would like this removed.    Patient Active Problem List   Diagnosis    Breast cyst    Cervical spondylosis    Chronic allergic conjunctivitis    Dyslipidemia    Hashimoto's disease    Herniated cervical disc    Non-toxic multinodular goiter    Primary osteoarthritis of left hip    Rhinitis    Varicose veins    Elevated TSH    Osteopenia    DDD (degenerative disc disease), lumbar    Left leg numbness    Chronic pain syndrome    Chronic pain of left knee    Chronic left hip pain    History of stroke    PFO (patent foramen ovale)     Past Medical History:   Diagnosis Date    Benign paroxysmal positional vertigo     Resolved: 9/15/2014    Cataracts, bilateral     Fracture of radius      Social History     Socioeconomic History    Marital  status: Single     Spouse name: Not on file    Number of children: Not on file    Years of education: Not on file    Highest education level: Not on file   Occupational History    Not on file   Tobacco Use    Smoking status: Never    Smokeless tobacco: Never   Vaping Use    Vaping status: Never Used   Substance and Sexual Activity    Alcohol use: Yes     Comment: social    Drug use: No    Sexual activity: Not Currently   Other Topics Concern    Not on file   Social History Narrative    Single, lives alone, part-time employment in [employer]     Social Determinants of Health     Financial Resource Strain: Low Risk  (12/7/2023)    Overall Financial Resource Strain (CARDIA)     Difficulty of Paying Living Expenses: Not hard at all   Food Insecurity: No Food Insecurity (5/19/2022)    Hunger Vital Sign     Worried About Running Out of Food in the Last Year: Never true     Ran Out of Food in the Last Year: Never true   Transportation Needs: No Transportation Needs (12/7/2023)    PRAPARE - Transportation     Lack of Transportation (Medical): No     Lack of Transportation (Non-Medical): No   Physical Activity: Not on file   Stress: Not on file   Social Connections: Not on file   Intimate Partner Violence: Not on file   Housing Stability: Low Risk  (5/19/2022)    Housing Stability Vital Sign     Unable to Pay for Housing in the Last Year: No     Number of Places Lived in the Last Year: 1     Unstable Housing in the Last Year: No      Family History   Problem Relation Age of Onset    Stroke Mother         CVA    Breast cancer Mother 66    Alcohol abuse Father     COPD Father     Other Father         Nicotine Abuse    No Known Problems Sister     No Known Problems Daughter     No Known Problems Daughter     Thyroid disease Maternal Grandmother     No Known Problems Maternal Grandfather     No Known Problems Paternal Grandmother     No Known Problems Paternal Grandfather     Thyroid cancer Brother 60    Coronary artery disease  Brother     No Known Problems Brother     Breast cancer Maternal Aunt 40    Breast cancer additional onset Maternal Aunt 50    No Known Problems Maternal Aunt     No Known Problems Maternal Aunt     No Known Problems Maternal Aunt      Past Surgical History:   Procedure Laterality Date    BIOPSY CORE NEEDLE      Thyroid    CARDIAC ELECTROPHYSIOLOGY PROCEDURE N/A 2022    Procedure: Cardiac loop recorder implant;  Surgeon: Aryan Mann MD;  [facility];  Service: Cardiology     SECTION      COLPOSCOPY      HIP SURGERY         Current Outpatient Medications:     aspirin 81 mg chewable tablet, Chew 1 tablet (81 mg total)  in the morning., Disp: , Rfl: 0    atorvastatin (LIPITOR) 20 mg tablet, TAKE 1 TABLET BY MOUTH EVERY DAY IN THE EVENING, Disp: 90 tablet, Rfl: 2    calcium carbonate (OS-WALKER) 1250 (500 Ca) MG tablet, Take 2 tablets by mouth in the morning., Disp: , Rfl:     DHA-EPA-Vitamin E (OMEGA-3 COMPLEX PO), Take 1 tablet by mouth in the morning, Disp: , Rfl:     Magnesium 250 MG TABS, Take 3 tablets by mouth in the morning., Disp: , Rfl:     Multiple Vitamin (MULTIVITAMINS PO), Take 1 capsule by mouth daily, Disp: , Rfl:     Chlorophyll 100 MG TABS, Take 1 tablet (100 mg total) by mouth daily (Patient not taking: Reported on 1/3/2024), Disp: , Rfl: 0    cholecalciferol (VITAMIN D3) 1,000 units tablet, Take 1,000 Units by mouth in the morning. (Patient not taking: Reported on 1/3/2024), Disp: , Rfl:   No Known Allergies    Social, Family and medication history as listed, reviewed and updated as necessary    Labs:   Lab Results   Component Value Date    K 3.6 2023     2023    CO2 30 2023    BUN 14 2023    CREATININE 0.87 2023    CREATININE 0.80 2023    CALCIUM 9.5 2023       Lab Results   Component Value Date    WBC 6.47 2022    HGB 12.6 2022    HGB 12.7 06/10/2022    HCT 39.4 2022    HCT 39.7 06/10/2022      "07/21/2022     06/10/2022       No results found for: \"CHOL\"  Lab Results   Component Value Date    HDL 81 08/24/2023    HDL 92 10/05/2022     Lab Results   Component Value Date    LDLCALC 87 08/24/2023    LDLCALC 70 10/05/2022     Lab Results   Component Value Date    TRIG 68 08/24/2023    TRIG 58 10/05/2022     No results found for: \"LDLDIRECT\"    Lab Results   Component Value Date    ALT 12 08/24/2023    ALT 29 02/06/2023    AST 37 08/24/2023    AST 53 (H) 02/06/2023    ALKPHOS 73 08/24/2023    ALKPHOS 108 02/06/2023             No results found for: \"NTBNP\"    Lab Results   Component Value Date    HGBA1C 5.6 05/18/2022       Imaging: Reviewed in epic      Review of Systems:  14 systems reviewed and negative with exception of the above       PHYSICAL EXAM:        Vitals:    01/03/24 0819   BP: 120/70   Pulse: 69   SpO2: 97%       Body mass index is 23.49 kg/m².  Weight (last 2 days)       Date/Time Weight    01/03/24 0819 68 (150)               Gen: No acute distress  HEENT: anicteric, mucous membranes moist  Neck: supple, no jugular venous distention, or carotid bruit  Heart: regular, normal s1 and s2, no murmur/rub or gallop  Lungs :clear to auscultation bilaterally, no rales/rhonchi or wheeze  Abdomen: soft nontender, normoactive bowel sounds, no organomegaly  Ext: warm and perfused, normal femoral pulses, no edema, or clubbing  Skin: warm, no rashes  Neuro: AAO x 3, no focal findings  Psychiatric: normal affect  Musculoskeletal: no obvious joint deformities.        This note was completed in part utilizing mAPPn direct voice recognition software.   Grammatical errors, random word insertion, spelling mistakes, and incomplete sentences may be an occasional consequence of the system secondary to software limitations, ambient noise and hardware issues. At the time of dictation, efforts were made to edit, clarify and /or correct errors.  Please read the chart carefully and recognize, using " context, where substitutions have occurred.  If you have any questions or concerns about the context, text or information contained within the body of this dictation, please contact myself, the provider, for further clarification.

## 2024-01-09 NOTE — TELEPHONE ENCOUNTER
Patient scheduled for a loop Explant at \[facility]\"" on  2/28/2024  with Dr Mann.    Patient aware of general instructions.     Patient confirmed is able to palpate the device when pass her hand on her chest area.     Can we please have insurance check for service.

## 2024-02-21 ENCOUNTER — APPOINTMENT (OUTPATIENT)
Dept: LAB | Facility: HOSPITAL | Age: 69
End: 2024-02-21
Payer: MEDICARE

## 2024-02-21 DIAGNOSIS — Z86.73 HISTORY OF STROKE: ICD-10-CM

## 2024-02-21 LAB
ALBUMIN SERPL BCP-MCNC: 4.5 G/DL (ref 3.5–5)
ALP SERPL-CCNC: 88 U/L (ref 34–104)
ALT SERPL W P-5'-P-CCNC: 14 U/L (ref 7–52)
ANION GAP SERPL CALCULATED.3IONS-SCNC: 5 MMOL/L
AST SERPL W P-5'-P-CCNC: 40 U/L (ref 13–39)
BASOPHILS # BLD AUTO: 0.05 THOUSANDS/ÂΜL (ref 0–0.1)
BASOPHILS NFR BLD AUTO: 1 % (ref 0–1)
BILIRUB SERPL-MCNC: 0.48 MG/DL (ref 0.2–1)
BUN SERPL-MCNC: 13 MG/DL (ref 5–25)
CALCIUM SERPL-MCNC: 10.1 MG/DL (ref 8.4–10.2)
CHLORIDE SERPL-SCNC: 101 MMOL/L (ref 96–108)
CO2 SERPL-SCNC: 31 MMOL/L (ref 21–32)
CREAT SERPL-MCNC: 0.82 MG/DL (ref 0.6–1.3)
EOSINOPHIL # BLD AUTO: 0.14 THOUSAND/ÂΜL (ref 0–0.61)
EOSINOPHIL NFR BLD AUTO: 3 % (ref 0–6)
ERYTHROCYTE [DISTWIDTH] IN BLOOD BY AUTOMATED COUNT: 13.8 % (ref 11.6–15.1)
GFR SERPL CREATININE-BSD FRML MDRD: 74 ML/MIN/1.73SQ M
GLUCOSE SERPL-MCNC: 82 MG/DL (ref 65–140)
HCT VFR BLD AUTO: 43.3 % (ref 34.8–46.1)
HGB BLD-MCNC: 13.4 G/DL (ref 11.5–15.4)
IMM GRANULOCYTES # BLD AUTO: 0.02 THOUSAND/UL (ref 0–0.2)
IMM GRANULOCYTES NFR BLD AUTO: 0 % (ref 0–2)
LYMPHOCYTES # BLD AUTO: 2.09 THOUSANDS/ÂΜL (ref 0.6–4.47)
LYMPHOCYTES NFR BLD AUTO: 37 % (ref 14–44)
MCH RBC QN AUTO: 28.5 PG (ref 26.8–34.3)
MCHC RBC AUTO-ENTMCNC: 30.9 G/DL (ref 31.4–37.4)
MCV RBC AUTO: 92 FL (ref 82–98)
MONOCYTES # BLD AUTO: 0.51 THOUSAND/ÂΜL (ref 0.17–1.22)
MONOCYTES NFR BLD AUTO: 9 % (ref 4–12)
NEUTROPHILS # BLD AUTO: 2.79 THOUSANDS/ÂΜL (ref 1.85–7.62)
NEUTS SEG NFR BLD AUTO: 50 % (ref 43–75)
NRBC BLD AUTO-RTO: 0 /100 WBCS
PLATELET # BLD AUTO: 297 THOUSANDS/UL (ref 149–390)
PMV BLD AUTO: 9 FL (ref 8.9–12.7)
POTASSIUM SERPL-SCNC: 4.2 MMOL/L (ref 3.5–5.3)
PROT SERPL-MCNC: 7.7 G/DL (ref 6.4–8.4)
RBC # BLD AUTO: 4.7 MILLION/UL (ref 3.81–5.12)
SODIUM SERPL-SCNC: 137 MMOL/L (ref 135–147)
WBC # BLD AUTO: 5.6 THOUSAND/UL (ref 4.31–10.16)

## 2024-02-21 PROCEDURE — 85025 COMPLETE CBC W/AUTO DIFF WBC: CPT

## 2024-02-28 ENCOUNTER — HOSPITAL ENCOUNTER (OUTPATIENT)
Facility: HOSPITAL | Age: 69
Setting detail: OUTPATIENT SURGERY
Discharge: HOME | End: 2024-02-28
Attending: INTERNAL MEDICINE | Admitting: INTERNAL MEDICINE
Payer: MEDICARE

## 2024-02-28 VITALS
SYSTOLIC BLOOD PRESSURE: 134 MMHG | HEART RATE: 84 BPM | TEMPERATURE: 97.5 F | RESPIRATION RATE: 16 BRPM | WEIGHT: 150 LBS | HEIGHT: 67 IN | BODY MASS INDEX: 23.54 KG/M2 | DIASTOLIC BLOOD PRESSURE: 79 MMHG | OXYGEN SATURATION: 95 %

## 2024-02-28 DIAGNOSIS — I63.9 CRYPTOGENIC STROKE (HCC): ICD-10-CM

## 2024-02-28 PROCEDURE — 33286 RMVL SUBQ CAR RHYTHM MNTR: CPT | Performed by: INTERNAL MEDICINE

## 2024-02-28 PROCEDURE — NC001 PR NO CHARGE: Performed by: PHYSICIAN ASSISTANT

## 2024-02-28 RX ORDER — LIDOCAINE HYDROCHLORIDE AND EPINEPHRINE 10; 10 MG/ML; UG/ML
INJECTION, SOLUTION INFILTRATION; PERINEURAL
Status: DISCONTINUED
Start: 2024-02-28 | End: 2024-02-28 | Stop reason: HOSPADM

## 2024-02-28 RX ORDER — LIDOCAINE HYDROCHLORIDE AND EPINEPHRINE 10; 10 MG/ML; UG/ML
INJECTION, SOLUTION INFILTRATION; PERINEURAL CODE/TRAUMA/SEDATION MEDICATION
Status: DISCONTINUED | OUTPATIENT
Start: 2024-02-28 | End: 2024-02-28 | Stop reason: HOSPADM

## 2024-02-28 NOTE — DISCHARGE INSTR - AVS FIRST PAGE
LOOP RECORDER INSTRUCTIONS:  - Keep loop recorder incision dry for one week. Do not use lotions, powders, creams, or ointments on incision.     - Remove outer bandage 24-48 hours after procedure. There is waterproof glue present over the incision. This should keep the incision dry. Avoid picking at the glue or peeling it off. The glue will come off in its own time.     - Because the glue is waterproof, it is safe to shower if the glue remains on over the incision. It is ok to let the soap and water gently run over the incision. Avoid direct exposure to the stream of water. Do not soak the incision. Do not scrub. Pat dry.    - If the glue comes off in less that 7 days, place a waterproof bandage over the incision before showering.    - If present, leave underlying steri-strips in place. They will either fall off on their own or will be removed at the 2 week follow up appointment. If present, leave stitches in place. They will be removed at the 2 week follow up appointment.    - Please call the office if you notice redness, swelling, bleeding, or drainage from incision or if you develop fevers.      Cardiac Loop Recorder explantation        AFTER YOU LEAVE:    Follow up with your cardiologist as directed: You will need to present to the office in 2 weeks. A nurse at the device clinic will check your incision and remove any stitches or steri strips that may be present. They may also program your device settings again. They will retrieve data from the device every 1 to 3 months with a monitor held over your skin. You may be able to transmit data from your device from home as well. You will do this by calling a number provided by the device clinic or as they have instructed you. Ask for information about this process. Write down your questions so you remember to ask them during your visits.      Wound care: Keep loop recorder incision dry for one week. Do not use lotions, powders, creams, or ointments on incision. Remove  outer bandage 24-48 hours after procedure. There is waterproof glue present over the incision. This should keep the incision dry. Avoid picking at the glue or peeling it off. The glue will come off in its own time. Because the glue is waterproof, it is safe to shower if the glue remains on over the incision. It is ok to let the soap and water gently run over the incision. Avoid direct exposure to the stream of water. Do not soak the incision. Do not scrub. Pat dry.If the glue comes off in less that 7 days, place a waterproof bandage over the incision before showering. If present, leave underlying steri-strips in place. They will either fall off on their own or will be removed at the 2 week follow up appointment. If present, leave stitches in place. They will be removed at the 2 week follow up appointment.    Please call the office if you notice redness, swelling, bleeding, or drainage from incision or if you develop fevers.    Return to activity: If you received anesthesia, you will not be able to drive for 24 hours. Otherwise, most people can return to normal activities soon after the procedure. Your cardiologist may want to know if your work involves electrical current or high-voltage equipment. Ask about other electrical items that could interfere with your cardiac loop recorder.      Contact your cardiologist if:   You have a fever or chills.    Your wound is red, swollen, or draining pus.    You have questions or concerns about your condition or care.    Seek care immediately or call 911 if:   You feel weak, dizzy, or faint.    You lose consciousness.      © 2014 [employer] Information is for End User's use only and may not be sold, redistributed or otherwise used for commercial purposes. All illustrations and images included in CareNotes® are the copyrighted property of [employer] or [employer].    The above information is an  only. It is not intended as  medical advice for individual conditions or treatments. Talk to your doctor, nurse or pharmacist before following any medical regimen to see if it is safe and effective for you.

## 2024-02-28 NOTE — H&P
Patient presents campus for loop recorder explantation.  She recently had loop recorder implanted in 2022 for cryptogenic CVA.  Loop recorder was placed for A-fib monitoring.  Unfortunately, loop recorder has been causing patient's significant pain especially with exercise and that she is coming in for explantation.

## 2024-03-13 ENCOUNTER — IN-CLINIC DEVICE VISIT (OUTPATIENT)
Dept: CARDIOLOGY CLINIC | Facility: CLINIC | Age: 69
End: 2024-03-13
Payer: MEDICARE

## 2024-03-13 DIAGNOSIS — Z95.818 PRESENCE OF OTHER CARDIAC IMPLANTS AND GRAFTS: Primary | ICD-10-CM

## 2024-03-13 PROCEDURE — 99211 OFF/OP EST MAY X REQ PHY/QHP: CPT

## 2024-03-13 NOTE — PROGRESS NOTES
Results for orders placed or performed in visit on 03/13/24   Cardiac EP device report    Narrative    SJM DOT ILR/ ACTIVE SYSTEM IS MRI CONDITIONAL  [facility]. WOUND CHECK: INCISION CLEAN AND DRY WITH EDGES APPROXIMATED; SUTURES REMOVED; WOUND CARE AND RESTRICTIONS REVIEWED WITH PATIENT. (PDF ATTACHED) PT RETURNED THE [employer] A10e TRANSMITTER TODAY. PROOF OF SAME GIVEN TO PT.  DISCHARGED FROM DEVICE CLINIC. PAS/ES

## 2024-04-05 ENCOUNTER — HOSPITAL ENCOUNTER (OUTPATIENT)
Dept: ULTRASOUND IMAGING | Facility: HOSPITAL | Age: 69
End: 2024-04-05
Attending: STUDENT IN AN ORGANIZED HEALTH CARE EDUCATION/TRAINING PROGRAM
Payer: MEDICARE

## 2024-04-05 ENCOUNTER — APPOINTMENT (OUTPATIENT)
Dept: LAB | Facility: HOSPITAL | Age: 69
End: 2024-04-05
Payer: MEDICARE

## 2024-04-05 DIAGNOSIS — E03.8 SUBCLINICAL HYPOTHYROIDISM: ICD-10-CM

## 2024-04-05 DIAGNOSIS — E04.2 MULTINODULAR GOITER: ICD-10-CM

## 2024-04-05 DIAGNOSIS — E06.3 HASHIMOTO'S DISEASE: ICD-10-CM

## 2024-04-05 LAB
T4 FREE SERPL-MCNC: 0.64 NG/DL (ref 0.61–1.12)
TSH SERPL DL<=0.05 MIU/L-ACNC: 3.63 UIU/ML (ref 0.45–4.5)

## 2024-04-05 PROCEDURE — 84443 ASSAY THYROID STIM HORMONE: CPT

## 2024-04-05 PROCEDURE — 76536 US EXAM OF HEAD AND NECK: CPT

## 2024-04-05 PROCEDURE — 84439 ASSAY OF FREE THYROXINE: CPT

## 2024-04-05 PROCEDURE — 36415 COLL VENOUS BLD VENIPUNCTURE: CPT

## 2024-04-05 PROCEDURE — 86376 MICROSOMAL ANTIBODY EACH: CPT

## 2024-04-07 LAB — THYROPEROXIDASE AB SERPL-ACNC: 26 IU/ML (ref 0–34)

## 2024-04-16 ENCOUNTER — TELEPHONE (OUTPATIENT)
Dept: NEUROLOGY | Facility: CLINIC | Age: 69
End: 2024-04-16

## 2024-04-16 NOTE — TELEPHONE ENCOUNTER
Patient called back and informed that due to location she may see MsSwapna LangOro , Appointment scheduled for 05/03/24 at 8 am.

## 2024-04-18 ENCOUNTER — OFFICE VISIT (OUTPATIENT)
Dept: ENDOCRINOLOGY | Facility: HOSPITAL | Age: 69
End: 2024-04-18
Payer: MEDICARE

## 2024-04-18 VITALS
SYSTOLIC BLOOD PRESSURE: 128 MMHG | HEIGHT: 67 IN | BODY MASS INDEX: 23.95 KG/M2 | WEIGHT: 152.6 LBS | HEART RATE: 82 BPM | OXYGEN SATURATION: 96 % | DIASTOLIC BLOOD PRESSURE: 80 MMHG

## 2024-04-18 DIAGNOSIS — E03.8 SUBCLINICAL HYPOTHYROIDISM: Primary | ICD-10-CM

## 2024-04-18 DIAGNOSIS — E04.2 MULTINODULAR GOITER: ICD-10-CM

## 2024-04-18 PROCEDURE — 99213 OFFICE O/P EST LOW 20 MIN: CPT | Performed by: PHYSICIAN ASSISTANT

## 2024-04-18 NOTE — PROGRESS NOTES
Sandra Rodas 68 y.o. female MRN: [id number]    Encounter: [id number]      Assessment/Plan     Assessment:  This is a 68 y.o.-year-old female with subclinical hyperthyroidism with multinodular goiter.    Plan:  1.  Subclinical hypothyroidism: Most recent set of lab work did reveal a normal TSH and free T4.  Thyroid antibodies were negative.  At this point in time she does not need thyroid medication.  I would recommend checking thyroid levels at least once a year.  If there is any concerns lab work can always be reordered in the meantime.  At this time she does not need to continue following up with endocrinology and this can be done through her PCP.  Did inform her that if she has any questions she can contact the office.    2.  Multinodular goiter: Recent ultrasound shows stable thyroid nodules.  At this point none of the nodules meet biopsy or monitoring criteria.  If there is any change in symptoms, ultrasound can always be reordered.    CC: Thyroid follow-up    History of Present Illness     HPI:  Sandra Rodas is a 68 y.o. female with subclinical hypothyroidism, non toxic MNG presenting for follow-up.  Initially presented to the endocrine office October 2023 for consult of subclinical hypothyroidism and also multinodular goiter.  TSH was being monitored by PCP and a few times TSH was elevated and she was recommended to start on thyroid medication.  Wanted endocrine's opinion before any thyroid medication was started.  She also has a history of a multinodular goiter with ultrasounds being completed in 2014 and 2018.  Presents today for follow-up plan.  Does have some fatigue, but denies anything out of the ordinary.  Denies any heat or cold intolerance, palpitations, abdominal pain, diarrhea or constipation, tremors, anxiety or depression, hair loss, dry skin, brittle nails.  Has not had any problems with any neck discomfort or difficulty swallowing.  Overall feeling well today without any concerns or  questions.      Review of Systems   Constitutional:  Negative for activity change, appetite change, diaphoresis, fatigue and unexpected weight change.   HENT:  Negative for sore throat, trouble swallowing and voice change.    Eyes:  Negative for visual disturbance.   Respiratory:  Negative for chest tightness and shortness of breath.    Cardiovascular:  Negative for chest pain, palpitations and leg swelling.   Gastrointestinal:  Negative for abdominal pain, constipation and diarrhea.   Endocrine: Negative for cold intolerance, heat intolerance, polydipsia, polyphagia and polyuria.   Skin:  Negative for rash.   Neurological:  Negative for dizziness, tremors, light-headedness, numbness and headaches.   Hematological:  Negative for adenopathy.   Psychiatric/Behavioral:  Negative for dysphoric mood and sleep disturbance. The patient is not nervous/anxious.        Historical Information   Past Medical History:   Diagnosis Date   • Benign paroxysmal positional vertigo     Resolved: 9/15/2014   • Cataracts, bilateral    • Fracture of radius      Past Surgical History:   Procedure Laterality Date   • BIOPSY CORE NEEDLE      Thyroid   • CARDIAC ELECTROPHYSIOLOGY PROCEDURE N/A 2022    Procedure: Cardiac loop recorder implant;  Surgeon: Aryan Mann MD;  Location: [facility];  Service: Cardiology   • CARDIAC ELECTROPHYSIOLOGY PROCEDURE N/A 2024    Procedure: Cardiac loop recorder explant;  Surgeon: Aryan Mann MD;  Location: [facility];  Service: Cardiology   •  SECTION     • COLPOSCOPY     • HIP SURGERY       Social History   Social History     Substance and Sexual Activity   Alcohol Use Yes    Comment: social     Social History     Substance and Sexual Activity   Drug Use No     Social History     Tobacco Use   Smoking Status Never   Smokeless Tobacco Never     Family History:   Family History   Problem Relation Age of Onset   • Stroke Mother         CVA   • Breast cancer Mother 66  "  • Alcohol abuse Father    • COPD Father    • Other Father         Nicotine Abuse   • No Known Problems Sister    • No Known Problems Daughter    • No Known Problems Daughter    • Thyroid disease Maternal Grandmother    • No Known Problems Maternal Grandfather    • No Known Problems Paternal Grandmother    • No Known Problems Paternal Grandfather    • Thyroid cancer Brother 60   • Coronary artery disease Brother    • No Known Problems Brother    • Breast cancer Maternal Aunt 40   • Breast cancer additional onset Maternal Aunt 50   • No Known Problems Maternal Aunt    • No Known Problems Maternal Aunt    • No Known Problems Maternal Aunt        Meds/Allergies   Current Outpatient Medications   Medication Sig Dispense Refill   • aspirin 81 mg chewable tablet Chew 1 tablet (81 mg total)  in the morning.  0   • calcium carbonate (OS-WALKER) 1250 (500 Ca) MG tablet Take 2 tablets by mouth in the morning.     • cholecalciferol (VITAMIN D3) 1,000 units tablet Take 1,000 Units by mouth daily     • Magnesium 250 MG TABS Take 3 tablets by mouth in the morning.     • Multiple Vitamin (MULTIVITAMINS PO) Take 1 capsule by mouth daily     • atorvastatin (LIPITOR) 20 mg tablet TAKE 1 TABLET BY MOUTH EVERY DAY IN THE EVENING (Patient not taking: Reported on 4/18/2024) 90 tablet 2   • Chlorophyll 100 MG TABS Take 1 tablet (100 mg total) by mouth daily (Patient not taking: Reported on 1/3/2024)  0   • DHA-EPA-Vitamin E (OMEGA-3 COMPLEX PO) Take 1 tablet by mouth in the morning (Patient not taking: Reported on 4/18/2024)       No current facility-administered medications for this visit.     No Known Allergies    Objective   Vitals: Blood pressure 128/80, pulse 82, height 5' 7\" (1.702 m), weight 69.2 kg (152 lb 9.6 oz), SpO2 96%.    Physical Exam  Vitals and nursing note reviewed.   Constitutional:       General: She is not in acute distress.     Appearance: Normal appearance. She is not diaphoretic.   HENT:      Head: Normocephalic and " atraumatic.   Eyes:      General: No scleral icterus.     Extraocular Movements: Extraocular movements intact.      Conjunctiva/sclera: Conjunctivae normal.      Pupils: Pupils are equal, round, and reactive to light.   Cardiovascular:      Rate and Rhythm: Normal rate and regular rhythm.      Heart sounds: No murmur heard.  Pulmonary:      Effort: Pulmonary effort is normal. No respiratory distress.      Breath sounds: Normal breath sounds. No wheezing.   Musculoskeletal:      Cervical back: Normal range of motion.      Right lower leg: No edema.      Left lower leg: No edema.   Lymphadenopathy:      Cervical: No cervical adenopathy.   Neurological:      Mental Status: She is alert and oriented to person, place, and time. Mental status is at baseline.      Sensory: No sensory deficit.      Gait: Gait normal.   Psychiatric:         Mood and Affect: Mood normal.         Behavior: Behavior normal.         Thought Content: Thought content normal.         The history was obtained from the review of the chart, patient.    Lab Results:   Lab Results   Component Value Date/Time    TSH 3RD GENERATON 3.627 04/05/2024 08:02 AM    TSH 3RD GENERATON 4.205 11/21/2023 09:40 AM    TSH 3RD GENERATON 4.001 08/24/2023 08:06 AM    Free T4 0.64 04/05/2024 08:02 AM       Imaging Studies:   Results for orders placed during the hospital encounter of 04/05/24    US thyroid    Impression  Multiple thyroid nodules with difficult direct comparison from 2018.  No nodule meets current ACR criteria for requiring biopsy or follow-up ultrasounds.        Reference: ACR Thyroid Imaging, Reporting and Data System (TI-RADS): White Paper of the ACR TI-RADS Committee. J AM Kofi Radiol 2017;14:587-595. Additional recommendations based on American Thyroid Association 2015 guidelines.      Workstation performed: [id number]      I have personally reviewed pertinent reports.      Portions of the record may have been created with voice recognition software.  "Occasional wrong word or \"sound a like\" substitutions may have occurred due to the inherent limitations of voice recognition software. Read the chart carefully and recognize, using context, where substitutions have occurred.    "

## 2024-04-18 NOTE — PATIENT INSTRUCTIONS
Thyroid lab work and ultrasound normal.    No need for therapy at this time.    Call if any concerns.    Follow up as needed.

## 2024-05-02 NOTE — PATIENT INSTRUCTIONS
Stroke:  - For ongoing stroke prevention continue: aspirin 81mg and atorvastatin 20mg.  - Discussed the importance of antiplatelet/anticoagulant management with the patient to prevent future strokes.   - Recommend to check blood pressure occasionally away from the doctor's office to make sure that those numbers are typically less than 130/80.  If they are frequently higher than that, we recommend checking a little more often and to follow up with primary care team.  - Will defer to primary care team for monitoring of cholesterol panel and blood sugar numbers with target LDL cholesterol of less than 70 and hemoglobin A1c less than 7%.  - Recommend following a low salt, mediterranean diet.   - Recommend routine physical exercise as tolerated.  - Avoid using NSAIDs (like ibuprofen, motrin, or aleve) for headaches or other pain and to use tylenol if needed.     Dizziness:  -Blood work ordered to complete at your convenience  -can consider brain imaging if labs come back normal and symptoms persist.     We will plan for you to return to the office in 1 month to see myself or MD, but would be happy to see you sooner if the need should arise.  If you have any symptoms concerning for TIA or stroke including: sudden painless loss of vision or double vision, difficulty speaking or swallowing, vertigo/room spinning that does not quickly resolve, or weakness/numbness/loss of coordination affecting 1 side of the face or body, you should proceed by ambulance to the nearest emergency room immediately.

## 2024-05-02 NOTE — PROGRESS NOTES
[facility]  PATIENT:  Sandra Rodas  MRN:  [id number]  :  1955  DATE OF SERVICE:  5/3/2024  PCP: Sagrario Good DO    Assessment/Plan:     History of stroke  Patient with right frontal cortical appearing stroke which occurred in May 2022.  Since her last office visit, she does not report any new stroke-like symptoms. Current stroke prevention medications include Aspirin 81mg and Lipitor 20mg. She takes her medication as prescribed and did not endorse any bleeding or bruising issues.  She has not had any recent falls. She had her loop recorder explanted secondary to pain but there afib was not detected.   For ongoing stroke prevention continue: aspirin 81mg and atorvastatin 20mg daily  Will recheck lipid panel since patient just restarted her atorvastatin 2 days ago  Recommend to check blood pressure occasionally away from the doctor's office to make sure that those numbers are typically less than 130/80.  If they are frequently higher than that, we recommend checking it more frequently and following up with primary care team/cardiologist   Will defer to primary care team for future monitoring of cholesterol panel and blood sugar numbers with target LDL cholesterol of less than 70 and hemoglobin A1c less than 7%  Recommend following a low salt, mediterranean diet   Recommend routine physical exercise as tolerated   Avoid using NSAIDs for headaches or other pain and to use tylenol if needed      Dizziness and giddiness  She started experiencing dizziness/lightheadedness approximately 3 days ago which happens with position changes but also at rest.  She denies any room spinning/vertigo and describes it more as being lightheaded.  She has not experienced any falls.    Orthostatics negative in office   Labs ordered to r/o any treatable causes of her symptoms: CBC, CMP, vitamin D, B12, iron panel  Can consider MRI brain if not improvement to her symptoms and lab work is  "normal       Diagnoses and all orders for this visit:    Dyslipidemia  -     Lipid Panel with Direct LDL reflex; Future    Dizziness and giddiness  -     CBC and differential; Future  -     Comprehensive metabolic panel; Future  -     Vitamin D 25 hydroxy; Future  -     Vitamin B12; Future  -     Iron Panel (Includes Ferritin, Iron Sat%, Iron, and TIBC); Future    Numbness  -     XR lumbar sp/bending only min 2-3 v; Future    Vitamin D deficiency, unspecified  -     Vitamin D 25 hydroxy; Future    Other vitamin B12 deficiency anemias  -     Iron Panel (Includes Ferritin, Iron Sat%, Iron, and TIBC); Future    Deficiency of other specified B group vitamins  -     Vitamin B12; Future    Left leg numbness    PFO (patent foramen ovale)    History of stroke         We will plan for her to return to the office in 1 year to see myself or Dr. Godfrey, but would be happy to see her sooner if the need should arise.  If she has any symptoms concerning for TIA or stroke including sudden painless loss of vision or double vision, difficulty speaking or swallowing, vertigo/room spinning that does not quickly resolve, or weakness/numbness/loss of coordination affecting 1 side of the face or body, she should proceed by ambulance to the nearest emergency room immediately.     CC:     We had the pleasure of evaluating Sandra in neurological follow-up today. She is a 68 y.o. year-old female who presents today for a follow up after stroke.     History of Present Illness:   Patient was last seen on 9/27/23 by Tati Hammer PA-C. In summary, \"Patient continues to do well following right frontal cortical appearing stroke which occurred in May 2022. She is maintained on ASA and statin. She has had an implantable loop recorder since November 2022 with no Afib found to date. She was also found to have a PFO during initial evaluation and has followed up with cardiology for this. No plans to close PFO at this point. \"    Since her last visit, " she does not report any new stroke-like symptoms. Current stroke prevention medications include Aspirin 81mg and Lipitor 20mg. She takes her medication as prescribed and did not endorse any bleeding or bruising issues.  She has not had any recent falls. She had her loop recorder explanted secondary to pain but there afib was not detected.     She started experiencing dizziness/lightheadedness approximately 3 days ago.    Medication changes: was taking a thyroid supplement (thyroid complex) her physician advised her she doesn't need the supplement and she stopped it 2 weeks ago.    Stress: she works at a [employer] shop and it's prom season so she has been very busy at work.    Diet: Sometimes skips meals because of being busy at work but she tries to eat snacks.      She has been experiencing numbness in the medial aspect of her left leg at the knee for about 5 years now.  She does report low back pain.  Denies any weakness of the leg or foot.  She also denies any issues with her bowel or bladder.  She had an x-ray of her lumbar spine on 1/12/2021 which showed mild to moderate multilevel spondee logic degenerative changes of the lumbar spine.  She also notes that she has tight hip flexors and does stretching every morning.    Residual symptoms:    None    Stroke risk factors were evaluated including:     AP/AC therapy: aspirin 81mg    Statin therapy:Atorvastatin 20mg (restarted 2 days ago) will recheck lipid panel    Blood pressure today and as of late: 141/94. She does check her numbers at home, generally sees around 130s/70s.    Most recent LDL:   Lab Results   Component Value Date    LDLCALC 87 08/24/2023        Most recent hemoglobin A1C:   Lab Results   Component Value Date    HGBA1C 5.6 05/18/2022        Cardiology evaluation/Cardiac Monitoring: Had implanted loop recorder with no evidence of arrhythmias.  Had it explanted 2/2 to pain and discomfort.  PFO with no plans for closure at this time.     Endocrinology  evaluation: N/A    Lifestyle history/modifications:    -Diet/Exercise regimen: On her feet at work. She does a 30 min walk usually 1-2x daily. She does some weight lifting as well and stretches.  Well-balance diet.    -PT/OT/ST: none required at the moment.     -Sleep: 7-8 hours per night    -MIKE/CPAP Compliance: Denies sleep apnea    -Post-stroke depression/anxiety: none     -Smoking: never    -ETOH: socially     -Illicit drug use: none     Past Medical History:     Past Medical History:   Diagnosis Date    Benign paroxysmal positional vertigo     Resolved: 9/15/2014    Cataracts, bilateral     Fracture of radius        Patient Active Problem List   Diagnosis    Breast cyst    Cervical spondylosis    Chronic allergic conjunctivitis    Dyslipidemia    Hashimoto's disease    Herniated cervical disc    Non-toxic multinodular goiter    Primary osteoarthritis of left hip    Rhinitis    Varicose veins    Elevated TSH    Osteopenia    DDD (degenerative disc disease), lumbar    Left leg numbness    Chronic pain syndrome    Chronic pain of left knee    Chronic left hip pain    History of stroke    PFO (patent foramen ovale)    Dizziness and giddiness       Medications:     Current Outpatient Medications   Medication Sig Dispense Refill    aspirin 81 mg chewable tablet Chew 1 tablet (81 mg total)  in the morning.  0    atorvastatin (LIPITOR) 20 mg tablet TAKE 1 TABLET BY MOUTH EVERY DAY IN THE EVENING 90 tablet 2    calcium carbonate (OS-WALKER) 1250 (500 Ca) MG tablet Take 2 tablets by mouth in the morning.      cholecalciferol (VITAMIN D3) 1,000 units tablet Take 1,000 Units by mouth daily      DHA-EPA-Vitamin E (OMEGA-3 COMPLEX PO) Take 1 tablet by mouth in the morning      Magnesium 250 MG TABS Take 3 tablets by mouth in the morning.      Multiple Vitamin (MULTIVITAMINS PO) Take 1 capsule by mouth daily      Chlorophyll 100 MG TABS Take 1 tablet (100 mg total) by mouth daily (Patient not taking: Reported on 1/3/2024)  0      No current facility-administered medications for this visit.        Allergies:     No Known Allergies    Family History:     Family History   Problem Relation Age of Onset    Stroke Mother         CVA    Breast cancer Mother 66    Alcohol abuse Father     COPD Father     Other Father         Nicotine Abuse    No Known Problems Sister     No Known Problems Daughter     No Known Problems Daughter     Thyroid disease Maternal Grandmother     No Known Problems Maternal Grandfather     No Known Problems Paternal Grandmother     No Known Problems Paternal Grandfather     Thyroid cancer Brother 60    Coronary artery disease Brother     No Known Problems Brother     Breast cancer Maternal Aunt 40    Breast cancer additional onset Maternal Aunt 50    No Known Problems Maternal Aunt     No Known Problems Maternal Aunt     No Known Problems Maternal Aunt        Social History:     Social History     Socioeconomic History    Marital status: Single     Spouse name: Not on file    Number of children: Not on file    Years of education: Not on file    Highest education level: Not on file   Occupational History    Not on file   Tobacco Use    Smoking status: Never    Smokeless tobacco: Never   Vaping Use    Vaping status: Never Used   Substance and Sexual Activity    Alcohol use: Yes     Comment: social    Drug use: No    Sexual activity: Not Currently   Other Topics Concern    Not on file   Social History Narrative    Single, lives alone, part-time employment in [employer]     Social Determinants of Health     Financial Resource Strain: Low Risk  (12/7/2023)    Overall Financial Resource Strain (CARDIA)     Difficulty of Paying Living Expenses: Not hard at all   Food Insecurity: No Food Insecurity (5/19/2022)    Hunger Vital Sign     Worried About Running Out of Food in the Last Year: Never true     Ran Out of Food in the Last Year: Never true   Transportation Needs: No Transportation Needs (12/7/2023)    PRAPARE -  "Transportation     Lack of Transportation (Medical): No     Lack of Transportation (Non-Medical): No   Physical Activity: Not on file   Stress: Not on file   Social Connections: Not on file   Intimate Partner Violence: Not on file   Housing Stability: Low Risk  (5/19/2022)    Housing Stability Vital Sign     Unable to Pay for Housing in the Last Year: No     Number of Places Lived in the Last Year: 1     Unstable Housing in the Last Year: No       Objective:     Physical Exam:    Vitals:  /94 (BP Location: Left arm, Patient Position: Standing, Cuff Size: Standard)   Pulse 73   Ht 5' 7\" (1.702 m)   Wt 68.9 kg (151 lb 14.4 oz)   BMI 23.79 kg/m²   BP Readings from Last 3 Encounters:   05/03/24 141/94   04/18/24 128/80   02/28/24 134/79     Pulse Readings from Last 3 Encounters:   05/03/24 73   04/18/24 82   02/28/24 84       Constitutional:       Appearance: Normal appearance.   HENT:      Head: Normocephalic and atraumatic.      Nose: Nose normal.      Mouth: Mucous membranes are moist.   Pulmonary:      Effort: Pulmonary effort is normal.    Skin:     General: Skin is warm and dry.   Psychiatric:        Affect normal.   Neurologic Exam     Mental Status   Oriented to person, place, and time.   Speech: speech is normal   Level of consciousness: alert  Able to name object.     Cranial Nerves   Cranial nerves II through XII intact.     Motor Exam   Muscle bulk: normal    Strength   Strength 5/5 throughout.     Sensory Exam   Right arm light touch: normal  Left arm light touch: normal  Right leg light touch: normal  Left leg light touch: decreased medial aspect of left knee.  Right arm vibration: normal  Left arm vibration: normal  Right leg vibration: normal  Left leg vibration: decreased medial aspect of left knee.    Gait, Coordination, and Reflexes     Gait  Gait: normal    Tremor   Resting tremor: absent    Reflexes   Right patellar: 2+  Left patellar: 1+      Pertinent lab results:    Lab Results "   Component Value Date/Time    CHOLESTEROL 182 2023 08:06 AM    CHOLESTEROL 276 (H) 2020 10:08 AM     Lab Results   Component Value Date/Time    TRIG 68 2023 08:06 AM    TRIG 97 2020 10:08 AM     Lab Results   Component Value Date/Time    HDL 81 2023 08:06 AM    HDL 83 2020 10:08 AM     Lab Results   Component Value Date/Time    LDLCALC 87 2023 08:06 AM    LDLCALC 177 (H) 2020 10:08 AM        Lab Results   Component Value Date/Time    HGBA1C 5.6 2022 04:24 PM     Lab Results   Component Value Date/Time     2022 04:24 PM        Pertinent Imagin/19/22 MRI brain: Small acute/early subacute infarct in the posterior right frontal lobe. No acute hemorrhage.   22 CTH:No acute intracranial hemorrhage, mass effect or edema.   22 CTA H&N: 1.  No hemodynamically significant stenosis major arteries of the neck.2.  No major intracranial arterial occlusion.  No evidence of intracranial aneurysm.  21 EMG LLE: Normal study. These electrodiagnostic findings do not provide any evidence to support a focal neuropathy versus radiculopathy in the left lower extremity. In addition, there is no evidence to support a generalized neuropathy affecting the peripheral nervous system.      Review of Systems:     Constitutional: Negative.    HENT: Negative.     Eyes: Negative.    Respiratory: Negative.     Cardiovascular: Negative.    Gastrointestinal: Negative.    Endocrine: Negative.    Genitourinary: Negative.    Musculoskeletal: Negative.    Skin: Negative.    Allergic/Immunologic: Negative.    Neurological:  Positive for dizziness.   Hematological: Negative.    Psychiatric/Behavioral: Negative.       I have reviewed the ROS as entered by medical assistant.     I have spent a total time of 40 minutes on 24 in caring for this patient including Diagnostic results, Prognosis, Risks and benefits of tx options, Instructions for management, Patient and  family education, Importance of tx compliance, Risk factor reductions, Impressions, Counseling / Coordination of care, Documenting in the medical record, Reviewing / ordering tests, medicine, procedures  , and Obtaining or reviewing history  .       Author:  DB Sy 5/3/2024 10:15 AM

## 2024-05-03 ENCOUNTER — OFFICE VISIT (OUTPATIENT)
Dept: NEUROLOGY | Facility: CLINIC | Age: 69
End: 2024-05-03

## 2024-05-03 VITALS
HEART RATE: 73 BPM | DIASTOLIC BLOOD PRESSURE: 94 MMHG | SYSTOLIC BLOOD PRESSURE: 141 MMHG | WEIGHT: 151.9 LBS | BODY MASS INDEX: 23.84 KG/M2 | HEIGHT: 67 IN

## 2024-05-03 DIAGNOSIS — E55.9 VITAMIN D DEFICIENCY, UNSPECIFIED: ICD-10-CM

## 2024-05-03 DIAGNOSIS — D51.8 OTHER VITAMIN B12 DEFICIENCY ANEMIAS: ICD-10-CM

## 2024-05-03 DIAGNOSIS — Q21.12 PFO (PATENT FORAMEN OVALE): ICD-10-CM

## 2024-05-03 DIAGNOSIS — E78.5 DYSLIPIDEMIA: Primary | ICD-10-CM

## 2024-05-03 DIAGNOSIS — R20.0 LEFT LEG NUMBNESS: ICD-10-CM

## 2024-05-03 DIAGNOSIS — R20.0 NUMBNESS: ICD-10-CM

## 2024-05-03 DIAGNOSIS — Z86.73 HISTORY OF STROKE: ICD-10-CM

## 2024-05-03 DIAGNOSIS — E53.8 DEFICIENCY OF OTHER SPECIFIED B GROUP VITAMINS: ICD-10-CM

## 2024-05-03 DIAGNOSIS — R42 DIZZINESS AND GIDDINESS: ICD-10-CM

## 2024-05-03 NOTE — ASSESSMENT & PLAN NOTE
She has been experiencing numbness in the medial aspect of her left leg at the knee for about 5 years now.  She does endorse low back pain, but denies any weakness of the leg or foot.  She also denies any issues with her bowel or bladder.  She had an x-ray of her lumbar spine on 1/12/2021 which showed mild to moderate multilevel spondee logic degenerative changes of the lumbar spine.  She also notes that she has tight hip flexors and does stretching every morning.  LLE EMG 2021 was unrevealing  X-ray lumbar spine w/ bending ordered for patient to complete at her convenience  Can consider referral to PT/pain management

## 2024-05-03 NOTE — PROGRESS NOTES
Review of Systems   Constitutional: Negative.    HENT: Negative.     Eyes: Negative.    Respiratory: Negative.     Cardiovascular: Negative.    Gastrointestinal: Negative.    Endocrine: Negative.    Genitourinary: Negative.    Musculoskeletal: Negative.    Skin: Negative.    Allergic/Immunologic: Negative.    Neurological:  Positive for dizziness.   Hematological: Negative.    Psychiatric/Behavioral: Negative.

## 2024-05-03 NOTE — ASSESSMENT & PLAN NOTE
She started experiencing dizziness/lightheadedness approximately 3 days ago which happens with position changes but also at rest.  She denies any room spinning/vertigo and describes it more as being lightheaded.  She has not experienced any falls.    Orthostatics negative in office   Labs ordered to r/o any treatable causes of her symptoms: CBC, CMP, vitamin D, B12, iron panel  Can consider MRI brain if not improvement to her symptoms and lab work is normal

## 2024-05-03 NOTE — ASSESSMENT & PLAN NOTE
Patient with right frontal cortical appearing stroke which occurred in May 2022.  Since her last office visit, she does not report any new stroke-like symptoms. Current stroke prevention medications include Aspirin 81mg and Lipitor 20mg. She takes her medication as prescribed and did not endorse any bleeding or bruising issues.  She has not had any recent falls. She had her loop recorder explanted secondary to pain but there afib was not detected.   For ongoing stroke prevention continue: aspirin 81mg and atorvastatin 20mg daily  Will recheck lipid panel since patient just restarted her atorvastatin 2 days ago  Recommend to check blood pressure occasionally away from the doctor's office to make sure that those numbers are typically less than 130/80.  If they are frequently higher than that, we recommend checking it more frequently and following up with primary care team/cardiologist   Will defer to primary care team for future monitoring of cholesterol panel and blood sugar numbers with target LDL cholesterol of less than 70 and hemoglobin A1c less than 7%  Recommend following a low salt, mediterranean diet   Recommend routine physical exercise as tolerated   Avoid using NSAIDs for headaches or other pain and to use tylenol if needed

## 2024-05-06 ENCOUNTER — APPOINTMENT (OUTPATIENT)
Dept: LAB | Facility: HOSPITAL | Age: 69
End: 2024-05-06
Payer: MEDICARE

## 2024-05-06 ENCOUNTER — HOSPITAL ENCOUNTER (OUTPATIENT)
Dept: RADIOLOGY | Facility: HOSPITAL | Age: 69
Discharge: HOME | End: 2024-05-06
Payer: MEDICARE

## 2024-05-06 DIAGNOSIS — R42 DIZZINESS AND GIDDINESS: ICD-10-CM

## 2024-05-06 DIAGNOSIS — R20.0 NUMBNESS: ICD-10-CM

## 2024-05-06 DIAGNOSIS — E53.8 DEFICIENCY OF OTHER SPECIFIED B GROUP VITAMINS: ICD-10-CM

## 2024-05-06 DIAGNOSIS — D51.8 OTHER VITAMIN B12 DEFICIENCY ANEMIAS: ICD-10-CM

## 2024-05-06 DIAGNOSIS — E78.5 DYSLIPIDEMIA: ICD-10-CM

## 2024-05-06 DIAGNOSIS — E55.9 VITAMIN D DEFICIENCY, UNSPECIFIED: ICD-10-CM

## 2024-05-06 LAB
25(OH)D3 SERPL-MCNC: 63.1 NG/ML (ref 30–100)
ALBUMIN SERPL BCP-MCNC: 4.2 G/DL (ref 3.5–5)
ALP SERPL-CCNC: 79 U/L (ref 34–104)
ALT SERPL W P-5'-P-CCNC: 12 U/L (ref 7–52)
ANION GAP SERPL CALCULATED.3IONS-SCNC: 6 MMOL/L (ref 4–13)
AST SERPL W P-5'-P-CCNC: 36 U/L (ref 13–39)
BASOPHILS # BLD AUTO: 0.04 THOUSANDS/ÂΜL (ref 0–0.1)
BASOPHILS NFR BLD AUTO: 1 % (ref 0–1)
BILIRUB SERPL-MCNC: 0.51 MG/DL (ref 0.2–1)
BUN SERPL-MCNC: 17 MG/DL (ref 5–25)
CALCIUM SERPL-MCNC: 9.1 MG/DL (ref 8.4–10.2)
CHLORIDE SERPL-SCNC: 104 MMOL/L (ref 96–108)
CHOLEST SERPL-MCNC: 188 MG/DL
CO2 SERPL-SCNC: 29 MMOL/L (ref 21–32)
CREAT SERPL-MCNC: 0.83 MG/DL (ref 0.6–1.3)
EOSINOPHIL # BLD AUTO: 0.1 THOUSAND/ÂΜL (ref 0–0.61)
EOSINOPHIL NFR BLD AUTO: 2 % (ref 0–6)
ERYTHROCYTE [DISTWIDTH] IN BLOOD BY AUTOMATED COUNT: 13.3 % (ref 11.6–15.1)
FERRITIN SERPL-MCNC: 44 NG/ML (ref 11–307)
GFR SERPL CREATININE-BSD FRML MDRD: 73 ML/MIN/1.73SQ M
GLUCOSE SERPL-MCNC: 92 MG/DL (ref 65–140)
HCT VFR BLD AUTO: 42.2 % (ref 34.8–46.1)
HDLC SERPL-MCNC: 76 MG/DL
HGB BLD-MCNC: 13.2 G/DL (ref 11.5–15.4)
IMM GRANULOCYTES # BLD AUTO: 0.01 THOUSAND/UL (ref 0–0.2)
IMM GRANULOCYTES NFR BLD AUTO: 0 % (ref 0–2)
IRON SATN MFR SERPL: 17 % (ref 15–50)
IRON SERPL-MCNC: 61 UG/DL (ref 50–212)
LDLC SERPL CALC-MCNC: 97 MG/DL (ref 0–100)
LYMPHOCYTES # BLD AUTO: 2.21 THOUSANDS/ÂΜL (ref 0.6–4.47)
LYMPHOCYTES NFR BLD AUTO: 47 % (ref 14–44)
MCH RBC QN AUTO: 28.8 PG (ref 26.8–34.3)
MCHC RBC AUTO-ENTMCNC: 31.3 G/DL (ref 31.4–37.4)
MCV RBC AUTO: 92 FL (ref 82–98)
MONOCYTES # BLD AUTO: 0.45 THOUSAND/ÂΜL (ref 0.17–1.22)
MONOCYTES NFR BLD AUTO: 10 % (ref 4–12)
NEUTROPHILS # BLD AUTO: 1.9 THOUSANDS/ÂΜL (ref 1.85–7.62)
NEUTS SEG NFR BLD AUTO: 40 % (ref 43–75)
NRBC BLD AUTO-RTO: 0 /100 WBCS
PLATELET # BLD AUTO: 271 THOUSANDS/UL (ref 149–390)
PMV BLD AUTO: 8.9 FL (ref 8.9–12.7)
POTASSIUM SERPL-SCNC: 4 MMOL/L (ref 3.5–5.3)
PROT SERPL-MCNC: 7.3 G/DL (ref 6.4–8.4)
RBC # BLD AUTO: 4.59 MILLION/UL (ref 3.81–5.12)
SODIUM SERPL-SCNC: 139 MMOL/L (ref 135–147)
TIBC SERPL-MCNC: 354 UG/DL (ref 250–450)
TRIGL SERPL-MCNC: 76 MG/DL
UIBC SERPL-MCNC: 293 UG/DL (ref 155–355)
VIT B12 SERPL-MCNC: 3390 PG/ML (ref 180–914)
WBC # BLD AUTO: 4.71 THOUSAND/UL (ref 4.31–10.16)

## 2024-05-06 PROCEDURE — 82306 VITAMIN D 25 HYDROXY: CPT

## 2024-05-06 PROCEDURE — 80053 COMPREHEN METABOLIC PANEL: CPT

## 2024-05-06 PROCEDURE — 82728 ASSAY OF FERRITIN: CPT

## 2024-05-06 PROCEDURE — 72120 X-RAY BEND ONLY L-S SPINE: CPT

## 2024-05-06 PROCEDURE — 83550 IRON BINDING TEST: CPT

## 2024-05-06 PROCEDURE — 82607 VITAMIN B-12: CPT

## 2024-05-06 PROCEDURE — 83540 ASSAY OF IRON: CPT

## 2024-05-06 PROCEDURE — 36415 COLL VENOUS BLD VENIPUNCTURE: CPT

## 2024-05-06 PROCEDURE — 85025 COMPLETE CBC W/AUTO DIFF WBC: CPT

## 2024-05-06 PROCEDURE — 80061 LIPID PANEL: CPT

## 2024-05-31 RX ORDER — ATORVASTATIN CALCIUM 40 MG/1
40 TABLET, FILM COATED ORAL DAILY
Qty: 30 TABLET | Refills: 3 | Status: CANCELLED | OUTPATIENT
Start: 2024-05-31

## 2024-05-31 NOTE — PROGRESS NOTES
[facility]  PATIENT:  Sandra Rodas  MRN:  [id number]  :  1955  DATE OF SERVICE:  6/3/2024  PCP: Sagrario Good DO    Assessment/Plan:     History of stroke  Since her last visit, she does not report any new stroke-like symptoms. Current stroke prevention medications include aspirin 81mg and atorvastatin 20mg. She takes her medication as prescribed and did not endorse any bleeding or bruising issues.  She has not had any recent falls.  She does report having constant daily fatigue since starting the statin medication and would be interested in trying a different one at this time.  LDL cholesterol is still elevated at 97 and with a history of stroke we would like that number closer to 70 or less.  Patient agreeable to try simvastatin and see if she feels better on that medication.  For ongoing stroke prevention continue: aspirin 81mg daily.  Will have her stop atorvastatin 20mg and start simvastatin 20mg to see if it helps with her daytime fatigue  Recommend to check blood pressure occasionally away from the doctor's office to make sure that those numbers are typically less than 130/80.  If they are frequently higher than that, we recommend checking it more frequently and following up with primary care team/cardiologist   Will defer to primary care team for monitoring of cholesterol panel and blood sugar numbers with target LDL cholesterol of less than 70 and hemoglobin A1c less than 7%  Recommend following a low salt, mediterranean diet   Recommend routine physical exercise as tolerated   Avoid using NSAIDs for headaches or other pain and to use tylenol if needed         Diagnoses and all orders for this visit:    Dyslipidemia  -     Lipid Panel with Direct LDL reflex; Future  -     simvastatin (ZOCOR) 20 mg tablet; Take 1 tablet (20 mg total) by mouth daily at bedtime    PFO (patent foramen ovale)    History of stroke         We will plan for her to return to the office  "in 6 months to see myself or Dr. Godfrey, but would be happy to see her sooner if the need should arise.  If she has any symptoms concerning for TIA or stroke including sudden painless loss of vision or double vision, difficulty speaking or swallowing, vertigo/room spinning that does not quickly resolve, or weakness/numbness/loss of coordination affecting 1 side of the face or body, she should proceed by ambulance to the nearest emergency room immediately.     CC:     We had the pleasure of evaluating Sandra in neurological follow-up today. She is a 68 y.o. year-old female who presents today for a follow up after stroke.     History of Present Illness:   9/27/23 by Tati Hammer PA-C. In summary, \"Patient continues to do well following right frontal cortical appearing stroke which occurred in May 2022. She is maintained on ASA and statin. She has had an implantable loop recorder since November 2022 with no Afib found to date. She was also found to have a PFO during initial evaluation and has followed up with cardiology for this. No plans to close PFO at this point. \"   Patient was last seen on 5/3/24 by myself. In summary, \"Since her last visit, she does not report any new stroke-like symptoms. Current stroke prevention medications include Aspirin 81mg and Lipitor 20mg. She takes her medication as prescribed and did not endorse any bleeding or bruising issues.  She has not had any recent falls. She had her loop recorder explanted secondary to pain but there afib was not detected.      She started experiencing dizziness/lightheadedness approximately 3 days ago.  Medication changes: was taking a thyroid supplement (thyroid complex) her physician advised her she doesn't need the supplement and she stopped it 2 weeks ago.    Stress: she works at a [employer] shop and it's prom season so she has been very busy at work.    Diet: Sometimes skips meals because of being busy at work but she tries to eat snacks.       She has " "been experiencing numbness in the medial aspect of her left leg at the knee for about 5 years now.  She does report low back pain.  Denies any weakness of the leg or foot.  She also denies any issues with her bowel or bladder.  She had an x-ray of her lumbar spine on 1/12/2021 which showed mild to moderate multilevel spondee logic degenerative changes of the lumbar spine.  She also notes that she has tight hip flexors and does stretching every morning.\"    Since her last visit, she does not report any new stroke-like symptoms. Current stroke prevention medications include aspirin 81mg and atorvastatin 20mg. She takes her medication as prescribed and did not endorse any bleeding or bruising issues.  She has not had any recent falls.  She does report having constant daily fatigue since starting the statin medication and would be interested in trying a different one at this time.  LDL cholesterol is still elevated at 97 and with a history of stroke we would like that number closer to 70 or less.  Patient agreeable to try simvastatin and see if she feels better on that medication.    She is happy to report she is no longer experiencing any dizziness after titrating down off of her thyroid medication with the help of her family doctor who continues to monitor her lab values.  She has also recently stopped all of her vitamins and supplements to see if that would help.    This numbness along the medial aspect of her left knee which is unchanged.  She denies any new weakness or increasing numbness or tingling in the leg.  She underwent an x-ray of the lumbar spine which did show mild instability with flexion.  She had an MRI of her lumbar spine in 2021 which could be repeated if patient has any new or worsening symptoms.  Patient does not require physical therapy at this time as she is very physically active on her own.      Residual symptoms:    None    Stroke risk factors were evaluated including:     AP/AC therapy: " aspirin 81mg    Statin therapy: atorvastatin 20mg-repeat lipid panel shows elevated LDL. This was likely repeated too soon after starting the medication.    Blood pressure today and as of late: 124/76 today in the office.  Checks blood pressure at home and generally always sees numbers less than 130/80.      Most recent LDL:   Lab Results   Component Value Date    LDLCALC 97 05/06/2024        Most recent hemoglobin A1C:   Lab Results   Component Value Date    HGBA1C 5.6 05/18/2022        Cardiology evaluation/Cardiac Monitoring:Had implanted loop recorder with no evidence of arrhythmias.  Had it explanted 2/2 to pain and discomfort.  PFO with no plans for closure at this time.      Endocrinology evaluation:N/A    Lifestyle history/modifications:    -Diet/Exercise regimen: On her feet at work. She does a 30 min walk usually 1-2x daily. She does some weight lifting as well and stretches.  Well-balance diet.     -PT/OT/ST: none required at the moment.      -Sleep: 7-8 hours per night. Excessive daytime fatigue which is believed to be related to the atorvastatin.  Switching medication to simvastatin to see if this helps.      -MIKE/CPAP Compliance: Denies sleep apnea     -Post-stroke depression/anxiety: none      -Smoking: never     -ETOH: socially      -Illicit drug use: none        Past Medical History:     Past Medical History:   Diagnosis Date    Benign paroxysmal positional vertigo     Resolved: 9/15/2014    Cataracts, bilateral     Fracture of radius        Patient Active Problem List   Diagnosis    Breast cyst    Cervical spondylosis    Chronic allergic conjunctivitis    Dyslipidemia    Hashimoto's disease    Herniated cervical disc    Non-toxic multinodular goiter    Primary osteoarthritis of left hip    Rhinitis    Varicose veins    Elevated TSH    Osteopenia    DDD (degenerative disc disease), lumbar    Left leg numbness    Chronic pain syndrome    Chronic pain of left knee    Chronic left hip pain    History  of stroke    PFO (patent foramen ovale)    Dizziness and giddiness       Medications:     Current Outpatient Medications   Medication Sig Dispense Refill    aspirin 81 mg chewable tablet Chew 1 tablet (81 mg total)  in the morning.  0    simvastatin (ZOCOR) 20 mg tablet Take 1 tablet (20 mg total) by mouth daily at bedtime 30 tablet 3     No current facility-administered medications for this visit.        Allergies:     No Known Allergies    Family History:     Family History   Problem Relation Age of Onset    Stroke Mother         CVA    Breast cancer Mother 66    Alcohol abuse Father     COPD Father     Other Father         Nicotine Abuse    No Known Problems Sister     No Known Problems Daughter     No Known Problems Daughter     Thyroid disease Maternal Grandmother     No Known Problems Maternal Grandfather     No Known Problems Paternal Grandmother     No Known Problems Paternal Grandfather     Thyroid cancer Brother 60    Coronary artery disease Brother     No Known Problems Brother     Breast cancer Maternal Aunt 40    Breast cancer additional onset Maternal Aunt 50    No Known Problems Maternal Aunt     No Known Problems Maternal Aunt     No Known Problems Maternal Aunt        Social History:     Social History     Socioeconomic History    Marital status: Single     Spouse name: Not on file    Number of children: Not on file    Years of education: Not on file    Highest education level: Not on file   Occupational History    Not on file   Tobacco Use    Smoking status: Never    Smokeless tobacco: Never   Vaping Use    Vaping status: Never Used   Substance and Sexual Activity    Alcohol use: Yes     Comment: social    Drug use: No    Sexual activity: Not Currently   Other Topics Concern    Not on file   Social History Narrative    Single, lives alone, part-time employment in [employer]     Social Determinants of Health     Financial Resource Strain: Low Risk  (12/7/2023)    Overall Financial Resource Strain  "(CARDIA)     Difficulty of Paying Living Expenses: Not hard at all   Food Insecurity: No Food Insecurity (5/19/2022)    Hunger Vital Sign     Worried About Running Out of Food in the Last Year: Never true     Ran Out of Food in the Last Year: Never true   Transportation Needs: No Transportation Needs (12/7/2023)    PRAPARE - Transportation     Lack of Transportation (Medical): No     Lack of Transportation (Non-Medical): No   Physical Activity: Not on file   Stress: Not on file   Social Connections: Not on file   Intimate Partner Violence: Not on file   Housing Stability: Low Risk  (5/19/2022)    Housing Stability Vital Sign     Unable to Pay for Housing in the Last Year: No     Number of Places Lived in the Last Year: 1     Unstable Housing in the Last Year: No       Objective:     Physical Exam:    Vitals:  /76 (BP Location: Left arm, Patient Position: Sitting, Cuff Size: Adult)   Pulse 63   Temp 97.6 °F (36.4 °C) (Temporal)   Ht 5' 7\" (1.702 m)   Wt 68.6 kg (151 lb 3.2 oz)   SpO2 98%   BMI 23.68 kg/m²   BP Readings from Last 3 Encounters:   06/03/24 124/76   05/03/24 141/94   04/18/24 128/80     Pulse Readings from Last 3 Encounters:   06/03/24 63   05/03/24 73   04/18/24 82     Constitutional:       Appearance: Normal appearance.   HENT:      Head: Normocephalic and atraumatic.      Nose: Nose normal.      Mouth: Mucous membranes are moist.   Pulmonary:      Effort: Pulmonary effort is normal.    Skin:     General: Skin is warm and dry.   Psychiatric:        Affect normal.   Neurologic Exam     Mental Status   Oriented to person, place, and time.   Speech: speech is normal   Level of consciousness: alert    Cranial Nerves   Cranial nerves II through XII intact.     Motor Exam   Muscle bulk: normal    Strength   Strength 5/5 throughout.     Sensory Exam   Light touch normal.   Decreased temperature and light touch sensation to medial aspect of her left knee which is unchanged from last visit     Gait, " Coordination, and Reflexes     Gait  Gait: normal    Reflexes   Right patellar: 2+  Left patellar: 2+      Pertinent lab results:    Lab Results   Component Value Date/Time    CHOLESTEROL 188 2024 07:48 AM    CHOLESTEROL 276 (H) 2020 10:08 AM     Lab Results   Component Value Date/Time    TRIG 76 2024 07:48 AM    TRIG 97 2020 10:08 AM     Lab Results   Component Value Date/Time    HDL 76 2024 07:48 AM    HDL 83 2020 10:08 AM     Lab Results   Component Value Date/Time    LDLCALC 97 2024 07:48 AM    LDLCALC 177 (H) 2020 10:08 AM        Lab Results   Component Value Date/Time    HGBA1C 5.6 2022 04:24 PM     Lab Results   Component Value Date/Time     2022 04:24 PM        Pertinent Imagin/6/24 XR L-spine bending: No acute osseous abnormality. Degenerative changes as described. There is grade 1 anterolisthesis of L4 on 5 which increases mildly upon flexion concerning for abnormal motion  22 MRI brain: Small acute/early subacute infarct in the posterior right frontal lobe. No acute hemorrhage.   22 CTH:No acute intracranial hemorrhage, mass effect or edema.   22 CTA H&N: 1.  No hemodynamically significant stenosis major arteries of the neck.2.  No major intracranial arterial occlusion.  No evidence of intracranial aneurysm.  21 EMG LLE: Normal study. These electrodiagnostic findings do not provide any evidence to support a focal neuropathy versus radiculopathy in the left lower extremity. In addition, there is no evidence to support a generalized neuropathy affecting the peripheral nervous system.      Review of Systems:     Constitutional:  Positive for fatigue. Negative for appetite change and fever.   HENT: Negative.  Negative for hearing loss, tinnitus, trouble swallowing and voice change.    Eyes: Negative.  Negative for photophobia, pain and visual disturbance.   Respiratory: Negative.  Negative for shortness of breath.     Cardiovascular: Negative.  Negative for palpitations.   Gastrointestinal: Negative.  Negative for nausea and vomiting.   Endocrine: Negative.  Negative for cold intolerance.   Genitourinary: Negative.  Negative for dysuria, frequency and urgency.   Musculoskeletal:  Negative for back pain, gait problem, myalgias, neck pain and neck stiffness.   Skin: Negative.  Negative for rash.   Allergic/Immunologic: Negative.    Neurological: Negative.  Negative for dizziness, tremors, seizures, syncope, facial asymmetry, speech difficulty, weakness, light-headedness, numbness and headaches.   Hematological: Negative.  Does not bruise/bleed easily.   Psychiatric/Behavioral: Negative.  Negative for confusion, hallucinations and sleep disturbance.    All other systems reviewed and are negative.    I have reviewed the ROS as entered by medical assistant.     I have spent a total time of 30 minutes on 06/03/24 in caring for this patient including Diagnostic results, Prognosis, Risks and benefits of tx options, Instructions for management, Patient and family education, Importance of tx compliance, Risk factor reductions, Impressions, Counseling / Coordination of care, Documenting in the medical record, Reviewing / ordering tests, medicine, procedures  , and Obtaining or reviewing history  .       Author:  DB Sy 6/3/2024 8:52 AM

## 2024-05-31 NOTE — PATIENT INSTRUCTIONS
Stroke:  - For ongoing stroke prevention continue: aspirin 81mg and simvastatin 20mg daily.   - Discussed the importance of antiplatelet/anticoagulant management with the patient to prevent future strokes.   - Recommend to check blood pressure occasionally away from the doctor's office to make sure that those numbers are typically less than 130/80.  If they are frequently higher than that, we recommend checking a little more often and to follow up with primary care team.  - Ordered lipid panel for you to complete in 3 months (sept 2024) to check on increase in statin medication on your cholesterol levels.  Will defer to primary care team for future monitoring of cholesterol panel and blood sugar numbers with target LDL cholesterol of less than 70 and hemoglobin A1c less than 7%.  - Recommend following a low salt, mediterranean diet.   - Recommend routine physical exercise as tolerated.  - Avoid using NSAIDs (like ibuprofen, motrin, or aleve) for headaches or other pain and to use tylenol if needed.     We will plan for you to return to the office in 6 months to see myself or MD, but would be happy to see you sooner if the need should arise.  If you have any symptoms concerning for TIA or stroke including: sudden painless loss of vision or double vision, difficulty speaking or swallowing, vertigo/room spinning that does not quickly resolve, or weakness/numbness/loss of coordination affecting 1 side of the face or body, you should proceed by ambulance to the nearest emergency room immediately.

## 2024-06-03 ENCOUNTER — OFFICE VISIT (OUTPATIENT)
Dept: NEUROLOGY | Facility: CLINIC | Age: 69
End: 2024-06-03

## 2024-06-03 VITALS
WEIGHT: 151.2 LBS | HEIGHT: 67 IN | BODY MASS INDEX: 23.73 KG/M2 | TEMPERATURE: 97.6 F | SYSTOLIC BLOOD PRESSURE: 124 MMHG | OXYGEN SATURATION: 98 % | HEART RATE: 63 BPM | DIASTOLIC BLOOD PRESSURE: 76 MMHG

## 2024-06-03 DIAGNOSIS — Q21.12 PFO (PATENT FORAMEN OVALE): ICD-10-CM

## 2024-06-03 DIAGNOSIS — Z86.73 HISTORY OF STROKE: ICD-10-CM

## 2024-06-03 DIAGNOSIS — E78.5 DYSLIPIDEMIA: Primary | ICD-10-CM

## 2024-06-03 RX ORDER — SIMVASTATIN 20 MG
20 TABLET ORAL
Qty: 30 TABLET | Refills: 3 | Status: SHIPPED | OUTPATIENT
Start: 2024-06-03

## 2024-06-03 NOTE — PROGRESS NOTES
Review of Systems   Constitutional:  Positive for fatigue. Negative for appetite change and fever.   HENT: Negative.  Negative for hearing loss, tinnitus, trouble swallowing and voice change.    Eyes: Negative.  Negative for photophobia, pain and visual disturbance.   Respiratory: Negative.  Negative for shortness of breath.    Cardiovascular: Negative.  Negative for palpitations.   Gastrointestinal: Negative.  Negative for nausea and vomiting.   Endocrine: Negative.  Negative for cold intolerance.   Genitourinary: Negative.  Negative for dysuria, frequency and urgency.   Musculoskeletal:  Negative for back pain, gait problem, myalgias, neck pain and neck stiffness.   Skin: Negative.  Negative for rash.   Allergic/Immunologic: Negative.    Neurological: Negative.  Negative for dizziness, tremors, seizures, syncope, facial asymmetry, speech difficulty, weakness, light-headedness, numbness and headaches.   Hematological: Negative.  Does not bruise/bleed easily.   Psychiatric/Behavioral: Negative.  Negative for confusion, hallucinations and sleep disturbance.    All other systems reviewed and are negative.

## 2024-06-03 NOTE — ASSESSMENT & PLAN NOTE
Since her last visit, she does not report any new stroke-like symptoms. Current stroke prevention medications include aspirin 81mg and atorvastatin 20mg. She takes her medication as prescribed and did not endorse any bleeding or bruising issues.  She has not had any recent falls.  She does report having constant daily fatigue since starting the statin medication and would be interested in trying a different one at this time.  LDL cholesterol is still elevated at 97 and with a history of stroke we would like that number closer to 70 or less.  Patient agreeable to try simvastatin and see if she feels better on that medication.  For ongoing stroke prevention continue: aspirin 81mg daily.  Will have her stop atorvastatin 20mg and start simvastatin 20mg to see if it helps with her daytime fatigue  Recommend to check blood pressure occasionally away from the doctor's office to make sure that those numbers are typically less than 130/80.  If they are frequently higher than that, we recommend checking it more frequently and following up with primary care team/cardiologist   Will defer to primary care team for monitoring of cholesterol panel and blood sugar numbers with target LDL cholesterol of less than 70 and hemoglobin A1c less than 7%  Recommend following a low salt, mediterranean diet   Recommend routine physical exercise as tolerated   Avoid using NSAIDs for headaches or other pain and to use tylenol if needed

## 2024-07-03 ENCOUNTER — HOSPITAL ENCOUNTER (OUTPATIENT)
Dept: MAMMOGRAPHY | Facility: IMAGING CENTER | Age: 69
Discharge: HOME | End: 2024-07-03
Payer: MEDICARE

## 2024-07-03 VITALS — HEIGHT: 67 IN | WEIGHT: 146 LBS | BODY MASS INDEX: 22.91 KG/M2

## 2024-07-03 DIAGNOSIS — Z12.31 ENCOUNTER FOR SCREENING MAMMOGRAM FOR MALIGNANT NEOPLASM OF BREAST: ICD-10-CM

## 2024-07-03 PROCEDURE — 77067 SCR MAMMO BI INCL CAD: CPT

## 2024-07-03 PROCEDURE — 77063 BREAST TOMOSYNTHESIS BI: CPT

## 2024-11-14 ENCOUNTER — RESULTS FOLLOW-UP (OUTPATIENT)
Dept: NEUROLOGY | Facility: CLINIC | Age: 69
End: 2024-11-14

## 2024-11-14 ENCOUNTER — APPOINTMENT (OUTPATIENT)
Dept: LAB | Facility: HOSPITAL | Age: 69
End: 2024-11-14
Payer: MEDICARE

## 2024-11-14 DIAGNOSIS — E78.5 DYSLIPIDEMIA: ICD-10-CM

## 2024-11-14 LAB
CHOLEST SERPL-MCNC: 255 MG/DL (ref ?–200)
HDLC SERPL-MCNC: 80 MG/DL
LDLC SERPL CALC-MCNC: 158 MG/DL (ref 0–100)
TRIGL SERPL-MCNC: 86 MG/DL (ref ?–150)

## 2024-11-14 PROCEDURE — 80061 LIPID PANEL: CPT

## 2024-11-14 PROCEDURE — 36415 COLL VENOUS BLD VENIPUNCTURE: CPT

## 2024-11-14 NOTE — RESULT ENCOUNTER NOTE
Keven Flores,    Your LDL cholesterol still remains above goal.  Ultimately it should be around 70 or less and yours is currently 158. I know we recently switched you to simvastatin secondary to fatigue, but you would benefit from a high intensity statin like lipitor (atorvastatin) or crestor (rosuvastatin).  Would you be in agreement to try one of those two medications?

## 2024-12-03 ENCOUNTER — RA CDI HCC (OUTPATIENT)
Dept: OTHER | Facility: HOSPITAL | Age: 69
End: 2024-12-03

## 2024-12-04 ENCOUNTER — OFFICE VISIT (OUTPATIENT)
Dept: NEUROLOGY | Facility: CLINIC | Age: 69
End: 2024-12-04
Payer: MEDICARE

## 2024-12-04 ENCOUNTER — TELEPHONE (OUTPATIENT)
Dept: ADMINISTRATIVE | Facility: OTHER | Age: 69
End: 2024-12-04

## 2024-12-04 VITALS
HEIGHT: 67 IN | HEART RATE: 78 BPM | TEMPERATURE: 97.3 F | WEIGHT: 150.9 LBS | OXYGEN SATURATION: 99 % | SYSTOLIC BLOOD PRESSURE: 126 MMHG | DIASTOLIC BLOOD PRESSURE: 68 MMHG | BODY MASS INDEX: 23.69 KG/M2

## 2024-12-04 DIAGNOSIS — E78.5 DYSLIPIDEMIA: Primary | ICD-10-CM

## 2024-12-04 DIAGNOSIS — Q21.12 PFO (PATENT FORAMEN OVALE): ICD-10-CM

## 2024-12-04 DIAGNOSIS — Z86.73 HISTORY OF STROKE: ICD-10-CM

## 2024-12-04 DIAGNOSIS — M51.369 DDD (DEGENERATIVE DISC DISEASE), LUMBAR: ICD-10-CM

## 2024-12-04 PROCEDURE — 99214 OFFICE O/P EST MOD 30 MIN: CPT

## 2024-12-04 NOTE — PATIENT INSTRUCTIONS
Stroke:    Additional testing:  -none needed at this time  -can consider referral to PT or pain management if you would like for your back pain    Recommendations:  - For ongoing stroke prevention continue: aspirin 81mg recommend the enteric coated   - Discussed the importance of antiplatelet/anticoagulant management with the patient to prevent future strokes.   - Recommend to check blood pressure occasionally away from the doctor's office to make sure that those numbers are typically less than 130/80.  If they are frequently higher than that, we recommend checking a little more often and to follow up with primary care team.  - Will defer to primary care team for future monitoring of cholesterol panel and blood sugar numbers with target LDL cholesterol of less than 70 and hemoglobin A1c less than 7%.  - Recommend following a low salt, mediterranean diet.   - Recommend routine physical exercise as tolerated.  - Avoid using NSAIDs (like ibuprofen, motrin, or aleve) for headaches or other pain and to use tylenol if needed.     We will plan for you to return to the office in 12 months to see myself or MD, but would be happy to see you sooner if the need should arise.  If you have any symptoms concerning for TIA or stroke including: sudden painless loss of vision or double vision, difficulty speaking or swallowing, vertigo/room spinning that does not quickly resolve, or weakness/numbness/loss of coordination affecting 1 side of the face or body, you should proceed by ambulance to the nearest emergency room immediately.

## 2024-12-04 NOTE — ASSESSMENT & PLAN NOTE
She is doing well and denies new strokelike symptoms.  She continues to take aspirin 81mg daily and denies any excessive bruising or bleeding. She noticed some increased acid reflux lately.  She is taking the chewable tablet and tries to take it with food.  Not currently taking the statin medication because it was making her more fatigued and depressed not wanting to do the things she normally likes doing.  She has also tried lipitor in the past and felt bad on that statin as well.  She plans to meet with the nutritionist to see if she can help to lower her cholesterol in a natural way.    Other vascular risk factors other than cholesterol are well managed.   For ongoing stroke prevention continue: aspirin 81mg daily. Recommending enteric coated pill rather than chewable and to continue taking with food and avoiding NSAIDs  Recommend to check blood pressure occasionally away from the doctor's office to make sure that those numbers are typically less than 130/80.  If they are frequently higher than that, we recommend checking it more frequently and following up with primary care team/cardiologist   Will defer to primary care team for monitoring of cholesterol panel and blood sugar numbers with target LDL cholesterol of less than 70 and hemoglobin A1c less than 7%  Recommend following a low salt, mediterranean diet   Recommend routine physical exercise as tolerated

## 2024-12-04 NOTE — ASSESSMENT & PLAN NOTE
She reports pain in her lower back/left hip area.  Denies any radiculopathy.  Denies any weakness in her legs or feet or any problems with her bowel or bladder continence.  She has been doing stretches which are helpful for a little bit but then the pain returns.  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   Patient declined need for PT or pain management at this juncture, as she has done PT in the past for her hip. Informed her to reach out anytime if she wishes to have a referral for either  Aware of red flag symptoms to look out for including new weakness, numbness, tingling, changes in her bowel or bladder function

## 2024-12-04 NOTE — PROGRESS NOTES
[facility]  PATIENT:  Sandra Rodas  MRN:  [id number]  :  1955  DATE OF SERVICE:  2024  PCP: Sagrario Good DO    Assessment/Plan:     History of stroke  She is doing well and denies new strokelike symptoms.  She continues to take aspirin 81mg daily and denies any excessive bruising or bleeding. She noticed some increased acid reflux lately.  She is taking the chewable tablet and tries to take it with food.  Not currently taking the statin medication because it was making her more fatigued and depressed not wanting to do the things she normally likes doing.  She has also tried lipitor in the past and felt bad on that statin as well.  She plans to meet with the nutritionist to see if she can help to lower her cholesterol in a natural way.    Other vascular risk factors other than cholesterol are well managed.   For ongoing stroke prevention continue: aspirin 81mg daily. Recommending enteric coated pill rather than chewable and to continue taking with food and avoiding NSAIDs  Recommend to check blood pressure occasionally away from the doctor's office to make sure that those numbers are typically less than 130/80.  If they are frequently higher than that, we recommend checking it more frequently and following up with primary care team/cardiologist   Will defer to primary care team for monitoring of cholesterol panel and blood sugar numbers with target LDL cholesterol of less than 70 and hemoglobin A1c less than 7%  Recommend following a low salt, mediterranean diet   Recommend routine physical exercise as tolerated     DDD (degenerative disc disease), lumbar  She reports pain in her lower back/left hip area.  Denies any radiculopathy.  Denies any weakness in her legs or feet or any problems with her bowel or bladder continence.  She has been doing stretches which are helpful for a little bit but then the pain returns.  She sits a lot while she is sewing during  "the day and then the pain increases when she stands up and is walking around.   Patient declined need for PT or pain management at this juncture, as she has done PT in the past for her hip. Informed her to reach out anytime if she wishes to have a referral for either  Aware of red flag symptoms to look out for including new weakness, numbness, tingling, changes in her bowel or bladder function         Diagnoses and all orders for this visit:    Dyslipidemia    History of stroke    PFO (patent foramen ovale)    DDD (degenerative disc disease), lumbar         We will plan for her to return to the office in 12 months to see myself or Dr. Godfrey, but would be happy to see her sooner if the need should arise.  If she has any symptoms concerning for TIA or stroke including sudden painless loss of vision or double vision, difficulty speaking or swallowing, vertigo/room spinning that does not quickly resolve, or weakness/numbness/loss of coordination affecting 1 side of the face or body, she should proceed by ambulance to the nearest emergency room immediately.     CC:     We had the pleasure of evaluating Sandra in neurological follow-up today. She is a 69 y.o. year-old female who presents today for a follow up after stroke.     History of Present Illness:   9/27/23 by Tati Hammer PA-C. In summary, \"Patient continues to do well following right frontal cortical appearing stroke which occurred in May 2022. She is maintained on ASA and statin. She has had an implantable loop recorder since November 2022 with no Afib found to date. She was also found to have a PFO during initial evaluation and has followed up with cardiology for this. No plans to close PFO at this point. \"   Patient was last seen on 5/3/24 by myself. In summary, \"Since her last visit, she does not report any new stroke-like symptoms. Current stroke prevention medications include Aspirin 81mg and Lipitor 20mg. She takes her medication as prescribed and did " "not endorse any bleeding or bruising issues.  She has not had any recent falls. She had her loop recorder explanted secondary to pain but there afib was not detected.    She started experiencing dizziness/lightheadedness approximately 3 days ago.  Medication changes: was taking a thyroid supplement (thyroid complex) her physician advised her she doesn't need the supplement and she stopped it 2 weeks ago.    Stress: she works at a [employer] shop and it's prom season so she has been very busy at work.    Diet: Sometimes skips meals because of being busy at work but she tries to eat snacks.     She has been experiencing numbness in the medial aspect of her left leg at the knee for about 5 years now.  She does report low back pain.  Denies any weakness of the leg or foot.  She also denies any issues with her bowel or bladder.  She had an x-ray of her lumbar spine on 1/12/2021 which showed mild to moderate multilevel spondee logic degenerative changes of the lumbar spine.  She also notes that she has tight hip flexors and does stretching every morning.\"  OV with me 6/3/24: Since her last visit, she does not report any new stroke-like symptoms. Current stroke prevention medications include aspirin 81mg and atorvastatin 20mg. She takes her medication as prescribed and did not endorse any bleeding or bruising issues.  She has not had any recent falls.  She does report having constant daily fatigue since starting the statin medication and would be interested in trying a different one at this time.  LDL cholesterol is still elevated at 97 and with a history of stroke we would like that number closer to 70 or less.  Patient agreeable to try simvastatin and see if she feels better on that medication.  She is happy to report she is no longer experiencing any dizziness after titrating down off of her thyroid medication with the help of her family doctor who continues to monitor her lab values.  She has also recently stopped all of " her vitamins and supplements to see if that would help.  This numbness along the medial aspect of her left knee which is unchanged.  She denies any new weakness or increasing numbness or tingling in the leg.  She underwent an x-ray of the lumbar spine which did show mild instability with flexion.  She had an MRI of her lumbar spine in 2021 which could be repeated if patient has any new or worsening symptoms.  Patient does not require physical therapy at this time as she is very physically active on her own.      Interval history as of 12/4/24:  She is doing well and denies new strokelike symptoms.  She continues to take aspirin 81mg daily and denies any excessive bruising or bleeding. She noticed some increased acid reflux lately.  She is taking the chewable tablet and tries to take it with food.  Not currently taking the statin medication because it was making her more fatigued and depressed not wanting to do the things she normally likes doing.  She has also tried lipitor in the past and felt bad on that statin as well.  She plans to meet with the nutritionist to see if she can help to lower her cholesterol in a natural way.    Other vascular risk factors other than cholesterol are well managed.     She reports pain in her lower back/left hip area.  Denies any radiculopathy.  Denies any weakness in her legs or feet or any problems with her bowel or bladder continence.  She has been doing stretches which are helpful for a little bit but then the pain returns.  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      Residual symptoms:    None    Stroke risk factors were evaluated including:     AP/AC therapy: aspirin 81mg    Statin therapy: none- patient cannot tolerate statins    Blood pressure today and as of late: 126/68 today in the office.  Checks blood pressure at home and generally always sees numbers less than 130/80.      Most recent LDL:    Lab Results   Component Value Date    LDLCALC 158 (H) 11/14/2024      Most recent hemoglobin A1C:   Lab Results   Component Value Date    HGBA1C 5.6 05/18/2022      Cardiology evaluation/Cardiac Monitoring: Had implanted loop recorder with no evidence of arrhythmias.  Had it explanted 2/2 to pain and discomfort.  PFO with no plans for closure at this time.      Endocrinology evaluation: N/A    Lifestyle history/modifications:    -Diet/Exercise regimen: On her feet at work. She does a 30 min walk usually 1-2x daily. She does some weight lifting as well and stretches.  Well-balance diet.     -PT/OT/ST: none required at the moment.      -Sleep: 7-8 hours per night. Excessive daytime fatigue which is believed to be related to the atorvastatin.  Switching medication to simvastatin to see if this helps.      -MIKE/CPAP Compliance: Denies sleep apnea     -Post-stroke depression/anxiety: none      -Smoking: never     -ETOH: socially      -Illicit drug use: none      Past Medical History:     Past Medical History:   Diagnosis Date    Benign paroxysmal positional vertigo     Resolved: 9/15/2014    Cataracts, bilateral     Fracture of radius      Patient Active Problem List   Diagnosis    Breast cyst    Cervical spondylosis    Chronic allergic conjunctivitis    Dyslipidemia    Hashimoto's disease    Herniated cervical disc    Non-toxic multinodular goiter    Primary osteoarthritis of left hip    Rhinitis    Varicose veins    Elevated TSH    Osteopenia    DDD (degenerative disc disease), lumbar    Left leg numbness    Chronic pain syndrome    Chronic pain of left knee    Chronic left hip pain    History of stroke    PFO (patent foramen ovale)    Dizziness and giddiness     Medications:     Current Outpatient Medications   Medication Sig Dispense Refill    simvastatin (ZOCOR) 20 mg tablet Take 1 tablet (20 mg total) by mouth daily at bedtime (Patient not taking: Reported on 12/4/2024) 30 tablet 3     No current  facility-administered medications for this visit.      Allergies:     No Known Allergies    Family History:     Family History   Problem Relation Age of Onset    Stroke Mother         CVA    Breast cancer Mother 66    Alcohol abuse Father     COPD Father     Other Father         Nicotine Abuse    No Known Problems Sister     No Known Problems Daughter     No Known Problems Daughter     Thyroid disease Maternal Grandmother     No Known Problems Maternal Grandfather     No Known Problems Paternal Grandmother     No Known Problems Paternal Grandfather     Thyroid cancer Brother 60    Coronary artery disease Brother     No Known Problems Brother     Breast cancer Maternal Aunt 40    Breast cancer additional onset Maternal Aunt 50    No Known Problems Maternal Aunt     No Known Problems Maternal Aunt     No Known Problems Maternal Aunt      Social History:     Social History     Socioeconomic History    Marital status: Single     Spouse name: Not on file    Number of children: Not on file    Years of education: Not on file    Highest education level: Not on file   Occupational History    Not on file   Tobacco Use    Smoking status: Never    Smokeless tobacco: Never   Vaping Use    Vaping status: Never Used   Substance and Sexual Activity    Alcohol use: Yes     Comment: social    Drug use: No    Sexual activity: Not Currently   Other Topics Concern    Not on file   Social History Narrative    Single, lives alone, part-time employment in [employer]     Social Drivers of Health     Financial Resource Strain: Low Risk  (12/7/2023)    Overall Financial Resource Strain (CARDIA)     Difficulty of Paying Living Expenses: Not hard at all   Food Insecurity: No Food Insecurity (12/2/2024)    Hunger Vital Sign     Worried About Running Out of Food in the Last Year: Never true     Ran Out of Food in the Last Year: Never true   Transportation Needs: No Transportation Needs (12/2/2024)    PRAPARE - Transportation     Lack of  "Transportation (Medical): No     Lack of Transportation (Non-Medical): No   Physical Activity: Not on file   Stress: Not on file   Social Connections: Not on file   Intimate Partner Violence: Not on file   Housing Stability: Low Risk  (12/2/2024)    Housing Stability Vital Sign     Unable to Pay for Housing in the Last Year: No     Number of Times Moved in the Last Year: 0     Homeless in the Last Year: No     Objective:     Physical Exam:    Vitals:  /68 (BP Location: Left arm, Patient Position: Sitting, Cuff Size: Adult)   Pulse 78   Temp (!) 97.3 °F (36.3 °C) (Temporal)   Ht 5' 7\" (1.702 m)   Wt 68.4 kg (150 lb 14.4 oz)   SpO2 99%   BMI 23.63 kg/m²   BP Readings from Last 3 Encounters:   12/04/24 126/68   06/03/24 124/76   05/03/24 141/94     Pulse Readings from Last 3 Encounters:   12/04/24 78   06/03/24 63   05/03/24 73     Constitutional:       Appearance: Normal appearance.   HENT:      Head: Normocephalic and atraumatic.      Nose: Nose normal.      Mouth: Mucous membranes are moist.   Pulmonary:      Effort: Pulmonary effort is normal.    Skin:     General: Skin is warm and dry.   Psychiatric:        Affect normal.   On neurological examination the patient was awake, alert, attentive, oriented to person, place, and time. Recent and remote memory intact to conversation with no evidence of language dysfunction. Satisfactory fund of knowledge. Normal attention span and concentration.  Mood, affect and judgement are appropriate. Speech is fluent without dysarthria or aphasia. Face appears symmetric, with no obvious weakness noted.  Audition is intact to casual conversation.  Eye movements are intact.  Able to move bilateral upper extremities antigravity without difficulty.      Pertinent lab results:    Lab Results   Component Value Date/Time    CHOLESTEROL 255 (H) 11/14/2024 08:20 AM    CHOLESTEROL 276 (H) 09/17/2020 10:08 AM     Lab Results   Component Value Date/Time    TRIG 86 11/14/2024 08:20 " AM    TRIG 97 2020 10:08 AM     Lab Results   Component Value Date/Time    HDL 80 2024 08:20 AM    HDL 83 2020 10:08 AM     Lab Results   Component Value Date/Time    LDLCALC 158 (H) 2024 08:20 AM    LDLCALC 177 (H) 2020 10:08 AM      Lab Results   Component Value Date/Time    HGBA1C 5.6 2022 04:24 PM     Lab Results   Component Value Date/Time     2022 04:24 PM      Pertinent Imagin/6/24 XR L-spine bending: No acute osseous abnormality. Degenerative changes as described. There is grade 1 anterolisthesis of L4 on 5 which increases mildly upon flexion concerning for abnormal motion  22 MRI brain: Small acute/early subacute infarct in the posterior right frontal lobe. No acute hemorrhage.   22 CTH:No acute intracranial hemorrhage, mass effect or edema.   22 CTA H&N: 1.  No hemodynamically significant stenosis major arteries of the neck.2.  No major intracranial arterial occlusion.  No evidence of intracranial aneurysm.  21 EMG LLE: Normal study. These electrodiagnostic findings do not provide any evidence to support a focal neuropathy versus radiculopathy in the left lower extremity. In addition, there is no evidence to support a generalized neuropathy affecting the peripheral nervous system.      Review of Systems:   Constitutional:  Negative for appetite change, fatigue and fever.   HENT: Negative.  Negative for hearing loss, tinnitus, trouble swallowing and voice change.    Eyes: Negative.  Negative for photophobia, pain and visual disturbance.   Respiratory: Negative.  Negative for shortness of breath.    Cardiovascular: Negative.  Negative for palpitations.   Gastrointestinal: Negative.  Negative for nausea and vomiting.   Endocrine: Negative.  Negative for cold intolerance.   Genitourinary: Negative.  Negative for dysuria, frequency and urgency.   Musculoskeletal:  Negative for back pain, gait problem, myalgias, neck pain and neck  stiffness.   Skin: Negative.  Negative for rash.   Allergic/Immunologic: Negative.    Neurological: Negative.  Negative for dizziness, tremors, seizures, syncope, facial asymmetry, speech difficulty, weakness, light-headedness, numbness and headaches.   Hematological: Negative.  Does not bruise/bleed easily.   Psychiatric/Behavioral: Negative.  Negative for confusion, hallucinations and sleep disturbance.    All other systems reviewed and are negative.    I have reviewed the ROS as entered by medical assistant.     I have spent a total time of 30 minutes on 12/04/24 in caring for this patient including Diagnostic results, Prognosis, Risks and benefits of tx options, Instructions for management, Patient and family education, Importance of tx compliance, Risk factor reductions, Impressions, Counseling / Coordination of care, Documenting in the medical record, Reviewing / ordering tests, medicine, procedures  , and Obtaining or reviewing history  .       Author:  DB Sy 12/4/2024 9:03 AM

## 2024-12-04 NOTE — TELEPHONE ENCOUNTER
12/04/24 11:16 AM    Patient contacted to bring Advance Directive, POLST, or Living Will document to next scheduled pcp visit.[facility] Department left message.    Thank you.  Silverio Whiting MA  [facility]

## 2024-12-09 ENCOUNTER — OFFICE VISIT (OUTPATIENT)
Dept: FAMILY MEDICINE CLINIC | Facility: HOSPITAL | Age: 69
End: 2024-12-09
Payer: MEDICARE

## 2024-12-09 VITALS
DIASTOLIC BLOOD PRESSURE: 88 MMHG | BODY MASS INDEX: 23.54 KG/M2 | HEART RATE: 83 BPM | SYSTOLIC BLOOD PRESSURE: 140 MMHG | WEIGHT: 150 LBS | OXYGEN SATURATION: 98 % | HEIGHT: 67 IN | TEMPERATURE: 97.1 F

## 2024-12-09 DIAGNOSIS — R79.89 ELEVATED TSH: ICD-10-CM

## 2024-12-09 DIAGNOSIS — Z86.73 HISTORY OF STROKE: ICD-10-CM

## 2024-12-09 DIAGNOSIS — M85.80 OTHER SPECIFIED DISORDERS OF BONE DENSITY AND STRUCTURE, UNSPECIFIED SITE: ICD-10-CM

## 2024-12-09 DIAGNOSIS — E78.5 DYSLIPIDEMIA: Primary | ICD-10-CM

## 2024-12-09 DIAGNOSIS — Z00.00 MEDICARE ANNUAL WELLNESS VISIT, SUBSEQUENT: ICD-10-CM

## 2024-12-09 DIAGNOSIS — E53.8 DISORDER OF VITAMIN B12: ICD-10-CM

## 2024-12-09 DIAGNOSIS — R03.0 ELEVATED BLOOD PRESSURE READING IN OFFICE WITHOUT DIAGNOSIS OF HYPERTENSION: ICD-10-CM

## 2024-12-09 PROCEDURE — 99214 OFFICE O/P EST MOD 30 MIN: CPT | Performed by: INTERNAL MEDICINE

## 2024-12-09 PROCEDURE — G0438 PPPS, INITIAL VISIT: HCPCS | Performed by: INTERNAL MEDICINE

## 2024-12-09 RX ORDER — ASPIRIN 81 MG/1
81 TABLET, CHEWABLE ORAL DAILY
COMMUNITY

## 2024-12-09 NOTE — PROGRESS NOTES
Name: Sandra Rodas      : 1955      MRN: [id number]  Encounter Provider: Sagrario Good DO  Encounter Date: 2024   Encounter department: [facility] PRIMARY CARE [address]     Assessment & Plan  Dyslipidemia  TC and LDL jumped  back up with pt stopping statin d/t SE (fatigue and depressed mood), she was given info on Mediterranean diet by Neuro, diet and regular exercise encouraged, recheck FLP with  labs in /Feb, risk of recurrent stroke or CV event reviewed - con't to refuse statin - call if she changes her mind and is agreeable to try a different statin  Orders:    Comprehensive metabolic panel    Lipid panel    TSH, 3rd generation with Free T4 reflex    CBC and differential    Vitamin B12    Vitamin D 25 hydroxy; Future    History of stroke  Just saw Neuro, on ASA but UTT statin despite review of benefit with h/o stroke and statin medications, healthy diet and regular exercise encouraged, will follow  Orders:    Comprehensive metabolic panel    Lipid panel    TSH, 3rd generation with Free T4 reflex    CBC and differential    Vitamin B12    Vitamin D 25 hydroxy; Future    Elevated blood pressure reading in office without diagnosis of hypertension  BP in HTN range today, she is stressed with moving in with BF and the holidays, con't off BP meds for now, re-eval in 3 mos - may have to start BP meds if con't to be elevated, healthy diet and regular exercise encouraged, will follow  Orders:    Comprehensive metabolic panel    Lipid panel    TSH, 3rd generation with Free T4 reflex    CBC and differential    Vitamin B12    Vitamin D 25 hydroxy; Future    Elevated TSH  Check labs in the new year - BW order given, will follow off thyroid meds for now  Orders:    Comprehensive metabolic panel    Lipid panel    TSH, 3rd generation with Free T4 reflex    CBC and differential    Vitamin B12    Vitamin D 25 hydroxy; Future    Other specified disorders of bone density and structure,  Verified verbally with pcp if he wanted to resent doxycyline and medrol dosepak, verbal okay from pcp to sent these scripts. Scripts sent to patients pharmacy. Pt called and informed of scripts being sent.    unspecified site  Osteopenia on Dexa from 202 - check Vit D levels with next labs, currently off all supplements, will follow  Orders:    Vitamin D 25 hydroxy; Future    Disorder of vitamin B12  Last labs showed elevated B12 levels, all supplements stopped, check levels with labs in the new year - will follow  Orders:    Vitamin B12    Medicare annual wellness visit, subsequent           Depression Screening and Follow-up Plan: Patient was screened for depression during today's encounter. They screened negative with a PHQ-2 score of 0.      Preventive health issues were discussed with patient, and age appropriate screening tests were ordered as noted in patient's After Visit Summary. Personalized health advice and appropriate referrals for health education or preventive services given if needed, as noted in patient's After Visit Summary.    Cologuard 10/22 - 3 yrs    Mammo 7/24    Dexa 11/20 - osteopenia - recheck with mammo in the summer of 2025    BW 5/24  FLP 11/24    Deferring vaccinations today      History of Present Illness     HPI Pt here for follow up appt/BW results and AWV    BW results were d/w pt in detail: FLP with  and , HDL and TG were at goal.  Goal FLP was d/w pt in detail.  Diet/exercise reviewed with pt.  She is trying to watch diet more and is exercising.  She has stopped taking her Simvastatin daily.  She notes no stroke/TIA symptoms/CP.     Pt saw Neuro NP (Jo-Ann Oro) last week for f/u h/o stroke - OV note reviewed.  She remains on ASA but stopped taking her cholesterol medication d/t increase in fatigue and depresion.     BP up today. She feels this is d/t just working out prior to appt. She does check her BP at home and reports readings 110-120's/60's.  She denies frequent HA's/dizziness/double vision/CP.      Patient Care Team:  Sagrario Good DO as PCP - General (Internal Medicine)  Mara Antonio MD as PCP - Endocrinology (Endocrinology)  Juan Godfrey MD  (Neurology)    Review of Systems   Constitutional:  Negative for chills and fever.   HENT:  Negative for congestion, hearing loss and trouble swallowing.    Eyes:  Negative for pain and visual disturbance.   Respiratory:  Negative for cough, shortness of breath and wheezing.    Cardiovascular:  Negative for chest pain, palpitations and leg swelling.   Gastrointestinal:  Negative for abdominal pain, blood in stool, constipation, diarrhea, nausea and vomiting.   Genitourinary:  Negative for difficulty urinating, dysuria, vaginal bleeding and vaginal pain.   Musculoskeletal:  Negative for back pain and neck pain.   Skin:  Negative for rash and wound.   Neurological:  Negative for dizziness and headaches.   Hematological:  Does not bruise/bleed easily.   Psychiatric/Behavioral:  Negative for confusion. The patient is nervous/anxious.      Medical History Reviewed by provider this encounter:  Tobacco  Allergies  Meds  Problems  Med Hx  Surg Hx  Fam Hx       Annual Wellness Visit Questionnaire   Sandra is here for her Subsequent Wellness visit. Last Medicare Wellness visit information reviewed, patient interviewed and updates made to the record today.      Health Risk Assessment:   Patient rates overall health as very good. Patient feels that their physical health rating is much better. Patient is very satisfied with their life. Eyesight was rated as same. Hearing was rated as same. Patient feels that their emotional and mental health rating is much better. Patients states they are never, rarely angry. Patient states they are never, rarely unusually tired/fatigued. Pain experienced in the last 7 days has been some. Patient's pain rating has been 2/10. Patient states that she has experienced no weight loss or gain in last 6 months.     Depression Screening:   PHQ-2 Score: 0      Fall Risk Screening:   In the past year, patient has experienced: no history of falling in past year      Urinary Incontinence Screening:    Patient has not leaked urine accidently in the last six months.     Home Safety:  Patient does not have trouble with stairs inside or outside of their home. Patient has working smoke alarms and has no working carbon monoxide detector. Home safety hazards include: none.     Nutrition:   Current diet is Regular, Low Cholesterol, Low Saturated Fat, Low Carb and Limited junk food.     Medications:   Patient is not currently taking any over-the-counter supplements. Patient is able to manage medications.     Activities of Daily Living (ADLs)/Instrumental Activities of Daily Living (IADLs):   Walk and transfer into and out of bed and chair?: Yes  Dress and groom yourself?: Yes    Bathe or shower yourself?: Yes    Feed yourself? Yes  Do your laundry/housekeeping?: Yes  Manage your money, pay your bills and track your expenses?: Yes  Make your own meals?: Yes    Do your own shopping?: Yes    Previous Hospitalizations:   Any hospitalizations or ED visits within the last 12 months?: No      Advance Care Planning:   Living will: Yes    Durable POA for healthcare: Yes    Advanced directive: Yes      Cognitive Screening:   Provider or family/friend/caregiver concerned regarding cognition?: No    PREVENTIVE SCREENINGS      Cardiovascular Screening:    General: Screening Current, Risks and Benefits Discussed and History Lipid Disorder      Diabetes Screening:     General: Screening Current and Risks and Benefits Discussed      Colorectal Cancer Screening:     General: Screening Current      Breast Cancer Screening:     General: Screening Current and Risks and Benefits Discussed      Cervical Cancer Screening:    General: Screening Not Indicated and Risks and Benefits Discussed      Osteoporosis Screening:    General: Risks and Benefits Discussed    Due for: DXA Axial      Abdominal Aortic Aneurysm (AAA) Screening:        General: Risks and Benefits Discussed      Lung Cancer Screening:     General: Screening Not Indicated and  Risks and Benefits Discussed      Hepatitis C Screening:    General: Screening Current and Risks and Benefits Discussed    Screening, Brief Intervention, and Referral to Treatment (SBIRT)    Screening  Typical number of drinks in a day: 0  Typical number of drinks in a week: 2  Interpretation: Low risk drinking behavior.    AUDIT-C Screenin) How often did you have a drink containing alcohol in the past year? monthly or less  2) How many drinks did you have on a typical day when you were drinking in the past year? 1 to 2  3) How often did you have 6 or more drinks on one occasion in the past year? never    AUDIT-C Score: 1  Interpretation: Score 0-2 (female): Negative screen for alcohol misuse    Single Item Drug Screening:  How often have you used an illegal drug (including marijuana) or a prescription medication for non-medical reasons in the past year? never    Single Item Drug Screen Score: 0  Interpretation: Negative screen for possible drug use disorder    Other Counseling Topics:   Car/seat belt/driving safety and regular weightbearing exercise and calcium and vitamin D intake.     Social Drivers of Health     Financial Resource Strain: Low Risk  (2023)    Overall Financial Resource Strain (CARDIA)     Difficulty of Paying Living Expenses: Not hard at all   Food Insecurity: No Food Insecurity (2024)    Hunger Vital Sign     Worried About Running Out of Food in the Last Year: Never true     Ran Out of Food in the Last Year: Never true   Transportation Needs: No Transportation Needs (2024)    PRAPARE - Transportation     Lack of Transportation (Medical): No     Lack of Transportation (Non-Medical): No   Housing Stability: Low Risk  (2024)    Housing Stability Vital Sign     Unable to Pay for Housing in the Last Year: No     Number of Times Moved in the Last Year: 0     Homeless in the Last Year: No   Utilities: Not At Risk (2024)    [facility] Utilities     Threatened with loss of  "utilities: No     Vision Screening    Right eye Left eye Both eyes   Without correction 20/25 20/30 20/25   With correction          Objective   /88   Pulse 83   Temp (!) 97.1 °F (36.2 °C) (Tympanic)   Ht 5' 7\" (1.702 m)   Wt 68 kg (150 lb)   SpO2 98%   BMI 23.49 kg/m²     Physical Exam  Vitals and nursing note reviewed.   Constitutional:       General: She is not in acute distress.     Appearance: She is well-developed. She is not ill-appearing.   HENT:      Head: Normocephalic and atraumatic.      Right Ear: Tympanic membrane and external ear normal. There is no impacted cerumen.      Left Ear: Tympanic membrane and external ear normal. There is no impacted cerumen.      Mouth/Throat:      Mouth: Mucous membranes are moist.      Pharynx: Oropharynx is clear. No oropharyngeal exudate.   Eyes:      General:         Right eye: No discharge.         Left eye: No discharge.      Conjunctiva/sclera: Conjunctivae normal.   Neck:      Thyroid: No thyromegaly.      Vascular: No carotid bruit.      Trachea: No tracheal deviation.   Cardiovascular:      Rate and Rhythm: Normal rate and regular rhythm.      Heart sounds: Normal heart sounds. No murmur heard.  Pulmonary:      Effort: Pulmonary effort is normal. No respiratory distress.      Breath sounds: Normal breath sounds. No wheezing, rhonchi or rales.   Abdominal:      General: There is no distension.      Palpations: Abdomen is soft.      Tenderness: There is no abdominal tenderness. There is no guarding or rebound.   Musculoskeletal:         General: No deformity or signs of injury.      Cervical back: Neck supple.   Lymphadenopathy:      Cervical: No cervical adenopathy.   Skin:     General: Skin is warm and dry.      Coloration: Skin is not pale.      Findings: No bruising or rash.   Neurological:      General: No focal deficit present.      Mental Status: She is alert. Mental status is at baseline.      Motor: No abnormal muscle tone.      Gait: Gait " normal.   Psychiatric:         Mood and Affect: Mood normal.         Behavior: Behavior normal.

## 2024-12-09 NOTE — ASSESSMENT & PLAN NOTE
TC and LDL jumped  back up with pt stopping statin d/t SE (fatigue and depressed mood), she was given info on Mediterranean diet by Neuro, diet and regular exercise encouraged, recheck FLP with  labs in Jan/Feb, risk of recurrent stroke or CV event reviewed - con't to refuse statin - call if she changes her mind and is agreeable to try a different statin  Orders:    Comprehensive metabolic panel    Lipid panel    TSH, 3rd generation with Free T4 reflex    CBC and differential    Vitamin B12    Vitamin D 25 hydroxy; Future

## 2024-12-09 NOTE — ASSESSMENT & PLAN NOTE
Check labs in the new year - BW order given, will follow off thyroid meds for now  Orders:    Comprehensive metabolic panel    Lipid panel    TSH, 3rd generation with Free T4 reflex    CBC and differential    Vitamin B12    Vitamin D 25 hydroxy; Future

## 2024-12-09 NOTE — ASSESSMENT & PLAN NOTE
Just saw Neuro, on ASA but UTT statin despite review of benefit with h/o stroke and statin medications, healthy diet and regular exercise encouraged, will follow  Orders:    Comprehensive metabolic panel    Lipid panel    TSH, 3rd generation with Free T4 reflex    CBC and differential    Vitamin B12    Vitamin D 25 hydroxy; Future

## 2024-12-09 NOTE — PATIENT INSTRUCTIONS
Medicare Preventive Visit Patient Instructions  Thank you for completing your Welcome to Medicare Visit or Medicare Annual Wellness Visit today. Your next wellness visit will be due in one year (12/10/2025).  The screening/preventive services that you may require over the next 5-10 years are detailed below. Some tests may not apply to you based off risk factors and/or age. Screening tests ordered at today's visit but not completed yet may show as past due. Also, please note that scanned in results may not display below.  Preventive Screenings:  Service Recommendations Previous Testing/Comments   Colorectal Cancer Screening  * Colonoscopy    * Fecal Occult Blood Test (FOBT)/Fecal Immunochemical Test (FIT)  * Fecal DNA/Cologuard Test  * Flexible Sigmoidoscopy Age: 45-75 years old   Colonoscopy: every 10 years (may be performed more frequently if at higher risk)  OR  FOBT/FIT: every 1 year  OR  Cologuard: every 3 years  OR  Sigmoidoscopy: every 5 years  Screening may be recommended earlier than age 45 if at higher risk for colorectal cancer. Also, an individualized decision between you and your healthcare provider will decide whether screening between the ages of 76-85 would be appropriate. Colonoscopy: Not on file  FOBT/FIT: Not on file  Cologuard: 10/04/2022  Sigmoidoscopy: Not on file    Screening Current     Breast Cancer Screening Age: 40+ years old  Frequency: every 1-2 years  Not required if history of left and right mastectomy Mammogram: 07/03/2024    Screening Current   Cervical Cancer Screening Between the ages of 21-29, pap smear recommended once every 3 years.   Between the ages of 30-65, can perform pap smear with HPV co-testing every 5 years.   Recommendations may differ for women with a history of total hysterectomy, cervical cancer, or abnormal pap smears in past. Pap Smear: 07/22/2019    Screening Not Indicated   Hepatitis C Screening Once for adults born between 1945 and 1965  More frequently in  patients at high risk for Hepatitis C Hep C Antibody: 09/17/2020    Screening Current   Diabetes Screening 1-2 times per year if you're at risk for diabetes or have pre-diabetes Fasting glucose: 92 mg/dL (8/24/2023)  A1C: 5.6 % (5/18/2022)  Screening Current   Cholesterol Screening Once every 5 years if you don't have a lipid disorder. May order more often based on risk factors. Lipid panel: 11/14/2024    Screening Current     Other Preventive Screenings Covered by Medicare:  Abdominal Aortic Aneurysm (AAA) Screening: covered once if your at risk. You're considered to be at risk if you have a family history of AAA.  Lung Cancer Screening: covers low dose CT scan once per year if you meet all of the following conditions: (1) Age 55-77; (2) No signs or symptoms of lung cancer; (3) Current smoker or have quit smoking within the last 15 years; (4) You have a tobacco smoking history of at least 20 pack years (packs per day multiplied by number of years you smoked); (5) You get a written order from a healthcare provider.  Glaucoma Screening: covered annually if you're considered high risk: (1) You have diabetes OR (2) Family history of glaucoma OR (3)  aged 50 and older OR (4)  American aged 65 and older  Osteoporosis Screening: covered every 2 years if you meet one of the following conditions: (1) You're estrogen deficient and at risk for osteoporosis based off medical history and other findings; (2) Have a vertebral abnormality; (3) On glucocorticoid therapy for more than 3 months; (4) Have primary hyperparathyroidism; (5) On osteoporosis medications and need to assess response to drug therapy.   Last bone density test (DXA Scan): 11/17/2020.  HIV Screening: covered annually if you're between the age of 15-65. Also covered annually if you are younger than 15 and older than 65 with risk factors for HIV infection. For pregnant patients, it is covered up to 3 times per  pregnancy.    Immunizations:  Immunization Recommendations   Influenza Vaccine Annual influenza vaccination during flu season is recommended for all persons aged >= 6 months who do not have contraindications   Pneumococcal Vaccine   * Pneumococcal conjugate vaccine = PCV13 (Prevnar 13), PCV15 (Vaxneuvance), PCV20 (Prevnar 20)  * Pneumococcal polysaccharide vaccine = PPSV23 (Pneumovax) Adults 19-65 yo with certain risk factors or if 65+ yo  If never received any pneumonia vaccine: recommend Prevnar 20 (PCV20)  Give PCV20 if previously received 1 dose of PCV13 or PPSV23   Hepatitis B Vaccine 3 dose series if at intermediate or high risk (ex: diabetes, end stage renal disease, liver disease)   Respiratory syncytial virus (RSV) Vaccine - COVERED BY MEDICARE PART D  * RSVPreF3 (Arexvy) CDC recommends that adults 60 years of age and older may receive a single dose of RSV vaccine using shared clinical decision-making (SCDM)   Tetanus (Td) Vaccine - COST NOT COVERED BY MEDICARE PART B Following completion of primary series, a booster dose should be given every 10 years to maintain immunity against tetanus. Td may also be given as tetanus wound prophylaxis.   Tdap Vaccine - COST NOT COVERED BY MEDICARE PART B Recommended at least once for all adults. For pregnant patients, recommended with each pregnancy.   Shingles Vaccine (Shingrix) - COST NOT COVERED BY MEDICARE PART B  2 shot series recommended in those 19 years and older who have or will have weakened immune systems or those 50 years and older     Health Maintenance Due:      Topic Date Due   • DXA SCAN  11/17/2022   • Breast Cancer Screening: Mammogram  07/03/2025   • Colorectal Cancer Screening  10/04/2025   • Hepatitis C Screening  Completed   • Cervical Cancer Screening  Discontinued     Immunizations Due:      Topic Date Due   • Pneumococcal Vaccine: 65+ Years (1 of 1 - PCV) Never done   • Influenza Vaccine (1) Never done   • COVID-19 Vaccine (4 - 2024-25 season)  09/01/2024     Advance Directives   What are advance directives?  Advance directives are legal documents that state your wishes and plans for medical care. These plans are made ahead of time in case you lose your ability to make decisions for yourself. Advance directives can apply to any medical decision, such as the treatments you want, and if you want to donate organs.   What are the types of advance directives?  There are many types of advance directives, and each state has rules about how to use them. You may choose a combination of any of the following:  Living will:  This is a written record of the treatment you want. You can also choose which treatments you do not want, which to limit, and which to stop at a certain time. This includes surgery, medicine, IV fluid, and tube feedings.   Durable power of  for healthcare (DPAHC):  This is a written record that states who you want to make healthcare choices for you when you are unable to make them for yourself. This person, called a proxy, is usually a family member or a friend. You may choose more than 1 proxy.  Do not resuscitate (DNR) order:  A DNR order is used in case your heart stops beating or you stop breathing. It is a request not to have certain forms of treatment, such as CPR. A DNR order may be included in other types of advance directives.  Medical directive:  This covers the care that you want if you are in a coma, near death, or unable to make decisions for yourself. You can list the treatments you want for each condition. Treatment may include pain medicine, surgery, blood transfusions, dialysis, IV or tube feedings, and a ventilator (breathing machine).  Values history:  This document has questions about your views, beliefs, and how you feel and think about life. This information can help others choose the care that you would choose.  Why are advance directives important?  An advance directive helps you control your care. Although spoken  wishes may be used, it is better to have your wishes written down. Spoken wishes can be misunderstood, or not followed. Treatments may be given even if you do not want them. An advance directive may make it easier for your family to make difficult choices about your care.       © Copyright [employer] 2018 Information is for End User's use only and may not be sold, redistributed or otherwise used for commercial purposes. All illustrations and images included in CareNotes® are the copyrighted property of [employer].D.A[employer]M., Inc[employer] or [employer]

## 2025-02-24 ENCOUNTER — APPOINTMENT (OUTPATIENT)
Dept: LAB | Facility: HOSPITAL | Age: 70
End: 2025-02-24
Payer: MEDICARE

## 2025-02-24 DIAGNOSIS — E78.5 DYSLIPIDEMIA: ICD-10-CM

## 2025-02-24 DIAGNOSIS — R79.89 ELEVATED TSH: ICD-10-CM

## 2025-02-24 DIAGNOSIS — R03.0 ELEVATED BLOOD PRESSURE READING IN OFFICE WITHOUT DIAGNOSIS OF HYPERTENSION: ICD-10-CM

## 2025-02-24 DIAGNOSIS — Z86.73 HISTORY OF STROKE: ICD-10-CM

## 2025-02-24 DIAGNOSIS — M85.80 OTHER SPECIFIED DISORDERS OF BONE DENSITY AND STRUCTURE, UNSPECIFIED SITE: ICD-10-CM

## 2025-02-24 LAB
25(OH)D3 SERPL-MCNC: 34.2 NG/ML (ref 30–100)
ALBUMIN SERPL BCG-MCNC: 4.3 G/DL (ref 3.5–5)
ALP SERPL-CCNC: 97 U/L (ref 34–104)
ALT SERPL W P-5'-P-CCNC: 11 U/L (ref 7–52)
ANION GAP SERPL CALCULATED.3IONS-SCNC: 8 MMOL/L (ref 4–13)
AST SERPL W P-5'-P-CCNC: 36 U/L (ref 13–39)
BASOPHILS # BLD AUTO: 0.05 THOUSANDS/ÂΜL (ref 0–0.1)
BASOPHILS NFR BLD AUTO: 1 % (ref 0–1)
BILIRUB SERPL-MCNC: 0.57 MG/DL (ref 0.2–1)
BUN SERPL-MCNC: 14 MG/DL (ref 5–25)
CALCIUM SERPL-MCNC: 9.6 MG/DL (ref 8.4–10.2)
CHLORIDE SERPL-SCNC: 105 MMOL/L (ref 96–108)
CHOLEST SERPL-MCNC: 233 MG/DL (ref ?–200)
CO2 SERPL-SCNC: 26 MMOL/L (ref 21–32)
CREAT SERPL-MCNC: 0.79 MG/DL (ref 0.6–1.3)
EOSINOPHIL # BLD AUTO: 0.11 THOUSAND/ÂΜL (ref 0–0.61)
EOSINOPHIL NFR BLD AUTO: 2 % (ref 0–6)
ERYTHROCYTE [DISTWIDTH] IN BLOOD BY AUTOMATED COUNT: 13.4 % (ref 11.6–15.1)
GFR SERPL CREATININE-BSD FRML MDRD: 77 ML/MIN/1.73SQ M
GLUCOSE P FAST SERPL-MCNC: 92 MG/DL (ref 65–99)
HCT VFR BLD AUTO: 40.5 % (ref 34.8–46.1)
HDLC SERPL-MCNC: 80 MG/DL
HGB BLD-MCNC: 12.9 G/DL (ref 11.5–15.4)
IMM GRANULOCYTES # BLD AUTO: 0.01 THOUSAND/UL (ref 0–0.2)
IMM GRANULOCYTES NFR BLD AUTO: 0 % (ref 0–2)
LDLC SERPL CALC-MCNC: 140 MG/DL (ref 0–100)
LYMPHOCYTES # BLD AUTO: 1.95 THOUSANDS/ÂΜL (ref 0.6–4.47)
LYMPHOCYTES NFR BLD AUTO: 41 % (ref 14–44)
MCH RBC QN AUTO: 29.2 PG (ref 26.8–34.3)
MCHC RBC AUTO-ENTMCNC: 31.9 G/DL (ref 31.4–37.4)
MCV RBC AUTO: 92 FL (ref 82–98)
MONOCYTES # BLD AUTO: 0.44 THOUSAND/ÂΜL (ref 0.17–1.22)
MONOCYTES NFR BLD AUTO: 9 % (ref 4–12)
NEUTROPHILS # BLD AUTO: 2.18 THOUSANDS/ÂΜL (ref 1.85–7.62)
NEUTS SEG NFR BLD AUTO: 47 % (ref 43–75)
NONHDLC SERPL-MCNC: 153 MG/DL
NRBC BLD AUTO-RTO: 0 /100 WBCS
PLATELET # BLD AUTO: 281 THOUSANDS/UL (ref 149–390)
PMV BLD AUTO: 9.6 FL (ref 8.9–12.7)
POTASSIUM SERPL-SCNC: 3.9 MMOL/L (ref 3.5–5.3)
PROT SERPL-MCNC: 6.9 G/DL (ref 6.4–8.4)
RBC # BLD AUTO: 4.42 MILLION/UL (ref 3.81–5.12)
SODIUM SERPL-SCNC: 139 MMOL/L (ref 135–147)
TRIGL SERPL-MCNC: 67 MG/DL (ref ?–150)
TSH SERPL DL<=0.05 MIU/L-ACNC: 3.73 UIU/ML (ref 0.45–4.5)
VIT B12 SERPL-MCNC: 400 PG/ML (ref 180–914)
WBC # BLD AUTO: 4.74 THOUSAND/UL (ref 4.31–10.16)

## 2025-02-24 PROCEDURE — 36415 COLL VENOUS BLD VENIPUNCTURE: CPT

## 2025-02-24 PROCEDURE — 82306 VITAMIN D 25 HYDROXY: CPT

## 2025-03-17 ENCOUNTER — OFFICE VISIT (OUTPATIENT)
Dept: FAMILY MEDICINE CLINIC | Facility: HOSPITAL | Age: 70
End: 2025-03-17
Payer: MEDICARE

## 2025-03-17 VITALS
HEIGHT: 67 IN | SYSTOLIC BLOOD PRESSURE: 126 MMHG | WEIGHT: 147.6 LBS | DIASTOLIC BLOOD PRESSURE: 79 MMHG | OXYGEN SATURATION: 97 % | TEMPERATURE: 97.7 F | HEART RATE: 79 BPM | BODY MASS INDEX: 23.17 KG/M2

## 2025-03-17 DIAGNOSIS — E78.5 DYSLIPIDEMIA: Primary | ICD-10-CM

## 2025-03-17 DIAGNOSIS — E55.9 VITAMIN D DEFICIENCY: ICD-10-CM

## 2025-03-17 DIAGNOSIS — Z12.31 ENCOUNTER FOR SCREENING MAMMOGRAM FOR BREAST CANCER: ICD-10-CM

## 2025-03-17 DIAGNOSIS — R03.0 ELEVATED BLOOD PRESSURE READING IN OFFICE WITHOUT DIAGNOSIS OF HYPERTENSION: ICD-10-CM

## 2025-03-17 DIAGNOSIS — R79.89 ELEVATED TSH: ICD-10-CM

## 2025-03-17 PROCEDURE — G2211 COMPLEX E/M VISIT ADD ON: HCPCS | Performed by: INTERNAL MEDICINE

## 2025-03-17 PROCEDURE — 99214 OFFICE O/P EST MOD 30 MIN: CPT | Performed by: INTERNAL MEDICINE

## 2025-03-17 RX ORDER — VITAMIN B COMPLEX
1000 TABLET ORAL DAILY
COMMUNITY

## 2025-03-17 NOTE — ASSESSMENT & PLAN NOTE
Vit D low nml, will restart Vit D at 1000 IU 1 tab PO q day, recheck levels in 6 mos - BW order given, will follow  Orders:    Vitamin D 25 hydroxy; Future

## 2025-03-17 NOTE — PROGRESS NOTES
Name: Sandra Rodas      : 1955      MRN: [id number]  Encounter Provider: Sagrario Good DO  Encounter Date: 3/17/2025   Encounter department: [facility] [address]   :  Assessment & Plan  Dyslipidemia  TC/LDL still elevated, pt refusing statin d/t SE in the past, encouraged healthy diet and regular formal exercise, will follow FLP annually       Elevated TSH  TSH again wnl, con't to monitor annually       Vitamin D deficiency  Vit D low nml, will restart Vit D at 1000 IU 1 tab PO q day, recheck levels in 6 mos - BW order given, will follow  Orders:    Vitamin D 25 hydroxy; Future    Elevated blood pressure reading in office without diagnosis of hypertension  BP much better today and pt reports nml home BP readings, con't to monitor off BP meds, recheck in 6 mos or so       Encounter for screening mammogram for breast cancer    Orders:    Mammo screening bilateral w 3d and cad; Future         Cologuard 10/22- 3 yrs    Mammo  - order given for     Dexa  - osteopenia - deferring as she would not take medicine for this at all    BW     Moving in with Phill (boyfriend) in April       History of Present Illness   HPI Pt here for follow up appt and BW results    BW results were d/w pt in detail: CMP/TSH/CBC/B12/Vit D were all wnl, FLP with  and , TG and HDL at goal    Goal FLP was d/w pt in detail.  Diet/exercise reviewed.  She is not on a statin daily.  She notes no stroke/TIA symptoms/CP.     Last visit in Dec pts BP was elevated in HTN range. She was told to watch diet and exercise regularly.  NO BP meds were started. She is here for a BP check. BP much better today and wnl. She remains off all BP meds.  She does check her BP at home and states she does average around 120's/60's.  She notes no frequent HA's/dizziness/double vision/flushing/CP.      Review of Systems   Constitutional:  Negative for chills and fever.   HENT:  Negative for congestion  "and sore throat.    Eyes:  Negative for pain and visual disturbance.   Respiratory:  Negative for cough and shortness of breath.    Cardiovascular:  Negative for chest pain and palpitations.   Gastrointestinal:  Negative for abdominal pain, diarrhea and nausea.   Genitourinary:  Negative for difficulty urinating and dysuria.   Musculoskeletal:  Negative for back pain and neck pain.   Skin:  Negative for rash and wound.   Neurological:  Negative for dizziness and headaches.   Hematological:  Does not bruise/bleed easily.   Psychiatric/Behavioral:  Negative for confusion and dysphoric mood.        Objective   /79 (BP Location: Left arm, Patient Position: Sitting, Cuff Size: Standard)   Pulse 79   Temp 97.7 °F (36.5 °C)   Ht 5' 7\" (1.702 m)   Wt 67 kg (147 lb 9.6 oz)   SpO2 97%   BMI 23.12 kg/m²      Physical Exam  Vitals and nursing note reviewed.   Constitutional:       General: She is not in acute distress.     Appearance: She is well-developed. She is not ill-appearing.   HENT:      Head: Normocephalic and atraumatic.      Right Ear: External ear normal.      Left Ear: External ear normal.   Eyes:      General:         Right eye: No discharge.         Left eye: No discharge.      Conjunctiva/sclera: Conjunctivae normal.   Neck:      Thyroid: No thyromegaly.      Trachea: No tracheal deviation.   Cardiovascular:      Rate and Rhythm: Normal rate and regular rhythm.      Heart sounds: Normal heart sounds. No murmur heard.  Pulmonary:      Effort: Pulmonary effort is normal. No respiratory distress.      Breath sounds: Normal breath sounds. No wheezing, rhonchi or rales.   Musculoskeletal:         General: No deformity or signs of injury.      Cervical back: Neck supple.   Skin:     General: Skin is warm and dry.      Coloration: Skin is not pale.      Findings: No rash.   Neurological:      General: No focal deficit present.      Mental Status: She is alert. Mental status is at baseline.      Motor: No " abnormal muscle tone.      Gait: Gait normal.   Psychiatric:         Mood and Affect: Mood normal.         Behavior: Behavior normal.         Judgment: Judgment normal.

## 2025-03-17 NOTE — ASSESSMENT & PLAN NOTE
TC/LDL still elevated, pt refusing statin d/t SE in the past, encouraged healthy diet and regular formal exercise, will follow FLP annually

## 2025-06-29 ENCOUNTER — HOSPITAL ENCOUNTER (EMERGENCY)
Facility: HOSPITAL | Age: 70
Discharge: HOME | End: 2025-06-29
Attending: EMERGENCY MEDICINE | Admitting: EMERGENCY MEDICINE
Payer: MEDICARE

## 2025-06-29 VITALS
DIASTOLIC BLOOD PRESSURE: 78 MMHG | WEIGHT: 142 LBS | RESPIRATION RATE: 18 BRPM | HEIGHT: 67 IN | OXYGEN SATURATION: 98 % | TEMPERATURE: 98 F | BODY MASS INDEX: 22.29 KG/M2 | HEART RATE: 77 BPM | SYSTOLIC BLOOD PRESSURE: 181 MMHG

## 2025-06-29 DIAGNOSIS — I83.811 VARICOSE VEINS OF RIGHT LOWER EXTREMITY WITH PAIN: ICD-10-CM

## 2025-06-29 DIAGNOSIS — M25.561 ACUTE PAIN OF RIGHT KNEE: Primary | ICD-10-CM

## 2025-06-29 PROCEDURE — 99282 EMERGENCY DEPT VISIT SF MDM: CPT

## 2025-06-29 PROCEDURE — 99284 EMERGENCY DEPT VISIT MOD MDM: CPT | Performed by: EMERGENCY MEDICINE

## 2025-06-30 ENCOUNTER — HOSPITAL ENCOUNTER (OUTPATIENT)
Dept: NON INVASIVE DIAGNOSTICS | Facility: HOSPITAL | Age: 70
Discharge: HOME | End: 2025-06-30
Attending: EMERGENCY MEDICINE
Payer: MEDICARE

## 2025-06-30 DIAGNOSIS — M25.561 ACUTE PAIN OF RIGHT KNEE: ICD-10-CM

## 2025-06-30 DIAGNOSIS — I83.811 VARICOSE VEINS OF RIGHT LOWER EXTREMITY WITH PAIN: ICD-10-CM

## 2025-06-30 PROCEDURE — 93971 EXTREMITY STUDY: CPT

## 2025-06-30 PROCEDURE — 93971 EXTREMITY STUDY: CPT | Performed by: SURGERY

## 2025-06-30 NOTE — DISCHARGE INSTRUCTIONS
You have been seen for right leg pain. Please take tylenol as needed for discomfort. Complete the outpatient ultrasound as ordered. Return to the emergency department if you develop worsening pain, weakness/numbness, fevers, trouble breathing or any other symptoms of concern. Please follow up with your PCP by calling the number provided.

## 2025-06-30 NOTE — ED PROVIDER NOTES
Time reflects when diagnosis was documented in both MDM as applicable and the Disposition within this note       Time User Action Codes Description Comment    6/29/2025 10:14 PM Bijan Soto Add [M25.561] Acute pain of right knee     6/29/2025 10:15 PM Bijan Soto Add [I83.811] Varicose veins of right lower extremity with pain           ED Disposition       ED Disposition   Discharge    Condition   Stable    Date/Time   Sun Jun 29, 2025 10:14 PM    Comment   Sandra Rodas discharge to home/self care.                   Assessment & Plan       Medical Decision Making    70 y.o. female presenting for left posterior knee discomfort.  Symptoms seem suggestive of meniscus/ligamentous injury or strain.  Ddx would include baker's cyst.  Offered to perform xray to evaluate for arthritis though patient declines xray at this time.  Clinically DVT seems unlikely, unfortunately patient evaluated at time where vascular ultrasound not available.  Will provide Rx for outpatient vascular US to exclude DVT.    Disposition: I have discussed with the patient our plan to discharge them from the ED and the patient is in agreement with this plan.     Discharge Plan: PRN Tylenol for pain.advised ice and elevation as needed. Patient declined crutches.  Rx provided for outpatient vascular duplex. RTED precautions emphasized. The patient was provided a written after visit summary with strict RTED precautions.     Followup: I have discussed with the patient plan to follow up with their PCP. Contact information provided in AVS.             Medications - No data to display    ED Risk Strat Scores                    No data recorded                            History of Present Illness       Chief Complaint   Patient presents with    Leg Pain     Patient arrived through triage with complaint of R posterior knee pain that began today. She reports a lot of walking over the last two days. Hx CVA; takes 81mg ASA.        Past Medical History[1]    Past Surgical History[2]   Family History[3]   Social History[4]   E-Cigarette/Vaping    E-Cigarette Use Never User       E-Cigarette/Vaping Substances    Nicotine No     THC No     CBD No     Flavoring No     Other No     Unknown No       I have reviewed and agree with the history as documented.     Sandra Rodas is a 70 y.o. year old female with PMH of CVA presenting [facility] ED for right knee pain. Patient has had two days of right knee pain.  Pain started while walking.  She denies fall or trauma to the area.  Pain was severe initially however improving at time of evaluation.  She is able to ambulate though with discomfort. Patient denies associated weakness/numbness in the lower extremity.  No fevers or chills.  No chest pain or dyspnea.  The patient has not taken/received any medications at home for relief of symptoms. Patient denies history of DVT/PE. Compliant with ASA Daily.        History provided by:  Medical records and patient   used: No    Leg Pain  Associated symptoms: no fever        Review of Systems   Constitutional:  Negative for chills and fever.   Respiratory:  Negative for shortness of breath.    Cardiovascular:  Negative for chest pain.   Musculoskeletal:  Positive for arthralgias and myalgias. Negative for joint swelling.   Skin:  Negative for color change and rash.   Neurological:  Negative for weakness and numbness.   All other systems reviewed and are negative.          Objective       ED Triage Vitals   Temperature Pulse Blood Pressure Respirations SpO2 Patient Position - Orthostatic VS   06/29/25 2032 06/29/25 2029 06/29/25 2029 06/29/25 2029 06/29/25 2029 06/29/25 2029   98 °F (36.7 °C) 77 (!) 181/78 18 98 % Sitting      Temp src Heart Rate Source BP Location FiO2 (%) Pain Score    -- 06/29/25 2029 06/29/25 2029 -- 06/29/25 2029     Monitor Left arm  8      Vitals      Date and Time Temp Pulse SpO2 Resp BP Pain Score FACES Pain Rating User   06/29/25 2032  98 °F (36.7 °C) -- -- -- -- -- -- BS   06/29/25 2029 -- 77 98 % 18 181/78 8 -- BS            Physical Exam  Vitals and nursing note reviewed.   Constitutional:       General: She is not in acute distress.     Appearance: Normal appearance. She is well-developed. She is not ill-appearing, toxic-appearing or diaphoretic.   HENT:      Head: Normocephalic and atraumatic.      Nose: No congestion or rhinorrhea.     Eyes:      General:         Right eye: No discharge.         Left eye: No discharge.       Cardiovascular:      Rate and Rhythm: Normal rate and regular rhythm.      Pulses:           Popliteal pulses are 2+ on the right side.   Pulmonary:      Effort: Pulmonary effort is normal. No accessory muscle usage or respiratory distress.      Breath sounds: Normal breath sounds. No stridor. No decreased breath sounds, wheezing, rhonchi or rales.   Abdominal:      General: Bowel sounds are normal.     Musculoskeletal:      Cervical back: Normal range of motion. No rigidity.      Right knee: No swelling, deformity, effusion, erythema, bony tenderness or crepitus. Normal range of motion. Tenderness present over the medial joint line. No LCL laxity, MCL laxity, ACL laxity or PCL laxity. Normal alignment. Normal pulse.      Right lower leg: No tenderness. No edema.      Left lower leg: No tenderness. No edema.     Skin:     Capillary Refill: Capillary refill takes less than 2 seconds.      Coloration: Skin is not pale.     Neurological:      Mental Status: She is alert and oriented to person, place, and time.     Psychiatric:         Mood and Affect: Mood normal.         Behavior: Behavior normal.         Results Reviewed       None            VAS VENOUS DUPLEX -LOWER LIMB UNILATERAL    (Results Pending)       Procedures    ED Medication and Procedure Management   Prior to Admission Medications   Prescriptions Last Dose Informant Patient Reported? Taking?   aspirin 81 mg chewable tablet   Yes No   Sig: Chew 81 mg daily    cholecalciferol (VITAMIN D3) 25 mcg (1,000 units) tablet   Yes No   Sig: Take 1,000 Units by mouth daily      Facility-Administered Medications: None     Discharge Medication List as of 2025 10:15 PM        CONTINUE these medications which have NOT CHANGED    Details   aspirin 81 mg chewable tablet Chew 81 mg daily, Historical Med      cholecalciferol (VITAMIN D3) 25 mcg (1,000 units) tablet Take 1,000 Units by mouth daily, Historical Med           Outpatient Discharge Orders   VAS VENOUS DUPLEX -LOWER LIMB UNILATERAL   Standing Status: Future Standing Exp. Date: 29     ED SEPSIS DOCUMENTATION   Time reflects when diagnosis was documented in both MDM as applicable and the Disposition within this note       Time User Action Codes Description Comment    2025 10:14 PM Bijan Soto [M25.561] Acute pain of right knee     2025 10:15 PM Bijan Soto [I83.811] Varicose veins of right lower extremity with pain                      [1]   Past Medical History:  Diagnosis Date    Benign paroxysmal positional vertigo     Resolved: 9/15/2014    Cataracts, bilateral     CVA (cerebral vascular accident) (HCC)         Fracture of radius    [2]   Past Surgical History:  Procedure Laterality Date    BIOPSY CORE NEEDLE      Thyroid    CARDIAC ELECTROPHYSIOLOGY PROCEDURE N/A 2022    Procedure: Cardiac loop recorder implant;  Surgeon: Aryan Mann MD;  Location: [facility];  Service: Cardiology    CARDIAC ELECTROPHYSIOLOGY PROCEDURE N/A 2024    Procedure: Cardiac loop recorder explant;  Surgeon: Aryan Mann MD;  Location: [facility];  Service: Cardiology     SECTION      COLPOSCOPY      HIP SURGERY     [3]   Family History  Problem Relation Name Age of Onset    Stroke Mother Leslie         CVA    Breast cancer Mother Leslie 66    Alcohol abuse Father      COPD Father      Other Father          Nicotine Abuse    No Known Problems Sister deidra     No Known  Problems Daughter marshall     No Known Problems Daughter antonio     Thyroid disease Maternal Grandmother      No Known Problems Maternal Grandfather      No Known Problems Paternal Grandmother      No Known Problems Paternal Grandfather      Thyroid cancer Brother Fernando 60    Coronary artery disease Brother Fernando     No Known Problems Brother      Breast cancer Maternal Aunt may 40    Breast cancer additional onset Maternal Aunt may 50    No Known Problems Maternal Aunt collin     No Known Problems Maternal Aunt clifford     No Known Problems Maternal Aunt cameron    [4]   Social History  Tobacco Use    Smoking status: Never    Smokeless tobacco: Never   Vaping Use    Vaping status: Never Used   Substance Use Topics    Alcohol use: Yes     Comment: social    Drug use: No        Bijan Soto, DO  06/30/25 0243

## 2025-07-31 ENCOUNTER — HOSPITAL ENCOUNTER (OUTPATIENT)
Dept: MAMMOGRAPHY | Facility: IMAGING CENTER | Age: 70
Discharge: HOME | End: 2025-07-31
Payer: MEDICARE

## 2025-07-31 VITALS — WEIGHT: 140 LBS | HEIGHT: 67 IN | BODY MASS INDEX: 21.97 KG/M2

## 2025-07-31 DIAGNOSIS — Z12.31 ENCOUNTER FOR SCREENING MAMMOGRAM FOR BREAST CANCER: ICD-10-CM

## 2025-07-31 PROCEDURE — 77067 SCR MAMMO BI INCL CAD: CPT

## 2025-07-31 PROCEDURE — 77063 BREAST TOMOSYNTHESIS BI: CPT
